# Patient Record
Sex: FEMALE | Race: WHITE | HISPANIC OR LATINO | Employment: UNEMPLOYED | URBAN - METROPOLITAN AREA
[De-identification: names, ages, dates, MRNs, and addresses within clinical notes are randomized per-mention and may not be internally consistent; named-entity substitution may affect disease eponyms.]

---

## 2017-01-09 ENCOUNTER — TELEPHONE (OUTPATIENT)
Dept: PEDIATRIC CARDIOLOGY | Facility: CLINIC | Age: 22
End: 2017-01-09

## 2017-01-26 ENCOUNTER — HOSPITAL ENCOUNTER (OUTPATIENT)
Dept: CARDIOLOGY | Facility: CLINIC | Age: 22
Discharge: HOME | End: 2017-01-26
Payer: COMMERCIAL

## 2017-01-26 ENCOUNTER — OFFICE VISIT (OUTPATIENT)
Dept: CARDIOLOGY | Facility: CLINIC | Age: 22
End: 2017-01-26
Payer: COMMERCIAL

## 2017-01-26 VITALS
WEIGHT: 106.69 LBS | SYSTOLIC BLOOD PRESSURE: 98 MMHG | HEART RATE: 54 BPM | HEIGHT: 59 IN | OXYGEN SATURATION: 98 % | DIASTOLIC BLOOD PRESSURE: 58 MMHG | BODY MASS INDEX: 21.51 KG/M2

## 2017-01-26 DIAGNOSIS — Q89.3 SITUS INVERSUS: ICD-10-CM

## 2017-01-26 DIAGNOSIS — I37.0 PULMONARY STENOSIS: ICD-10-CM

## 2017-01-26 DIAGNOSIS — Z98.890 S/P FONTAN PROCEDURE: ICD-10-CM

## 2017-01-26 DIAGNOSIS — I37.0 NONRHEUMATIC PULMONARY VALVE STENOSIS: ICD-10-CM

## 2017-01-26 DIAGNOSIS — Q20.1 DORV (DOUBLE OUTLET RIGHT VENTRICLE): ICD-10-CM

## 2017-01-26 DIAGNOSIS — Q24.9 ADULT CONGENITAL HEART DISEASE: ICD-10-CM

## 2017-01-26 DIAGNOSIS — Q24.9 ADULT CONGENITAL HEART DISEASE: Primary | ICD-10-CM

## 2017-01-26 PROCEDURE — 99999 PR PBB SHADOW E&M-EST. PATIENT-LVL III: CPT | Mod: PBBFAC,,, | Performed by: PEDIATRICS

## 2017-01-26 PROCEDURE — 93303 ECHO TRANSTHORACIC: CPT | Mod: S$GLB,,, | Performed by: PEDIATRICS

## 2017-01-26 PROCEDURE — 93320 DOPPLER ECHO COMPLETE: CPT | Mod: S$GLB,,, | Performed by: PEDIATRICS

## 2017-01-26 PROCEDURE — 1159F MED LIST DOCD IN RCRD: CPT | Mod: S$GLB,,, | Performed by: PEDIATRICS

## 2017-01-26 PROCEDURE — 93325 DOPPLER ECHO COLOR FLOW MAPG: CPT | Mod: S$GLB,,, | Performed by: PEDIATRICS

## 2017-01-26 PROCEDURE — 99214 OFFICE O/P EST MOD 30 MIN: CPT | Mod: S$GLB,,, | Performed by: PEDIATRICS

## 2017-01-26 PROCEDURE — 93000 ELECTROCARDIOGRAM COMPLETE: CPT | Mod: S$GLB,,, | Performed by: INTERNAL MEDICINE

## 2017-01-28 NOTE — PROGRESS NOTES
"2017     [facility]    Primary Doctor No  No address on file    re:Theresa Grey  :1995    Theresa Grey is a 21 y.o. female diagnosed as a young child with congenital heart disease while living in [place]. She was evaluated at [facility] with a cath at about 7 years of age. She was scheduled for heart surgery by her report about 10 years ago. However, due to social issues (no insurance, no social security number) the surgery was put off and she was lost to follow up. I first saw her in  she was admitted with pregnancy, rare chest pain, and cyanosis.  After long discussion, she had a pregnancy termination due to the risk associated with unrepaired cyanotic congenital heart disease.    On 16, she underwent an extracardiac Fontan with oversewing and ligation of the MPA at [facility].  She did very well and was discharged a week later.      Interval history:  I last saw her a year ago.  Since then, she denies new problems.  No chest pain, worsening dyspnea on exertion, edema, diarrhea, palpitations, productive cough, syncope, near syncope.    The review of systems is as noted above. It is otherwise negative for other symptoms related to the general, neurological, psychiatric, endocrine, gastrointestinal, genitourinary, respiratory, dermatologic, musculoskeletal, hematologic, and immunologic systems.    Past Medical History   Diagnosis Date    heart disease      fatigue as a result of pregnancy (8wks ) clubbing indicates chronic oxygenation issues but pt considered it "normal"     Past Surgical History   Procedure Laterality Date    Cardiac catheterization  at 7 yars of age    Dilation and curettage of uterus      Fontan procedure, extracardiac  2015     With a nonfenestrated 22 mm [place]-Justice tube graft.  The pulmonary valve was closed.  Procedure performed by Dr. Moe Kulkarni at [facility].     Family History   Problem Relation " Age of Onset    Diabetes Maternal Grandmother     Vision loss Maternal Grandmother     Miscarriages / Stillbirths Mother     Diabetes Father     Diabetes Paternal Grandmother     No Known Problems Brother     No Known Problems Maternal Grandfather     No Known Problems Paternal Grandfather     Hypertension Maternal Aunt     Diabetes Maternal Aunt     No Known Problems Sister     No Known Problems Maternal Uncle     No Known Problems Paternal Aunt     No Known Problems Paternal Uncle     Anemia Neg Hx     Arrhythmia Neg Hx     Asthma Neg Hx     Clotting disorder Neg Hx     Fainting Neg Hx     Heart attack Neg Hx     Heart disease Neg Hx     Heart failure Neg Hx     Hyperlipidemia Neg Hx     Stroke Neg Hx     Atrial Septal Defect Neg Hx      Social History     Social History    Marital status: Single     Spouse name: N/A    Number of children: N/A    Years of education: N/A     Social History Main Topics    Smoking status: Former Smoker     Packs/day: 0.25    Smokeless tobacco: Never Used    Alcohol use 0.0 oz/week     0 Standard drinks or equivalent per week      Comment: on ocassion before pregnancy    Drug use: No      Comment: previous marijuana use    Sexual activity: Yes     Partners: Male     Birth control/ protection: Injection     Other Topics Concern    None     Social History Narrative    None     Current Outpatient Prescriptions on File Prior to Visit   Medication Sig Dispense Refill    aspirin 81 MG Chew Take 1 tablet (81 mg total) by mouth once daily.  0    ibuprofen (ADVIL) 100 MG tablet Take 100 mg by mouth every 6 (six) hours as needed for Temperature greater than.       No current facility-administered medications on file prior to visit.      Review of patient's allergies indicates:   Allergen Reactions    Hydrocodone Shortness Of Breath and Other (See Comments)     Dizziness; sweating     Visit Vitals    BP (!) 98/58 (BP Location: Right arm, Patient Position:  "Sitting, BP Method: Automatic)    Pulse (!) 54    Ht 4' 11" (1.499 m)    Wt 48.4 kg (106 lb 11.2 oz)    LMP 01/05/2017    SpO2 98%    BMI 21.55 kg/m2       SpO2 Readings from Last 3 Encounters:   01/26/17 98%   01/27/16 97%   07/30/15 (!) 83%   In general, she is a thin, acyanotic, nondysmorphic appearing female in no apparent distress.  The eyes, nares, and oropharynx are clear.  Eyelids and conjunctiva free of drainage and redness.  PERRLA.  Teeth in good repair.  Mucous membranes are moist.  The head is normocephalic and atraumatic.  The neck is supple without jugular venous distention or thyroid enlargement.  The lungs are clear to auscultation bilaterally.  There is a well healed sternotomy scar.  The PMI is in the right chest.  The first heart sound is normal.  The second is loud and single.  There are no gallops, rubs, or clicks in the supine position.  The abdominal exam is benign without hepatosplenomegaly, tenderness, or distention.  Pulses are normal in all 4 extremities with brisk capillary refill and no edema.  No rashes are noted.  She has some clubbing.  No tenderness, swelling in legs.  Her neurological exam is normal.    EKG with a "left atrial rhythm" with evidence of dextrocardia.  HR about 50.  Echo:  CONCLUSIONS     1 - Heterotaxy, abdominal situs inversus, dextrocardia (I, L, D), atrial situs inversus, atrioventricular concordance, large VSD with straddling AV valves, d-malposed great arteries and pulmonary stenosis s/p Fontan.     2 - Patent left SVC Pavel with flow noted into the proximal branch PAs.     3 - Intact IVC to Fontan without evidence of obstruction. The Fontan lateral to the heart is not seen. Flow from the Fontan is demonstrated into the pulmonary artery confluence.     4 - Pulmonary venous anatomy is not well seen.     5 - The atrial septum is not well seen.     6 - Large inlet to outlet VSD with shunt from LV to RV.     7 - The RV is leftward, anterior, and superior. " Moderate RVH, moderate RV dilation. Qualitatively normal RV function. .     8 - The LV is rightward, posterior, and inferior. Normal LV systolic function.     9 - The mitral valve straddles the ventricular septum. .     10 - Trivial mitral regurgitation.     11 - The tricuspid valve is difficult to visualize.     12 - Mild tricuspid regurgitation.     13 - No aortic valve insufficiency. Normal aortic valve velocity.    Diagnoses:  1. 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 resolved cyanosis    Discussion:  I had a long discussion with the patient last year, and we reviewed today. We discussed:  1. Risk of thromboembolic events with Fontan   - she needs to be on a baby aspirin daily to try to cut down on that risk   - would avoid estrogen containing birth control.  She will discuss with her gynecologist  2. Long term risk of arrhythmias, heart failure, PLE, liver disease discussed  3. Desire for pregnancy - we discussed the increased risk of arrhythmia, swelling, heart failure symptoms in mother's with Fontan physiology and the increased risk of early delivery, miscarriage, and congenital heart disease in the offspring.  That said, she would likely do well.  Would need careful monitoring of pregnancy by high risk OB, fetal evaluation for heart disease at about 18-22 wga, and delivery at [facility].  Vaginal delivery is preferable from a cardiac standpoint.  That said, there is definitely risk involved, and careful consideration of all potential issues related to pregnancy need to be considered.    Plan:  1. aspirin 81 mg daily  2. 1 year with ekg, CPX, holter and echo  3. SBEP is indicated for dental work     Sincerely,        Toro Weems MD  Pediatric Cardiology  Adult Congenital Heart Disease  Pediatric Heart Failure and  Transplantation  [facility]  [address] [place]  [address]  [phone]

## 2019-04-18 ENCOUNTER — HOSPITAL ENCOUNTER (EMERGENCY)
Facility: HOSPITAL | Age: 24
Discharge: HOME | End: 2019-04-18
Attending: EMERGENCY MEDICINE
Payer: COMMERCIAL

## 2019-04-18 VITALS
BODY MASS INDEX: 22.58 KG/M2 | SYSTOLIC BLOOD PRESSURE: 110 MMHG | DIASTOLIC BLOOD PRESSURE: 70 MMHG | HEIGHT: 59 IN | HEART RATE: 63 BPM | WEIGHT: 112 LBS | RESPIRATION RATE: 17 BRPM | OXYGEN SATURATION: 96 % | TEMPERATURE: 98 F

## 2019-04-18 DIAGNOSIS — R07.9 CHEST PAIN: ICD-10-CM

## 2019-04-18 LAB
ANION GAP SERPL CALC-SCNC: 10 MMOL/L (ref 8–16)
B-HCG UR QL: NEGATIVE
BASOPHILS # BLD AUTO: 0 K/UL (ref 0–0.2)
BASOPHILS NFR BLD: 0.3 % (ref 0–1.9)
BUN SERPL-MCNC: 11 MG/DL (ref 6–20)
CALCIUM SERPL-MCNC: 9.8 MG/DL (ref 8.7–10.5)
CHLORIDE SERPL-SCNC: 107 MMOL/L (ref 95–110)
CO2 SERPL-SCNC: 22 MMOL/L (ref 23–29)
CREAT SERPL-MCNC: 0.7 MG/DL (ref 0.5–1.4)
CTP QC/QA: YES
D DIMER PPP IA.FEU-MCNC: 0.19 MG/L FEU
DIFFERENTIAL METHOD: ABNORMAL
EOSINOPHIL # BLD AUTO: 0.5 K/UL (ref 0–0.5)
EOSINOPHIL NFR BLD: 4.4 % (ref 0–8)
ERYTHROCYTE [DISTWIDTH] IN BLOOD BY AUTOMATED COUNT: 13 % (ref 11.5–14.5)
EST. GFR  (AFRICAN AMERICAN): >60 ML/MIN/1.73 M^2
EST. GFR  (NON AFRICAN AMERICAN): >60 ML/MIN/1.73 M^2
GLUCOSE SERPL-MCNC: 114 MG/DL (ref 70–110)
HCT VFR BLD AUTO: 40.7 % (ref 37–48.5)
HGB BLD-MCNC: 13.3 G/DL (ref 12–16)
LYMPHOCYTES # BLD AUTO: 2.3 K/UL (ref 1–4.8)
LYMPHOCYTES NFR BLD: 19.8 % (ref 18–48)
MCH RBC QN AUTO: 29.4 PG (ref 27–31)
MCHC RBC AUTO-ENTMCNC: 32.5 G/DL (ref 32–36)
MCV RBC AUTO: 90 FL (ref 82–98)
MONOCYTES # BLD AUTO: 0.7 K/UL (ref 0.3–1)
MONOCYTES NFR BLD: 6.1 % (ref 4–15)
NEUTROPHILS # BLD AUTO: 8.2 K/UL (ref 1.8–7.7)
NEUTROPHILS NFR BLD: 69.4 % (ref 38–73)
PLATELET # BLD AUTO: 153 K/UL (ref 150–350)
PMV BLD AUTO: 12.1 FL (ref 9.2–12.9)
POTASSIUM SERPL-SCNC: 3.6 MMOL/L (ref 3.5–5.1)
RBC # BLD AUTO: 4.51 M/UL (ref 4–5.4)
SODIUM SERPL-SCNC: 139 MMOL/L (ref 136–145)
TROPONIN I SERPL DL<=0.01 NG/ML-MCNC: <0.006 NG/ML (ref 0–0.03)
WBC # BLD AUTO: 11.8 K/UL (ref 3.9–12.7)

## 2019-04-18 PROCEDURE — 84484 ASSAY OF TROPONIN QUANT: CPT

## 2019-04-18 PROCEDURE — 85379 FIBRIN DEGRADATION QUANT: CPT

## 2019-04-18 PROCEDURE — 25000003 PHARM REV CODE 250: Performed by: EMERGENCY MEDICINE

## 2019-04-18 PROCEDURE — 93005 ELECTROCARDIOGRAM TRACING: CPT

## 2019-04-18 PROCEDURE — 36415 COLL VENOUS BLD VENIPUNCTURE: CPT

## 2019-04-18 PROCEDURE — 85025 COMPLETE CBC W/AUTO DIFF WBC: CPT

## 2019-04-18 PROCEDURE — 81025 URINE PREGNANCY TEST: CPT | Performed by: EMERGENCY MEDICINE

## 2019-04-18 PROCEDURE — 99285 EMERGENCY DEPT VISIT HI MDM: CPT | Mod: 25

## 2019-04-18 PROCEDURE — 80048 BASIC METABOLIC PNL TOTAL CA: CPT

## 2019-04-18 RX ORDER — IBUPROFEN 400 MG/1
400 TABLET ORAL
Status: COMPLETED | OUTPATIENT
Start: 2019-04-18 | End: 2019-04-18

## 2019-04-18 RX ADMIN — IBUPROFEN 400 MG: 400 TABLET, FILM COATED ORAL at 09:04

## 2019-04-19 NOTE — ED PROVIDER NOTES
"Encounter Date: 4/18/2019    SCRIBE #1 NOTE: I, Binta Hong, am scribing for, and in the presence of, Dr. Hummel.       History     Chief Complaint   Patient presents with    Chest Pain     chest pain and SOB since this AM described at heaviness on the chest and top of back         Time seen by provider: 7:57 PM on 04/18/2019    Theresa Grey is a 23 y.o. female who presents to the ED complaining of chest pain located in the middle of her chest and shortness of breath that began this morning. She admits that it is painful to take deep breaths and notes that the pain radiates to her back. Pt is currently followed by the adult congential heart disease clinic. The patient denies vomiting, sweating, abdominal pain, or any other symptoms at this time. PMHx includes abnormality of heart valve. SHx includes extracardiac fontan procedure. Allergies include hydrocodone.    The history is provided by the patient.     Review of patient's allergies indicates:   Allergen Reactions    Hydrocodone Shortness Of Breath and Other (See Comments)     Dizziness; sweating     Past Medical History:   Diagnosis Date    Abnormality of heart valve     heart disease     fatigue as a result of pregnancy (8wks ) clubbing indicates chronic oxygenation issues but pt considered it "normal"     Past Surgical History:   Procedure Laterality Date    CARDIAC CATHETERIZATION  at 7 yars of age    D & C (SUCTION) N/A 12/12/2014    Performed by Pierre Ellsworth III, MD at [facility] OR 2ND FLR    DILATION AND CURETTAGE OF UTERUS      FONTAN PROCEDURE, EXTRACARDIAC  September 29, 2015    With a nonfenestrated 22 mm [place]-Justice tube graft.  The pulmonary valve was closed.  Procedure performed by Dr. Moe [facility]i [facility] [place] [facility] WITH [facility] CONGEITAL CARD ABN N/A 3/20/2015    Performed by Patrick Fragoso Jr., MD at [facility] CATH LAB    TRANSESOPHAGEAL ECHOCARDIOGRAM (FLORI) N/A 3/20/2015    Performed by Patrick Fragoso Jr., MD at [facility] " CATH LAB     Family History   Problem Relation Age of Onset    Diabetes Maternal Grandmother     Vision loss Maternal Grandmother     Miscarriages / Stillbirths Mother     Diabetes Father     Diabetes Paternal Grandmother     No Known Problems Brother     No Known Problems Maternal Grandfather     No Known Problems Paternal Grandfather     Hypertension Maternal Aunt     Diabetes Maternal Aunt     No Known Problems Sister     No Known Problems Maternal Uncle     No Known Problems Paternal Aunt     No Known Problems Paternal Uncle     Anemia Neg Hx     Arrhythmia Neg Hx     Asthma Neg Hx     Clotting disorder Neg Hx     Fainting Neg Hx     Heart attack Neg Hx     Heart disease Neg Hx     Heart failure Neg Hx     Hyperlipidemia Neg Hx     Stroke Neg Hx     Atrial Septal Defect Neg Hx      Social History     Tobacco Use    Smoking status: Former Smoker     Packs/day: 0.25    Smokeless tobacco: Never Used   Substance Use Topics    Alcohol use: Yes     Alcohol/week: 0.0 oz     Comment: on ocassion before pregnancy    Drug use: No     Comment: previous marijuana use     Review of Systems   Constitutional: Negative for diaphoresis and fever.   HENT: Negative for sore throat.    Respiratory: Positive for shortness of breath.    Cardiovascular: Positive for chest pain.   Gastrointestinal: Negative for abdominal pain, nausea and vomiting.   Genitourinary: Negative for dysuria.   Musculoskeletal: Negative for back pain.   Skin: Negative for rash.   Neurological: Negative for weakness.   Hematological: Does not bruise/bleed easily.       Physical Exam     Initial Vitals [04/18/19 1944]   BP Pulse Resp Temp SpO2   131/69 97 17 98.3 °F (36.8 °C) 99 %      MAP       --         Physical Exam    Nursing note and vitals reviewed.  Constitutional: She appears well-developed and well-nourished. She is not diaphoretic. No distress.   HENT:   Head: Normocephalic and atraumatic.   Mouth/Throat: Oropharynx is  clear and moist.   Eyes: Conjunctivae are normal.   Neck: Neck supple.   Cardiovascular: Normal rate, regular rhythm, normal heart sounds and intact distal pulses. Exam reveals no gallop and no friction rub.    No murmur heard.  Pulses:       Radial pulses are 2+ on the right side, and 2+ on the left side.        Dorsalis pedis pulses are 2+ on the right side, and 2+ on the left side.        Posterior tibial pulses are 2+ on the right side, and 2+ on the left side.   Right-sided heart tones.   Pulmonary/Chest: Breath sounds normal. She has no wheezes. She has no rhonchi. She has no rales.   Well-healed midline chest incision.   Abdominal: Soft. She exhibits no distension. There is no tenderness.   Musculoskeletal: Normal range of motion. She exhibits no edema or tenderness.        Right lower leg: She exhibits no tenderness and no edema.        Left lower leg: She exhibits no tenderness and no edema.   Neurological: She is alert and oriented to person, place, and time.   Skin: No rash noted. No erythema.   Psychiatric: Her speech is normal.         ED Course   Procedures  Labs Reviewed   CBC W/ AUTO DIFFERENTIAL - Abnormal; Notable for the following components:       Result Value    Gran # (ANC) 8.2 (*)     All other components within normal limits   BASIC METABOLIC PANEL - Abnormal; Notable for the following components:    CO2 22 (*)     Glucose 114 (*)     All other components within normal limits   TROPONIN I   D DIMER, QUANTITATIVE   POCT URINE PREGNANCY          Imaging Results          X-Ray Chest PA And Lateral (In process)                  Medical Decision Making:   History:   Old Medical Records: I decided to obtain old medical records.  Clinical Tests:   Lab Tests: Ordered and Reviewed  Radiological Study: Ordered and Reviewed  Medical Tests: Ordered and Reviewed            Scribe Attestation:   Scribe #1: I performed the above scribed service and the documentation accurately describes the services I  performed. I attest to the accuracy of the note.    I, Dr. Jayson Hummel, personally performed the services described in this documentation. All medical record entries made by the scribe were at my direction and in my presence.  I have reviewed the chart and agree that the record reflects my personal performance and is accurate and complete. Jayson Hummel MD.  10:20 PM 04/18/2019    Theresa Grey is a 23 y.o. female presenting with chest pain in this otherwise well-appearing patient.  I did speak with Pediatric Cardiology given her significant history who concurs with outpatient management given negative workup.  Negative cardiac biomarker after symptoms throughout the day today.  I do not think she requires admission cardiac monitoring or serial troponin evaluation.  EKG is nonischemic with no sign of arrhythmia.  Lungs are clear with no sign of edema.  There is no JVD.  I doubt heart failure.  Low risk for PE with negative D-dimer and I think pulmonary embolus is very unlikely.  I do not think further CT angiography is indicated.  I have very low suspicion for aortic dissection.  I doubt other complication of previous cardiac surgery warranting emergent treatment.  She is appropriate for outpatient follow-up.  Follow up closely with Pediatric Cardiology Clinic this coming week.  Return precautions reviewed.        ED Course as of Apr 18 2221 Thu Apr 18, 2019 1952 EKG:  NSR, rate of 67, normal intervals, normal axis. Incomplete RBBB morphology similar to prior.  There are no acute ST or T wave changes suggestive of acute ischemia or infarction.      [MR]   2017 CXR:  Dextrocardia, sternal wires, NAD. (my read)    [MR]   2100 Awaiting return call from group with Dr. Toro Weems pediatric cardiology.    [MR]      ED Course User Index  [MR] Jayson Hummel MD     Clinical Impression:       ICD-10-CM ICD-9-CM   1. Chest pain R07.9 786.50         Disposition:   Disposition: Discharged  Condition:  Stable                        Jayson Hummel MD  04/18/19 0939

## 2019-04-22 DIAGNOSIS — Q24.9 ADULT CONGENITAL HEART DISEASE: Primary | ICD-10-CM

## 2019-09-18 DIAGNOSIS — Q20.1 DORV (DOUBLE OUTLET RIGHT VENTRICLE): Primary | ICD-10-CM

## 2019-09-26 ENCOUNTER — HOSPITAL ENCOUNTER (EMERGENCY)
Facility: HOSPITAL | Age: 24
Discharge: HOME | End: 2019-09-26
Attending: EMERGENCY MEDICINE
Payer: MEDICAID

## 2019-09-26 VITALS
RESPIRATION RATE: 16 BRPM | WEIGHT: 116 LBS | DIASTOLIC BLOOD PRESSURE: 57 MMHG | OXYGEN SATURATION: 98 % | HEART RATE: 62 BPM | BODY MASS INDEX: 23.39 KG/M2 | TEMPERATURE: 98 F | HEIGHT: 59 IN | SYSTOLIC BLOOD PRESSURE: 100 MMHG

## 2019-09-26 DIAGNOSIS — O21.9 VOMITING COMPLICATING PREGNANCY: Primary | ICD-10-CM

## 2019-09-26 LAB
ALBUMIN SERPL BCP-MCNC: 3.6 G/DL (ref 3.5–5.2)
ALP SERPL-CCNC: 50 U/L (ref 55–135)
ALT SERPL W/O P-5'-P-CCNC: 18 U/L (ref 10–44)
ANION GAP SERPL CALC-SCNC: 8 MMOL/L (ref 8–16)
APTT PPP: 29 SEC (ref 26.2–34.7)
AST SERPL-CCNC: 20 U/L (ref 10–40)
B-HCG UR QL: POSITIVE
BASOPHILS # BLD AUTO: 0.05 K/UL (ref 0–0.2)
BASOPHILS NFR BLD: 0.5 % (ref 0–1.9)
BILIRUB SERPL-MCNC: 0.7 MG/DL (ref 0.1–1)
BILIRUB UR QL STRIP: NEGATIVE
BUN SERPL-MCNC: 7 MG/DL (ref 6–20)
CALCIUM SERPL-MCNC: 9 MG/DL (ref 8.7–10.5)
CHLORIDE SERPL-SCNC: 106 MMOL/L (ref 95–110)
CLARITY UR: ABNORMAL
CO2 SERPL-SCNC: 21 MMOL/L (ref 23–29)
COLOR UR: YELLOW
CREAT SERPL-MCNC: 0.4 MG/DL (ref 0.5–1.4)
DIFFERENTIAL METHOD: ABNORMAL
EOSINOPHIL # BLD AUTO: 0.4 K/UL (ref 0–0.5)
EOSINOPHIL NFR BLD: 3.6 % (ref 0–8)
ERYTHROCYTE [DISTWIDTH] IN BLOOD BY AUTOMATED COUNT: 13 % (ref 11.5–14.5)
EST. GFR  (AFRICAN AMERICAN): >60 ML/MIN/1.73 M^2
EST. GFR  (NON AFRICAN AMERICAN): >60 ML/MIN/1.73 M^2
GLUCOSE SERPL-MCNC: 97 MG/DL (ref 70–110)
GLUCOSE UR QL STRIP: NEGATIVE
HCT VFR BLD AUTO: 38.5 % (ref 37–48.5)
HGB BLD-MCNC: 12.8 G/DL (ref 12–16)
HGB UR QL STRIP: NEGATIVE
IMM GRANULOCYTES # BLD AUTO: 0.05 K/UL (ref 0–0.04)
IMM GRANULOCYTES NFR BLD AUTO: 0.5 % (ref 0–0.5)
INR PPP: 1.1
KETONES UR QL STRIP: NEGATIVE
LEUKOCYTE ESTERASE UR QL STRIP: NEGATIVE
LIPASE SERPL-CCNC: 30 U/L (ref 4–60)
LYMPHOCYTES # BLD AUTO: 1.6 K/UL (ref 1–4.8)
LYMPHOCYTES NFR BLD: 16.6 % (ref 18–48)
MAGNESIUM SERPL-MCNC: 1.6 MG/DL (ref 1.6–2.6)
MCH RBC QN AUTO: 29.8 PG (ref 27–31)
MCHC RBC AUTO-ENTMCNC: 33.2 G/DL (ref 32–36)
MCV RBC AUTO: 90 FL (ref 82–98)
MONOCYTES # BLD AUTO: 0.5 K/UL (ref 0.3–1)
MONOCYTES NFR BLD: 4.7 % (ref 4–15)
NEUTROPHILS # BLD AUTO: 7.2 K/UL (ref 1.8–7.7)
NEUTROPHILS NFR BLD: 74.1 % (ref 38–73)
NITRITE UR QL STRIP: NEGATIVE
NRBC BLD-RTO: 0 /100 WBC
PH UR STRIP: 7 [PH] (ref 5–8)
PLATELET # BLD AUTO: 109 K/UL (ref 150–350)
PMV BLD AUTO: 13.8 FL (ref 9.2–12.9)
POTASSIUM SERPL-SCNC: 3.5 MMOL/L (ref 3.5–5.1)
PROT SERPL-MCNC: 7.5 G/DL (ref 6–8.4)
PROT UR QL STRIP: ABNORMAL
PROTHROMBIN TIME: 13.3 SEC (ref 11.7–14)
RBC # BLD AUTO: 4.3 M/UL (ref 4–5.4)
SODIUM SERPL-SCNC: 135 MMOL/L (ref 136–145)
SP GR UR STRIP: 1.01 (ref 1–1.03)
URN SPEC COLLECT METH UR: ABNORMAL
UROBILINOGEN UR STRIP-ACNC: NEGATIVE EU/DL
WBC # BLD AUTO: 9.69 K/UL (ref 3.9–12.7)

## 2019-09-26 PROCEDURE — C9113 INJ PANTOPRAZOLE SODIUM, VIA: HCPCS | Performed by: EMERGENCY MEDICINE

## 2019-09-26 PROCEDURE — 85730 THROMBOPLASTIN TIME PARTIAL: CPT

## 2019-09-26 PROCEDURE — 96375 TX/PRO/DX INJ NEW DRUG ADDON: CPT

## 2019-09-26 PROCEDURE — 85025 COMPLETE CBC W/AUTO DIFF WBC: CPT

## 2019-09-26 PROCEDURE — 83690 ASSAY OF LIPASE: CPT

## 2019-09-26 PROCEDURE — 96374 THER/PROPH/DIAG INJ IV PUSH: CPT

## 2019-09-26 PROCEDURE — 99284 EMERGENCY DEPT VISIT MOD MDM: CPT | Mod: 25

## 2019-09-26 PROCEDURE — 96361 HYDRATE IV INFUSION ADD-ON: CPT

## 2019-09-26 PROCEDURE — 80053 COMPREHEN METABOLIC PANEL: CPT

## 2019-09-26 PROCEDURE — 81003 URINALYSIS AUTO W/O SCOPE: CPT

## 2019-09-26 PROCEDURE — 81025 URINE PREGNANCY TEST: CPT

## 2019-09-26 PROCEDURE — 63600175 PHARM REV CODE 636 W HCPCS: Performed by: EMERGENCY MEDICINE

## 2019-09-26 PROCEDURE — 85610 PROTHROMBIN TIME: CPT

## 2019-09-26 PROCEDURE — 83735 ASSAY OF MAGNESIUM: CPT

## 2019-09-26 RX ORDER — METOCLOPRAMIDE 10 MG/1
10 TABLET ORAL EVERY 6 HOURS PRN
Qty: 15 TABLET | Refills: 0 | Status: SHIPPED | OUTPATIENT
Start: 2019-09-26 | End: 2019-11-12 | Stop reason: CLARIF

## 2019-09-26 RX ORDER — METOCLOPRAMIDE HYDROCHLORIDE 5 MG/ML
10 INJECTION INTRAMUSCULAR; INTRAVENOUS
Status: COMPLETED | OUTPATIENT
Start: 2019-09-26 | End: 2019-09-26

## 2019-09-26 RX ORDER — PANTOPRAZOLE SODIUM 20 MG/1
20 TABLET, DELAYED RELEASE ORAL DAILY
Qty: 10 TABLET | Refills: 0 | Status: SHIPPED | OUTPATIENT
Start: 2019-09-26 | End: 2019-11-12 | Stop reason: CLARIF

## 2019-09-26 RX ORDER — PANTOPRAZOLE SODIUM 40 MG/10ML
40 INJECTION, POWDER, LYOPHILIZED, FOR SOLUTION INTRAVENOUS
Status: COMPLETED | OUTPATIENT
Start: 2019-09-26 | End: 2019-09-26

## 2019-09-26 RX ADMIN — PANTOPRAZOLE SODIUM 40 MG: 40 INJECTION, POWDER, LYOPHILIZED, FOR SOLUTION INTRAVENOUS at 10:09

## 2019-09-26 RX ADMIN — METOCLOPRAMIDE 10 MG: 5 INJECTION, SOLUTION INTRAMUSCULAR; INTRAVENOUS at 10:09

## 2019-09-26 RX ADMIN — SODIUM CHLORIDE 1000 ML: 9 INJECTION, SOLUTION INTRAVENOUS at 08:09

## 2019-09-26 NOTE — ED PROVIDER NOTES
"Encounter Date: 9/26/2019       History     Chief Complaint   Patient presents with    Emesis     pt is 12 wks pregnant. normally nauseated with emesis. this morning saw "bright red blood in vomit"      Patient here with reported vomiting states she is approximately 12 weeks pregnant has had a significant amount of hyperemesis with pregnancy she presented this morning because she saw some bright red blood after vomiting streaks of blood no abdominal pain no diarrhea no black stools no spotting        Review of patient's allergies indicates:   Allergen Reactions    Hydrocodone Shortness Of Breath and Other (See Comments)     Dizziness; sweating     Past Medical History:   Diagnosis Date    Abnormality of heart valve     heart disease     fatigue as a result of pregnancy (8wks ) clubbing indicates chronic oxygenation issues but pt considered it "normal"     Past Surgical History:   Procedure Laterality Date    CARDIAC CATHETERIZATION  at 7 yars of age    DILATION AND CURETTAGE OF UTERUS      FONTAN PROCEDURE, EXTRACARDIAC  September 29, 2015    With a nonfenestrated 22 mm [place]-Justice tube graft.  The pulmonary valve was closed.  Procedure performed by Dr. Moe Kulkarni at [facility].     Family History   Problem Relation Age of Onset    Diabetes Maternal Grandmother     Vision loss Maternal Grandmother     Miscarriages / Stillbirths Mother     Diabetes Father     Diabetes Paternal Grandmother     No Known Problems Brother     No Known Problems Maternal Grandfather     No Known Problems Paternal Grandfather     Hypertension Maternal Aunt     Diabetes Maternal Aunt     No Known Problems Sister     No Known Problems Maternal Uncle     No Known Problems Paternal Aunt     No Known Problems Paternal Uncle     Anemia Neg Hx     Arrhythmia Neg Hx     Asthma Neg Hx     Clotting disorder Neg Hx     Fainting Neg Hx     Heart attack Neg Hx     Heart disease Neg Hx     Heart failure Neg Hx     " Hyperlipidemia Neg Hx     Stroke Neg Hx     Atrial Septal Defect Neg Hx      Social History     Tobacco Use    Smoking status: Former Smoker     Packs/day: 0.25    Smokeless tobacco: Never Used   Substance Use Topics    Alcohol use: Yes     Alcohol/week: 0.0 standard drinks     Comment: on ocassion before pregnancy    Drug use: No     Comment: previous marijuana use     Review of Systems   Constitutional: Negative.    HENT: Negative.    Eyes: Negative.    Respiratory: Negative.    Cardiovascular: Negative.    Gastrointestinal: Positive for nausea and vomiting. Negative for abdominal pain, anal bleeding and blood in stool.   Endocrine: Negative.    Genitourinary: Negative.    Musculoskeletal: Negative.    Skin: Negative.    Neurological: Negative.    Hematological: Negative.    Psychiatric/Behavioral: Negative.        Physical Exam     Initial Vitals [09/26/19 0739]   BP Pulse Resp Temp SpO2   (!) 146/89 66 16 98.1 °F (36.7 °C) 97 %      MAP       --         Physical Exam    Constitutional: She appears well-developed and well-nourished. No distress.   HENT:   Head: Normocephalic and atraumatic.   Right Ear: External ear normal.   Mouth/Throat: Oropharynx is clear and moist.   Eyes: Conjunctivae and EOM are normal. Pupils are equal, round, and reactive to light.   Neck: Normal range of motion. Neck supple.   Cardiovascular: Normal rate, regular rhythm, normal heart sounds and intact distal pulses.   Pulmonary/Chest: Breath sounds normal. No respiratory distress.   Abdominal: Soft. Bowel sounds are normal. She exhibits no distension.   Musculoskeletal: Normal range of motion. She exhibits no edema or tenderness.   Neurological: She is alert and oriented to person, place, and time. She has normal strength. GCS score is 15. GCS eye subscore is 4. GCS verbal subscore is 5. GCS motor subscore is 6.   Skin: Skin is warm and dry. Capillary refill takes less than 2 seconds.   Psychiatric: She has a normal mood and  affect. Her behavior is normal.         ED Course   Procedures  Labs Reviewed   CBC W/ AUTO DIFFERENTIAL - Abnormal; Notable for the following components:       Result Value    Platelets 109 (*)     MPV 13.8 (*)     Immature Grans (Abs) 0.05 (*)     Gran% 74.1 (*)     Lymph% 16.6 (*)     All other components within normal limits   COMPREHENSIVE METABOLIC PANEL - Abnormal; Notable for the following components:    Sodium 135 (*)     CO2 21 (*)     Creatinine 0.4 (*)     Alkaline Phosphatase 50 (*)     All other components within normal limits   PROTIME-INR   APTT   LIPASE   MAGNESIUM   URINALYSIS   PREGNANCY TEST, URINE RAPID          Imaging Results    None                            ED Course as of Sep 26 0904   Thu Sep 26, 2019   0832 WBC: 9.69 [AT]   0832 Hemoglobin: 12.8 [AT]   0832 Hematocrit: 38.5 [AT]   0832 Platelets(!): 109 [AT]      ED Course User Index  [AT] Connor Rodriguez MD     Clinical Impression:       ICD-10-CM ICD-9-CM   1. Vomiting complicating pregnancy O21.9 643.90                                Connor Rodriguez MD  09/26/19 1618       Connor Rodriguez MD  01/28/20 [id number]

## 2019-09-26 NOTE — ED NOTES
Pt states she is 12 weeks pregnant. She normally vomits, but today she said she saw bright red blood in her vomit and it scared her.

## 2019-10-04 ENCOUNTER — TELEPHONE (OUTPATIENT)
Dept: MATERNAL FETAL MEDICINE | Facility: CLINIC | Age: 24
End: 2019-10-04

## 2019-10-04 DIAGNOSIS — Z36.9 ENCOUNTER FOR FETAL ULTRASOUND: Primary | ICD-10-CM

## 2019-10-04 NOTE — TELEPHONE ENCOUNTER
Pt returned call. Is unable to make appointment 10/11. Pt requested 10/7 since she is off for the day. Rescheduled to Monday, 10/7 at 1:00 pm with Dr Kothari. Discussed date, time and location of clinic in detail. Pt verbalized understanding of information. Refaxed appointment letter to Dr Turpin with new date and time.

## 2019-10-04 NOTE — TELEPHONE ENCOUNTER
Received referral from Dr. Davey Turpin via fax for a MFM consultation and ultrasound due to maternal heart defect/surgery in 2015 currently 14 weeks IUP. Information reviewed with Dr. Franklin who recommends pt be scheduled for next week.    Phone call placed to patient to schedule appointment. No answer, no voicemail. MFM consult and ultrasound appointment scheduled on Friday, October 11 at 8:40 am. Appointment letter placed in mail.

## 2019-10-07 ENCOUNTER — PROCEDURE VISIT (OUTPATIENT)
Dept: MATERNAL FETAL MEDICINE | Facility: CLINIC | Age: 24
End: 2019-10-07
Attending: OBSTETRICS & GYNECOLOGY
Payer: MEDICAID

## 2019-10-07 VITALS
SYSTOLIC BLOOD PRESSURE: 114 MMHG | DIASTOLIC BLOOD PRESSURE: 78 MMHG | BODY MASS INDEX: 23.38 KG/M2 | WEIGHT: 115.75 LBS

## 2019-10-07 DIAGNOSIS — Q24.9 ADULT CONGENITAL HEART DISEASE: Primary | ICD-10-CM

## 2019-10-07 DIAGNOSIS — Z36.89 ENCOUNTER FOR FETAL ANATOMIC SURVEY: Primary | ICD-10-CM

## 2019-10-07 DIAGNOSIS — Z36.9 ENCOUNTER FOR FETAL ULTRASOUND: ICD-10-CM

## 2019-10-07 DIAGNOSIS — Q20.1 DORV (DOUBLE OUTLET RIGHT VENTRICLE): ICD-10-CM

## 2019-10-07 PROCEDURE — 99999 PR PBB SHADOW E&M-EST. PATIENT-LVL II: ICD-10-PCS | Mod: PBBFAC,,, | Performed by: OBSTETRICS & GYNECOLOGY

## 2019-10-07 PROCEDURE — 76805 OB US >/= 14 WKS SNGL FETUS: CPT | Mod: 26,S$PBB,, | Performed by: OBSTETRICS & GYNECOLOGY

## 2019-10-07 PROCEDURE — 99205 OFFICE O/P NEW HI 60 MIN: CPT | Mod: S$PBB,TH,25, | Performed by: OBSTETRICS & GYNECOLOGY

## 2019-10-07 PROCEDURE — 76805 OB US >/= 14 WKS SNGL FETUS: CPT | Mod: PBBFAC | Performed by: OBSTETRICS & GYNECOLOGY

## 2019-10-07 PROCEDURE — 99212 OFFICE O/P EST SF 10 MIN: CPT | Mod: PBBFAC,TH,25 | Performed by: OBSTETRICS & GYNECOLOGY

## 2019-10-07 PROCEDURE — 99205 PR OFFICE/OUTPT VISIT, NEW, LEVL V, 60-74 MIN: ICD-10-PCS | Mod: S$PBB,TH,25, | Performed by: OBSTETRICS & GYNECOLOGY

## 2019-10-07 PROCEDURE — 99999 PR PBB SHADOW E&M-EST. PATIENT-LVL II: CPT | Mod: PBBFAC,,, | Performed by: OBSTETRICS & GYNECOLOGY

## 2019-10-07 PROCEDURE — 76805 PR US, OB 14+WKS, TRANSABD, SINGLE GESTATION: ICD-10-PCS | Mod: 26,S$PBB,, | Performed by: OBSTETRICS & GYNECOLOGY

## 2019-10-07 NOTE — PROGRESS NOTES
"Chief complaint: Maternal congenital heart disease S/PFontan repair    Provider requesting consultation: Dr. SANDHYA Turpin    24 y.o. [id number] 14w3d EGA     PMH:  Past Medical History:   Diagnosis Date    Abnormality of heart valve     heart disease     fatigue as a result of pregnancy (8wks ) clubbing indicates chronic oxygenation issues but pt considered it "normal"       PObHx:  OB History    Para Term  AB Living   2       1     SAB TAB Ectopic Multiple Live Births     1            # Outcome Date GA Lbr Hong/2nd Weight Sex Delivery Anes PTL Lv   2 Current            1 TAB  8w0d              PSH:  Past Surgical History:   Procedure Laterality Date    CARDIAC CATHETERIZATION  at 7 yars of age    DILATION AND CURETTAGE OF UTERUS      FONTAN PROCEDURE, EXTRACARDIAC  2015    With a nonfenestrated 22 mm Jerusalem-Jutsice tube graft.  The pulmonary valve was closed.  Procedure performed by Dr. Moe Kulkarni at [facility].       Family history:family history includes Diabetes in her father, maternal aunt, maternal grandmother, and paternal grandmother; Hypertension in her maternal aunt; Miscarriages / Stillbirths in her mother; No Known Problems in her brother, maternal grandfather, maternal uncle, paternal aunt, paternal grandfather, paternal uncle, and sister; Vision loss in her maternal grandmother.    Social history: reports that she has quit smoking. She smoked 0.25 packs per day. She has never used smokeless tobacco. She reports that she drinks alcohol. She reports that she does not use drugs.    A detailed fetal ultrasound was completed today.  See details in imaging section of [facility].    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  The echocardiogram S/P procedure 2 years ago documented the followin - Heterotaxy, abdominal situs inversus, dextrocardia (I, L, D), atrial situs inversus, atrioventricular concordance, large VSD with " straddling AV valves, d-malposed great arteries and pulmonary stenosis s/p Fontan.     2 - Patent left SVC Pavel with flow noted into the proximal branch PAs.     3 - Intact IVC to Fontan without evidence of obstruction. The Fontan lateral to the heart is not seen. Flow from the Fontan is demonstrated into the pulmonary artery confluence.     4 - Pulmonary venous anatomy is not well seen.     5 - The atrial septum is not well seen.     6 - Large inlet to outlet VSD with shunt from LV to RV.     7 - The RV is leftward, anterior, and superior. Moderate RVH, moderate RV dilation. Qualitatively normal RV function. .     8 - The LV is rightward, posterior, and inferior. Normal LV systolic function.     9 - The mitral valve straddles the ventricular septum. .     10 - Trivial mitral regurgitation.     11 - The tricuspid valve is difficult to visualize.     12 - Mild tricuspid regurgitation.     13 - No aortic valve insufficiency. Normal aortic valve velocity.    She has not been seen for 2 years.  With her last pregnancy she became symptomatic in the 1st trimester with SOB and limitation of activity.  This pregnancy was terminated and she had her Fontan procedure at age 17.  Since then she has been asymptomatic and has not had symptoms into the second trimester of pregnancy.  She has been lost to f/u for 2 years.   I discussed the stressors that pregnancy would place on her heart and circulation.  I discussed the increased blood volume associated with pregnancy and the auto transfusion after delivery.  I also reviewed the increased O2 demands of the placenta and fetus.    I reviewed all of her past records available at the time of consult and discussed with Dr. Bean.  Before offering a prognosis, we will have Dr. Weems reimage her heart.  I will see her back several days after this visit to complete the consult.    Of note, the literature indicates good maternal outcomes in pregnancy with decompensation resolving after  delivery.  Prematurity and IUGR are very frequent due to the decreased O2 tension associated with Fontan circulations.   If she decides to proceed with the pregnancy, delivery should likely take place at [facility] or [facility].    The patient was given an opportunity to ask questions about management and the diease process.  She expressed an understanding of and agreement to the above impression and plan. All questions were answered to her satisfaction.  She was given contact information to the [facility] clinic to address further concerns.      The approximate physician face-to-face time was 60 minutes. The majority of the time (>50%) was spent on counseling of the patient or coordination of care.

## 2019-10-07 NOTE — LETTER
October 7, 2019      Davey Turpin MD  [address] [facility]  [place] [facility] [place] [address]           [employer] JAIME Galan [facility] [address]  [address]  [facility] [address]  Phone: [phone]          Patient: Theresa Grey   MR Number: [id number]   YOB: 1995   Date of Visit: 10/7/2019       Dear Dr. Davey Turpin:    Thank you for referring Theresa Grey to me for evaluation. Attached you will find relevant portions of my assessment and plan of care.    If you have questions, please do not hesitate to call me. I look forward to following Theresa Grey along with you.    Sincerely,    Felice Kothari MD    Enclosure  CC:  No Recipients    If you would like to receive this communication electronically, please contact [email or web address] or [phone] to request more information on [employer] Link access.    For providers and/or their staff who would like to refer a patient to [facility], please contact us through our one-stop-shop provider referral line, [facility], at [phone].    If you feel you have received this communication in error or would no longer like to receive these types of communications, please e-mail [email or web address]

## 2019-10-10 ENCOUNTER — HOSPITAL ENCOUNTER (OUTPATIENT)
Dept: CARDIOLOGY | Facility: CLINIC | Age: 24
Discharge: HOME | End: 2019-10-10
Payer: MEDICAID

## 2019-10-10 ENCOUNTER — CLINICAL SUPPORT (OUTPATIENT)
Dept: PEDIATRIC CARDIOLOGY | Facility: CLINIC | Age: 24
End: 2019-10-10
Attending: PEDIATRICS
Payer: MEDICAID

## 2019-10-10 ENCOUNTER — HOSPITAL ENCOUNTER (OUTPATIENT)
Dept: CARDIOLOGY | Facility: CLINIC | Age: 24
Discharge: HOME | End: 2019-10-10
Attending: PEDIATRICS
Payer: MEDICAID

## 2019-10-10 ENCOUNTER — OFFICE VISIT (OUTPATIENT)
Dept: CARDIOLOGY | Facility: CLINIC | Age: 24
End: 2019-10-10
Payer: MEDICAID

## 2019-10-10 VITALS
HEART RATE: 90 BPM | WEIGHT: 115.5 LBS | SYSTOLIC BLOOD PRESSURE: 105 MMHG | BODY MASS INDEX: 23.28 KG/M2 | OXYGEN SATURATION: 95 % | HEIGHT: 59 IN | DIASTOLIC BLOOD PRESSURE: 70 MMHG

## 2019-10-10 VITALS — HEART RATE: 65 BPM | HEIGHT: 59 IN | WEIGHT: 115 LBS | BODY MASS INDEX: 23.18 KG/M2

## 2019-10-10 DIAGNOSIS — Z98.890 S/P FONTAN PROCEDURE: ICD-10-CM

## 2019-10-10 DIAGNOSIS — O99.891 CONGENITAL HEART DISEASE IN PREGNANCY: ICD-10-CM

## 2019-10-10 DIAGNOSIS — Q20.1 DORV (DOUBLE OUTLET RIGHT VENTRICLE): ICD-10-CM

## 2019-10-10 DIAGNOSIS — I37.0 NONRHEUMATIC PULMONARY VALVE STENOSIS: ICD-10-CM

## 2019-10-10 DIAGNOSIS — Q24.9 ADULT CONGENITAL HEART DISEASE: ICD-10-CM

## 2019-10-10 DIAGNOSIS — Z98.890 S/P FONTAN PROCEDURE: Primary | ICD-10-CM

## 2019-10-10 DIAGNOSIS — Q24.9 CONGENITAL HEART DISEASE IN PREGNANCY: ICD-10-CM

## 2019-10-10 DIAGNOSIS — Q24.9 CONGENITAL HEART DISEASE: ICD-10-CM

## 2019-10-10 DIAGNOSIS — Q89.3 SITUS INVERSUS: ICD-10-CM

## 2019-10-10 LAB
AV INDEX (PROSTH): 0.7
AV MEAN GRADIENT: 4 MMHG
AV PEAK GRADIENT: 5 MMHG
AV VELOCITY RATIO: 0.78
BSA FOR ECHO PROCEDURE: 1.47 M2
CV ECHO LV RWT: 0.47 CM
DOP CALC AO PEAK VEL: 1.14 M/S
DOP CALC AO VTI: 25.04 CM
DOP CALC LVOT PEAK VEL: 0.89 M/S
DOP CALCLVOT PEAK VEL VTI: 17.41 CM
E WAVE DECELERATION TIME: 246.92 MSEC
E/A RATIO: 1.48
ECHO LV POSTERIOR WALL: 0.69 CM (ref 0.6–1.1)
FRACTIONAL SHORTENING: 31 % (ref 28–44)
INTERVENTRICULAR SEPTUM: 0.69 CM (ref 0.6–1.1)
LEFT ATRIUM SIZE: 2.88 CM
LEFT INTERNAL DIMENSION IN SYSTOLE: 2.04 CM (ref 2.1–4)
LEFT VENTRICLE DIASTOLIC VOLUME INDEX: 22.87 ML/M2
LEFT VENTRICLE DIASTOLIC VOLUME: 33.33 ML
LEFT VENTRICLE MASS INDEX: 32 G/M2
LEFT VENTRICLE SYSTOLIC VOLUME INDEX: 9.1 ML/M2
LEFT VENTRICLE SYSTOLIC VOLUME: 13.33 ML
LEFT VENTRICULAR INTERNAL DIMENSION IN DIASTOLE: 2.94 CM (ref 3.5–6)
LEFT VENTRICULAR MASS: 46.53 G
MV PEAK A VEL: 0.5 M/S
MV PEAK E VEL: 0.74 M/S

## 2019-10-10 PROCEDURE — 93303 ECHO (CUPID ONLY): ICD-10-PCS | Mod: 26,S$PBB,, | Performed by: PEDIATRICS

## 2019-10-10 PROCEDURE — 93227: ICD-10-PCS | Mod: ,,, | Performed by: PEDIATRICS

## 2019-10-10 PROCEDURE — 99214 OFFICE O/P EST MOD 30 MIN: CPT | Mod: PBBFAC,25 | Performed by: PEDIATRICS

## 2019-10-10 PROCEDURE — 99999 PR PBB SHADOW E&M-EST. PATIENT-LVL IV: ICD-10-PCS | Mod: PBBFAC,,, | Performed by: PEDIATRICS

## 2019-10-10 PROCEDURE — 93010 EKG 12-LEAD: ICD-10-PCS | Mod: S$PBB,,, | Performed by: INTERNAL MEDICINE

## 2019-10-10 PROCEDURE — 99215 PR OFFICE/OUTPT VISIT, EST, LEVL V, 40-54 MIN: ICD-10-PCS | Mod: 25,S$PBB,, | Performed by: PEDIATRICS

## 2019-10-10 PROCEDURE — 93010 ELECTROCARDIOGRAM REPORT: CPT | Mod: S$PBB,,, | Performed by: INTERNAL MEDICINE

## 2019-10-10 PROCEDURE — 93227 XTRNL ECG REC<48 HR R&I: CPT | Mod: ,,, | Performed by: PEDIATRICS

## 2019-10-10 PROCEDURE — 93303 ECHO TRANSTHORACIC: CPT | Mod: PBBFAC | Performed by: PEDIATRICS

## 2019-10-10 PROCEDURE — 99215 OFFICE O/P EST HI 40 MIN: CPT | Mod: 25,S$PBB,, | Performed by: PEDIATRICS

## 2019-10-10 PROCEDURE — 99999 PR PBB SHADOW E&M-EST. PATIENT-LVL IV: CPT | Mod: PBBFAC,,, | Performed by: PEDIATRICS

## 2019-10-10 PROCEDURE — 93005 ELECTROCARDIOGRAM TRACING: CPT | Mod: PBBFAC | Performed by: INTERNAL MEDICINE

## 2019-10-10 NOTE — PROGRESS NOTES
10/10/2019     [facility]    re:Theresa Grey  :1995    Theresa Grey is a 24 y.o. female with the following diagnoses:  Diagnoses:  1. 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 resolved cyanosis  3. Currently pregnant at 14 6/7 wga    Discussion:  In regards to her pregnancy, we have several concerns:  1.  The rate of fetal loss is significantly elevated, approaching 50%.  2.  The rate of premature delivery is significantly elevated.  3.  The clotting predisposition often noted in Fontan patients may be worsened by pregnancy.  4.  Without a sub pulmonic ventricle, she may tolerate the volume load associated with a pregnancy less well than a 2 ventricle patient.  There is an increased risk of heart failure symptoms.  5.  There is an increased risk of arrhythmias.    That said, she really looks good.  I would expect her to tolerate a pregnancy well.  Vaginal delivery is typically preferable if she is doing well.  IV fluids may be necessary to avoid dehydration.  I would recommend close monitoring with telemetry for 24-48 hours after delivery.    That said, she really looks good.  I would expect her to tolerate a pregnancy well.    We again discussed the long-term risk associated with Fontan circulation. We discussed:  1. Risk of thromboembolic events with Fontan   - she needs to be on a baby aspirin daily to try to cut down on that risk   - would avoid estrogen containing birth control.  She will discuss with her gynecologist  2. Long term risk of arrhythmias, heart failure, PLE, liver disease discussed    Our echocardiogram today reveals good systolic function of her single ventricle and no significant atrioventricular valve insufficiency.  That said, we are  completely unable to image her Fontan and the branch pulmonary arteries.  To better risk assess her pregnancy, we need to confirm that there is no obstruction or thrombosis.  A cardiac MRI is indicated.  The American Heart Association recently published a scientific statement entitled Evaluation and Management of the Child and Adult with Fontan Circulation  (Circulation, 2019).  They recommend a cardiac MRI every 2-3 years in these patients.  She has not had an MRI since her Fontan surgery in 2016.    Plan:  1. I strongly recommend that she get back on her baby aspirin daily.  I would have a low threshold to start her on Lovenox if she was immobilized for any significant amount of time.  2.  24 hr Holter today  3. SBEP is indicated for dental work   4.  Fetal echocardiogram at around 20-24 weeks gestational age  5.  Follow-up me in 1 month with EKG.    Interval history:  I last saw her 2 and half years ago.  She is now almost 15 weeks pregnant.  She denies any symptoms related to cardiovascular system.  Specifically, she denies chest pain, palpitations, syncope, near syncope, cyanosis, and edema.  She denies chronic diarrhea.  She has had no productive cough.    PMH:  She wasdiagnosed as a young child with congenital heart disease while living in [place]. She was evaluated at [facility] with a cath at about 7 years of age. She was scheduled for heart surgery by her report about 10 years ago. However, due to social issues (no insurance, no social security number) the surgery was put off and she was lost to follow up. I first saw her in 2014 she was admitted with pregnancy, rare chest pain, and cyanosis.  After long discussion, she had a pregnancy termination due to the risk associated with unrepaired cyanotic congenital heart disease.  On 9/29/16, she underwent an extracardiac Fontan with oversewing and ligation of the MPA at [facility].  She did very well and was discharged a week later.      The review  "of systems is as noted above. It is otherwise negative for other symptoms related to the general, neurological, psychiatric, endocrine, gastrointestinal, genitourinary, respiratory, dermatologic, musculoskeletal, hematologic, and immunologic systems.    Past Medical History:   Diagnosis Date    Abnormality of heart valve     heart disease     fatigue as a result of pregnancy (8wks ) clubbing indicates chronic oxygenation issues but pt considered it "normal"     Past Surgical History:   Procedure Laterality Date    CARDIAC CATHETERIZATION  at 7 yars of age    DILATION AND CURETTAGE OF UTERUS      FONTAN PROCEDURE, EXTRACARDIAC  September 29, 2015    With a nonfenestrated 22 mm Poultney-Justice tube graft.  The pulmonary valve was closed.  Procedure performed by Dr. Moe Kulkarni at [facility].     Family History   Problem Relation Age of Onset    Diabetes Maternal Grandmother     Vision loss Maternal Grandmother     Miscarriages / Stillbirths Mother     Diabetes Father     Diabetes Paternal Grandmother     No Known Problems Brother     No Known Problems Maternal Grandfather     No Known Problems Paternal Grandfather     Hypertension Maternal Aunt     Diabetes Maternal Aunt     No Known Problems Sister     No Known Problems Maternal Uncle     No Known Problems Paternal Aunt     No Known Problems Paternal Uncle     Anemia Neg Hx     Arrhythmia Neg Hx     Asthma Neg Hx     Clotting disorder Neg Hx     Fainting Neg Hx     Heart attack Neg Hx     Heart disease Neg Hx     Heart failure Neg Hx     Hyperlipidemia Neg Hx     Stroke Neg Hx     Atrial Septal Defect Neg Hx      Social History     Socioeconomic History    Marital status:      Spouse name: Not on file    Number of children: Not on file    Years of education: Not on file    Highest education level: Not on file   Occupational History    Not on file   Social Needs    Financial resource strain: Not on file    Food insecurity: " "    Worry: Not on file     Inability: Not on file    Transportation needs:     Medical: Not on file     Non-medical: Not on file   Tobacco Use    Smoking status: Former Smoker     Packs/day: 0.25    Smokeless tobacco: Never Used   Substance and Sexual Activity    Alcohol use: Yes     Alcohol/week: 0.0 standard drinks     Comment: on ocassion before pregnancy    Drug use: No     Comment: previous marijuana use    Sexual activity: Yes     Partners: Male     Birth control/protection: Injection   Lifestyle    Physical activity:     Days per week: Not on file     Minutes per session: Not on file    Stress: Not on file   Relationships    Social connections:     Talks on phone: Not on file     Gets together: Not on file     Attends [employer] service: Not on file     Active member of club or organization: Not on file     Attends meetings of clubs or organizations: Not on file     Relationship status: Not on file   Other Topics Concern    Not on file   Social History Narrative    Not on file     Current Outpatient Medications on File Prior to Visit   Medication Sig Dispense Refill    aspirin 81 MG Chew Take 1 tablet (81 mg total) by mouth once daily.  0    ibuprofen (ADVIL) 100 MG tablet Take 100 mg by mouth every 6 (six) hours as needed for Temperature greater than.      metoclopramide HCl (REGLAN) 10 MG tablet Take 1 tablet (10 mg total) by mouth every 6 (six) hours as needed. (Patient not taking: Reported on 10/7/2019) 15 tablet 0    pantoprazole (PROTONIX) 20 MG tablet Take 1 tablet (20 mg total) by mouth once daily. for 10 doses 10 tablet 0     No current facility-administered medications on file prior to visit.      Review of patient's allergies indicates:   Allergen Reactions    Hydrocodone Shortness Of Breath and Other (See Comments)     Dizziness; sweating     LMP 06/26/2019   /70 (BP Location: Left arm, Patient Position: Sitting, BP Method: Large (Automatic))   Pulse 90   Ht 4' 11" (1.499 m) " "  Wt 52.4 kg (115 lb 8.3 oz)   LMP 06/26/2019   SpO2 95%   BMI 23.33 kg/m²     In general, she is an acyanotic, nondysmorphic appearing female in no apparent distress.  The eyes, nares, and oropharynx are clear.  Eyelids and conjunctiva free of drainage and redness.  PERRLA.  Teeth in good repair.  Mucous membranes are moist.  The head is normocephalic and atraumatic.  The neck is supple without jugular venous distention or thyroid enlargement.  The lungs are clear to auscultation bilaterally.  There is a well healed sternotomy scar.  The PMI is in the right chest.  The first heart sound is normal.  The second is loud and single.  There are no gallops, rubs, or clicks in the supine position.  The abdominal exam is benign without hepatosplenomegaly, tenderness, or distention.  Pulses are normal in all 4 extremities with brisk capillary refill and no edema.  No rashes are noted.  She has some clubbing.  No tenderness, swelling in legs.  Her neurological exam is normal.    EKG with a "left atrial rhythm" with evidence of dextrocardia.  HR about 89.  Echo:  Pregnant at 15 weeks EGA-  Technically very limited imaging secondary to difficult acoustic windows.  Dextrocardia.  Laminar flow noted in normal size inferior vena cava connecting to extracardiac conduit, laminar flow in left-sided SVC connecting to left pulmonary artery with laminar flow demonstrated at Pavel and Fontan anastomoses.  Unable to demonstrate pulmonary branches clearly.    Qualitative impression of normal size atria.  Straddling left AV valve demonstrated with no significant AV valve insufficiency.    Right-sided AV valve with no evidence of stenosis or insufficiency.  Large inlet left ventricle unrestricted flow from LV to RV.  The ventricles are jason-cross in relationship as demonstrated by color Doppler with limited details of the ventricular structures demonstrated by 2D imaging.  Qualitatively good biventricular systolic function.  No " evidence of subaortic or aortic valve stenosis or insufficiency.  Large right aortic arch.  There is no pericardial effusion.    Results for YAN PELAYO (MRN [id number]) as of 10/10/2019 22:20   Ref. Range 9/26/2019 08:05   WBC Latest Ref Range: 3.90 - 12.70 K/uL 9.69   RBC Latest Ref Range: 4.00 - 5.40 M/uL 4.30   Hemoglobin Latest Ref Range: 12.0 - 16.0 g/dL 12.8   Hematocrit Latest Ref Range: 37.0 - 48.5 % 38.5   MCV Latest Ref Range: 82 - 98 fL 90   MCH Latest Ref Range: 27.0 - 31.0 pg 29.8   MCHC Latest Ref Range: 32.0 - 36.0 g/dL 33.2   RDW Latest Ref Range: 11.5 - 14.5 % 13.0   Platelets Latest Ref Range: 150 - 350 K/uL 109 (L)   MPV Latest Ref Range: 9.2 - 12.9 fL 13.8 (H)   Gran% Latest Ref Range: 38.0 - 73.0 % 74.1 (H)   Gran # (ANC) Latest Ref Range: 1.8 - 7.7 K/uL 7.2   Lymph% Latest Ref Range: 18.0 - 48.0 % 16.6 (L)   Lymph # Latest Ref Range: 1.0 - 4.8 K/uL 1.6   Mono% Latest Ref Range: 4.0 - 15.0 % 4.7   Mono # Latest Ref Range: 0.3 - 1.0 K/uL 0.5   Eosinophil% Latest Ref Range: 0.0 - 8.0 % 3.6   Eos # Latest Ref Range: 0.0 - 0.5 K/uL 0.4   Basophil% Latest Ref Range: 0.0 - 1.9 % 0.5   Baso # Latest Ref Range: 0.00 - 0.20 K/uL 0.05   nRBC Latest Ref Range: 0 /100 WBC 0   Differential Method Unknown Automated   Immature Grans (Abs) Latest Ref Range: 0.00 - 0.04 K/uL 0.05 (H)   Immature Granulocytes Latest Ref Range: 0.0 - 0.5 % 0.5   Coumadin Monitoring INR Unknown 1.1   PT Latest Ref Range: 11.7 - 14.0 sec 13.3   aPTT Latest Ref Range: 26.2 - 34.7 sec 29.0   Sodium Latest Ref Range: 136 - 145 mmol/L 135 (L)   Potassium Latest Ref Range: 3.5 - 5.1 mmol/L 3.5   Chloride Latest Ref Range: 95 - 110 mmol/L 106   CO2 Latest Ref Range: 23 - 29 mmol/L 21 (L)   Anion Gap Latest Ref Range: 8 - 16 mmol/L 8   BUN, Bld Latest Ref Range: 6 - 20 mg/dL 7   Creatinine Latest Ref Range: 0.5 - 1.4 mg/dL 0.4 (L)   eGFR if non African American Latest Ref Range: >60 mL/min/1.73 m^2 >60.0   eGFR if  Latest  Ref Range: >60 mL/min/1.73 m^2 >60.0   Glucose Latest Ref Range: 70 - 110 mg/dL 97   Calcium Latest Ref Range: 8.7 - 10.5 mg/dL 9.0   Magnesium Latest Ref Range: 1.6 - 2.6 mg/dL 1.6   Alkaline Phosphatase Latest Ref Range: 55 - 135 U/L 50 (L)   PROTEIN TOTAL Latest Ref Range: 6.0 - 8.4 g/dL 7.5   Albumin Latest Ref Range: 3.5 - 5.2 g/dL 3.6   BILIRUBIN TOTAL Latest Ref Range: 0.1 - 1.0 mg/dL 0.7   AST Latest Ref Range: 10 - 40 U/L 20   ALT Latest Ref Range: 10 - 44 U/L 18   Lipase Latest Ref Range: 4 - 60 U/L 30     Sincerely,        Toro Weems MD  Pediatric Cardiology  Adult Congenital Heart Disease  Pediatric Heart Failure and Transplantation  [facility] [address] [place]  [address]  [phone]

## 2019-10-14 ENCOUNTER — INITIAL CONSULT (OUTPATIENT)
Dept: MATERNAL FETAL MEDICINE | Facility: CLINIC | Age: 24
End: 2019-10-14
Attending: OBSTETRICS & GYNECOLOGY
Payer: MEDICAID

## 2019-10-14 VITALS
WEIGHT: 114.63 LBS | BODY MASS INDEX: 23.15 KG/M2 | DIASTOLIC BLOOD PRESSURE: 74 MMHG | SYSTOLIC BLOOD PRESSURE: 100 MMHG

## 2019-10-14 DIAGNOSIS — Q24.9 ADULT CONGENITAL HEART DISEASE: ICD-10-CM

## 2019-10-14 DIAGNOSIS — Q89.3 SITUS INVERSUS: ICD-10-CM

## 2019-10-14 DIAGNOSIS — Q24.9 CONGENITAL HEART DISEASE IN PREGNANCY: Primary | ICD-10-CM

## 2019-10-14 DIAGNOSIS — O99.891 CONGENITAL HEART DISEASE IN PREGNANCY: Primary | ICD-10-CM

## 2019-10-14 PROCEDURE — 99214 PR OFFICE/OUTPT VISIT, EST, LEVL IV, 30-39 MIN: ICD-10-PCS | Mod: S$PBB,TH,, | Performed by: OBSTETRICS & GYNECOLOGY

## 2019-10-14 PROCEDURE — 99999 PR PBB SHADOW E&M-EST. PATIENT-LVL II: ICD-10-PCS | Mod: PBBFAC,,, | Performed by: OBSTETRICS & GYNECOLOGY

## 2019-10-14 PROCEDURE — 99214 OFFICE O/P EST MOD 30 MIN: CPT | Mod: S$PBB,TH,, | Performed by: OBSTETRICS & GYNECOLOGY

## 2019-10-14 PROCEDURE — 99212 OFFICE O/P EST SF 10 MIN: CPT | Mod: PBBFAC,TH | Performed by: OBSTETRICS & GYNECOLOGY

## 2019-10-14 PROCEDURE — 99999 PR PBB SHADOW E&M-EST. PATIENT-LVL II: CPT | Mod: PBBFAC,,, | Performed by: OBSTETRICS & GYNECOLOGY

## 2019-10-14 RX ORDER — ENOXAPARIN SODIUM 100 MG/ML
40 INJECTION SUBCUTANEOUS DAILY
Qty: 30 SYRINGE | Refills: 12 | Status: SHIPPED | OUTPATIENT
Start: 2019-10-14 | End: 2020-10-02

## 2019-10-14 NOTE — PROGRESS NOTES
"Chief complaint: Maternal congenital heart disease S/P Fontan repair    Provider requesting consultation: Dr. Turpin    24 y.o. [id number] 15w3d EGA.    PMH:  Past Medical History:   Diagnosis Date    Abnormality of heart valve     heart disease     fatigue as a result of pregnancy (8wks ) clubbing indicates chronic oxygenation issues but pt considered it "normal"       PObHx:  OB History    Para Term  AB Living   2       1     SAB TAB Ectopic Multiple Live Births     1            # Outcome Date GA Lbr Hong/2nd Weight Sex Delivery Anes PTL Lv   2 Current            1 TAB 2014 8w0d              PSH:  Past Surgical History:   Procedure Laterality Date    CARDIAC CATHETERIZATION  at 7 yars of age    DILATION AND CURETTAGE OF UTERUS      FONTAN PROCEDURE, EXTRACARDIAC  2015    With a nonfenestrated 22 mm Cade-Justice tube graft.  The pulmonary valve was closed.  Procedure performed by Dr. Moe Kulkarni at [facility].       Family history:family history includes Diabetes in her father, maternal aunt, maternal grandmother, and paternal grandmother; Hypertension in her maternal aunt; Miscarriages / Stillbirths in her mother; No Known Problems in her brother, maternal grandfather, maternal uncle, paternal aunt, paternal grandfather, paternal uncle, and sister; Vision loss in her maternal grandmother.    Social history: reports that she has quit smoking. She smoked 0.25 packs per day. She has never used smokeless tobacco. She reports that she drinks alcohol. She reports that she does not use drugs.    A detailed fetal anatomical ultrasound was completed today.  See details in imaging section of [facility].    Theresa returns for discussion after having had a visit and echocardiogram with Dr. Weems. I discussed the risks as outlined in his note.  These are excellently outlined in his recent note:  "Discussion:  In regards to her pregnancy, we have several concerns:  1.  The rate of fetal loss is " "significantly elevated, approaching 50%.  2.  The rate of premature delivery is significantly elevated.  3.  The clotting predisposition often noted in Fontan patients may be worsened by pregnancy.  4.  Without a sub pulmonic ventricle, she may tolerate the volume load associated with a pregnancy less well than a 2 ventricle patient.  There is an increased risk of heart failure symptoms.  5.  There is an increased risk of arrhythmias.    That said, she really looks good.  I would expect her to tolerate a pregnancy well.  Vaginal delivery is typically preferable if she is doing well.  IV fluids may be necessary to avoid dehydration.  I would recommend close monitoring with telemetry for 24-48 hours after delivery.    That said, she really looks good.  I would expect her to tolerate a pregnancy well.     We again discussed the long-term risk associated with Fontan circulation. We discussed:  1. Risk of thromboembolic events with Fontan              - she needs to be on a baby aspirin daily to try to cut down on that risk              - would avoid estrogen containing birth control.  She will discuss with her gynecologist  2. Long term risk of arrhythmias, heart failure, PLE, liver disease discussed     Plan:  1. I strongly recommend that she get back on her baby aspirin daily.  I would have a low threshold to start her on Lovenox if she was immobilized for any significant amount of time.  2.  24 hr Holter today  3. SBEP is indicated for dental work   4.  Fetal echocardiogram at around 20-24 weeks gestational age  5.  Follow-up me in 1 month with EKG."     I discussed these risks with her.  She understands and is willing to accept these risks.  With the above in mind, I decided to start her on prophylactic Lovenox at 40 mg daily.  I discussed how to administer and to call us if the pharmacy does not provide education on administration.      The next decision to be made is OB care.  She should deliver at [facility].  This " can be arranged in 1 of 2 ways.  The patient would prefer to continue care with Dr. Turpin with [facility] following as co-managers. This would be easier for her transportation wise and is acceptable if Dr. Turpin agrees.  The other would be to transfer all care to [facility] from now on.  She will discuss this with Dr. Turpin.      Her next [facility] appoint ment is for her anatomy scan at 20 weeks.  We would be happy to see her  before that time if indicated.      The patient was given an opportunity to ask questions about management and the diease process.  She expressed an understanding of and agreement to the above impression and plan. All questions were answered to her satisfaction.  She was given contact information to the [facility] clinic to address further concerns.      The approximate physician face-to-face time was 30 minutes. The majority of the time (>50%) was spent on counseling of the patient or coordination of care.

## 2019-10-16 ENCOUNTER — HOSPITAL ENCOUNTER (OUTPATIENT)
Dept: RADIOLOGY | Facility: HOSPITAL | Age: 24
Discharge: HOME | End: 2019-10-16
Attending: PEDIATRICS
Payer: MEDICAID

## 2019-10-16 DIAGNOSIS — Z98.890 S/P FONTAN PROCEDURE: ICD-10-CM

## 2019-10-16 DIAGNOSIS — Q24.9 CONGENITAL HEART DISEASE: ICD-10-CM

## 2019-10-16 PROCEDURE — 75557 CARDIAC MRI FOR MORPH: CPT | Mod: TC

## 2019-10-16 PROCEDURE — 75557 CARDIAC MRI FOR MORPH: CPT | Mod: 26,,, | Performed by: RADIOLOGY

## 2019-10-16 PROCEDURE — 75557 MRI CARDIAC WO CONTRAST: ICD-10-PCS | Mod: 26,,, | Performed by: RADIOLOGY

## 2019-10-17 ENCOUNTER — TELEPHONE (OUTPATIENT)
Dept: PEDIATRIC CARDIOLOGY | Facility: CLINIC | Age: 24
End: 2019-10-17

## 2019-10-21 ENCOUNTER — TELEPHONE (OUTPATIENT)
Dept: PEDIATRIC CARDIOLOGY | Facility: CLINIC | Age: 24
End: 2019-10-21

## 2019-10-21 DIAGNOSIS — Z98.890 S/P FONTAN PROCEDURE: ICD-10-CM

## 2019-10-21 DIAGNOSIS — Q20.1 DORV (DOUBLE OUTLET RIGHT VENTRICLE): Primary | ICD-10-CM

## 2019-10-21 LAB
OHS CV EVENT MONITOR DAY: 1
OHS CV HOLTER LENGTH DECIMAL HOURS: 24
OHS CV HOLTER LENGTH HOURS: 0
OHS CV HOLTER LENGTH MINUTES: 0

## 2019-10-21 NOTE — TELEPHONE ENCOUNTER
NO answer, unable to leave message.    ----- Message from Carin Pitt sent at 10/21/2019  9:33 AM CDT -----  Contact: Self Theresa [phone]  Type:  Patient Returning Call    Who Called: Theresa    Who Left Message for Patient: Unknown    Would the patient rather a call back or a response via [facility]? Call back    Best Call Back Number: Joby Cardenas [phone]    Additional Information: Theresa is returning a missed call.

## 2019-10-30 ENCOUNTER — TELEPHONE (OUTPATIENT)
Dept: MATERNAL FETAL MEDICINE | Facility: CLINIC | Age: 24
End: 2019-10-30

## 2019-10-30 NOTE — TELEPHONE ENCOUNTER
"Vivienne calling to report that Dr Turpin would like to transfer full OB care to [facility] so there is "no more back and forth". Informed her that we do not do total OB care but we will however assist with getting her established with an OB provider here at [facility] and we will work closely with them to manage her care. She verbalized understanding of information and notified Dr Turpin.      "

## 2019-10-31 ENCOUNTER — TELEPHONE (OUTPATIENT)
Dept: OBSTETRICS AND GYNECOLOGY | Facility: CLINIC | Age: 24
End: 2019-10-31

## 2019-10-31 ENCOUNTER — PATIENT MESSAGE (OUTPATIENT)
Dept: OBSTETRICS AND GYNECOLOGY | Facility: CLINIC | Age: 24
End: 2019-10-31

## 2019-11-08 ENCOUNTER — APPOINTMENT (OUTPATIENT)
Dept: LAB | Facility: OTHER | Age: 24
End: 2019-11-08
Payer: MEDICAID

## 2019-11-08 ENCOUNTER — INITIAL PRENATAL (OUTPATIENT)
Dept: OBSTETRICS AND GYNECOLOGY | Facility: CLINIC | Age: 24
End: 2019-11-08
Payer: MEDICAID

## 2019-11-08 ENCOUNTER — CLINICAL SUPPORT (OUTPATIENT)
Dept: PEDIATRIC CARDIOLOGY | Facility: CLINIC | Age: 24
End: 2019-11-08
Payer: MEDICAID

## 2019-11-08 ENCOUNTER — PROCEDURE VISIT (OUTPATIENT)
Dept: MATERNAL FETAL MEDICINE | Facility: CLINIC | Age: 24
End: 2019-11-08
Payer: MEDICAID

## 2019-11-08 ENCOUNTER — PATIENT MESSAGE (OUTPATIENT)
Dept: ADMINISTRATIVE | Facility: OTHER | Age: 24
End: 2019-11-08

## 2019-11-08 ENCOUNTER — OFFICE VISIT (OUTPATIENT)
Dept: PEDIATRIC CARDIOLOGY | Facility: CLINIC | Age: 24
End: 2019-11-08
Payer: MEDICAID

## 2019-11-08 ENCOUNTER — INITIAL CONSULT (OUTPATIENT)
Dept: MATERNAL FETAL MEDICINE | Facility: CLINIC | Age: 24
End: 2019-11-08
Payer: MEDICAID

## 2019-11-08 VITALS
HEART RATE: 80 BPM | SYSTOLIC BLOOD PRESSURE: 129 MMHG | RESPIRATION RATE: 18 BRPM | BODY MASS INDEX: 23.75 KG/M2 | OXYGEN SATURATION: 96 % | DIASTOLIC BLOOD PRESSURE: 69 MMHG | HEIGHT: 59 IN | TEMPERATURE: 98 F | WEIGHT: 117.81 LBS

## 2019-11-08 VITALS — WEIGHT: 114 LBS | BODY MASS INDEX: 23.03 KG/M2 | SYSTOLIC BLOOD PRESSURE: 108 MMHG | DIASTOLIC BLOOD PRESSURE: 72 MMHG

## 2019-11-08 VITALS
SYSTOLIC BLOOD PRESSURE: 108 MMHG | WEIGHT: 117.31 LBS | BODY MASS INDEX: 23.69 KG/M2 | DIASTOLIC BLOOD PRESSURE: 72 MMHG

## 2019-11-08 DIAGNOSIS — Z3A.19 19 WEEKS GESTATION OF PREGNANCY: Primary | ICD-10-CM

## 2019-11-08 DIAGNOSIS — Z36.89 ENCOUNTER FOR ULTRASOUND TO CHECK FETAL GROWTH: ICD-10-CM

## 2019-11-08 DIAGNOSIS — Q24.9 CONGENITAL HEART DISEASE IN PREGNANCY: ICD-10-CM

## 2019-11-08 DIAGNOSIS — Q24.9 CONGENITAL HEART DISEASE IN PREGNANCY: Primary | ICD-10-CM

## 2019-11-08 DIAGNOSIS — O99.891 CONGENITAL HEART DISEASE IN PREGNANCY: ICD-10-CM

## 2019-11-08 DIAGNOSIS — Z36.9 ENCOUNTER FOR FETAL ULTRASOUND: ICD-10-CM

## 2019-11-08 DIAGNOSIS — Q20.1 DORV (DOUBLE OUTLET RIGHT VENTRICLE): ICD-10-CM

## 2019-11-08 DIAGNOSIS — Q24.9 ADULT CONGENITAL HEART DISEASE: ICD-10-CM

## 2019-11-08 DIAGNOSIS — Z98.890 S/P FONTAN PROCEDURE: Primary | ICD-10-CM

## 2019-11-08 DIAGNOSIS — O99.891 CONGENITAL HEART DISEASE IN PREGNANCY: Primary | ICD-10-CM

## 2019-11-08 DIAGNOSIS — O09.92 SUPERVISION OF HIGH RISK PREGNANCY IN SECOND TRIMESTER: ICD-10-CM

## 2019-11-08 DIAGNOSIS — Z36.89 ENCOUNTER FOR FETAL ANATOMIC SURVEY: ICD-10-CM

## 2019-11-08 PROCEDURE — 99999 PR PBB SHADOW E&M-EST. PATIENT-LVL III: ICD-10-PCS | Mod: PBBFAC,,, | Performed by: PEDIATRICS

## 2019-11-08 PROCEDURE — 99214 OFFICE O/P EST MOD 30 MIN: CPT | Mod: S$PBB,25,TH, | Performed by: PEDIATRICS

## 2019-11-08 PROCEDURE — 99213 PR OFFICE/OUTPT VISIT, EST, LEVL III, 20-29 MIN: ICD-10-PCS | Mod: 25,S$PBB,, | Performed by: PEDIATRICS

## 2019-11-08 PROCEDURE — 99999 PR PBB SHADOW E&M-EST. PATIENT-LVL III: CPT | Mod: PBBFAC,,, | Performed by: PEDIATRICS

## 2019-11-08 PROCEDURE — 99213 OFFICE O/P EST LOW 20 MIN: CPT | Mod: 25,S$PBB,, | Performed by: PEDIATRICS

## 2019-11-08 PROCEDURE — 76811 PR US, OB FETAL EVAL & EXAM, TRANSABDOM,FIRST GESTATION: ICD-10-PCS | Mod: 26,S$PBB,, | Performed by: PEDIATRICS

## 2019-11-08 PROCEDURE — 99213 PR OFFICE/OUTPT VISIT, EST, LEVL III, 20-29 MIN: ICD-10-PCS | Mod: S$PBB,TH,, | Performed by: OBSTETRICS & GYNECOLOGY

## 2019-11-08 PROCEDURE — 93005 ELECTROCARDIOGRAM TRACING: CPT | Mod: PBBFAC,PO | Performed by: PEDIATRICS

## 2019-11-08 PROCEDURE — 76811 OB US DETAILED SNGL FETUS: CPT | Mod: 26,S$PBB,, | Performed by: PEDIATRICS

## 2019-11-08 PROCEDURE — 99213 OFFICE O/P EST LOW 20 MIN: CPT | Mod: S$PBB,TH,, | Performed by: OBSTETRICS & GYNECOLOGY

## 2019-11-08 PROCEDURE — 99213 OFFICE O/P EST LOW 20 MIN: CPT | Mod: PBBFAC,27,TH,25 | Performed by: OBSTETRICS & GYNECOLOGY

## 2019-11-08 PROCEDURE — 93010 ELECTROCARDIOGRAM REPORT: CPT | Mod: S$PBB,,, | Performed by: PEDIATRICS

## 2019-11-08 PROCEDURE — 99999 PR PBB SHADOW E&M-EST. PATIENT-LVL III: CPT | Mod: PBBFAC,,, | Performed by: OBSTETRICS & GYNECOLOGY

## 2019-11-08 PROCEDURE — 76811 OB US DETAILED SNGL FETUS: CPT | Mod: PBBFAC | Performed by: PEDIATRICS

## 2019-11-08 PROCEDURE — 99999 PR PBB SHADOW E&M-EST. PATIENT-LVL III: ICD-10-PCS | Mod: PBBFAC,,, | Performed by: OBSTETRICS & GYNECOLOGY

## 2019-11-08 PROCEDURE — 99213 OFFICE O/P EST LOW 20 MIN: CPT | Mod: PBBFAC,PO,25 | Performed by: PEDIATRICS

## 2019-11-08 PROCEDURE — 99214 PR OFFICE/OUTPT VISIT, EST, LEVL IV, 30-39 MIN: ICD-10-PCS | Mod: S$PBB,25,TH, | Performed by: PEDIATRICS

## 2019-11-08 PROCEDURE — 93010 EKG 12-LEAD PEDIATRIC: ICD-10-PCS | Mod: S$PBB,,, | Performed by: PEDIATRICS

## 2019-11-08 NOTE — PROGRESS NOTES
The patient had consult with Dr Quiñones and would like [id number] test done. The appropriate requisition form was given to the patient. She was directed on the location of the lab and informed that lab results take approximately 7-10 business days. Pt verbalized understanding of information.

## 2019-11-08 NOTE — PROGRESS NOTES
2019     [facility]    re:Theresa Grey  :1995     Davey Turpin MD   [address] [facility] [address]     Dr. Felice Jones    Dear Doctors:    Theresa Grey is a 24 y.o. female with the following diagnoses:  Diagnoses:  1. 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  2. resolved cyanosis  3. Currently pregnant at 19 wga    Discussion:  In regards to her pregnancy, we have several concerns:  1.  The rate of fetal loss is significantly elevated, approaching 50%.  2.  The rate of premature delivery is significantly elevated.  3.  The clotting predisposition often noted in Fontan patients may be worsened by pregnancy.  4.  Without a sub pulmonic ventricle, she may tolerate the volume load associated with a pregnancy less well than a 2 ventricle patient.  There is an increased risk of heart failure symptoms.  5.  There is an increased risk of arrhythmias.    That said, she really looks good.  I would expect her to tolerate a pregnancy well.  Vaginal delivery is typically preferable if she is doing well.  IV fluids may be necessary to avoid dehydration.  I would recommend close monitoring with telemetry for 24-48 hours after delivery.      We again discussed the long-term risk associated with Fontan circulation. We discussed:  1. Risk of thromboembolic events with Fontan   - she needs to be on a baby aspirin daily to try to cut down on that risk   - would avoid estrogen containing birth control.  She will discuss with her gynecologist  2.  Long term risk of arrhythmias, heart failure, PLE, liver disease discussed    Plan:  1.  Continue daily baby aspirin.  I would have a low threshold to start her on Lovenox if she was immobilized for any significant amount of time.  2.  Follow-up with me in 1 month with repeat echocardiogram, EKG, and 24 hr Holter.  3.  SBEP is indicated for dental work   4.  Fetal echocardiogram scheduled for November 18th.  5.  Close follow-up with obstetrics.    Interval history:  I saw her today in [place].  I last saw her a month ago.  Overall, she has done well since that time.  She denies shortness of breath.  She denies edema.  She has had no diarrhea.  She does occasionally get a midsternal chest tightness that lasts for a minute or 2.  It is not exertional.  She also occasionally feels a skipped heartbeat when she is laying down at night.  She denies any runs of tachycardia.  She has had no syncope or near-syncope.  She says there are concerns about a possible clubfoot in the fetus.    PMH:  She wasdiagnosed as a young child with congenital heart disease while living in [place]. She was evaluated at [facility] with a cath at about 7 years of age. She was scheduled for heart surgery by her report about 10 years ago. However, due to social issues (no insurance, no social security number) the surgery was put off and she was lost to follow up. I first saw her in 2014 she was admitted with pregnancy, rare chest pain, and cyanosis.  After long discussion, she had a pregnancy termination due to the risk associated with unrepaired cyanotic congenital heart disease.  On 9/29/16, she underwent an extracardiac Fontan with oversewing and ligation of the MPA at [facility].  She did very well and was discharged a week later.      The review of systems is as noted above. It is otherwise negative for other symptoms related to the general, neurological, psychiatric, endocrine, gastrointestinal, genitourinary, respiratory,  "dermatologic, musculoskeletal, hematologic, and immunologic systems.    Past Medical History:   Diagnosis Date    Abnormality of heart valve     heart disease     fatigue as a result of pregnancy (8wks ) clubbing indicates chronic oxygenation issues but pt considered it "normal"     Past Surgical History:   Procedure Laterality Date    CARDIAC CATHETERIZATION  at 7 yars of age    DILATION AND CURETTAGE OF UTERUS      FONTAN PROCEDURE, EXTRACARDIAC  September 29, 2015    With a nonfenestrated 22 mm Morgan-Justice tube graft.  The pulmonary valve was closed.  Procedure performed by Dr. Moe Kulkarni at [facility].     Family History   Problem Relation Age of Onset    Diabetes Maternal Grandmother     Vision loss Maternal Grandmother     Miscarriages / Stillbirths Mother     Diabetes Father     Diabetes Paternal Grandmother     No Known Problems Brother     No Known Problems Maternal Grandfather     No Known Problems Paternal Grandfather     Hypertension Maternal Aunt     Diabetes Maternal Aunt     No Known Problems Sister     No Known Problems Maternal Uncle     No Known Problems Paternal Aunt     No Known Problems Paternal Uncle     Anemia Neg Hx     Arrhythmia Neg Hx     Asthma Neg Hx     Clotting disorder Neg Hx     Fainting Neg Hx     Heart attack Neg Hx     Heart disease Neg Hx     Heart failure Neg Hx     Hyperlipidemia Neg Hx     Stroke Neg Hx     Atrial Septal Defect Neg Hx      Social History     Socioeconomic History    Marital status:      Spouse name: Not on file    Number of children: Not on file    Years of education: Not on file    Highest education level: Not on file   Occupational History    Not on file   Social Needs    Financial resource strain: Not on file    Food insecurity:     Worry: Not on file     Inability: Not on file    Transportation needs:     Medical: Not on file     Non-medical: Not on file   Tobacco Use    Smoking status: Former Smoker     " Packs/day: 0.25    Smokeless tobacco: Never Used   Substance and Sexual Activity    Alcohol use: Not Currently     Alcohol/week: 0.0 standard drinks     Comment: on ocassion before pregnancy    Drug use: No     Comment: previous marijuana use    Sexual activity: Yes     Partners: Male     Birth control/protection: Injection   Lifestyle    Physical activity:     Days per week: Not on file     Minutes per session: Not on file    Stress: Not on file   Relationships    Social connections:     Talks on phone: Not on file     Gets together: Not on file     Attends Caodaism service: Not on file     Active member of club or organization: Not on file     Attends meetings of clubs or organizations: Not on file     Relationship status: Not on file   Other Topics Concern    Not on file   Social History Narrative    Not on file     Current Outpatient Medications on File Prior to Visit   Medication Sig Dispense Refill    aspirin 81 MG Chew Take 1 tablet (81 mg total) by mouth once daily.  0    enoxaparin (LOVENOX) 40 mg/0.4 mL Syrg Inject 0.4 mLs (40 mg total) into the skin once daily. 30 Syringe 12    PRENATAL NO.116-IRON-FOLIC-DHA ORAL Take 1 tablet by mouth once daily.      ibuprofen (ADVIL) 100 MG tablet Take 100 mg by mouth every 6 (six) hours as needed for Temperature greater than.      metoclopramide HCl (REGLAN) 10 MG tablet Take 1 tablet (10 mg total) by mouth every 6 (six) hours as needed. (Patient not taking: Reported on 10/7/2019) 15 tablet 0    pantoprazole (PROTONIX) 20 MG tablet Take 1 tablet (20 mg total) by mouth once daily. for 10 doses (Patient not taking: Reported on 10/10/2019) 10 tablet 0     No current facility-administered medications on file prior to visit.      Review of patient's allergies indicates:   Allergen Reactions    Hydrocodone Shortness Of Breath and Other (See Comments)     Dizziness; sweating     /69 (BP Location: Right arm, Patient Position: Sitting, BP Method: Medium  "(Automatic))   Pulse 80   Temp 98.3 °F (36.8 °C) (Oral)   Resp 18   Ht 4' 11" (1.499 m)   Wt 53.4 kg (117 lb 13.4 oz)   LMP 06/26/2019   SpO2 96%   BMI 23.80 kg/m²   /69 (BP Location: Right arm, Patient Position: Sitting, BP Method: Medium (Automatic))   Pulse 80   Temp 98.3 °F (36.8 °C) (Oral)   Resp 18   Ht 4' 11" (1.499 m)   Wt 53.4 kg (117 lb 13.4 oz)   LMP 06/26/2019   SpO2 96%   BMI 23.80 kg/m²     In general, she is an acyanotic, nondysmorphic appearing female in no apparent distress.  The eyes, nares, and oropharynx are clear.  Eyelids and conjunctiva free of drainage and redness.  PERRLA.  Teeth in good repair.  Mucous membranes are moist.  The head is normocephalic and atraumatic.  The neck is supple without jugular venous distention or thyroid enlargement.  The lungs are clear to auscultation bilaterally.  There is a well healed sternotomy scar.  The PMI is in the right chest.  The first heart sound is normal.  The second is loud and single.  There are no gallops, rubs, or clicks in the supine position.  The abdominal exam is benign without hepatosplenomegaly, tenderness, or distention.  Pulses are normal in all 4 extremities with brisk capillary refill and no edema.  No rashes are noted.  She has some clubbing.  No tenderness, swelling in legs.  Her neurological exam is normal.    EKG with a "left atrial rhythm" with evidence of dextrocardia.  HR about 65.    Cardiac MRI October 19, 2019:  1. Patent innominate vein to left-sided SVC. Widely patent left-sided Pavel anastomosis. The IVC is anastomosed to the left pulmonary artery. The Fontan tunnel is widely patent without evidence of obstruction. The branch pulmonary arteries are normal in size with no evidence of obstruction.   2. Atrio-ventricular concordance with a jason-cross type of arrangement.   3. The right ventricle volumes are low normal. Moderate RVH. The calculated RVEF is 45 %.  4. The left ventricular volumes are low " normal. The calculated LVEF is 50 %.   5. Large inlet type ventricular septal defect.   6. Oversewn pulmonary valve.   7. Anterior and rightward aorta. Structurally normal aortic valve with no significant aortic insufficiency.   8. Right aortic arch.   9.   Results for YAN PELAYO (MRN [id number]) as of 10/10/2019 22:20   Ref. Range 9/26/2019 08:05   WBC Latest Ref Range: 3.90 - 12.70 K/uL 9.69   RBC Latest Ref Range: 4.00 - 5.40 M/uL 4.30   Hemoglobin Latest Ref Range: 12.0 - 16.0 g/dL 12.8   Hematocrit Latest Ref Range: 37.0 - 48.5 % 38.5   MCV Latest Ref Range: 82 - 98 fL 90   MCH Latest Ref Range: 27.0 - 31.0 pg 29.8   MCHC Latest Ref Range: 32.0 - 36.0 g/dL 33.2   RDW Latest Ref Range: 11.5 - 14.5 % 13.0   Platelets Latest Ref Range: 150 - 350 K/uL 109 (L)   MPV Latest Ref Range: 9.2 - 12.9 fL 13.8 (H)   Gran% Latest Ref Range: 38.0 - 73.0 % 74.1 (H)   Gran # (ANC) Latest Ref Range: 1.8 - 7.7 K/uL 7.2   Lymph% Latest Ref Range: 18.0 - 48.0 % 16.6 (L)   Lymph # Latest Ref Range: 1.0 - 4.8 K/uL 1.6   Mono% Latest Ref Range: 4.0 - 15.0 % 4.7   Mono # Latest Ref Range: 0.3 - 1.0 K/uL 0.5   Eosinophil% Latest Ref Range: 0.0 - 8.0 % 3.6   Eos # Latest Ref Range: 0.0 - 0.5 K/uL 0.4   Basophil% Latest Ref Range: 0.0 - 1.9 % 0.5   Baso # Latest Ref Range: 0.00 - 0.20 K/uL 0.05   nRBC Latest Ref Range: 0 /100 WBC 0   Differential Method Unknown Automated   Immature Grans (Abs) Latest Ref Range: 0.00 - 0.04 K/uL 0.05 (H)   Immature Granulocytes Latest Ref Range: 0.0 - 0.5 % 0.5   Coumadin Monitoring INR Unknown 1.1   PT Latest Ref Range: 11.7 - 14.0 sec 13.3   aPTT Latest Ref Range: 26.2 - 34.7 sec 29.0   Sodium Latest Ref Range: 136 - 145 mmol/L 135 (L)   Potassium Latest Ref Range: 3.5 - 5.1 mmol/L 3.5   Chloride Latest Ref Range: 95 - 110 mmol/L 106   CO2 Latest Ref Range: 23 - 29 mmol/L 21 (L)   Anion Gap Latest Ref Range: 8 - 16 mmol/L 8   BUN, Bld Latest Ref Range: 6 - 20 mg/dL 7   Creatinine Latest Ref Range: 0.5 -  1.4 mg/dL 0.4 (L)   eGFR if non African American Latest Ref Range: >60 mL/min/1.73 m^2 >60.0   eGFR if African American Latest Ref Range: >60 mL/min/1.73 m^2 >60.0   Glucose Latest Ref Range: 70 - 110 mg/dL 97   Calcium Latest Ref Range: 8.7 - 10.5 mg/dL 9.0   Magnesium Latest Ref Range: 1.6 - 2.6 mg/dL 1.6   Alkaline Phosphatase Latest Ref Range: 55 - 135 U/L 50 (L)   PROTEIN TOTAL Latest Ref Range: 6.0 - 8.4 g/dL 7.5   Albumin Latest Ref Range: 3.5 - 5.2 g/dL 3.6   BILIRUBIN TOTAL Latest Ref Range: 0.1 - 1.0 mg/dL 0.7   AST Latest Ref Range: 10 - 40 U/L 20   ALT Latest Ref Range: 10 - 44 U/L 18   Lipase Latest Ref Range: 4 - 60 U/L 30     Sincerely,        Toro Weems MD  Pediatric Cardiology  Adult Congenital Heart Disease  Pediatric Heart Failure and Transplantation  [facility] [address] [place]  [address]  [phone]

## 2019-11-10 PROBLEM — O09.92 SUPERVISION OF HIGH RISK PREGNANCY IN SECOND TRIMESTER: Status: ACTIVE | Noted: 2019-11-10

## 2019-11-12 ENCOUNTER — HOSPITAL ENCOUNTER (EMERGENCY)
Facility: HOSPITAL | Age: 24
Discharge: ANOTHER HOSPITAL | End: 2019-11-12
Attending: EMERGENCY MEDICINE
Payer: MEDICAID

## 2019-11-12 ENCOUNTER — OFFICE VISIT (OUTPATIENT)
Dept: URGENT CARE | Facility: CLINIC | Age: 24
End: 2019-11-12
Payer: MEDICAID

## 2019-11-12 ENCOUNTER — HOSPITAL ENCOUNTER (EMERGENCY)
Facility: OTHER | Age: 24
Discharge: HOME | End: 2019-11-12
Attending: OBSTETRICS & GYNECOLOGY
Payer: MEDICAID

## 2019-11-12 VITALS
BODY MASS INDEX: 23.99 KG/M2 | HEIGHT: 59 IN | TEMPERATURE: 98 F | RESPIRATION RATE: 18 BRPM | DIASTOLIC BLOOD PRESSURE: 76 MMHG | OXYGEN SATURATION: 97 % | SYSTOLIC BLOOD PRESSURE: 141 MMHG | HEART RATE: 78 BPM | WEIGHT: 119 LBS

## 2019-11-12 VITALS
HEART RATE: 74 BPM | HEIGHT: 59 IN | SYSTOLIC BLOOD PRESSURE: 97 MMHG | TEMPERATURE: 97 F | DIASTOLIC BLOOD PRESSURE: 60 MMHG | WEIGHT: 120.19 LBS | BODY MASS INDEX: 24.23 KG/M2 | OXYGEN SATURATION: 97 %

## 2019-11-12 VITALS
HEART RATE: 56 BPM | RESPIRATION RATE: 18 BRPM | SYSTOLIC BLOOD PRESSURE: 106 MMHG | TEMPERATURE: 98 F | DIASTOLIC BLOOD PRESSURE: 56 MMHG | OXYGEN SATURATION: 96 %

## 2019-11-12 DIAGNOSIS — R10.13 EPIGASTRIC PAIN DURING PREGNANCY, ANTEPARTUM: Primary | ICD-10-CM

## 2019-11-12 DIAGNOSIS — E87.6 HYPOKALEMIA DUE TO LOSS OF POTASSIUM: ICD-10-CM

## 2019-11-12 DIAGNOSIS — Q24.9 ADULT CONGENITAL HEART DISEASE: ICD-10-CM

## 2019-11-12 DIAGNOSIS — O13.2 TRANSIENT HYPERTENSION OF PREGNANCY IN SECOND TRIMESTER: ICD-10-CM

## 2019-11-12 DIAGNOSIS — R10.9 ABDOMINAL PAIN DURING PREGNANCY IN SECOND TRIMESTER: Primary | ICD-10-CM

## 2019-11-12 DIAGNOSIS — O26.892 ABDOMINAL PAIN DURING PREGNANCY IN SECOND TRIMESTER: Primary | ICD-10-CM

## 2019-11-12 DIAGNOSIS — R10.13 EPIGASTRIC PAIN: ICD-10-CM

## 2019-11-12 DIAGNOSIS — O26.899 EPIGASTRIC PAIN DURING PREGNANCY, ANTEPARTUM: Primary | ICD-10-CM

## 2019-11-12 DIAGNOSIS — Z98.890 S/P FONTAN PROCEDURE: ICD-10-CM

## 2019-11-12 LAB
ALBUMIN SERPL BCP-MCNC: 3.4 G/DL (ref 3.5–5.2)
ALP SERPL-CCNC: 68 U/L (ref 55–135)
ALT SERPL W/O P-5'-P-CCNC: 16 U/L (ref 10–44)
ANION GAP SERPL CALC-SCNC: 9 MMOL/L (ref 8–16)
AST SERPL-CCNC: 15 U/L (ref 10–40)
BASOPHILS # BLD AUTO: 0.03 K/UL (ref 0–0.2)
BASOPHILS NFR BLD: 0.3 % (ref 0–1.9)
BILIRUB SERPL-MCNC: 0.2 MG/DL (ref 0.1–1)
BNP SERPL-MCNC: 26 PG/ML (ref 0–99)
BUN SERPL-MCNC: 7 MG/DL (ref 6–20)
CALCIUM SERPL-MCNC: 9.4 MG/DL (ref 8.7–10.5)
CHLORIDE SERPL-SCNC: 105 MMOL/L (ref 95–110)
CO2 SERPL-SCNC: 22 MMOL/L (ref 23–29)
CREAT SERPL-MCNC: 0.6 MG/DL (ref 0.5–1.4)
CREAT UR-MCNC: 76.8 MG/DL (ref 15–325)
DIFFERENTIAL METHOD: ABNORMAL
EOSINOPHIL # BLD AUTO: 0.3 K/UL (ref 0–0.5)
EOSINOPHIL NFR BLD: 2.8 % (ref 0–8)
ERYTHROCYTE [DISTWIDTH] IN BLOOD BY AUTOMATED COUNT: 12.7 % (ref 11.5–14.5)
EST. GFR  (AFRICAN AMERICAN): >60 ML/MIN/1.73 M^2
EST. GFR  (NON AFRICAN AMERICAN): >60 ML/MIN/1.73 M^2
GLUCOSE SERPL-MCNC: 95 MG/DL (ref 70–110)
HCT VFR BLD AUTO: 34.6 % (ref 37–48.5)
HGB BLD-MCNC: 11.4 G/DL (ref 12–16)
IMM GRANULOCYTES # BLD AUTO: 0.09 K/UL (ref 0–0.04)
LIPASE SERPL-CCNC: 20 U/L (ref 4–60)
LYMPHOCYTES # BLD AUTO: 1.7 K/UL (ref 1–4.8)
LYMPHOCYTES NFR BLD: 15.5 % (ref 18–48)
MCH RBC QN AUTO: 29.4 PG (ref 27–31)
MCHC RBC AUTO-ENTMCNC: 32.9 G/DL (ref 32–36)
MCV RBC AUTO: 89 FL (ref 82–98)
MONOCYTES # BLD AUTO: 0.7 K/UL (ref 0.3–1)
MONOCYTES NFR BLD: 6.1 % (ref 4–15)
NEUTROPHILS # BLD AUTO: 8.3 K/UL (ref 1.8–7.7)
NEUTROPHILS NFR BLD: 74.5 % (ref 38–73)
NRBC BLD-RTO: 0 /100 WBC
PLATELET # BLD AUTO: 113 K/UL (ref 150–350)
PMV BLD AUTO: 13.4 FL (ref 9.2–12.9)
POTASSIUM SERPL-SCNC: 3.3 MMOL/L (ref 3.5–5.1)
PROT SERPL-MCNC: 7.4 G/DL (ref 6–8.4)
PROT UR-MCNC: 18 MG/DL (ref 0–15)
PROT/CREAT UR: 0.23 MG/G{CREAT} (ref 0–0.2)
RBC # BLD AUTO: 3.88 M/UL (ref 4–5.4)
SODIUM SERPL-SCNC: 136 MMOL/L (ref 136–145)
TROPONIN I SERPL DL<=0.01 NG/ML-MCNC: 0.01 NG/ML (ref 0–0.03)
WBC # BLD AUTO: 11.07 K/UL (ref 3.9–12.7)

## 2019-11-12 PROCEDURE — 84156 ASSAY OF PROTEIN URINE: CPT

## 2019-11-12 PROCEDURE — 99204 OFFICE O/P NEW MOD 45 MIN: CPT | Mod: S$GLB,,, | Performed by: NURSE PRACTITIONER

## 2019-11-12 PROCEDURE — 85025 COMPLETE CBC W/AUTO DIFF WBC: CPT

## 2019-11-12 PROCEDURE — 83690 ASSAY OF LIPASE: CPT

## 2019-11-12 PROCEDURE — 84484 ASSAY OF TROPONIN QUANT: CPT

## 2019-11-12 PROCEDURE — 83880 ASSAY OF NATRIURETIC PEPTIDE: CPT

## 2019-11-12 PROCEDURE — 99204 PR OFFICE/OUTPT VISIT, NEW, LEVL IV, 45-59 MIN: ICD-10-PCS | Mod: S$GLB,,, | Performed by: NURSE PRACTITIONER

## 2019-11-12 PROCEDURE — 25000003 PHARM REV CODE 250: Performed by: STUDENT IN AN ORGANIZED HEALTH CARE EDUCATION/TRAINING PROGRAM

## 2019-11-12 PROCEDURE — 80053 COMPREHEN METABOLIC PANEL: CPT

## 2019-11-12 PROCEDURE — 36415 COLL VENOUS BLD VENIPUNCTURE: CPT

## 2019-11-12 PROCEDURE — 99283 EMERGENCY DEPT VISIT LOW MDM: CPT | Mod: 25

## 2019-11-12 PROCEDURE — 99285 EMERGENCY DEPT VISIT HI MDM: CPT | Mod: 25,27

## 2019-11-12 PROCEDURE — 93005 ELECTROCARDIOGRAM TRACING: CPT

## 2019-11-12 PROCEDURE — 99284 EMERGENCY DEPT VISIT MOD MDM: CPT | Mod: 25,27

## 2019-11-12 PROCEDURE — 25000003 PHARM REV CODE 250: Performed by: OBSTETRICS & GYNECOLOGY

## 2019-11-12 PROCEDURE — 25000003 PHARM REV CODE 250: Performed by: EMERGENCY MEDICINE

## 2019-11-12 RX ORDER — POTASSIUM CHLORIDE 20 MEQ/1
20 TABLET, EXTENDED RELEASE ORAL ONCE
Status: COMPLETED | OUTPATIENT
Start: 2019-11-12 | End: 2019-11-12

## 2019-11-12 RX ORDER — CALCIUM CARBONATE 200(500)MG
1000 TABLET,CHEWABLE ORAL
Status: COMPLETED | OUTPATIENT
Start: 2019-11-12 | End: 2019-11-12

## 2019-11-12 RX ADMIN — ALUMINUM HYDROXIDE, MAGNESIUM HYDROXIDE, AND SIMETHICONE 50 ML: 200; 200; 20 SUSPENSION ORAL at 06:11

## 2019-11-12 RX ADMIN — Medication 1000 MG: at 03:11

## 2019-11-12 RX ADMIN — POTASSIUM CHLORIDE 20 MEQ: 1500 TABLET, EXTENDED RELEASE ORAL at 06:11

## 2019-11-12 NOTE — ED PROVIDER NOTES
"Encounter Date: 11/12/2019    SCRIBE #1 NOTE: IFrancesca, am scribing for, and in the presence of, Dr. Hummel.       History     Chief Complaint   Patient presents with    Abdominal Pain     19 weeks pregnant      11/12/2019  2:55 PM     The patient is a 24 y.o. High risk pregnant female who presents with abdominal pain. Pt is 19 weeks pregnant. Patient c/o acute onset of abdomen which started 3 hours ago. She describes her pain as tightness to the upper abdomen. Pt also reports her pain radiates down her abdomen when she stretches. She denies any nausea, vomiting, fever, diarrhea, chest pain, sob, cough, burning with urination, blood in urine, vaginal pain, vaginal discharge or vaginal bleeding. No previous episode of abdominal pain. No changes in swelling. Pt is on lovenox and asa daily. PMHx of Complex cyanotic coangenital heart disease with dextrocardia, DORV, large VSD, significant pulmonary valve stenosis and resolved cyanosis. SHx of cardiac catheterization, D&C and extracardiac fontan procedure.    The history is provided by the patient.     Review of patient's allergies indicates:   Allergen Reactions    Hydrocodone Shortness Of Breath and Other (See Comments)     Dizziness; sweating     Past Medical History:   Diagnosis Date    Abnormality of heart valve     heart disease     fatigue as a result of pregnancy (8wks ) clubbing indicates chronic oxygenation issues but pt considered it "normal"     Past Surgical History:   Procedure Laterality Date    CARDIAC CATHETERIZATION  at 7 yars of age    DILATION AND CURETTAGE OF UTERUS      FONTAN PROCEDURE, EXTRACARDIAC  September 29, 2015    With a nonfenestrated 22 mm [place]-Justice tube graft.  The pulmonary valve was closed.  Procedure performed by [facility]. Moe [facility]arni at [facility].     Family History   Problem Relation Age of Onset    Diabetes Maternal Grandmother     Vision loss Maternal Grandmother     Miscarriages / Stillbirths Mother     " Diabetes Father     Diabetes Paternal Grandmother     No Known Problems Brother     No Known Problems Maternal Grandfather     No Known Problems Paternal Grandfather     Hypertension Maternal Aunt     Diabetes Maternal Aunt     No Known Problems Sister     No Known Problems Maternal Uncle     No Known Problems Paternal Aunt     No Known Problems Paternal Uncle     Anemia Neg Hx     Arrhythmia Neg Hx     Asthma Neg Hx     Clotting disorder Neg Hx     Fainting Neg Hx     Heart attack Neg Hx     Heart disease Neg Hx     Heart failure Neg Hx     Hyperlipidemia Neg Hx     Stroke Neg Hx     Atrial Septal Defect Neg Hx      Social History     Tobacco Use    Smoking status: Former Smoker     Packs/day: 0.25    Smokeless tobacco: Never Used   Substance Use Topics    Alcohol use: Not Currently     Alcohol/week: 0.0 standard drinks     Comment: on ocassion before pregnancy    Drug use: No     Comment: previous marijuana use     Review of Systems   Constitutional: Negative for appetite change, chills and fever.   HENT: Negative for congestion, rhinorrhea and sore throat.    Respiratory: Negative for cough and shortness of breath.    Cardiovascular: Negative for chest pain.   Gastrointestinal: Positive for abdominal pain. Negative for diarrhea, nausea and vomiting.   Genitourinary: Negative for dysuria, hematuria, vaginal bleeding, vaginal discharge and vaginal pain.   Musculoskeletal: Negative for back pain and myalgias.   Skin: Negative for rash.   Neurological: Negative for weakness and numbness.   Hematological: Does not bruise/bleed easily.       Physical Exam     Initial Vitals [11/12/19 1447]   BP Pulse Resp Temp SpO2   (!) 142/76 80 18 97.9 °F (36.6 °C) 97 %      MAP       --         Physical Exam    Nursing note and vitals reviewed.  Constitutional: She appears well-developed and well-nourished. No distress.   HENT:   Head: Normocephalic and atraumatic.   Eyes: EOM are normal. Pupils are equal,  round, and reactive to light.   Neck: Normal range of motion.   Cardiovascular: Normal rate, regular rhythm, normal heart sounds and intact distal pulses. Exam reveals no gallop and no friction rub.    No murmur heard.  Pulmonary/Chest: Breath sounds normal. She has no wheezes. She has no rhonchi. She has no rales.   Abdominal: Soft. She exhibits no distension. There is tenderness in the epigastric area. There is no rebound, no guarding and negative Durand's sign. No hernia.   Gravid abdomen. Mild epigastric TTP. No lower abdominal TTP. No hernias. No guarding. Negative durand's sign.   Musculoskeletal: Normal range of motion. She exhibits no edema.   Neurological: She is alert and oriented to person, place, and time. She has normal strength.   Skin: Skin is warm and dry. No rash noted.   Psychiatric: She has a normal mood and affect.         ED Course   Procedures  Labs Reviewed   CBC W/ AUTO DIFFERENTIAL - Abnormal; Notable for the following components:       Result Value    RBC 3.88 (*)     Hemoglobin 11.4 (*)     Hematocrit 34.6 (*)     Platelets 113 (*)     MPV 13.4 (*)     Gran # (ANC) 8.3 (*)     Immature Grans (Abs) 0.09 (*)     Gran% 74.5 (*)     Lymph% 15.5 (*)     All other components within normal limits   COMPREHENSIVE METABOLIC PANEL   LIPASE     EKG Readings: (Independently Interpreted)   EKG:  Sinus bradycardia, rate of 59, incomplete RBBB morphology, old T-wave inversions in I and aVL.  There are no acute ST or T wave changes suggestive of acute ischemia or infarction.  No significant change compared to 4 days prior.       Imaging Results    None          Medical Decision Making:   History:   Old Medical Records: I decided to obtain old medical records.  Independently Interpreted Test(s):   I have ordered and independently interpreted EKG Reading(s) - see prior notes  Clinical Tests:   Lab Tests: Ordered and Reviewed  Medical Tests: Reviewed and Ordered  Other:   I have discussed this case with  another health care provider.       <> Summary of the Discussion: Spoke to transfer center and pt will be transferred to [facility]. Dr Radha Mario is the accepting OB.            Scribe Attestation:   Scribe #1: I performed the above scribed service and the documentation accurately describes the services I performed. I attest to the accuracy of the note.    I, Dr. Jayson Hummel, personally performed the services described in this documentation. All medical record entries made by the scribe were at my direction and in my presence.  I have reviewed the chart and agree that the record reflects my personal performance and is accurate and complete. Jayson Hummel MD.  4:09 PM 2019    Theresa Grey is a 24 y.o. female presenting with epigastric pain at approximately 19 weeks of pregnancy.  No sign of heart failure or cardiopulmonary compromise.  Lungs are clear to auscultation with no edema on exam.  She has primarily epigastric tenderness. On the part of the mother, I have low suspicion for acute, life-threatening illness such as abscess, atypical appendicitis, or other emergent process with laboratories currently pending.  I did discuss extensively that she is a high risk pregnancy with new abdominal pain and although epigastric area is somewhat reassuring, she does merit some consideration for OB related issues.  She has no symptoms to suggest  labor at this point.  I will defer pelvic exam if indicated to OB.  Certainly upper GI etiologies are possible with calcium carbonate given prior to arrival after discussion with OB. Patient elects to go via private vehicle to OB ED with family; directions given; admonition to not detour with associated risks reviewed prior to her departure.        ED Course as of 1617   Tue 2019   1529 D/w Dr. Radha Mario OB accepting via [facility].    [MR]   1529 EKG:  Sinus bradycardia, rate of 59, incomplete RBBB morphology, old T-wave  inversions in I and aVL.  There are no acute ST or T wave changes suggestive of acute ischemia or infarction.  No significant change compared to 4 days prior.      [MP]      ED Course User Index  [MP] Francesca Paredes  [MR] Jayson Hummel MD                Clinical Impression:       ICD-10-CM ICD-9-CM   1. Epigastric pain R10.13 789.06         Disposition:   Disposition: Transferred  Condition: Stable                     Jayson Hummel MD  11/12/19 5829

## 2019-11-12 NOTE — PATIENT INSTRUCTIONS
Please report to ED immediatly for further evaluation.     Abdominal Pain and Early Pregnancy    (To rule out ectopic pregnancy or miscarriage)  Our tests show that you are pregnant, but the exact cause of your pain isnt clear.  Some pain and bleeding are common early in pregnancy. Often they stop, and you can go on to have a normal pregnancy and baby. Other times the pain or bleeding can be signs of a miscarriage or ectopic pregnancy. An ectopic pregnancy is a very serious problem. At this time it is unclear if your pregnancy will continue normally, if you will have a miscarriage, or if you could have an ectopic pregnancy. Below is some information about this.  Miscarriage  At this time we dont know whether you will have a miscarriage, or if things will clear up and your pregnancy will continue normally. We understand that this is emotionally difficult. There is little we can say to change the way you feel. But understand that miscarriages are common.  About 1 or 2 out of every 10 pregnancies end this way. Some end even before you know you are pregnant. This happens for a number of reasons, and usually we never figure out why. Its important you know that it is not your fault. It didnt happen because you did anything wrong.  Having sex or exercising does not cause a miscarriage. These activities are usually safe unless you have pain or bleeding or your doctor tells you to stop. Even minor falls wont cause a miscarriage. Miscarriages happen because things were not developing as they were supposed to. No medicine can prevent a miscarriage.  Ectopic pregnancy  In a normal pregnancy, the fertilized egg attaches to the wall of the womb (uterus). In an ectopic or tubal pregnancy, the fertilized egg attaches outside the uterus, usually in the fallopian tube. Very rarely, the egg attaches to an ovary or somewhere else in the abdomen. An ectopic pregnancy is much less common than a miscarriage, but it is very serious.  The baby cannot survive, and as it grows it can rupture the tube. This can cause internal bleeding and even death. Risk factors for an ectopic are:  · An ectopic pregnancy in the past  · Pelvic inflammatory disease, or PID  · Endometriosis  · Smoking  · An IUD  Additional tests  Because we dont know whats causing your symptoms, you will need more tests to figure out what the problem is. You may need:  Ultrasound  An ultrasound can usually find a normal pregnancy as early as 4 to 5 weeks along. If the ultrasound does not show the baby inside the uterus, it means that:  · You have a normal pregnancy less than 4 weeks along, or  · You are having or recently had a miscarriage, or  · You have an ectopic pregnancy  Quantitative HCG  This test measures the amount of a pregnancy hormone in your blood. Comparing today's test result to a repeat test in 2 days will show whether you have a normal pregnancy.  Laparoscopy  This is a type of surgery. The healthcare provider will put a tube with a light inside your belly (abdomen) to look directly at your pelvic organs. This test is used when it is not safe to wait 2 days for blood test results.  Important information  If you do have an ectopic pregnancy, there is a small chance that the growing fetus can tear the fallopian tube. This can cause severe internal bleeding. If this happens, you may have:  · Sudden severe pain in your lower abdomen  · Vaginal bleeding  · Weakness, dizziness, and sometimes fainting  If any of these symptoms occur:  · Call 911 or return immediately to the hospital.  · Do not drive yourself.  · Do not go to your healthcare provider's office or to a clinic. Go to the hospital.   Home care  Follow these guidelines to help care for yourself at home:  · Rest until your next exam. Dont do anything strenuous.  · Eat a light diet with foods that are easy to digest.  · Dont have sex until your healthcare provider says its OK.  Follow-up care  Follow up with  your healthcare provider, or as advised. If you were told to have a repeat blood test in 2 days, its important to get it done.  If you had an X-ray or ultrasound, a radiologist will review it. You will be told of any new findings that may affect your care.  Call 911  Call 911 if you have any of these:  · Severe pain and very heavy bleeding  · Severe lightheadedness, passing out, or fainting  · Rapid heart rate  · Trouble breathing  · Confused or difficulty waking up  When to seek medical advice  Call your healthcare provider right away if any of these occur:  · The pain in your abdomen gets worse, either suddenly or gradually.  · You are dizzy or weak when you stand.  · You have heavy vaginal bleeding. This means soaking 1 pad an hour for 3 hours.  · You have vaginal bleeding for more than 5 days.  · You have repeated vomiting or diarrhea.  · The pain in your abdomen moves to the lower right.  · You have blood in your vomit or bowel movements. This will be dark red or black.  · You have a fever of 100.4ºF (38ºC) or higher, or as directed by your healthcare provider  Date Last Reviewed: 10/1/2016  © 2911-2557 [employer]. [address] [place] [address], [place] [address]. All rights reserved. This information is not intended as a substitute for professional medical care. Always follow your healthcare professional's instructions.

## 2019-11-12 NOTE — PROGRESS NOTES
"Subjective:       Patient ID: Theresa Grey is a 24 y.o. female.    Vitals:  height is 4' 11" (1.499 m) and weight is 54.5 kg (120 lb 3.2 oz). Her temperature is 97.1 °F (36.2 °C). Her blood pressure is 97/60 and her pulse is 74. Her oxygen saturation is 97%.     Chief Complaint: Abdominal Pain (pt is 19 wks pregnant)    Presents to the clinic with complaint of upper abdominal pain, which has become worse over 24 hours. Patient is 19 weeks pregnant, and has OBGYN care with [facility]. Patient describes her pain as "cramping" feeling. She also explains she was categorized as a "high risk" pregnancy secondary to undergoing heart surgery in 2015. Pain radiates down stomach. Denies  sympoms. Denies Vaginal Bleeding. Denies N/V/D.       Gastrointestinal: Positive for abdominal pain.       Objective:      Physical Exam   Constitutional: She is oriented to person, place, and time. She appears well-developed and well-nourished.   HENT:   Head: Normocephalic.   Eyes: Pupils are equal, round, and reactive to light.   Neck: Normal range of motion.   Cardiovascular: Normal rate and regular rhythm.   Pulmonary/Chest: Effort normal and breath sounds normal.   Abdominal: Bowel sounds are normal. There is tenderness. There is guarding.   Pregnant abdomen noted.    Musculoskeletal: Normal range of motion.   Neurological: She is oriented to person, place, and time.   Skin: Skin is warm. Capillary refill takes less than 2 seconds.   Psychiatric: She has a normal mood and affect.         Assessment:       No diagnosis found.    Plan:         After assessing patient, and discussing high risk factors at bedside, patient agrees to to go to ED for further evaluation. Being patient is a "high risk pregnancy", she will need higher lever of care/assessment, and possible further work-up.     There are no diagnoses linked to this encounter.       "

## 2019-11-12 NOTE — ED PROVIDER NOTES
"Encounter Date: 2019       History     Chief Complaint   Patient presents with    Abdominal Pain     Theresa Grey is a 24 y.o. [id number] at 19w4d presents complaining of abdominal pain.    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 S/p extracardiac Fontan with oversewing of the pulmonary valve and division of the MPA on 16 at [facility]. She is currently on ASA and Lovenox.    Patient reports that the pain began at 1 pm and reached its peak intensity at 2pm. The pain is sharp, constant, non-radiating and localized to her epigastric region. It is worsened with recumbence and does not increase with activity. It is not worsened or relieved with food. She is tolerating PO with no nausea or vomiting. Denies dysphagia. Endorses normal bowel movements with no diarrhea, hematochezia, or melena. No associated SOB, palpitations, or lightheadedness. No fever, chills, headache, or changes in vision.     Patient denies contractions, denies vaginal bleeding, denies LOF. Denies fetal movement.     She was evaluated in at the ED on main campus and elected come by private vehicle be evaluated at the OB ED due to her high risk pregnancy. Per the ED evaluation, her EKG was unchanged from a previous EKG 4 days ago and did not reveal ischemic changes. CBC revealed mild anemia, Lipase was normal, and CMP unremarkable with normal bilirubin and AST/ALT.        Review of patient's allergies indicates:   Allergen Reactions    Hydrocodone Shortness Of Breath and Other (See Comments)     Dizziness; sweating     Past Medical History:   Diagnosis Date    Abnormality of heart valve     heart disease     fatigue as a result of pregnancy (8wks ) clubbing indicates chronic oxygenation issues but pt considered it "normal"     Past Surgical History:   Procedure Laterality Date    CARDIAC CATHETERIZATION  at 7 yars of age "    DILATION AND CURETTAGE OF UTERUS      FONTAN PROCEDURE, EXTRACARDIAC  September 29, 2015    With a nonfenestrated 22 mm [place] tube graft.  The pulmonary valve was closed.  Pro[facility] performed by [facility] at [facility]     Family History   Problem Relation Age of Onset    Diabetes Maternal Grandmother     Vision loss Maternal Grandmother     Miscarriages / Stillbirths Mother     Diabetes Father     Diabetes Paternal Grandmother     No Known Problems Brother     No Known Problems Maternal Grandfather     No Known Problems Paternal Grandfather     Hypertension Maternal Aunt     Diabetes Maternal Aunt     No Known Problems Sister     No Known Problems Maternal Uncle     No Known Problems Paternal Aunt     No Known Problems Paternal Uncle     Anemia Neg Hx     Arrhythmia Neg Hx     Asthma Neg Hx     Clotting disorder Neg Hx     Fainting Neg Hx     Heart attack Neg Hx     Heart disease Neg Hx     Heart failure Neg Hx     Hyperlipidemia Neg Hx     Stroke Neg Hx     Atrial Septal Defect Neg Hx      Social History     Tobacco Use    Smoking status: Former Smoker     Packs/day: 0.25    Smokeless tobacco: Never Used   Substance Use Topics    Alcohol use: Not Currently     Alcohol/week: 0.0 standard drinks     Comment: on ocassion before pregnancy    Drug use: No     Comment: previous marijuana use     Review of Systems   Constitutional: Negative for appetite change, chills, fatigue and fever.   HENT: Negative for sore throat.    Respiratory: Negative for cough, chest tightness and shortness of breath.    Cardiovascular: Negative for chest pain, palpitations and leg swelling.   Gastrointestinal: Positive for abdominal pain. Negative for anal bleeding, blood in stool, constipation, diarrhea, nausea and vomiting.   Genitourinary: Negative for dysuria, vaginal bleeding, vaginal discharge and vaginal pain.       Physical Exam     Initial Vitals   BP Pulse Resp Temp SpO2    11/12/19 1701 11/12/19 1700 11/12/19 1701 11/12/19 1701 11/12/19 1700   121/61 69 16 97.3 °F (36.3 °C) 98 %      MAP       --                Physical Exam    Constitutional: She appears well-nourished.   Cardiovascular: Normal rate and regular rhythm.   Heart sounds auscultated on right side of chest due to dextrocardia   Pulmonary/Chest: Breath sounds normal.   Abdominal: Soft. Bowel sounds are normal. She exhibits no distension and no mass. There is tenderness. There is no rebound and no guarding.   Neurological: She is oriented to person, place, and time.   Skin: Skin is warm and dry.         ED Course   Procedures   Heart tones verified, 140s    Labs Reviewed   PROTEIN / CREATININE RATIO, URINE - Abnormal; Notable for the following components:       Result Value    Protein, Urine Random 18 (*)     Prot/Creat Ratio, Ur 0.23 (*)     All other components within normal limits   B-TYPE NATRIURETIC PEPTIDE   TROPONIN I          Imaging Results    None          Medical Decision Making:   ED Management:  - BNP and troponin ordered -- wnl  - GI cocktail given. Pt vomited up the GI cocktail, but felt improvement in her pain afterwards  - Dc home in good condition  Other:   I have discussed this case with another health care provider.       <> Summary of the Discussion: Dr. Slade                                 Clinical Impression:       ICD-10-CM ICD-9-CM   1. Epigastric pain during pregnancy, antepartum O26.899 646.83    R10.13 789.06   2. Hypokalemia due to loss of potassium E87.6 276.8   3. S/P Fontan procedure Z98.890 V45.89   4. Adult congenital heart disease Q24.9 746.9   5. Transient hypertension of pregnancy in second trimester O13.2 642.33         Disposition:   Disposition: Discharged  Condition: Stable        Tomasz Birmingham MD  PGY3, OBGYN [facility]               Tomasz Birmingham MD  Resident  11/13/19 0694

## 2019-11-12 NOTE — PROGRESS NOTES
"Subjective:       Patient ID: Theresa Grey is a 24 y.o. female.    Vitals:  height is 4' 11" (1.499 m) and weight is 54.5 kg (120 lb 3.2 oz). Her temperature is 97.1 °F (36.2 °C). Her blood pressure is 97/60 and her pulse is 74. Her oxygen saturation is 97%.     Chief Complaint: Abdominal Pain (pt is 19 wks pregnant)    Abdominal Pain   The current episode started today. The pain is at a severity of 7/10. The quality of the pain is sharp. Associated symptoms include constipation. The pain is aggravated by certain positions. The pain is relieved by nothing. She has tried nothing for the symptoms.       Gastrointestinal: Positive for abdominal pain and constipation.       Objective:      Physical Exam      Assessment:       No diagnosis found.    Plan:         There are no diagnoses linked to this encounter.       "

## 2019-11-13 NOTE — ED NOTES
RN at bedside. Pt denies any abdominal pain or epigastric pain currently. Denies SOB, chest pain. No ctxs, LOF, or vaginal bleeding

## 2019-11-15 ENCOUNTER — TELEPHONE (OUTPATIENT)
Dept: MATERNAL FETAL MEDICINE | Facility: CLINIC | Age: 24
End: 2019-11-15

## 2019-11-15 NOTE — TELEPHONE ENCOUNTER
Patient has been notified of NEGATIVE [id number] results. This specimen showed an expected representation of chromosomes 13, 18 and 21 material consistent with a FEMALE fetus.    Pt verbalized understanding of information.

## 2019-11-18 ENCOUNTER — OFFICE VISIT (OUTPATIENT)
Dept: PEDIATRIC CARDIOLOGY | Facility: CLINIC | Age: 24
End: 2019-11-18
Payer: MEDICAID

## 2019-11-18 ENCOUNTER — CLINICAL SUPPORT (OUTPATIENT)
Dept: PEDIATRIC CARDIOLOGY | Facility: CLINIC | Age: 24
End: 2019-11-18
Payer: MEDICAID

## 2019-11-18 VITALS
WEIGHT: 118.19 LBS | BODY MASS INDEX: 23.2 KG/M2 | SYSTOLIC BLOOD PRESSURE: 108 MMHG | DIASTOLIC BLOOD PRESSURE: 65 MMHG | HEIGHT: 60 IN | HEART RATE: 88 BPM

## 2019-11-18 DIAGNOSIS — Z82.79 FAMILY HISTORY OF COMPLEX CONGENITAL HEART DISEASE: ICD-10-CM

## 2019-11-18 DIAGNOSIS — Z98.890 S/P FONTAN PROCEDURE: ICD-10-CM

## 2019-11-18 DIAGNOSIS — Z03.73 FETAL ANOMALY SUSPECTED BUT NOT FOUND: ICD-10-CM

## 2019-11-18 DIAGNOSIS — Q20.1 DORV (DOUBLE OUTLET RIGHT VENTRICLE): ICD-10-CM

## 2019-11-18 DIAGNOSIS — Q20.1 DORV (DOUBLE OUTLET RIGHT VENTRICLE): Primary | ICD-10-CM

## 2019-11-18 PROCEDURE — 93325 DOPPLER ECHO COLOR FLOW MAPG: CPT | Mod: 26,S$PBB,, | Performed by: PEDIATRICS

## 2019-11-18 PROCEDURE — 76825 ECHO EXAM OF FETAL HEART: CPT | Mod: 26,S$PBB,, | Performed by: PEDIATRICS

## 2019-11-18 PROCEDURE — 93325 PR DOPPLER COLOR FLOW VELOCITY MAP: ICD-10-PCS | Mod: 26,S$PBB,, | Performed by: PEDIATRICS

## 2019-11-18 PROCEDURE — 93325 DOPPLER ECHO COLOR FLOW MAPG: CPT | Mod: PBBFAC,PO | Performed by: PEDIATRICS

## 2019-11-18 PROCEDURE — 76827 PR  SO2 FETAL HEART DOPPLER: ICD-10-PCS | Mod: 26,S$PBB,, | Performed by: PEDIATRICS

## 2019-11-18 PROCEDURE — 99203 PR OFFICE/OUTPT VISIT, NEW, LEVL III, 30-44 MIN: ICD-10-PCS | Mod: 25,S$PBB,, | Performed by: PEDIATRICS

## 2019-11-18 PROCEDURE — 99999 PR PBB SHADOW E&M-EST. PATIENT-LVL II: CPT | Mod: PBBFAC,,, | Performed by: PEDIATRICS

## 2019-11-18 PROCEDURE — 76825 PR  SO2 FETAL HEART: ICD-10-PCS | Mod: 26,S$PBB,, | Performed by: PEDIATRICS

## 2019-11-18 PROCEDURE — 76827 ECHO EXAM OF FETAL HEART: CPT | Mod: PBBFAC,PO | Performed by: PEDIATRICS

## 2019-11-18 PROCEDURE — 76827 ECHO EXAM OF FETAL HEART: CPT | Mod: 26,S$PBB,, | Performed by: PEDIATRICS

## 2019-11-18 PROCEDURE — 99999 PR PBB SHADOW E&M-EST. PATIENT-LVL II: ICD-10-PCS | Mod: PBBFAC,,, | Performed by: PEDIATRICS

## 2019-11-18 PROCEDURE — 99203 OFFICE O/P NEW LOW 30 MIN: CPT | Mod: 25,S$PBB,, | Performed by: PEDIATRICS

## 2019-11-18 PROCEDURE — 76825 ECHO EXAM OF FETAL HEART: CPT | Mod: PBBFAC,PO | Performed by: PEDIATRICS

## 2019-11-18 PROCEDURE — 99212 OFFICE O/P EST SF 10 MIN: CPT | Mod: PBBFAC,PO,25 | Performed by: PEDIATRICS

## 2019-11-19 NOTE — PROGRESS NOTES
"[facility]- Pediatric Cardiology Fetal Cardiology Clinic    Today, I had the pleasure of evaluating Theresa Grey who is now 24 y.o. and carrying her second pregnancy at 20 3/7 weeks gestation with an DWAYNE of 4/3/20. She was referred for evaluation of the fetal heart  for a history of severe maternal congenital heart disease.     She is carrying a female fetus, named Inga.      Obstetric History:    .  Her OB care is by Dr. Dorado.  Her MFM care is by Dr. Kothari.    Past Medical History:   Diagnosis Date    Abnormality of heart valve     heart disease     fatigue as a result of pregnancy (8wks ) clubbing indicates chronic oxygenation issues but pt considered it "normal"         Current Outpatient Medications:     aspirin 81 MG Chew, Take 1 tablet (81 mg total) by mouth once daily., Disp: , Rfl: 0    enoxaparin (LOVENOX) 40 mg/0.4 mL Syrg, Inject 0.4 mLs (40 mg total) into the skin once daily., Disp: 30 Syringe, Rfl: 12    PRENATAL NO.116-IRON-FOLIC-DHA ORAL, Take 1 tablet by mouth once daily., Disp: , Rfl:     Family History: Except for her own severe congenital heart disease, negative for congenital heart disease, early coronary artery disease, sudden unexplained death, connective tissues disorders, genetic syndromes, multiple miscarriages or other congenital anomalies.    Social History: Mrs. Grey is  to the father of the baby.  She is a dental assistant.  He is a .    FETAL ECHOCARDIOGRAM (summary):  Fetal echocardiogram at 20 3/7 weeks gestation for a history of severe  maternal congenital heart disease. DWAYNE 4/3/20.  Normally connected heart.  No ectopy or sustained arrhythmia demonstrated throughout the study.  Normal fetal atrial and ductal level shunting.  No ventricular level shunting.  Normal atrioventricular and semilunar valve structure and function.  Normal ductal and aortic arches.  Normal biventricular size and systolic function.  No pericardial effusion.(Full report in " electronic medical record)    Impression:  Single active female fetus at 20 wga.  Normal fetal echocardiogram.      Todays fetal echocardiogram is normal, within the limitations of fetal echocardiography.  I discussed with her that fetal echocardiography is insufficiently sensitive to rule out all septal defects, anomalies of pulmonary and systemic veins, arch anomalies, and some valvar abnormalities, nor can it ensure that the ductus arteriosus and foramen ovale will spontaneously close.     Recommendations:  Location, timing, and mode of delivery will be determined by the obstetrical team.  She does not require further follow-up in the fetal echocardiography clinic, but I would be happy to see her again if additional questions or concerns arise.    Should there be any concerns about the baby's heart after birth, a post-arabella echocardiogram and cardiology consultation are recommended.     Given Mrs. Grey's history, I would like for the baby to have an echocardiogram in clinic with me in the first 1-2 months of life.      The above information was discussed in detail including the use of diagrams, with 30 minutes of total face to face time, with greater than 50% with counseling and coordination of care.  The discussion of the diagnosis and treatment options is as described above.      Lefty Coon MD, MPH  Pediatric and Fetal Cardiology  [facility]   [address] [place] [address]    Office: [phone]  Cell: [phone]

## 2019-11-25 ENCOUNTER — PATIENT MESSAGE (OUTPATIENT)
Dept: OBSTETRICS AND GYNECOLOGY | Facility: CLINIC | Age: 24
End: 2019-11-25

## 2019-11-29 DIAGNOSIS — Q24.9 CONGENITAL HEART DISEASE IN PREGNANCY: ICD-10-CM

## 2019-11-29 DIAGNOSIS — I37.0 NONRHEUMATIC PULMONARY VALVE STENOSIS: Primary | ICD-10-CM

## 2019-11-29 DIAGNOSIS — Q24.9 ADULT CONGENITAL HEART DISEASE: ICD-10-CM

## 2019-11-29 DIAGNOSIS — O99.891 CONGENITAL HEART DISEASE IN PREGNANCY: ICD-10-CM

## 2019-11-29 DIAGNOSIS — Q20.1 DORV (DOUBLE OUTLET RIGHT VENTRICLE): ICD-10-CM

## 2019-12-09 ENCOUNTER — PROCEDURE VISIT (OUTPATIENT)
Dept: MATERNAL FETAL MEDICINE | Facility: CLINIC | Age: 24
End: 2019-12-09
Payer: MEDICAID

## 2019-12-09 DIAGNOSIS — O99.891 CONGENITAL HEART DISEASE IN PREGNANCY: ICD-10-CM

## 2019-12-09 DIAGNOSIS — Z36.89 ENCOUNTER FOR ULTRASOUND TO CHECK FETAL GROWTH: ICD-10-CM

## 2019-12-09 DIAGNOSIS — Q24.9 CONGENITAL HEART DISEASE IN PREGNANCY: ICD-10-CM

## 2019-12-09 PROCEDURE — 76816 OB US FOLLOW-UP PER FETUS: CPT | Mod: 26,S$PBB,, | Performed by: PEDIATRICS

## 2019-12-09 PROCEDURE — 76816 PR  US,PREGNANT UTERUS,F/U,TRANSABD APP: ICD-10-PCS | Mod: 26,S$PBB,, | Performed by: PEDIATRICS

## 2019-12-09 PROCEDURE — 76816 OB US FOLLOW-UP PER FETUS: CPT | Mod: PBBFAC | Performed by: PEDIATRICS

## 2019-12-10 DIAGNOSIS — Z3A.23 23 WEEKS GESTATION OF PREGNANCY: Primary | ICD-10-CM

## 2019-12-18 ENCOUNTER — TELEPHONE (OUTPATIENT)
Dept: MATERNAL FETAL MEDICINE | Facility: CLINIC | Age: 24
End: 2019-12-18

## 2019-12-18 NOTE — TELEPHONE ENCOUNTER
Patient called [facility] clinic to reschedule follow-up with Dr. Quiñones. Patient states that she cannot come in on 12/31/19 because of her work schedule. Offered to reschedule appointment to 1/3/20 with Dr. Quiñones but patient declined due to her work schedule. Patient then requested an appointment on 1/2/20 and appt scheduled for 1240pm with Dr. Franklin. Patient aware that Dr. Quiñones is not available on 1/2/20.

## 2019-12-20 ENCOUNTER — OFFICE VISIT (OUTPATIENT)
Dept: PEDIATRIC CARDIOLOGY | Facility: CLINIC | Age: 24
End: 2019-12-20
Payer: MEDICAID

## 2019-12-20 ENCOUNTER — CLINICAL SUPPORT (OUTPATIENT)
Dept: PEDIATRIC CARDIOLOGY | Facility: CLINIC | Age: 24
End: 2019-12-20
Attending: PEDIATRICS
Payer: MEDICAID

## 2019-12-20 ENCOUNTER — CLINICAL SUPPORT (OUTPATIENT)
Dept: PEDIATRIC CARDIOLOGY | Facility: CLINIC | Age: 24
End: 2019-12-20
Payer: MEDICAID

## 2019-12-20 ENCOUNTER — ROUTINE PRENATAL (OUTPATIENT)
Dept: OBSTETRICS AND GYNECOLOGY | Facility: CLINIC | Age: 24
End: 2019-12-20
Payer: MEDICAID

## 2019-12-20 VITALS
DIASTOLIC BLOOD PRESSURE: 62 MMHG | HEART RATE: 93 BPM | BODY MASS INDEX: 22.76 KG/M2 | HEIGHT: 60 IN | OXYGEN SATURATION: 96 % | SYSTOLIC BLOOD PRESSURE: 114 MMHG | WEIGHT: 115.94 LBS

## 2019-12-20 VITALS — SYSTOLIC BLOOD PRESSURE: 123 MMHG | WEIGHT: 116 LBS | BODY MASS INDEX: 22.77 KG/M2 | DIASTOLIC BLOOD PRESSURE: 84 MMHG

## 2019-12-20 DIAGNOSIS — O99.891 CONGENITAL HEART DISEASE IN PREGNANCY: ICD-10-CM

## 2019-12-20 DIAGNOSIS — Z3A.25 25 WEEKS GESTATION OF PREGNANCY: Primary | ICD-10-CM

## 2019-12-20 DIAGNOSIS — Q20.1 DORV (DOUBLE OUTLET RIGHT VENTRICLE): ICD-10-CM

## 2019-12-20 DIAGNOSIS — Q24.9 ADULT CONGENITAL HEART DISEASE: ICD-10-CM

## 2019-12-20 DIAGNOSIS — Q24.9 CONGENITAL HEART DISEASE IN PREGNANCY: ICD-10-CM

## 2019-12-20 DIAGNOSIS — Q24.0 DEXTROCARDIA: ICD-10-CM

## 2019-12-20 DIAGNOSIS — I37.0 NONRHEUMATIC PULMONARY VALVE STENOSIS: ICD-10-CM

## 2019-12-20 DIAGNOSIS — Q89.3 SITUS INVERSUS: ICD-10-CM

## 2019-12-20 DIAGNOSIS — I37.0 NONRHEUMATIC PULMONARY VALVE STENOSIS: Primary | ICD-10-CM

## 2019-12-20 DIAGNOSIS — O09.92 SUPERVISION OF HIGH RISK PREGNANCY IN SECOND TRIMESTER: ICD-10-CM

## 2019-12-20 DIAGNOSIS — Z98.890 S/P FONTAN PROCEDURE: ICD-10-CM

## 2019-12-20 PROCEDURE — 93325 DOPPLER ECHO COLOR FLOW MAPG: CPT | Mod: PBBFAC,PO | Performed by: PEDIATRICS

## 2019-12-20 PROCEDURE — 93010 EKG 12-LEAD PEDIATRIC: ICD-10-PCS | Mod: S$PBB,,, | Performed by: PEDIATRICS

## 2019-12-20 PROCEDURE — 99999 PR PBB SHADOW E&M-EST. PATIENT-LVL III: CPT | Mod: PBBFAC,,, | Performed by: PEDIATRICS

## 2019-12-20 PROCEDURE — 93320 DOPPLER ECHO COMPLETE: CPT | Mod: PBBFAC,PO | Performed by: PEDIATRICS

## 2019-12-20 PROCEDURE — 93303 PR ECHO XTHORACIC,CONG A2M,COMPLETE: ICD-10-PCS | Mod: 26,S$PBB,, | Performed by: PEDIATRICS

## 2019-12-20 PROCEDURE — 99212 OFFICE O/P EST SF 10 MIN: CPT | Mod: PBBFAC,TH,25 | Performed by: OBSTETRICS & GYNECOLOGY

## 2019-12-20 PROCEDURE — 93010 ELECTROCARDIOGRAM REPORT: CPT | Mod: S$PBB,,, | Performed by: PEDIATRICS

## 2019-12-20 PROCEDURE — 99999 PR PBB SHADOW E&M-EST. PATIENT-LVL III: ICD-10-PCS | Mod: PBBFAC,,, | Performed by: PEDIATRICS

## 2019-12-20 PROCEDURE — 93325 DOPPLER ECHO COLOR FLOW MAPG: CPT | Mod: 26,S$PBB,, | Performed by: PEDIATRICS

## 2019-12-20 PROCEDURE — 93325 PR DOPPLER COLOR FLOW VELOCITY MAP: ICD-10-PCS | Mod: 26,S$PBB,, | Performed by: PEDIATRICS

## 2019-12-20 PROCEDURE — 93303 ECHO TRANSTHORACIC: CPT | Mod: PBBFAC,PO | Performed by: PEDIATRICS

## 2019-12-20 PROCEDURE — 93227: ICD-10-PCS | Mod: ,,, | Performed by: PEDIATRICS

## 2019-12-20 PROCEDURE — 93005 ELECTROCARDIOGRAM TRACING: CPT | Mod: PBBFAC,PO | Performed by: PEDIATRICS

## 2019-12-20 PROCEDURE — 99213 PR OFFICE/OUTPT VISIT, EST, LEVL III, 20-29 MIN: ICD-10-PCS | Mod: S$PBB,TH,, | Performed by: OBSTETRICS & GYNECOLOGY

## 2019-12-20 PROCEDURE — 93320 PR DOPPLER ECHO HEART,COMPLETE: ICD-10-PCS | Mod: 26,S$PBB,, | Performed by: PEDIATRICS

## 2019-12-20 PROCEDURE — 99214 PR OFFICE/OUTPT VISIT, EST, LEVL IV, 30-39 MIN: ICD-10-PCS | Mod: 25,S$PBB,, | Performed by: PEDIATRICS

## 2019-12-20 PROCEDURE — 99999 PR PBB SHADOW E&M-EST. PATIENT-LVL II: CPT | Mod: PBBFAC,,, | Performed by: OBSTETRICS & GYNECOLOGY

## 2019-12-20 PROCEDURE — 93303 ECHO TRANSTHORACIC: CPT | Mod: 26,S$PBB,, | Performed by: PEDIATRICS

## 2019-12-20 PROCEDURE — 99214 OFFICE O/P EST MOD 30 MIN: CPT | Mod: 25,S$PBB,, | Performed by: PEDIATRICS

## 2019-12-20 PROCEDURE — 93320 DOPPLER ECHO COMPLETE: CPT | Mod: 26,S$PBB,, | Performed by: PEDIATRICS

## 2019-12-20 PROCEDURE — 99999 PR PBB SHADOW E&M-EST. PATIENT-LVL II: ICD-10-PCS | Mod: PBBFAC,,, | Performed by: OBSTETRICS & GYNECOLOGY

## 2019-12-20 PROCEDURE — 99213 OFFICE O/P EST LOW 20 MIN: CPT | Mod: PBBFAC,27,PO,25 | Performed by: PEDIATRICS

## 2019-12-20 PROCEDURE — 93227 XTRNL ECG REC<48 HR R&I: CPT | Mod: ,,, | Performed by: PEDIATRICS

## 2019-12-20 PROCEDURE — 99213 OFFICE O/P EST LOW 20 MIN: CPT | Mod: S$PBB,TH,, | Performed by: OBSTETRICS & GYNECOLOGY

## 2019-12-20 NOTE — PROGRESS NOTES
Doing well today. No OB concerns.    Problem Noted   Supervision of High Risk Pregnancy in Second Trimester 11/10/2019    Rh positive  1 hour GTT between 24-28 weeks, today  GBS between 35-37 weeks  Genetic screening: NIPT  TDaP after 27 weeks  Flu recommended  Feeding: Breast, Rx for pump given   PP Birth Control: TBD  Connected MOM: Yes  Pediatrician: CHANDNI  Prenatal Classes: TBD  FLU SHOT 12/20  Anesthesia consult today               Congenital Heart Disease in Pregnancy 10/10/2019    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 S/p extracardiac Fontan with oversewing of the pulmonary valve and division of the MPA on 9/29/16 at [facility]. Good ventricular function, no significant atrioventricular valve insufficiency, and no evidence of Fontan obstruction or thrombosis on cardiac MRI October 16, 2019.  Followed by Dr. Weems and JAIME    #Telemetry 24-48 hours after delivery  #increased risk of thromboembolic events - currently on ASA 81 mg daily and lovenox 40 mg chandrika  #avoid estrogen containing contraception  #Fu with Dr. Weems for echo, ekg and 24 hour holter  #SBEP is indicated for dental work   #Fetal echocardiogram scheduled for November 18th.         RTO in 4 weeks.     Gabriella Dorado MD  Obstetrics and Gynecology  [facility]

## 2019-12-20 NOTE — PROGRESS NOTES
2019     [facility]    re:Theresa Grey  :1995     Davey Turpin MD   [address] [facility] [place] [facility] [address]     Dr. Felice Quiñones    Dear Doctors:    Theresa Grey is a 24 y.o. female with the following diagnoses:  Diagnoses:  1. 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 resolved cyanosis  3. Currently pregnant at 25 wga    Discussion:  In regards to her pregnancy, we have several concerns:  1.  The rate of fetal loss is significantly elevated, approaching 50%.  2.  The rate of premature delivery is significantly elevated.  3.  The clotting predisposition often noted in Fontan patients may be worsened by pregnancy.  4.  Without a sub pulmonic ventricle, she may tolerate the volume load associated with a pregnancy less well than a 2 ventricle patient.  There is an increased risk of heart failure symptoms.  5.  There is an increased risk of arrhythmias.    That said, she really looks good.  I would expect her to tolerate a pregnancy well.  Vaginal delivery is typically preferable if she is doing well.  IV fluids may be necessary to avoid dehydration.  I would recommend close monitoring with telemetry for 24-48 hours after delivery.      We again discussed the long-term risk associated with Fontan circulation. We discussed:  1. Risk of thromboembolic events with Fontan   - she needs to be on a baby aspirin daily to try to cut down on that risk   - would avoid estrogen containing birth control.  She will discuss with her gynecologist  2. Long term risk of arrhythmias,  "heart failure, PLE, liver disease discussed    Plan:  1.  Continue daily baby aspirin.  I would have a low threshold to start her on Lovenox if she was immobilized for any significant amount of time.  2.  Follow-up with me in 1 month with EKG only.  Plan repeat echo at about 34-35 weeks.  3.  SBEP is indicated for dental work   4.  Close follow-up with obstetrics.    Interval history:  I saw her today in [place].  I last saw her a month ago.  Overall, she has done well since that time.  She denies shortness of breath.  She denies edema.  She has had no diarrhea.  No chest pain or palpitations.  She has had no syncope or near-syncope.      PMH:  She was diagnosed as a young child with congenital heart disease while living in [place]. She was evaluated at [facility] with a cath at about 7 years of age. She was scheduled for heart surgery by her report about 10 years ago. However, due to social issues (no insurance, no social security number) the surgery was put off and she was lost to follow up. I first saw her in 2014 she was admitted with pregnancy, rare chest pain, and cyanosis.  After long discussion, she had a pregnancy termination due to the risk associated with unrepaired cyanotic congenital heart disease.  On 9/29/16, she underwent an extracardiac Fontan with oversewing and ligation of the MPA at [facility].  She did very well and was discharged a week later.      The review of systems is as noted above. It is otherwise negative for other symptoms related to the general, neurological, psychiatric, endocrine, gastrointestinal, genitourinary, respiratory, dermatologic, musculoskeletal, hematologic, and immunologic systems.    Past Medical History:   Diagnosis Date    Abnormality of heart valve     heart disease     fatigue as a result of pregnancy (8wks ) clubbing indicates chronic oxygenation issues but pt considered it "normal"     Past Surgical History:   Procedure Laterality Date    CARDIAC " CATHETERIZATION  at 7 yars of age    DILATION AND CURETTAGE OF UTERUS      FONTAN PROCEDURE, EXTRACARDIAC  September 29, 2015    With a nonfenestrated 22 mm Elgin-Justice tube graft.  The pulmonary valve was closed.  Procedure performed by Dr. Moe Kulkarni at [facility].     Family History   Problem Relation Age of Onset    Diabetes Maternal Grandmother     Vision loss Maternal Grandmother     Miscarriages / Stillbirths Mother     Diabetes Father     Diabetes Paternal Grandmother     No Known Problems Brother     No Known Problems Maternal Grandfather     No Known Problems Paternal Grandfather     Hypertension Maternal Aunt     Diabetes Maternal Aunt     No Known Problems Sister     No Known Problems Maternal Uncle     No Known Problems Paternal Aunt     No Known Problems Paternal Uncle     Anemia Neg Hx     Arrhythmia Neg Hx     Asthma Neg Hx     Clotting disorder Neg Hx     Fainting Neg Hx     Heart attack Neg Hx     Heart disease Neg Hx     Heart failure Neg Hx     Hyperlipidemia Neg Hx     Stroke Neg Hx     Atrial Septal Defect Neg Hx      Social History     Socioeconomic History    Marital status:      Spouse name: Not on file    Number of children: Not on file    Years of education: Not on file    Highest education level: Not on file   Occupational History    Not on file   Social Needs    Financial resource strain: Not on file    Food insecurity:     Worry: Not on file     Inability: Not on file    Transportation needs:     Medical: Not on file     Non-medical: Not on file   Tobacco Use    Smoking status: Former Smoker     Packs/day: 0.25    Smokeless tobacco: Never Used   Substance and Sexual Activity    Alcohol use: Not Currently     Alcohol/week: 0.0 standard drinks     Comment: on ocassion before pregnancy    Drug use: No     Comment: previous marijuana use    Sexual activity: Yes     Partners: Male     Birth control/protection: Injection   Lifestyle     "Physical activity:     Days per week: Not on file     Minutes per session: Not on file    Stress: Not on file   Relationships    Social connections:     Talks on phone: Not on file     Gets together: Not on file     Attends Baptism service: Not on file     Active member of club or organization: Not on file     Attends meetings of clubs or organizations: Not on file     Relationship status: Not on file   Other Topics Concern    Not on file   Social History Narrative    Not on file     Current Outpatient Medications on File Prior to Visit   Medication Sig Dispense Refill    aspirin 81 MG Chew Take 1 tablet (81 mg total) by mouth once daily.  0    enoxaparin (LOVENOX) 40 mg/0.4 mL Syrg Inject 0.4 mLs (40 mg total) into the skin once daily. 30 Syringe 12    PRENATAL NO.116-IRON-FOLIC-DHA ORAL Take 1 tablet by mouth once daily.       No current facility-administered medications on file prior to visit.      Review of patient's allergies indicates:   Allergen Reactions    Hydrocodone Shortness Of Breath and Other (See Comments)     Dizziness; sweating     /62 (BP Location: Right arm)   Pulse 93   Ht 4' 11.84" (1.52 m)   Wt 52.6 kg (115 lb 15.4 oz)   LMP 06/26/2019   SpO2 96%   BMI 22.77 kg/m²     In general, she is an acyanotic, nondysmorphic appearing female in no apparent distress.  The eyes, nares, and oropharynx are clear.  Eyelids and conjunctiva free of drainage and redness.  PERRLA.  Teeth in good repair.  Mucous membranes are moist.  The head is normocephalic and atraumatic.  The neck is supple without jugular venous distention or thyroid enlargement.  The lungs are clear to auscultation bilaterally.  There is a well healed sternotomy scar.  The PMI is in the right chest.  The first heart sound is normal.  The second is loud and single.  There are no gallops, rubs, or clicks in the supine position.  The abdominal exam is benign without hepatosplenomegaly, tenderness but there is a gravid " "uterus.  Pulses are normal in all 4 extremities with brisk capillary refill and no edema.  No rashes are noted.  She has some clubbing.  No tenderness, swelling in legs.  Her neurological exam is normal.    EKG with a "left atrial rhythm" with evidence of dextrocardia.  HR about 70.    An echocardiogram today reveals excellent function, no significant AV valve insufficiency, no effusion, and no evidence of Fontan obstruction.    Cardiac MRI October 19, 2019:  1. Patent innominate vein to left-sided SVC. Widely patent left-sided Pavel anastomosis. The IVC is anastomosed to the left pulmonary artery. The Fontan tunnel is widely patent without evidence of obstruction. The branch pulmonary arteries are normal in size with no evidence of obstruction.   2. Atrio-ventricular concordance with a jason-cross type of arrangement.   3. The right ventricle volumes are low normal. Moderate RVH. The calculated RVEF is 45 %.  4. The left ventricular volumes are low normal. The calculated LVEF is 50 %.   5. Large inlet type ventricular septal defect.   6. Oversewn pulmonary valve.   7. Anterior and rightward aorta. Structurally normal aortic valve with no significant aortic insufficiency.   8. Right aortic arch.     Sincerely,        Toro Weems MD  Pediatric Cardiology  Adult Congenital Heart Disease  Pediatric Heart Failure and Transplantation  [facility]  [address] [place]  [address]  [phone]            "

## 2020-01-02 ENCOUNTER — PROCEDURE VISIT (OUTPATIENT)
Dept: MATERNAL FETAL MEDICINE | Facility: CLINIC | Age: 25
End: 2020-01-02
Payer: MEDICAID

## 2020-01-02 ENCOUNTER — LAB VISIT (OUTPATIENT)
Dept: LAB | Facility: OTHER | Age: 25
End: 2020-01-02
Attending: OBSTETRICS & GYNECOLOGY
Payer: MEDICAID

## 2020-01-02 ENCOUNTER — INITIAL CONSULT (OUTPATIENT)
Dept: MATERNAL FETAL MEDICINE | Facility: CLINIC | Age: 25
End: 2020-01-02
Payer: MEDICAID

## 2020-01-02 VITALS
WEIGHT: 119.06 LBS | DIASTOLIC BLOOD PRESSURE: 64 MMHG | BODY MASS INDEX: 23.37 KG/M2 | SYSTOLIC BLOOD PRESSURE: 108 MMHG

## 2020-01-02 DIAGNOSIS — Q24.9 ADULT CONGENITAL HEART DISEASE: ICD-10-CM

## 2020-01-02 DIAGNOSIS — Z36.89 ENCOUNTER FOR ULTRASOUND TO CHECK FETAL GROWTH: ICD-10-CM

## 2020-01-02 DIAGNOSIS — Z3A.25 25 WEEKS GESTATION OF PREGNANCY: ICD-10-CM

## 2020-01-02 DIAGNOSIS — Z36.89 ENCOUNTER FOR ULTRASOUND TO CHECK FETAL GROWTH: Primary | ICD-10-CM

## 2020-01-02 LAB
BASOPHILS # BLD AUTO: 0.03 K/UL (ref 0–0.2)
BASOPHILS NFR BLD: 0.3 % (ref 0–1.9)
DIFFERENTIAL METHOD: ABNORMAL
EOSINOPHIL # BLD AUTO: 0.2 K/UL (ref 0–0.5)
EOSINOPHIL NFR BLD: 2 % (ref 0–8)
ERYTHROCYTE [DISTWIDTH] IN BLOOD BY AUTOMATED COUNT: 12.9 % (ref 11.5–14.5)
GLUCOSE SERPL-MCNC: 123 MG/DL (ref 70–140)
HCT VFR BLD AUTO: 35.9 % (ref 37–48.5)
HGB BLD-MCNC: 11.3 G/DL (ref 12–16)
IMM GRANULOCYTES # BLD AUTO: 0.13 K/UL (ref 0–0.04)
IMM GRANULOCYTES NFR BLD AUTO: 1.2 % (ref 0–0.5)
LYMPHOCYTES # BLD AUTO: 1.3 K/UL (ref 1–4.8)
LYMPHOCYTES NFR BLD: 12.5 % (ref 18–48)
MCH RBC QN AUTO: 28.2 PG (ref 27–31)
MCHC RBC AUTO-ENTMCNC: 31.5 G/DL (ref 32–36)
MCV RBC AUTO: 90 FL (ref 82–98)
MONOCYTES # BLD AUTO: 0.6 K/UL (ref 0.3–1)
MONOCYTES NFR BLD: 5.5 % (ref 4–15)
NEUTROPHILS # BLD AUTO: 8.4 K/UL (ref 1.8–7.7)
NEUTROPHILS NFR BLD: 78.5 % (ref 38–73)
NRBC BLD-RTO: 0 /100 WBC
PLATELET # BLD AUTO: 121 K/UL (ref 150–350)
PMV BLD AUTO: 14.6 FL (ref 9.2–12.9)
RBC # BLD AUTO: 4.01 M/UL (ref 4–5.4)
WBC # BLD AUTO: 10.71 K/UL (ref 3.9–12.7)

## 2020-01-02 PROCEDURE — 99999 PR PBB SHADOW E&M-EST. PATIENT-LVL II: CPT | Mod: PBBFAC,,, | Performed by: OBSTETRICS & GYNECOLOGY

## 2020-01-02 PROCEDURE — 99214 PR OFFICE/OUTPT VISIT, EST, LEVL IV, 30-39 MIN: ICD-10-PCS | Mod: 25,S$PBB,TH, | Performed by: OBSTETRICS & GYNECOLOGY

## 2020-01-02 PROCEDURE — 36415 COLL VENOUS BLD VENIPUNCTURE: CPT

## 2020-01-02 PROCEDURE — 82950 GLUCOSE TEST: CPT

## 2020-01-02 PROCEDURE — 76816 OB US FOLLOW-UP PER FETUS: CPT | Mod: 26,S$PBB,, | Performed by: OBSTETRICS & GYNECOLOGY

## 2020-01-02 PROCEDURE — 99999 PR PBB SHADOW E&M-EST. PATIENT-LVL II: ICD-10-PCS | Mod: PBBFAC,,, | Performed by: OBSTETRICS & GYNECOLOGY

## 2020-01-02 PROCEDURE — 99214 OFFICE O/P EST MOD 30 MIN: CPT | Mod: 25,S$PBB,TH, | Performed by: OBSTETRICS & GYNECOLOGY

## 2020-01-02 PROCEDURE — 76816 PR  US,PREGNANT UTERUS,F/U,TRANSABD APP: ICD-10-PCS | Mod: 26,S$PBB,, | Performed by: OBSTETRICS & GYNECOLOGY

## 2020-01-02 PROCEDURE — 85025 COMPLETE CBC W/AUTO DIFF WBC: CPT

## 2020-01-02 PROCEDURE — 76816 OB US FOLLOW-UP PER FETUS: CPT | Mod: PBBFAC | Performed by: OBSTETRICS & GYNECOLOGY

## 2020-01-02 PROCEDURE — 99212 OFFICE O/P EST SF 10 MIN: CPT | Mod: PBBFAC,TH,25 | Performed by: OBSTETRICS & GYNECOLOGY

## 2020-01-02 NOTE — PROGRESS NOTES
Patient also has low platelets (noted post visit). Recommend primary ob monitor these at least monthly. They have been low in past. If they fall below 100, we will need to address Lovenox. Would recommend primary ob refer to hematology.

## 2020-01-02 NOTE — PROGRESS NOTES
Indication  ========    Follow-up evaluation for fetal growth and anatomy    History  ======    Previous Outcomes   2  Para 0  Abortions (A) 1  Terminations 1  Preg. no. 1  Outcome: elective termination  Date:   Risk Factors  Details: patient born with congenital Heart Defect surgical repair     Pregnancy History  ==============    Maternal Lab Tests  Test: Cell free fetal DNA analysis  Result:    Materni T21 negative    Maternal Assessment  =================    Weight 54 kg  Weight (lb) 119 lb  BP syst 108 mmHg  BP diast 64 mmHg    Fetal Growth Overview  =================    Exam date        GA              BPD (mm)        HC (mm)         AC (mm)        FL (mm)        HL (mm)         EFW (g)  10/7/2019          14w 3d        26.6                 97.7               79.0               15.1              14.6               97  2019          19w 0d        42.7                 164.5              130.9             27.5              26.9               247  2019          23w 3d        54.2                 205.5             181.7              38.8                                   530    23%  2020          26w 6d        65.0                 238.3             226.6              46.5                                   923    36%      Method  ======    Transabdominal ultrasound examination. View: Good view    Pregnancy  =========    Rice pregnancy. Number of fetuses: 1    Dating  ======    Cycle: regular cycle  GA by prior assessment 26 w + 6 d  DWAYNE by prior assessment: 4/3/2020  Ultrasound examination on: 2020  GA by U/S based upon: AC, BPD, Femur, HC  GA by U/S 26 w + 1 d  DWAYNE by U/S: 2020  Assigned: based on stated DWAYNE, selected on 10/7/2019  Assigned GA 26 w + 6 d  Assigned DWAYNE: 4/3/2020  Pregnancy length 280 d    General Evaluation  ==============    Cardiac activity present.  bpm.  Fetal movements visualized.  Presentation cephalic.  Placenta Placental site: anterior with  lakes.  Umbilical cord Cord vessels: 3 vessel cord.  Amniotic fluid Amount of AF: normal amount. MVP 4.6 cm.    Fetal Biometry  ============    Standard  BPD 65.0 mm  26w 2d                Hadlock    OFD 84.4 mm  27w 2d                Wendy    .3 mm  25w 6d                Hadlock    .6 mm  27w 0d                Hadlock    Femur 46.5 mm  25w 3d                Hadlock    HC / AC 1.05     g          36%        Scottie    EFW (lb) 2 lb  EFW (oz) 1 oz  EFW by: Hadlock (BPD-HC-AC-FL)  Head / Face / Neck  Cephalic index 0.77    Extremities / Bony Struc  FL / BPD 0.72    FL / AC 0.21    Other Structures   bpm    Fetal Anatomy  ============    Cranium: appears normal  Profile: normal  4-chamber view: normal  Heart / Thorax  3-vessel view: normal  Stomach: normal  Kidneys: normal  Bladder: normal  Genitals: normal  Rt foot: plantar surface normal, tib/fib views suboptimal  Wants to know gender: no    Consultation  ==========    Type: Follow up  I reviewed the prior consultation notes from my colleagues and Dr. Weems.    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    Per Dr. Weems last note, he does recommend telemetry postpartum. I would also likely recommend this intrapartum but I will message him as  well. He did recommend SBE prophylaxis for dental procedures. We will confirm that he would also want this with delivery as this is typical if  they require it for dental procedures. She has no cardiac symptoms currently. I would recommend that she have a follow-up growth ultrasound  in 4 weeks. We will monitor the fetal growth closely. She will need a weekly BPP or NST/MVP at 32 weeks. Delivery time will be dependent  upon how the patient is doing as well. We would want to stop  "her Lovenox 24 hr ideally before delivery. She should have SCDs on during her  intrapartum and postpartum course and then resume the Lovenox postpartum. She is continuing baby aspirin. She did have an anesthesia  consult their note is not available. Recommend primary Ob contact anesthesia for the consultation note.  At Dr. Weems's last visit:" EKG with a "left atrial rhythm" with evidence of dextrocardia. HR about 70.  An echocardiogram today reveals excellent function, no significant AV valve insufficiency, no effusion, and no evidence of Fontan obstruction."  That was done on .    Patient with known situs inversus. This should be kept in mind with respect to complaints and for surgical issues.    Patient has a rare left side rib pain/upper abdomen pain. It did not appear obviously liver related on palpation. This comes and goes. It does not  appear to be contraction related. No obvious concerns for cardiopulmonary process. She denies any vaginal bleeding, or leakage of fluid. She  has felt fetal movement. There is no back pain or CVA tenderness. There are no urinary symptoms. She was given precautions. Dr. Dorado will  also check on the patient.    The patient asked about a fetal clubfoot. The fetal right foot did not appear obviously clubbed today but views were limited. I discussed with her  that they will assess the baby postnatally as well to confirm there is no evidence of clubbing. Low risk NIPT.    Fetal echo was normal.  to see Dr. Coon in the first 1-2 months of life for evaluation.    25 min was spent face-to-face time with greater than half that time spent in counseling and coordination of care.    Impression  =========    Rice live intrauterine pregnancy.  Overall normal fetal growth.  Normal amniotic fluid volume.  Limited fetal anatomy appears normal.  Right foot plantar surface appears normal-tib/fib views suboptimal.  Placenta with numerous lakes and possible resolving " subchorionic hemorrhage (stable to improved from prior scan).    Recommendation  ==============    Precautions given.  Continued follow up with Dr. Weems.  Will message Dr. Weems about tele in labor and SBE prophylaxis.  Recommend primary ob message anesthesia regarding consult report.  Serial growth.  Will need weekly BPP or NST/MVP at 32 weeks.  Would plan to schedule delivery so that Lovenox can be stopped the day prior.  SCDs in labor.  Resume Lovenox postpartum at appropriate timeframe.  Dr. Dorado will check on patient within the next few days.  See note above.

## 2020-01-03 ENCOUNTER — DOCUMENTATION ONLY (OUTPATIENT)
Dept: MATERNAL FETAL MEDICINE | Facility: CLINIC | Age: 25
End: 2020-01-03

## 2020-01-03 NOTE — PROGRESS NOTES
Dr. Weems does recommend SBE prophylaxis for delivery and intrapartum tele.  Ideally, will try to get pregnancy as far as possible but he indicated risk of PTD is high.

## 2020-01-03 NOTE — PROGRESS NOTES
Dr. Weems indicated thrombocytopenia is common with Fontan patients but did proceed Fontan-he is fine with primary ob referring to heme.

## 2020-01-06 ENCOUNTER — TELEPHONE (OUTPATIENT)
Dept: OBSTETRICS AND GYNECOLOGY | Facility: CLINIC | Age: 25
End: 2020-01-06

## 2020-01-06 DIAGNOSIS — D69.6 THROMBOCYTOPENIA: Primary | ICD-10-CM

## 2020-01-06 DIAGNOSIS — Z3A.27 27 WEEKS GESTATION OF PREGNANCY: Primary | ICD-10-CM

## 2020-01-06 NOTE — TELEPHONE ENCOUNTER
----- Message from Ilya Ortiz sent at 1/6/2020  3:53 PM CST -----  Contact: Pt   Type:  Patient Returning Call    Who Called: YAN PELAYO [[id number]]    Who Left Message for Patient: Michelle     Does the patient know what this is regarding?:Yes     Best Call Back Number:[phone] (home)     Additional Information:

## 2020-01-06 NOTE — TELEPHONE ENCOUNTER
Spoke with patient, she would like to get results from  then she will contact me via portal to schedule nst/sai [place]. MSG routed to provider.

## 2020-01-06 NOTE — TELEPHONE ENCOUNTER
Tried calling patient to review results of CBC and GTT. No answer.     Gabriella Dorado MD  Obstetrics and Gynecology  [facility]

## 2020-01-06 NOTE — TELEPHONE ENCOUNTER
----- Message from Gabriella Dorado MD sent at 1/6/2020  2:55 PM CST -----  Hi,  Can we schedule her for weekly prenatal testing (NST and LEONIDAS) starting at 32 weeks per [facility] recommendations?  Thanks!  Gabriella

## 2020-01-07 ENCOUNTER — PATIENT MESSAGE (OUTPATIENT)
Dept: OBSTETRICS AND GYNECOLOGY | Facility: CLINIC | Age: 25
End: 2020-01-07

## 2020-01-07 NOTE — TELEPHONE ENCOUNTER
Called patient. We reviewed thrombocytopenia and likely related to her congenital heart disease. However, will also refer to hematology to complete evaluation.    She is amenable to weekly prenatal testing starting at 32 weeks and since I am out of the office on 1/17/20, can we reschedule her for 1/31/20?    Thank you!    Gabriella Dorado MD  Obstetrics and Gynecology  [facility]

## 2020-01-08 ENCOUNTER — TELEPHONE (OUTPATIENT)
Dept: HEMATOLOGY/ONCOLOGY | Facility: CLINIC | Age: 25
End: 2020-01-08

## 2020-01-09 ENCOUNTER — TELEPHONE (OUTPATIENT)
Dept: HEMATOLOGY/ONCOLOGY | Facility: CLINIC | Age: 25
End: 2020-01-09

## 2020-01-16 DIAGNOSIS — I37.0 NONRHEUMATIC PULMONARY VALVE STENOSIS: Primary | ICD-10-CM

## 2020-01-16 DIAGNOSIS — Q24.9 ADULT CONGENITAL HEART DISEASE: ICD-10-CM

## 2020-01-16 DIAGNOSIS — Z98.890 S/P FONTAN PROCEDURE: ICD-10-CM

## 2020-01-16 DIAGNOSIS — Q24.0 DEXTROCARDIA: ICD-10-CM

## 2020-01-16 DIAGNOSIS — Q20.1 DORV (DOUBLE OUTLET RIGHT VENTRICLE): ICD-10-CM

## 2020-01-17 ENCOUNTER — CLINICAL SUPPORT (OUTPATIENT)
Dept: PEDIATRIC CARDIOLOGY | Facility: CLINIC | Age: 25
End: 2020-01-17
Payer: MEDICAID

## 2020-01-17 ENCOUNTER — OFFICE VISIT (OUTPATIENT)
Dept: PEDIATRIC CARDIOLOGY | Facility: CLINIC | Age: 25
End: 2020-01-17
Payer: MEDICAID

## 2020-01-17 VITALS
HEART RATE: 74 BPM | WEIGHT: 118.63 LBS | BODY MASS INDEX: 23.29 KG/M2 | OXYGEN SATURATION: 97 % | SYSTOLIC BLOOD PRESSURE: 118 MMHG | DIASTOLIC BLOOD PRESSURE: 63 MMHG | HEIGHT: 60 IN

## 2020-01-17 DIAGNOSIS — Q24.9 ADULT CONGENITAL HEART DISEASE: Primary | ICD-10-CM

## 2020-01-17 DIAGNOSIS — Z98.890 S/P FONTAN PROCEDURE: ICD-10-CM

## 2020-01-17 DIAGNOSIS — Z98.890 S/P FONTAN PROCEDURE: Primary | ICD-10-CM

## 2020-01-17 DIAGNOSIS — Q24.0 DEXTROCARDIA: ICD-10-CM

## 2020-01-17 DIAGNOSIS — I37.0 NONRHEUMATIC PULMONARY VALVE STENOSIS: ICD-10-CM

## 2020-01-17 DIAGNOSIS — O99.891 CONGENITAL HEART DISEASE IN PREGNANCY: ICD-10-CM

## 2020-01-17 DIAGNOSIS — Q24.9 ADULT CONGENITAL HEART DISEASE: ICD-10-CM

## 2020-01-17 DIAGNOSIS — Q24.9 CONGENITAL HEART DISEASE IN PREGNANCY: ICD-10-CM

## 2020-01-17 DIAGNOSIS — Z98.890 S/P D&C (STATUS POST DILATION AND CURETTAGE): ICD-10-CM

## 2020-01-17 DIAGNOSIS — Q20.1 DORV (DOUBLE OUTLET RIGHT VENTRICLE): ICD-10-CM

## 2020-01-17 PROCEDURE — 93010 ELECTROCARDIOGRAM REPORT: CPT | Mod: S$PBB,,, | Performed by: PEDIATRICS

## 2020-01-17 PROCEDURE — 93010 EKG 12-LEAD PEDIATRIC: ICD-10-PCS | Mod: S$PBB,,, | Performed by: PEDIATRICS

## 2020-01-17 PROCEDURE — 99213 PR OFFICE/OUTPT VISIT, EST, LEVL III, 20-29 MIN: ICD-10-PCS | Mod: 25,S$PBB,, | Performed by: PEDIATRICS

## 2020-01-17 PROCEDURE — 99999 PR PBB SHADOW E&M-EST. PATIENT-LVL I: ICD-10-PCS | Mod: PBBFAC,,,

## 2020-01-17 PROCEDURE — 99212 OFFICE O/P EST SF 10 MIN: CPT | Mod: PBBFAC,PO,25 | Performed by: PEDIATRICS

## 2020-01-17 PROCEDURE — 99213 OFFICE O/P EST LOW 20 MIN: CPT | Mod: 25,S$PBB,, | Performed by: PEDIATRICS

## 2020-01-17 PROCEDURE — 99999 PR PBB SHADOW E&M-EST. PATIENT-LVL II: ICD-10-PCS | Mod: PBBFAC,,, | Performed by: PEDIATRICS

## 2020-01-17 PROCEDURE — 93005 ELECTROCARDIOGRAM TRACING: CPT | Mod: PBBFAC,PO | Performed by: PEDIATRICS

## 2020-01-17 PROCEDURE — 99999 PR PBB SHADOW E&M-EST. PATIENT-LVL I: CPT | Mod: PBBFAC,,,

## 2020-01-17 PROCEDURE — 99211 OFF/OP EST MAY X REQ PHY/QHP: CPT | Mod: PBBFAC,25,27,PO

## 2020-01-17 PROCEDURE — 99999 PR PBB SHADOW E&M-EST. PATIENT-LVL II: CPT | Mod: PBBFAC,,, | Performed by: PEDIATRICS

## 2020-01-17 NOTE — PROGRESS NOTES
2020     [facility]    re:Theresa Grey  :1995     Gabriella Dorado MD   [address] [facility] [address]     Dr. Felice Kim    Dear Doctors:    Theresa Grey is a 24 y.o. female with the following diagnoses:  Diagnoses:  1. 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 resolved cyanosis  3. Currently pregnant at 29 wga.  Normal fetal echocardiogram.  Estimated date of delivery April 3, 2020.    Discussion:  In regards to her pregnancy, we have several concerns:  1.  The rate of fetal loss is significantly elevated, approaching 50%.  2.  The rate of premature delivery is significantly elevated.  3.  The clotting predisposition often noted in Fontan patients may be worsened by pregnancy.  4.  Without a sub pulmonic ventricle, she may tolerate the volume load associated with a pregnancy less well than a 2 ventricle patient.  There is an increased risk of heart failure symptoms.  5.  There is an increased risk of arrhythmias.    That said, she really looks good.  I would expect her to tolerate a pregnancy well.  Vaginal delivery is typically preferable if she is doing well.  IV fluids may be necessary to avoid dehydration.  I would recommend SBE prophylaxis for delivery although this is certainly debatable.  I would recommend close monitoring with telemetry for during delivery and for 24-48 hours after delivery.      We again discussed the long-term risk associated with Fontan circulation. We discussed:  1. Risk of thromboembolic events with Fontan   - she needs to be on a baby  aspirin daily to try to cut down on that risk.  I agree with the use of prophylactic Lovenox during the pregnancy.   - would avoid estrogen containing birth control long-term.  She will discuss with her gynecologist  2. Long term risk of arrhythmias, heart failure, PLE, liver disease discussed    Plan:  1.  Continue daily baby aspirin.  Agree with current use of Lovenox which will be stopped prior to her delivery.    2.  Follow-up with me in 1 month with EKG and echocardiogram.  3.  SBEP is indicated for dental work.  Although there are no clear guidelines for this, I would recommend SBE prophylaxis for delivery as well.  4.  Close follow-up with obstetrics.    Interval history:  I saw her today in [place].  I last saw her a month ago.  Overall, she has done well since that time.  She denies productive cough, diarrhea, chest pain, palpitations, syncope, near syncope.  She has noticed a little bit of shortness of breath when she exerts herself.  She denies any shortness of breath at rest or while lying supine.  She had mild swelling in her feet during a recent long car ride to [place].  Otherwise, she has had no swelling.      PMH:  She was diagnosed as a young child with congenital heart disease while living in [place]. She was evaluated at [facility] with a cath at about 7 years of age. She was scheduled for heart surgery by her report about 10 years ago. However, due to social issues (no insurance, no social security number) the surgery was put off and she was lost to follow up. I first saw her in 2014 she was admitted with pregnancy, rare chest pain, and cyanosis.  After long discussion, she had a pregnancy termination due to the risk associated with unrepaired cyanotic congenital heart disease.  On 9/29/16, she underwent an extracardiac Fontan with oversewing and ligation of the MPA at [facility].  She did very well and was discharged a week later.      The review of systems is as noted above. It is  "otherwise negative for other symptoms related to the general, neurological, psychiatric, endocrine, gastrointestinal, genitourinary, respiratory, dermatologic, musculoskeletal, hematologic, and immunologic systems.    Past Medical History:   Diagnosis Date    Abnormality of heart valve     heart disease     fatigue as a result of pregnancy (8wks ) clubbing indicates chronic oxygenation issues but pt considered it "normal"     Past Surgical History:   Procedure Laterality Date    CARDIAC CATHETERIZATION  at 7 yars of age    DILATION AND CURETTAGE OF UTERUS      FONTAN PROCEDURE, EXTRACARDIAC  September 29, 2015    With a nonfenestrated 22 mm [place]-Justice tube graft.  The pulmonary valve was closed.  Procedure performed by Dr. Moe Kulkarni at [facility].     Family History   Problem Relation Age of Onset    Diabetes Maternal Grandmother     Vision loss Maternal Grandmother     Miscarriages / Stillbirths Mother     Diabetes Father     Diabetes Paternal Grandmother     No Known Problems Brother     No Known Problems Maternal Grandfather     No Known Problems Paternal Grandfather     Hypertension Maternal Aunt     Diabetes Maternal Aunt     No Known Problems Sister     No Known Problems Maternal Uncle     No Known Problems Paternal Aunt     No Known Problems Paternal Uncle     Anemia Neg Hx     Arrhythmia Neg Hx     Asthma Neg Hx     Clotting disorder Neg Hx     Fainting Neg Hx     Heart attack Neg Hx     Heart disease Neg Hx     Heart failure Neg Hx     Hyperlipidemia Neg Hx     Stroke Neg Hx     Atrial Septal Defect Neg Hx      Social History     Socioeconomic History    Marital status:      Spouse name: Not on file    Number of children: Not on file    Years of education: Not on file    Highest education level: Not on file   Occupational History    Not on file   Social Needs    Financial resource strain: Not on file    Food insecurity:     Worry: Not on file     " "Inability: Not on file    Transportation needs:     Medical: Not on file     Non-medical: Not on file   Tobacco Use    Smoking status: Former Smoker     Packs/day: 0.25    Smokeless tobacco: Never Used   Substance and Sexual Activity    Alcohol use: Not Currently     Alcohol/week: 0.0 standard drinks     Comment: on ocassion before pregnancy    Drug use: No     Comment: previous marijuana use    Sexual activity: Yes     Partners: Male     Birth control/protection: Injection   Lifestyle    Physical activity:     Days per week: Not on file     Minutes per session: Not on file    Stress: Not on file   Relationships    Social connections:     Talks on phone: Not on file     Gets together: Not on file     Attends [employer] service: Not on file     Active member of club or organization: Not on file     Attends meetings of clubs or organizations: Not on file     Relationship status: Not on file   Other Topics Concern    Not on file   Social History Narrative    Not on file     Current Outpatient Medications on File Prior to Visit   Medication Sig Dispense Refill    aspirin 81 MG Chew Take 1 tablet (81 mg total) by mouth once daily.  0    enoxaparin (LOVENOX) 40 mg/0.4 mL Syrg Inject 0.4 mLs (40 mg total) into the skin once daily. 30 Syringe 12    PRENATAL NO.116-IRON-FOLIC-DHA ORAL Take 1 tablet by mouth once daily.       No current facility-administered medications on file prior to visit.      Review of patient's allergies indicates:   Allergen Reactions    Hydrocodone Shortness Of Breath and Other (See Comments)     Dizziness; sweating     /63 (BP Location: Right arm)   Pulse 74   Ht 5' 0.04" (1.525 m)   Wt 53.8 kg (118 lb 9.7 oz)   LMP 06/26/2019   SpO2 97%   BMI 23.13 kg/m²     In general, she is an acyanotic, nondysmorphic appearing female in no apparent distress.  The eyes, nares, and oropharynx are clear.  Eyelids and conjunctiva free of drainage and redness.  PERRLA.  Teeth in good " "repair.  Mucous membranes are moist.  The head is normocephalic and atraumatic.  The neck is supple without jugular venous distention or thyroid enlargement.  The lungs are clear to auscultation bilaterally.  There is a well healed sternotomy scar.  The PMI is in the right chest.  The first heart sound is normal.  The second is loud and single.  There are no gallops, rubs, or clicks in the supine position.  The abdominal exam reveals a gravid uterus.  Pulses are normal in all 4 extremities with brisk capillary refill and no edema.  No rashes are noted.  She has some clubbing.  No tenderness, swelling in legs.  Her neurological exam is normal.    EKG with a "left atrial rhythm" with evidence of dextrocardia.  HR about 72.    An echocardiogram 1 month ago reveals excellent function, no significant AV valve insufficiency, no effusion, and no evidence of Fontan obstruction.    Cardiac MRI October 19, 2019:  1. Patent innominate vein to left-sided SVC. Widely patent left-sided Pavel anastomosis. The IVC is anastomosed to the left pulmonary artery. The Fontan tunnel is widely patent without evidence of obstruction. The branch pulmonary arteries are normal in size with no evidence of obstruction.   2. Atrio-ventricular concordance with a jason-cross type of arrangement.   3. The right ventricle volumes are low normal. Moderate RVH. The calculated RVEF is 45 %.  4. The left ventricular volumes are low normal. The calculated LVEF is 50 %.   5. Large inlet type ventricular septal defect.   6. Oversewn pulmonary valve.   7. Anterior and rightward aorta. Structurally normal aortic valve with no significant aortic insufficiency.   8. Right aortic arch.     Sincerely,        Toro Weems MD  Pediatric Cardiology  Adult Congenital Heart Disease  Pediatric Heart Failure and Transplantation  [facility] [address] [place]  [address]  [phone]            "

## 2020-01-19 PROBLEM — D69.6 THROMBOCYTOPENIA: Status: ACTIVE | Noted: 2020-01-19

## 2020-01-20 ENCOUNTER — INITIAL CONSULT (OUTPATIENT)
Dept: HEMATOLOGY/ONCOLOGY | Facility: CLINIC | Age: 25
End: 2020-01-20
Payer: MEDICAID

## 2020-01-20 ENCOUNTER — LAB VISIT (OUTPATIENT)
Dept: LAB | Facility: HOSPITAL | Age: 25
End: 2020-01-20
Payer: MEDICAID

## 2020-01-20 VITALS
WEIGHT: 119.69 LBS | RESPIRATION RATE: 16 BRPM | TEMPERATURE: 98 F | SYSTOLIC BLOOD PRESSURE: 107 MMHG | BODY MASS INDEX: 23.5 KG/M2 | HEIGHT: 60 IN | HEART RATE: 72 BPM | OXYGEN SATURATION: 98 % | DIASTOLIC BLOOD PRESSURE: 59 MMHG

## 2020-01-20 DIAGNOSIS — Q24.9 CONGENITAL HEART DISEASE IN PREGNANCY: Primary | ICD-10-CM

## 2020-01-20 DIAGNOSIS — D69.6 THROMBOCYTOPENIA: ICD-10-CM

## 2020-01-20 DIAGNOSIS — Z3A.29 29 WEEKS GESTATION OF PREGNANCY: ICD-10-CM

## 2020-01-20 DIAGNOSIS — O99.891 CONGENITAL HEART DISEASE IN PREGNANCY: Primary | ICD-10-CM

## 2020-01-20 LAB
ALBUMIN SERPL BCP-MCNC: 3.1 G/DL (ref 3.5–5.2)
ALP SERPL-CCNC: 112 U/L (ref 55–135)
ALT SERPL W/O P-5'-P-CCNC: 10 U/L (ref 10–44)
ANION GAP SERPL CALC-SCNC: 9 MMOL/L (ref 8–16)
APTT BLDCRRT: 23.9 SEC (ref 21–32)
AST SERPL-CCNC: 12 U/L (ref 10–40)
BASOPHILS # BLD AUTO: 0.03 K/UL (ref 0–0.2)
BASOPHILS NFR BLD: 0.3 % (ref 0–1.9)
BILIRUB SERPL-MCNC: 0.3 MG/DL (ref 0.1–1)
BUN SERPL-MCNC: 6 MG/DL (ref 6–20)
CALCIUM SERPL-MCNC: 9.1 MG/DL (ref 8.7–10.5)
CHLORIDE SERPL-SCNC: 105 MMOL/L (ref 95–110)
CO2 SERPL-SCNC: 21 MMOL/L (ref 23–29)
CREAT SERPL-MCNC: 0.6 MG/DL (ref 0.5–1.4)
DIFFERENTIAL METHOD: ABNORMAL
EOSINOPHIL # BLD AUTO: 0.3 K/UL (ref 0–0.5)
EOSINOPHIL NFR BLD: 2.5 % (ref 0–8)
ERYTHROCYTE [DISTWIDTH] IN BLOOD BY AUTOMATED COUNT: 13.1 % (ref 11.5–14.5)
EST. GFR  (AFRICAN AMERICAN): >60 ML/MIN/1.73 M^2
EST. GFR  (NON AFRICAN AMERICAN): >60 ML/MIN/1.73 M^2
FIBRINOGEN PPP-MCNC: 559 MG/DL (ref 182–366)
FOLATE SERPL-MCNC: 13.4 NG/ML (ref 4–24)
GLUCOSE SERPL-MCNC: 71 MG/DL (ref 70–110)
HCT VFR BLD AUTO: 35.9 % (ref 37–48.5)
HGB BLD-MCNC: 11.3 G/DL (ref 12–16)
HIV1+2 IGG SERPL QL IA.RAPID: NORMAL
IGA SERPL-MCNC: 291 MG/DL (ref 40–350)
IGG SERPL-MCNC: 1131 MG/DL (ref 650–1600)
IGM SERPL-MCNC: 88 MG/DL (ref 50–300)
IMM GRANULOCYTES # BLD AUTO: 0.1 K/UL (ref 0–0.04)
IMM GRANULOCYTES NFR BLD AUTO: 0.9 % (ref 0–0.5)
INR PPP: 1 (ref 0.8–1.2)
LDH SERPL L TO P-CCNC: 175 U/L (ref 110–260)
LYMPHOCYTES # BLD AUTO: 1.8 K/UL (ref 1–4.8)
LYMPHOCYTES NFR BLD: 15.6 % (ref 18–48)
MCH RBC QN AUTO: 28 PG (ref 27–31)
MCHC RBC AUTO-ENTMCNC: 31.5 G/DL (ref 32–36)
MCV RBC AUTO: 89 FL (ref 82–98)
MONOCYTES # BLD AUTO: 0.6 K/UL (ref 0.3–1)
MONOCYTES NFR BLD: 5.4 % (ref 4–15)
NEUTROPHILS # BLD AUTO: 8.6 K/UL (ref 1.8–7.7)
NEUTROPHILS NFR BLD: 75.3 % (ref 38–73)
NRBC BLD-RTO: 0 /100 WBC
PATH REV BLD -IMP: NORMAL
PLATELET # BLD AUTO: 128 K/UL (ref 150–350)
PMV BLD AUTO: 13.6 FL (ref 9.2–12.9)
POTASSIUM SERPL-SCNC: 3.6 MMOL/L (ref 3.5–5.1)
PROT SERPL-MCNC: 7.7 G/DL (ref 6–8.4)
PROTHROMBIN TIME: 10.1 SEC (ref 9–12.5)
RBC # BLD AUTO: 4.03 M/UL (ref 4–5.4)
SODIUM SERPL-SCNC: 135 MMOL/L (ref 136–145)
TSH SERPL DL<=0.005 MIU/L-ACNC: 1.44 UIU/ML (ref 0.4–4)
VIT B12 SERPL-MCNC: 214 PG/ML (ref 210–950)
WBC # BLD AUTO: 11.34 K/UL (ref 3.9–12.7)

## 2020-01-20 PROCEDURE — 82607 VITAMIN B-12: CPT

## 2020-01-20 PROCEDURE — 86703 HIV-1/HIV-2 1 RESULT ANTBDY: CPT

## 2020-01-20 PROCEDURE — 84443 ASSAY THYROID STIM HORMONE: CPT

## 2020-01-20 PROCEDURE — 85610 PROTHROMBIN TIME: CPT

## 2020-01-20 PROCEDURE — 86147 CARDIOLIPIN ANTIBODY EA IG: CPT | Mod: 59

## 2020-01-20 PROCEDURE — 85384 FIBRINOGEN ACTIVITY: CPT

## 2020-01-20 PROCEDURE — 82746 ASSAY OF FOLIC ACID SERUM: CPT

## 2020-01-20 PROCEDURE — 99999 PR PBB SHADOW E&M-EST. PATIENT-LVL III: CPT | Mod: PBBFAC,,, | Performed by: NURSE PRACTITIONER

## 2020-01-20 PROCEDURE — 87340 HEPATITIS B SURFACE AG IA: CPT

## 2020-01-20 PROCEDURE — 99999 PR PBB SHADOW E&M-EST. PATIENT-LVL III: ICD-10-PCS | Mod: PBBFAC,,, | Performed by: NURSE PRACTITIONER

## 2020-01-20 PROCEDURE — 80053 COMPREHEN METABOLIC PANEL: CPT

## 2020-01-20 PROCEDURE — 99203 PR OFFICE/OUTPT VISIT, NEW, LEVL III, 30-44 MIN: ICD-10-PCS | Mod: S$PBB,,, | Performed by: NURSE PRACTITIONER

## 2020-01-20 PROCEDURE — 83615 LACTATE (LD) (LDH) ENZYME: CPT

## 2020-01-20 PROCEDURE — 99213 OFFICE O/P EST LOW 20 MIN: CPT | Mod: PBBFAC | Performed by: NURSE PRACTITIONER

## 2020-01-20 PROCEDURE — 84165 PATHOLOGIST INTERPRETATION SPE: ICD-10-PCS | Mod: 26,,, | Performed by: PATHOLOGY

## 2020-01-20 PROCEDURE — 85730 THROMBOPLASTIN TIME PARTIAL: CPT

## 2020-01-20 PROCEDURE — 85613 RUSSELL VIPER VENOM DILUTED: CPT

## 2020-01-20 PROCEDURE — 86038 ANTINUCLEAR ANTIBODIES: CPT

## 2020-01-20 PROCEDURE — 82784 ASSAY IGA/IGD/IGG/IGM EACH: CPT | Mod: 59

## 2020-01-20 PROCEDURE — 84165 PROTEIN E-PHORESIS SERUM: CPT | Mod: 26,,, | Performed by: PATHOLOGY

## 2020-01-20 PROCEDURE — 86334 IMMUNOFIX E-PHORESIS SERUM: CPT

## 2020-01-20 PROCEDURE — 85025 COMPLETE CBC W/AUTO DIFF WBC: CPT

## 2020-01-20 PROCEDURE — 85060 PATHOLOGIST REVIEW: ICD-10-PCS | Mod: ,,, | Performed by: PATHOLOGY

## 2020-01-20 PROCEDURE — 36415 COLL VENOUS BLD VENIPUNCTURE: CPT

## 2020-01-20 PROCEDURE — 84165 PROTEIN E-PHORESIS SERUM: CPT

## 2020-01-20 PROCEDURE — 86334 IMMUNOFIX E-PHORESIS SERUM: CPT | Mod: 26,,, | Performed by: PATHOLOGY

## 2020-01-20 PROCEDURE — 86334 PATHOLOGIST INTERPRETATION IFE: ICD-10-PCS | Mod: 26,,, | Performed by: PATHOLOGY

## 2020-01-20 PROCEDURE — 99203 OFFICE O/P NEW LOW 30 MIN: CPT | Mod: S$PBB,,, | Performed by: NURSE PRACTITIONER

## 2020-01-20 PROCEDURE — 85060 BLOOD SMEAR INTERPRETATION: CPT | Mod: ,,, | Performed by: PATHOLOGY

## 2020-01-20 NOTE — PROGRESS NOTES
"HEMATOLOGY/ONCOLOGY NEW PATIENT CONSULT NOTE:    PATIENT: Theresa Grey  MRN: [id number]  DATE: 1/20/2020  Diagnosis:   1. Congenital heart disease in pregnancy    2. Thrombocytopenia    3. 29 weeks gestation of pregnancy      Chief Complaint: Follow-up    Subjective:    Initial History: Ms. Grey is a 24 y.o. female who presents as a new patient consult for thrombocytopenia. She was referred by her OB GYN. She is 29 weeks pregnant. She is followed by cardiology for congenital heart disease. Her OBGYN thought her thrombocytopenia may be 2/2 congenital heart disease but thought it would be best to work-up with hematology. She denies any unusual bruising or bleeding, including nose or gum bleeding and bloody urine and stools. Her plts have intermittently mildly low since her cardiac surgery in 2015--but barely low (~145). She went to the ED 3 mos ago with pregnancy complicated vomiting and plts were 109K. 2 mos ago they were 113K, and 2 weeks ago they were 121K. Of note, she is on lovenox to prevent blood clots during pregnancy given her cardiac history. She has been on long term baby ASA, which she is also taking.     Duration of thrombocytopenia: Per Epic record review, she has had intermittently mild thrombocytopenia since 2015.  Diet: She reports that she tends to eat unhealthy foods and not many fruits and vegetables  Bleeding: denies bleeding, including gum bleeding, nose bleeding, vaginal bleeding, and blood to urine or stools  GI: n/a  Colonoscopy: n/a  Family history of blood disorder: denies  Surgical History: Open heart surgery 2015  Alcohol use: Not currrently while pregnant  GYN: patient is 29 weeks pregnant.     Past Medical History:   Past Medical History:   Diagnosis Date    Abnormality of heart valve     heart disease     fatigue as a result of pregnancy (8wks ) clubbing indicates chronic oxygenation issues but pt considered it "normal"     Past Surgical HIstory:   Past Surgical History:   Procedure " Laterality Date    CARDIAC CATHETERIZATION  at 7 yars of age    DILATION AND CURETTAGE OF UTERUS      FONTAN PROCEDURE, EXTRACARDIAC  September 29, 2015    With a nonfenestrated 22 mm [place]-Justice tube graft.  The pulmonary valve was closed.  Procedure performed by Dr. Moe Kulkarni at [facility].     Family History:   Family History   Problem Relation Age of Onset    Diabetes Maternal Grandmother     Vision loss Maternal Grandmother     Miscarriages / Stillbirths Mother     Diabetes Father     Diabetes Paternal Grandmother     No Known Problems Brother     No Known Problems Maternal Grandfather     No Known Problems Paternal Grandfather     Hypertension Maternal Aunt     Diabetes Maternal Aunt     No Known Problems Sister     No Known Problems Maternal Uncle     No Known Problems Paternal Aunt     No Known Problems Paternal Uncle     Anemia Neg Hx     Arrhythmia Neg Hx     Asthma Neg Hx     Clotting disorder Neg Hx     Fainting Neg Hx     Heart attack Neg Hx     Heart disease Neg Hx     Heart failure Neg Hx     Hyperlipidemia Neg Hx     Stroke Neg Hx     Atrial Septal Defect Neg Hx      Social History:  reports that she has quit smoking. She smoked 0.25 packs per day. She has never used smokeless tobacco. She reports that she drank alcohol. She reports that she does not use drugs.  Allergies:  Review of patient's allergies indicates:   Allergen Reactions    Hydrocodone Shortness Of Breath and Other (See Comments)     Dizziness; sweating     Medications:  Current Outpatient Medications   Medication Sig Dispense Refill    enoxaparin (LOVENOX) 40 mg/0.4 mL Syrg Inject 0.4 mLs (40 mg total) into the skin once daily. 30 Syringe 12    PRENATAL NO.116-IRON-FOLIC-DHA ORAL Take 1 tablet by mouth once daily.      aspirin 81 MG Chew Take 1 tablet (81 mg total) by mouth once daily.  0     No current facility-administered medications for this visit.      Review of Systems  Constitutional:  Negative for chills, fatigue, and fever.   HENT: Negative for congestion and nosebleeds.    Eyes: Negative for redness.   Respiratory: Negative for cough, sputum production, shortness of breath and wheezing.    Cardiovascular:  Negative for chest pain, leg swelling, and palpitations.   Gastrointestinal: Negative for abdominal pain, blood in stool, constipation, nausea and vomiting.   Genitourinary: Negative for dysuria, frequency and hematuria.   Musculoskeletal: Negative for back pain, falls, joint pain and myalgias.   Skin: Neg for rash or wounds  Neurological: Negative for dizziness, tingling and headaches.   Psychiatric/Behavioral: The patient is not nervous/anxious.   Past medical, surgical, social history reviewed with patient.    ECOG Performance Status: 0 - Asymptomatic  KPS Performance Status: 100%- Normal, No Complaints, No Evidence of Disease  Objective:      Vitals:   Vitals:    01/20/20 1537   BP: (!) 107/59   BP Location: Right arm   Patient Position: Sitting   BP Method: Medium (Automatic)   Pulse: 72   Resp: 16   Temp: 98.1 °F (36.7 °C)   TempSrc: Oral   SpO2: 98%   Weight: 54.3 kg (119 lb 11.4 oz)   Height: 5' (1.524 m)     BMI: Body mass index is 23.38 kg/m².  Physical Exam  General - well developed, well nourished, no apparent distress  HEENT - oropharynx clear  Chest and Lung - clear to auscultation   Cardiovascular - RRR with no MGR, normal S1 and S2. No swelling to legs noted  Abdomen-  soft, no palpable hepatomegaly or splenomegaly; no abdominal tenderness  Lymph - no lymphadenopathy on exam today  Heme - no bruising, petechiae, pallor  Skin - no wounds or rashes noted  Psych - appropriate mood and affect  Laboratory Data:  Lab Visit on 01/20/2020   Component Date Value Ref Range Status    WBC 01/20/2020 11.34  3.90 - 12.70 K/uL Final    RBC 01/20/2020 4.03  4.00 - 5.40 M/uL Final    Hemoglobin 01/20/2020 11.3* 12.0 - 16.0 g/dL Final    Hematocrit 01/20/2020 35.9* 37.0 - 48.5 % Final     Mean Corpuscular Volume 01/20/2020 89  82 - 98 fL Final    Mean Corpuscular Hemoglobin 01/20/2020 28.0  27.0 - 31.0 pg Final    Mean Corpuscular Hemoglobin Conc 01/20/2020 31.5* 32.0 - 36.0 g/dL Final    RDW 01/20/2020 13.1  11.5 - 14.5 % Final    Platelets 01/20/2020 128* 150 - 350 K/uL Final    MPV 01/20/2020 13.6* 9.2 - 12.9 fL Final    Immature Granulocytes 01/20/2020 0.9* 0.0 - 0.5 % Final    Gran # (ANC) 01/20/2020 8.6* 1.8 - 7.7 K/uL Final    Immature Grans (Abs) 01/20/2020 0.10* 0.00 - 0.04 K/uL Final    Comment: Mild elevation in immature granulocytes is non specific and   can be seen in a variety of conditions including stress response,   acute inflammation, trauma and pregnancy. Correlation with other   laboratory and clinical findings is essential.      Lymph # 01/20/2020 1.8  1.0 - 4.8 K/uL Final    Mono # 01/20/2020 0.6  0.3 - 1.0 K/uL Final    Eos # 01/20/2020 0.3  0.0 - 0.5 K/uL Final    Baso # 01/20/2020 0.03  0.00 - 0.20 K/uL Final    nRBC 01/20/2020 0  0 /100 WBC Final    Gran% 01/20/2020 75.3* 38.0 - 73.0 % Final    Lymph% 01/20/2020 15.6* 18.0 - 48.0 % Final    Mono% 01/20/2020 5.4  4.0 - 15.0 % Final    Eosinophil% 01/20/2020 2.5  0.0 - 8.0 % Final    Basophil% 01/20/2020 0.3  0.0 - 1.9 % Final    Differential Method 01/20/2020 Automated   Final    Pathologist Review 01/20/2020 Review required   Final    aPTT 01/20/2020 23.9  21.0 - 32.0 sec Final    aPTT therapeutic range = 39-69 seconds    Prothrombin Time 01/20/2020 10.1  9.0 - 12.5 sec Final    INR 01/20/2020 1.0  0.8 - 1.2 Final    Comment: Coumadin Therapy:  2.0 - 3.0 for INR for all indicators except mechanical heart valves  and antiphospholipid syndromes which should use 2.5 - 3.5.      Fibrinogen 01/20/2020 559* 182 - 366 mg/dL Final     Imaging:    Assessment:       1. Congenital heart disease in pregnancy    2. Thrombocytopenia    3. 29 weeks gestation of pregnancy         Plan:     Thrombocytopenia  -  intermittently mild thrombocytopenia dating back to 2015. See HPI.  - plts 121K on 1/2/20  - will check CBC, CMP, peripheral smear, SPEP, MARIELA, immunoglubulins, B12, folate, tsh, T4. HBc Ab, HBsAg, HIV, HCV Ab, KECIA, anti cardiolipin Ab, lupus anticoag, PTT, INR, fibrinogen, and LDH  - plts 128K today. Trending up. Suspect thrombocytopenia is transient 2/2 pregnancy and/or lovenox.    Congenital heart disease  - open heart surgery 2015  - followed by cardiology    Pregnancy  - 29 weeks pregnant  - managed by OB/GYN who is referring provider    Follow up:  Labs today. CBC and f/u appt with NP in 1-2 weeks    Wilma Noyola, FNP  Hematology/Oncology/Bone Marrow Transplant    Collaborating provider Mino Sotelo    Reviewed medical, surgical, family, social, and substance history.

## 2020-01-20 NOTE — LETTER
January 20, 2020      Gabriella Dorado MD  [address] [facility] [address]           [facility]  [address] [facility] [address]  Phone: [phone]          Patient: Theresa Grey   MR Number: [id number]   YOB: 1995   Date of Visit: 1/20/2020       Dear Dr. Gabriella Dorado:    Thank you for referring Theresa Grey to me for evaluation. Attached you will find relevant portions of my assessment and plan of care.    If you have questions, please do not hesitate to call me. I look forward to following Theresa Grey along with you.    Sincerely,    Wilma Noyola, NP    Enclosure  CC:  No Recipients    If you would like to receive this communication electronically, please contact [email or web address] or [phone] to request more information on [employer] access.    For providers and/or their staff who would like to refer a patient to [facility], please contact us through our one-stop-shop provider referral line, [facility], at [phone].    If you feel you have received this communication in error or would no longer like to receive these types of communications, please e-mail [email or web address]

## 2020-01-21 LAB
ALBUMIN SERPL ELPH-MCNC: 3.53 G/DL (ref 3.35–5.55)
ALPHA1 GLOB SERPL ELPH-MCNC: 0.37 G/DL (ref 0.17–0.41)
ALPHA2 GLOB SERPL ELPH-MCNC: 1.11 G/DL (ref 0.43–0.99)
ANA SER QL IF: NORMAL
B-GLOBULIN SERPL ELPH-MCNC: 1.09 G/DL (ref 0.5–1.1)
CARDIOLIPIN IGG SER IA-ACNC: <9.4 GPL (ref 0–14.99)
CARDIOLIPIN IGM SER IA-ACNC: 12.72 MPL (ref 0–12.49)
GAMMA GLOB SERPL ELPH-MCNC: 0.99 G/DL (ref 0.67–1.58)
HBV SURFACE AG SERPL QL IA: NEGATIVE
INTERPRETATION SERPL IFE-IMP: NORMAL
LA PPP-IMP: NEGATIVE
PATH REV BLD -IMP: NORMAL
PATHOLOGIST INTERPRETATION IFE: NORMAL
PATHOLOGIST INTERPRETATION SPE: NORMAL
PROT SERPL-MCNC: 7.1 G/DL (ref 6–8.4)

## 2020-01-27 ENCOUNTER — PATIENT MESSAGE (OUTPATIENT)
Dept: OBSTETRICS AND GYNECOLOGY | Facility: CLINIC | Age: 25
End: 2020-01-27

## 2020-01-31 ENCOUNTER — ROUTINE PRENATAL (OUTPATIENT)
Dept: OBSTETRICS AND GYNECOLOGY | Facility: CLINIC | Age: 25
End: 2020-01-31
Payer: MEDICAID

## 2020-01-31 ENCOUNTER — INITIAL CONSULT (OUTPATIENT)
Dept: MATERNAL FETAL MEDICINE | Facility: CLINIC | Age: 25
End: 2020-01-31
Payer: MEDICAID

## 2020-01-31 ENCOUNTER — TELEPHONE (OUTPATIENT)
Dept: OBSTETRICS AND GYNECOLOGY | Facility: CLINIC | Age: 25
End: 2020-01-31

## 2020-01-31 ENCOUNTER — PROCEDURE VISIT (OUTPATIENT)
Dept: MATERNAL FETAL MEDICINE | Facility: CLINIC | Age: 25
End: 2020-01-31
Payer: MEDICAID

## 2020-01-31 ENCOUNTER — CLINICAL SUPPORT (OUTPATIENT)
Dept: OBSTETRICS AND GYNECOLOGY | Facility: CLINIC | Age: 25
End: 2020-01-31
Payer: MEDICAID

## 2020-01-31 VITALS
WEIGHT: 119 LBS | DIASTOLIC BLOOD PRESSURE: 78 MMHG | HEIGHT: 60 IN | SYSTOLIC BLOOD PRESSURE: 120 MMHG | BODY MASS INDEX: 23.36 KG/M2

## 2020-01-31 VITALS
SYSTOLIC BLOOD PRESSURE: 120 MMHG | BODY MASS INDEX: 23.25 KG/M2 | DIASTOLIC BLOOD PRESSURE: 78 MMHG | WEIGHT: 119.06 LBS

## 2020-01-31 DIAGNOSIS — Z23 NEED FOR DIPHTHERIA-TETANUS-PERTUSSIS (TDAP) VACCINE: Primary | ICD-10-CM

## 2020-01-31 DIAGNOSIS — Z36.89 ENCOUNTER FOR ULTRASOUND TO CHECK FETAL GROWTH: ICD-10-CM

## 2020-01-31 DIAGNOSIS — O99.891 CONGENITAL HEART DISEASE IN PREGNANCY: ICD-10-CM

## 2020-01-31 DIAGNOSIS — Q24.9 ADULT CONGENITAL HEART DISEASE: ICD-10-CM

## 2020-01-31 DIAGNOSIS — Q24.9 CONGENITAL HEART DISEASE IN PREGNANCY: ICD-10-CM

## 2020-01-31 DIAGNOSIS — Q24.9 CONGENITAL HEART DISEASE DURING PREGNANCY: Primary | ICD-10-CM

## 2020-01-31 DIAGNOSIS — D69.6 THROMBOCYTOPENIA: ICD-10-CM

## 2020-01-31 DIAGNOSIS — Z98.890 S/P FONTAN PROCEDURE: ICD-10-CM

## 2020-01-31 DIAGNOSIS — O99.891 CONGENITAL HEART DISEASE DURING PREGNANCY: Primary | ICD-10-CM

## 2020-01-31 DIAGNOSIS — Z36.89 ENCOUNTER FOR ULTRASOUND TO CHECK FETAL GROWTH: Primary | ICD-10-CM

## 2020-01-31 PROCEDURE — 99999 PR PBB SHADOW E&M-EST. PATIENT-LVL II: CPT | Mod: PBBFAC,,, | Performed by: OBSTETRICS & GYNECOLOGY

## 2020-01-31 PROCEDURE — 76816 OB US FOLLOW-UP PER FETUS: CPT | Mod: 26,S$PBB,, | Performed by: OBSTETRICS & GYNECOLOGY

## 2020-01-31 PROCEDURE — 99999 PR PBB SHADOW E&M-EST. PATIENT-LVL III: ICD-10-PCS | Mod: PBBFAC,,, | Performed by: OBSTETRICS & GYNECOLOGY

## 2020-01-31 PROCEDURE — 99211 OFF/OP EST MAY X REQ PHY/QHP: CPT | Mod: PBBFAC

## 2020-01-31 PROCEDURE — 99213 PR OFFICE/OUTPT VISIT, EST, LEVL III, 20-29 MIN: ICD-10-PCS | Mod: S$PBB,,, | Performed by: OBSTETRICS & GYNECOLOGY

## 2020-01-31 PROCEDURE — 90471 IMMUNIZATION ADMIN: CPT | Mod: PBBFAC

## 2020-01-31 PROCEDURE — 76819 FETAL BIOPHYS PROFIL W/O NST: CPT | Mod: 26,S$PBB,, | Performed by: OBSTETRICS & GYNECOLOGY

## 2020-01-31 PROCEDURE — 99213 PR OFFICE/OUTPT VISIT, EST, LEVL III, 20-29 MIN: ICD-10-PCS | Mod: S$PBB,TH,25, | Performed by: OBSTETRICS & GYNECOLOGY

## 2020-01-31 PROCEDURE — 76816 OB US FOLLOW-UP PER FETUS: CPT | Mod: PBBFAC | Performed by: OBSTETRICS & GYNECOLOGY

## 2020-01-31 PROCEDURE — 99212 OFFICE O/P EST SF 10 MIN: CPT | Mod: PBBFAC,27,25 | Performed by: OBSTETRICS & GYNECOLOGY

## 2020-01-31 PROCEDURE — 99213 OFFICE O/P EST LOW 20 MIN: CPT | Mod: S$PBB,,, | Performed by: OBSTETRICS & GYNECOLOGY

## 2020-01-31 PROCEDURE — 99999 PR PBB SHADOW E&M-EST. PATIENT-LVL I: ICD-10-PCS | Mod: PBBFAC,,,

## 2020-01-31 PROCEDURE — 76819 FETAL BIOPHYS PROFIL W/O NST: CPT | Mod: PBBFAC | Performed by: OBSTETRICS & GYNECOLOGY

## 2020-01-31 PROCEDURE — 99213 OFFICE O/P EST LOW 20 MIN: CPT | Mod: PBBFAC,27,TH,25 | Performed by: OBSTETRICS & GYNECOLOGY

## 2020-01-31 PROCEDURE — 76819 PR US, OB, FETAL BIOPHYSICAL, W/O NST: ICD-10-PCS | Mod: 26,S$PBB,, | Performed by: OBSTETRICS & GYNECOLOGY

## 2020-01-31 PROCEDURE — 76816 PR  US,PREGNANT UTERUS,F/U,TRANSABD APP: ICD-10-PCS | Mod: 26,S$PBB,, | Performed by: OBSTETRICS & GYNECOLOGY

## 2020-01-31 PROCEDURE — 99213 OFFICE O/P EST LOW 20 MIN: CPT | Mod: S$PBB,TH,25, | Performed by: OBSTETRICS & GYNECOLOGY

## 2020-01-31 PROCEDURE — 99999 PR PBB SHADOW E&M-EST. PATIENT-LVL III: CPT | Mod: PBBFAC,,, | Performed by: OBSTETRICS & GYNECOLOGY

## 2020-01-31 PROCEDURE — 99999 PR PBB SHADOW E&M-EST. PATIENT-LVL I: CPT | Mod: PBBFAC,,,

## 2020-01-31 PROCEDURE — 99999 PR PBB SHADOW E&M-EST. PATIENT-LVL II: ICD-10-PCS | Mod: PBBFAC,,, | Performed by: OBSTETRICS & GYNECOLOGY

## 2020-01-31 RX ORDER — ASPIRIN 81 MG/1
81 TABLET ORAL DAILY
COMMUNITY
End: 2020-10-02 | Stop reason: SDUPTHER

## 2020-01-31 NOTE — PROGRESS NOTES
Doing well today. No OB concerns. Having repeat US and MFM consultation today.     Problem Noted   Thrombocytopenia 1/19/2020    Seeing heme/onc  Labs obtained at consult visit, has fu in 2 weeks. Intermittent thrombocytopenia since 2015. Per heme, may be transient due to pregnancy vs lovenox. Per cardiology, may also be secondary to congential heart disease.    Lab Results   Component Value Date    WBC 11.34 01/20/2020    HGB 11.3 (L) 01/20/2020    HCT 35.9 (L) 01/20/2020    MCV 89 01/20/2020     (L) 01/20/2020            Supervision of High Risk Pregnancy in Second Trimester 11/10/2019    Rh positive  1 hour GTT between 24-28 weeks, today  GBS between 35-37 weeks  Genetic screening: NIPT  TDaP after 27 weeks  Flu recommended  Feeding: Breast, Rx for pump given   PP Birth Control: TBD  Connected MOM: Yes  Pediatrician: [facility]  Prenatal Classes: TBD  FLU SHOT 12/20  Anesthesia consult today               Congenital Heart Disease in Pregnancy 10/10/2019    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 S/p extracardiac Fontan with oversewing of the pulmonary valve and division of the MPA on 9/29/16 at [facility]. Good ventricular function, no significant atrioventricular valve insufficiency, and no evidence of Fontan obstruction or thrombosis on cardiac MRI October 16, 2019.  Followed by Dr. Weems and M    #Telemetry intrapartum and for 24-48 hours after delivery  #increased risk of thromboembolic events - currently on ASA 81 mg daily and lovenox 40 mg chandrika  #avoid estrogen containing contraception  #Fu with Dr. Weems for echo, ekg and 24 hour holter  #SBEP is indicated for dental work and delivery   #Fetal echocardiogram scheduled for November 18th.         RTO in 2 weeks. Strict precautions reviewed. She will start prenatal testing.     Gabriella Dorado MD  Obstetrics and Gynecology  [facility]

## 2020-01-31 NOTE — TELEPHONE ENCOUNTER
----- Message from Gabriella Dorado MD sent at 1/31/2020  1:06 PM CST -----  Do you mind letting her know that heme/onc wants to see her back to review lab results and they have scheduled her for 02/5. They are also going to repeat labs the same day and she can stop by the lab before her appointment.  Thanks!  Gabriella

## 2020-02-01 ENCOUNTER — DOCUMENTATION ONLY (OUTPATIENT)
Dept: MATERNAL FETAL MEDICINE | Facility: CLINIC | Age: 25
End: 2020-02-01

## 2020-02-01 NOTE — PROGRESS NOTES
Obstetrical ultrasound completed today.  See report in imaging section of EPIC.    Follow-up visit, maternal congenital heart disease    Weight 119 lb blood pressure 120/78    Medications:  Low-dose aspirin, Lovenox 40 mg, prenatal vitamin    Patient reports normal fetal movement, denies  labor complaints.  We reviewed her ultrasound findings which are reassuring.  Growth is in the 19th percentile the biophysical profile is reassuring.    I have reviewed Dr. Weems and Dr. Franklin's notes.  She has seen Hematology for thrombocytopenia.  Currently her platelet counts are stable.  The patient reports that she has seen OB anesthesia, however that consultation report is not available for review. I discussed with the patient that I will confirm with Dr. Weems and Dr. Lopez that there are no concerns with delivery here at [facility].  In the interim, the patient was counseled to monitor for signs and symptoms of  labor.  We reviewed that weekly antepartum testing is indicated.  We will do this in the [facility] office and will follow patient closely.      1. Primary OB should contact OB anesthesia for consultation report  2. Continue lovenox, baby aspirin  3. Weekly BPP with [facility] visit; f/u growth scan in 3 weeks  4. Anticipate induction of labor (37-38 weeks); however, high risk for  labor  5. SBE prophylaxis intrapartum  6. Telemetry intrapartum and for 48 hrs PP  7. Keep f/u with Dr. Weems as scheduled  8. Hold lovenox 12 hours prior to neuraxial; resume at appropriate time according to [facility] thromb[facility]roph[facility]axis guidelines. SCDs should be used intrapartum and postpartum.    The approximate physician face to face time was 15 minutes. The majority of time (greater than 50%) was spent on counseling of the patient or coordination of care.

## 2020-02-04 NOTE — PROGRESS NOTES
HEMATOLOGY/ONCOLOGY NEW PATIENT CONSULT NOTE:    PATIENT: Theresa Grey  MRN: [id number]  DATE: 2/5/2020  Diagnosis:   1. Benign gestational thrombocytopenia in third trimester    2. Congenital heart disease in pregnancy    3. 31 weeks gestation of pregnancy      Chief Complaint: Follow-up    Subjective:    Initial History: Ms. Grey is a 24 y.o. female initially presented as a new patient consult for thrombocytopenia. She was referred by her OBGYN. She was 29 weeks pregnant. She is followed by cardiology for congenital heart disease. Her OBGYN thought her thrombocytopenia may be 2/2 congenital heart disease but thought it would be best to work-up with hematology. She denies any unusual bruising or bleeding, including nose or gum bleeding and bloody urine and stools. Her plts have intermittently mildly low since her cardiac surgery in 2015--but barely low (~145). She went to the ED 3 mos ago with pregnancy complicated vomiting and plts were 109K. 2 mos ago they were 113K, and 2 weeks ago they were 121K. Of note, she is on lovenox to prevent blood clots during pregnancy given her cardiac history. She has been on long term baby ASA, which she is also taking.     Today: Imelda is feeling well overall, currently 31weeks pregnant. No issues with bleeding. Platelet count remains stable today. Workup for thrombocytopenia negative. Likely related to pregnancy. Discussed with patient importance of monitoring for bleeding. Also discussed incidental finding on peripheral smear. 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 Recommend repeating CBC and peripheral smear ~6 weeks postpartum.     Duration of thrombocytopenia: Per Epic record review, she has had intermittently mild thrombocytopenia since 2015.  Diet: She reports that she tends to eat unhealthy foods and not many fruits and vegetables  Bleeding: denies bleeding, including gum bleeding, nose bleeding, vaginal bleeding, and blood to urine or stools  GI:  "n/a  Colonoscopy: n/a  Family history of blood disorder: denies  Surgical History: Open heart surgery 2015  Alcohol use: Not currrently while pregnant  GYN: patient was 29 weeks pregnant at time of workup.     Past Medical History:   Past Medical History:   Diagnosis Date    Abnormality of heart valve     heart disease     fatigue as a result of pregnancy (8wks ) clubbing indicates chronic oxygenation issues but pt considered it "normal"     Past Surgical HIstory:   Past Surgical History:   Procedure Laterality Date    CARDIAC CATHETERIZATION  at 7 yars of age    DILATION AND CURETTAGE OF UTERUS      FONTAN PROCEDURE, EXTRACARDIAC  September 29, 2015    With a nonfenestrated 22 mm [place]-Justice tube graft.  The pulmonary valve was closed.  Procedure performed by Dr. Moe Kulkarni at [facility].     Family History:   Family History   Problem Relation Age of Onset    Diabetes Maternal Grandmother     Vision loss Maternal Grandmother     Miscarriages / Stillbirths Mother     Diabetes Father     Diabetes Paternal Grandmother     No Known Problems Brother     No Known Problems Maternal Grandfather     No Known Problems Paternal Grandfather     Hypertension Maternal Aunt     Diabetes Maternal Aunt     No Known Problems Sister     No Known Problems Maternal Uncle     No Known Problems Paternal Aunt     No Known Problems Paternal Uncle     Anemia Neg Hx     Arrhythmia Neg Hx     Asthma Neg Hx     Clotting disorder Neg Hx     Fainting Neg Hx     Heart attack Neg Hx     Heart disease Neg Hx     Heart failure Neg Hx     Hyperlipidemia Neg Hx     Stroke Neg Hx     Atrial Septal Defect Neg Hx      Social History:  reports that she has quit smoking. She smoked 0.25 packs per day. She has never used smokeless tobacco. She reports that she drank alcohol. She reports that she does not use drugs.  Allergies:  Review of patient's allergies indicates:   Allergen Reactions    Hydrocodone Shortness Of " Breath and Other (See Comments)     Dizziness; sweating     Medications:  Current Outpatient Medications   Medication Sig Dispense Refill    aspirin (ECOTRIN) 81 MG EC tablet Take 81 mg by mouth once daily.      enoxaparin (LOVENOX) 40 mg/0.4 mL Syrg Inject 0.4 mLs (40 mg total) into the skin once daily. 30 Syringe 12    PRENATAL NO.116-IRON-FOLIC-DHA ORAL Take 1 tablet by mouth once daily.       No current facility-administered medications for this visit.      Review of Systems  Constitutional: Negative for chills, fatigue, and fever.   HENT: Negative for congestion and nosebleeds.    Eyes: Negative for redness.   Respiratory: Negative for cough, sputum production, shortness of breath and wheezing.    Cardiovascular:  Negative for chest pain, leg swelling, and palpitations.   Gastrointestinal: Negative for abdominal pain, blood in stool, constipation, nausea and vomiting.   Genitourinary: Negative for dysuria, frequency and hematuria.   Musculoskeletal: Negative for back pain, falls, joint pain and myalgias.   Skin: Neg for rash or wounds  Neurological: Negative for dizziness, tingling and headaches.   Psychiatric/Behavioral: The patient is not nervous/anxious.   Past medical, surgical, social history reviewed with patient.    ECOG Performance Status: 0 - Asymptomatic  KPS Performance Status: 100%- Normal, No Complaints, No Evidence of Disease  Objective:      Vitals:   Vitals:    02/05/20 1459   BP: (!) 105/59   BP Location: Right arm   Patient Position: Sitting   BP Method: Medium (Automatic)   Pulse: 72   Resp: 16   Temp: 97.7 °F (36.5 °C)   TempSrc: Oral   SpO2: 97%   Weight: 54.9 kg (121 lb 0.5 oz)   Height: 5' (1.524 m)     BMI: Body mass index is 23.64 kg/m².  Physical Exam  General - well developed, well nourished, no apparent distress  HEENT - oropharynx clear  Chest and Lung - clear to auscultation   Cardiovascular - RRR with no MGR, normal S1 and S2. No swelling to legs noted  Abdomen-  soft, no  palpable hepatomegaly or splenomegaly; no abdominal tenderness  Lymph - no lymphadenopathy on exam today  Heme - no bruising, petechiae, pallor  Skin - no wounds or rashes noted  Psych - appropriate mood and affect  Laboratory Data:  Lab Visit on 02/05/2020   Component Date Value Ref Range Status    WBC 02/05/2020 10.72  3.90 - 12.70 K/uL Final    RBC 02/05/2020 4.22  4.00 - 5.40 M/uL Final    Hemoglobin 02/05/2020 11.4* 12.0 - 16.0 g/dL Final    Hematocrit 02/05/2020 37.3  37.0 - 48.5 % Final    Mean Corpuscular Volume 02/05/2020 88  82 - 98 fL Final    Mean Corpuscular Hemoglobin 02/05/2020 27.0  27.0 - 31.0 pg Final    Mean Corpuscular Hemoglobin Conc 02/05/2020 30.6* 32.0 - 36.0 g/dL Final    RDW 02/05/2020 13.2  11.5 - 14.5 % Final    Platelets 02/05/2020 128* 150 - 350 K/uL Final    MPV 02/05/2020 SEE COMMENT  9.2 - 12.9 fL Final    Result not available.    Immature Granulocytes 02/05/2020 1.1* 0.0 - 0.5 % Final    Gran # (ANC) 02/05/2020 7.9* 1.8 - 7.7 K/uL Final    Immature Grans (Abs) 02/05/2020 0.12* 0.00 - 0.04 K/uL Final    Comment: Mild elevation in immature granulocytes is non specific and   can be seen in a variety of conditions including stress response,   acute inflammation, trauma and pregnancy. Correlation with other   laboratory and clinical findings is essential.      Lymph # 02/05/2020 1.8  1.0 - 4.8 K/uL Final    Mono # 02/05/2020 0.7  0.3 - 1.0 K/uL Final    Eos # 02/05/2020 0.3  0.0 - 0.5 K/uL Final    Baso # 02/05/2020 0.05  0.00 - 0.20 K/uL Final    nRBC 02/05/2020 0  0 /100 WBC Final    Gran% 02/05/2020 73.4* 38.0 - 73.0 % Final    Lymph% 02/05/2020 16.3* 18.0 - 48.0 % Final    Mono% 02/05/2020 6.3  4.0 - 15.0 % Final    Eosinophil% 02/05/2020 2.4  0.0 - 8.0 % Final    Basophil% 02/05/2020 0.5  0.0 - 1.9 % Final    Differential Method 02/05/2020 Automated   Final     Imaging:    Assessment:       1. Benign gestational thrombocytopenia in third trimester    2.  Congenital heart disease in pregnancy    3. 31 weeks gestation of pregnancy         Plan:     Gestational Thrombocytopenia  - intermittently mild thrombocytopenia dating back to 2015. See HPI.  - plts 121K on 1/2/20  - CBC, CMP, peripheral smear, SPEP, MARIELA, immunoglubulins, B12, folate, tsh, T4. HBc Ab, HBsAg, HIV, HCV Ab, KECIA, lupus anticoag, PTT, INR, fibrinogen, and LDH drawn on 1/20/20 all negative  - Cardiolipin Ab only very mildly elevated; finding benign  - Of note, on pathologist review of peripheral smear, white cells with noted Pelger-Huet anomaly. Recommend repeating CBC and peripheral smear ~6 weeks postpartum.   - Plts 128K today.  - Suspect thrombocytopenia is transient 2/2 pregnancy.    Congenital heart disease  - open heart surgery 2015  - followed by cardiology    Pregnancy  - patient is now 31 weeks pregnant  - managed by OB/GYN who is referring provider    Follow up: Hematology will sign off at this time. Thrombocytopenia workup benign, likely gestational. Incidental finding on peripheral smear of pelger-heut anomaly in white blood cells. Recommend that patient have repeat CBC and peripheral smear with pathologist review ~6weeks postpartum; will notify OBGYASHOK Mcdonough NP  Hematology/Oncology/BMT    Collaborating physician Dr. Mino Sotelo

## 2020-02-05 ENCOUNTER — OFFICE VISIT (OUTPATIENT)
Dept: HEMATOLOGY/ONCOLOGY | Facility: CLINIC | Age: 25
End: 2020-02-05
Payer: MEDICAID

## 2020-02-05 ENCOUNTER — LAB VISIT (OUTPATIENT)
Dept: LAB | Facility: HOSPITAL | Age: 25
End: 2020-02-05
Payer: MEDICAID

## 2020-02-05 VITALS
WEIGHT: 121.06 LBS | DIASTOLIC BLOOD PRESSURE: 59 MMHG | TEMPERATURE: 98 F | OXYGEN SATURATION: 97 % | SYSTOLIC BLOOD PRESSURE: 105 MMHG | RESPIRATION RATE: 16 BRPM | BODY MASS INDEX: 23.77 KG/M2 | HEART RATE: 72 BPM | HEIGHT: 60 IN

## 2020-02-05 DIAGNOSIS — Q24.9 CONGENITAL HEART DISEASE IN PREGNANCY: ICD-10-CM

## 2020-02-05 DIAGNOSIS — O99.891 CONGENITAL HEART DISEASE IN PREGNANCY: ICD-10-CM

## 2020-02-05 DIAGNOSIS — D69.6 BENIGN GESTATIONAL THROMBOCYTOPENIA IN THIRD TRIMESTER: Primary | ICD-10-CM

## 2020-02-05 DIAGNOSIS — Z3A.31 31 WEEKS GESTATION OF PREGNANCY: ICD-10-CM

## 2020-02-05 DIAGNOSIS — O99.113 BENIGN GESTATIONAL THROMBOCYTOPENIA IN THIRD TRIMESTER: Primary | ICD-10-CM

## 2020-02-05 DIAGNOSIS — D69.6 THROMBOCYTOPENIA: ICD-10-CM

## 2020-02-05 LAB
BASOPHILS # BLD AUTO: 0.05 K/UL (ref 0–0.2)
BASOPHILS NFR BLD: 0.5 % (ref 0–1.9)
DIFFERENTIAL METHOD: ABNORMAL
EOSINOPHIL # BLD AUTO: 0.3 K/UL (ref 0–0.5)
EOSINOPHIL NFR BLD: 2.4 % (ref 0–8)
ERYTHROCYTE [DISTWIDTH] IN BLOOD BY AUTOMATED COUNT: 13.2 % (ref 11.5–14.5)
HCT VFR BLD AUTO: 37.3 % (ref 37–48.5)
HGB BLD-MCNC: 11.4 G/DL (ref 12–16)
IMM GRANULOCYTES # BLD AUTO: 0.12 K/UL (ref 0–0.04)
IMM GRANULOCYTES NFR BLD AUTO: 1.1 % (ref 0–0.5)
LYMPHOCYTES # BLD AUTO: 1.8 K/UL (ref 1–4.8)
LYMPHOCYTES NFR BLD: 16.3 % (ref 18–48)
MCH RBC QN AUTO: 27 PG (ref 27–31)
MCHC RBC AUTO-ENTMCNC: 30.6 G/DL (ref 32–36)
MCV RBC AUTO: 88 FL (ref 82–98)
MONOCYTES # BLD AUTO: 0.7 K/UL (ref 0.3–1)
MONOCYTES NFR BLD: 6.3 % (ref 4–15)
NEUTROPHILS # BLD AUTO: 7.9 K/UL (ref 1.8–7.7)
NEUTROPHILS NFR BLD: 73.4 % (ref 38–73)
NRBC BLD-RTO: 0 /100 WBC
PLATELET # BLD AUTO: 128 K/UL (ref 150–350)
PMV BLD AUTO: ABNORMAL FL (ref 9.2–12.9)
RBC # BLD AUTO: 4.22 M/UL (ref 4–5.4)
WBC # BLD AUTO: 10.72 K/UL (ref 3.9–12.7)

## 2020-02-05 PROCEDURE — 99999 PR PBB SHADOW E&M-EST. PATIENT-LVL III: CPT | Mod: PBBFAC,,, | Performed by: NURSE PRACTITIONER

## 2020-02-05 PROCEDURE — 36415 COLL VENOUS BLD VENIPUNCTURE: CPT

## 2020-02-05 PROCEDURE — 99213 OFFICE O/P EST LOW 20 MIN: CPT | Mod: PBBFAC | Performed by: NURSE PRACTITIONER

## 2020-02-05 PROCEDURE — 99999 PR PBB SHADOW E&M-EST. PATIENT-LVL III: ICD-10-PCS | Mod: PBBFAC,,, | Performed by: NURSE PRACTITIONER

## 2020-02-05 PROCEDURE — 99213 PR OFFICE/OUTPT VISIT, EST, LEVL III, 20-29 MIN: ICD-10-PCS | Mod: S$PBB,,, | Performed by: NURSE PRACTITIONER

## 2020-02-05 PROCEDURE — 85025 COMPLETE CBC W/AUTO DIFF WBC: CPT

## 2020-02-05 PROCEDURE — 99213 OFFICE O/P EST LOW 20 MIN: CPT | Mod: S$PBB,,, | Performed by: NURSE PRACTITIONER

## 2020-02-07 ENCOUNTER — INITIAL CONSULT (OUTPATIENT)
Dept: MATERNAL FETAL MEDICINE | Facility: CLINIC | Age: 25
End: 2020-02-07
Payer: MEDICAID

## 2020-02-07 ENCOUNTER — PROCEDURE VISIT (OUTPATIENT)
Dept: MATERNAL FETAL MEDICINE | Facility: CLINIC | Age: 25
End: 2020-02-07
Payer: MEDICAID

## 2020-02-07 VITALS
WEIGHT: 121.25 LBS | SYSTOLIC BLOOD PRESSURE: 124 MMHG | BODY MASS INDEX: 23.68 KG/M2 | DIASTOLIC BLOOD PRESSURE: 72 MMHG

## 2020-02-07 DIAGNOSIS — O99.891 CONGENITAL HEART DISEASE IN PREGNANCY: ICD-10-CM

## 2020-02-07 DIAGNOSIS — Z98.890 S/P FONTAN PROCEDURE: ICD-10-CM

## 2020-02-07 DIAGNOSIS — Q24.9 CONGENITAL HEART DISEASE IN PREGNANCY: ICD-10-CM

## 2020-02-07 DIAGNOSIS — Q24.9 ADULT CONGENITAL HEART DISEASE: ICD-10-CM

## 2020-02-07 DIAGNOSIS — Z36.89 ENCOUNTER FOR ULTRASOUND TO CHECK FETAL GROWTH: ICD-10-CM

## 2020-02-07 PROCEDURE — 99212 OFFICE O/P EST SF 10 MIN: CPT | Mod: PBBFAC,TH,25 | Performed by: OBSTETRICS & GYNECOLOGY

## 2020-02-07 PROCEDURE — 99212 OFFICE O/P EST SF 10 MIN: CPT | Mod: S$PBB,TH,25, | Performed by: OBSTETRICS & GYNECOLOGY

## 2020-02-07 PROCEDURE — 76819 FETAL BIOPHYS PROFIL W/O NST: CPT | Mod: PBBFAC | Performed by: OBSTETRICS & GYNECOLOGY

## 2020-02-07 PROCEDURE — 99212 PR OFFICE/OUTPT VISIT, EST, LEVL II, 10-19 MIN: ICD-10-PCS | Mod: S$PBB,TH,25, | Performed by: OBSTETRICS & GYNECOLOGY

## 2020-02-07 PROCEDURE — 99999 PR PBB SHADOW E&M-EST. PATIENT-LVL II: ICD-10-PCS | Mod: PBBFAC,,, | Performed by: OBSTETRICS & GYNECOLOGY

## 2020-02-07 PROCEDURE — 76819 PR US, OB, FETAL BIOPHYSICAL, W/O NST: ICD-10-PCS | Mod: 26,S$PBB,, | Performed by: OBSTETRICS & GYNECOLOGY

## 2020-02-07 PROCEDURE — 76819 FETAL BIOPHYS PROFIL W/O NST: CPT | Mod: 26,S$PBB,, | Performed by: OBSTETRICS & GYNECOLOGY

## 2020-02-07 PROCEDURE — 99999 PR PBB SHADOW E&M-EST. PATIENT-LVL II: CPT | Mod: PBBFAC,,, | Performed by: OBSTETRICS & GYNECOLOGY

## 2020-02-07 NOTE — PROGRESS NOTES
Obstetrical ultrasound completed today.  See report in imaging section of [facility].    F/u visit    /74  Lovenox 40 daily, ASA 81 mg, PNV    +FM. No PTL.    Heme notes reviewed. Likely benign gestational thrombocytopenia. Needs CBC and peripheral smear 6 weeks postpartum. Recommend primary OB check CBC q 2 weeks until 36 weeks then weekly. Notify MFM if < 100K.    Maternal CHD. See previous notes. F/u with Dr. Weems on 2/14.    Continue weekly BPP. High risk for PTD and preeclampsia. Anesthesia consultation report not available in Epic. Will need to stop lovenox 12 hours prior to planned neuraxial anesthesia. Counseled on fetal kick counts today and OB ED.    The approximate physician face to face time was 10 minutes. The majority of time (greater than 50%) was spent on counseling of the patient or coordination of care.    2/14: f/u with Dr. Weems, Dr. Quiñones (weekly BPP), and Dr. Dorado

## 2020-02-13 ENCOUNTER — PATIENT MESSAGE (OUTPATIENT)
Dept: OBSTETRICS AND GYNECOLOGY | Facility: CLINIC | Age: 25
End: 2020-02-13

## 2020-02-14 ENCOUNTER — PROCEDURE VISIT (OUTPATIENT)
Dept: MATERNAL FETAL MEDICINE | Facility: CLINIC | Age: 25
End: 2020-02-14
Payer: MEDICAID

## 2020-02-14 ENCOUNTER — ROUTINE PRENATAL (OUTPATIENT)
Dept: OBSTETRICS AND GYNECOLOGY | Facility: CLINIC | Age: 25
End: 2020-02-14
Payer: MEDICAID

## 2020-02-14 ENCOUNTER — CLINICAL SUPPORT (OUTPATIENT)
Dept: PEDIATRIC CARDIOLOGY | Facility: CLINIC | Age: 25
End: 2020-02-14
Payer: MEDICAID

## 2020-02-14 ENCOUNTER — OFFICE VISIT (OUTPATIENT)
Dept: PEDIATRIC CARDIOLOGY | Facility: CLINIC | Age: 25
End: 2020-02-14
Payer: MEDICAID

## 2020-02-14 VITALS
WEIGHT: 120.56 LBS | HEIGHT: 61 IN | BODY MASS INDEX: 22.76 KG/M2 | RESPIRATION RATE: 18 BRPM | HEART RATE: 71 BPM | SYSTOLIC BLOOD PRESSURE: 111 MMHG | OXYGEN SATURATION: 98 % | TEMPERATURE: 98 F | DIASTOLIC BLOOD PRESSURE: 67 MMHG

## 2020-02-14 VITALS — BODY MASS INDEX: 23.2 KG/M2 | DIASTOLIC BLOOD PRESSURE: 68 MMHG | WEIGHT: 121.69 LBS | SYSTOLIC BLOOD PRESSURE: 110 MMHG

## 2020-02-14 DIAGNOSIS — Q24.9 CONGENITAL HEART DISEASE IN PREGNANCY: ICD-10-CM

## 2020-02-14 DIAGNOSIS — O99.891 CONGENITAL HEART DISEASE IN PREGNANCY: ICD-10-CM

## 2020-02-14 DIAGNOSIS — Q24.0 DEXTROCARDIA: ICD-10-CM

## 2020-02-14 DIAGNOSIS — Q24.9 ADULT CONGENITAL HEART DISEASE: ICD-10-CM

## 2020-02-14 DIAGNOSIS — Z98.890 S/P FONTAN PROCEDURE: ICD-10-CM

## 2020-02-14 DIAGNOSIS — O09.92 SUPERVISION OF HIGH RISK PREGNANCY IN SECOND TRIMESTER: ICD-10-CM

## 2020-02-14 DIAGNOSIS — Z3A.33 33 WEEKS GESTATION OF PREGNANCY: ICD-10-CM

## 2020-02-14 DIAGNOSIS — Q20.1 DORV (DOUBLE OUTLET RIGHT VENTRICLE): ICD-10-CM

## 2020-02-14 DIAGNOSIS — Q24.9 ADULT CONGENITAL HEART DISEASE: Primary | ICD-10-CM

## 2020-02-14 DIAGNOSIS — Z36.89 ENCOUNTER FOR ULTRASOUND TO CHECK FETAL GROWTH: ICD-10-CM

## 2020-02-14 DIAGNOSIS — Z98.890 S/P D&C (STATUS POST DILATION AND CURETTAGE): ICD-10-CM

## 2020-02-14 DIAGNOSIS — D69.6 THROMBOCYTOPENIA: ICD-10-CM

## 2020-02-14 PROCEDURE — 93010 EKG 12-LEAD PEDIATRIC: ICD-10-PCS | Mod: S$PBB,,, | Performed by: PEDIATRICS

## 2020-02-14 PROCEDURE — 99213 PR OFFICE/OUTPT VISIT, EST, LEVL III, 20-29 MIN: ICD-10-PCS | Mod: 25,S$PBB,, | Performed by: PEDIATRICS

## 2020-02-14 PROCEDURE — 99999 PR PBB SHADOW E&M-EST. PATIENT-LVL III: ICD-10-PCS | Mod: PBBFAC,,, | Performed by: PEDIATRICS

## 2020-02-14 PROCEDURE — 93321 DOPPLER ECHO F-UP/LMTD STD: CPT | Mod: PBBFAC,PO | Performed by: PEDIATRICS

## 2020-02-14 PROCEDURE — 76819 FETAL BIOPHYS PROFIL W/O NST: CPT | Mod: PBBFAC | Performed by: OBSTETRICS & GYNECOLOGY

## 2020-02-14 PROCEDURE — 93325 DOPPLER ECHO COLOR FLOW MAPG: CPT | Mod: PBBFAC,PO | Performed by: PEDIATRICS

## 2020-02-14 PROCEDURE — 93304 ECHO TRANSTHORACIC: CPT | Mod: PBBFAC,PO | Performed by: PEDIATRICS

## 2020-02-14 PROCEDURE — 99213 OFFICE O/P EST LOW 20 MIN: CPT | Mod: PBBFAC,PO,25 | Performed by: PEDIATRICS

## 2020-02-14 PROCEDURE — 99213 OFFICE O/P EST LOW 20 MIN: CPT | Mod: 25,S$PBB,, | Performed by: PEDIATRICS

## 2020-02-14 PROCEDURE — 76819 PR US, OB, FETAL BIOPHYSICAL, W/O NST: ICD-10-PCS | Mod: 26,S$PBB,, | Performed by: OBSTETRICS & GYNECOLOGY

## 2020-02-14 PROCEDURE — 93325 PR DOPPLER COLOR FLOW VELOCITY MAP: ICD-10-PCS | Mod: 26,S$PBB,, | Performed by: PEDIATRICS

## 2020-02-14 PROCEDURE — 99999 PR PBB SHADOW E&M-EST. PATIENT-LVL III: CPT | Mod: PBBFAC,,, | Performed by: PEDIATRICS

## 2020-02-14 PROCEDURE — 99213 OFFICE O/P EST LOW 20 MIN: CPT | Mod: S$PBB,TH,, | Performed by: OBSTETRICS & GYNECOLOGY

## 2020-02-14 PROCEDURE — 93304 PR ECHO XTHORACIC,CONG A2M,LIMITED: ICD-10-PCS | Mod: 26,S$PBB,, | Performed by: PEDIATRICS

## 2020-02-14 PROCEDURE — 93325 DOPPLER ECHO COLOR FLOW MAPG: CPT | Mod: 26,S$PBB,, | Performed by: PEDIATRICS

## 2020-02-14 PROCEDURE — 93321 DOPPLER ECHO F-UP/LMTD STD: CPT | Mod: 26,S$PBB,, | Performed by: PEDIATRICS

## 2020-02-14 PROCEDURE — 99213 PR OFFICE/OUTPT VISIT, EST, LEVL III, 20-29 MIN: ICD-10-PCS | Mod: S$PBB,TH,, | Performed by: OBSTETRICS & GYNECOLOGY

## 2020-02-14 PROCEDURE — 93304 ECHO TRANSTHORACIC: CPT | Mod: 26,S$PBB,, | Performed by: PEDIATRICS

## 2020-02-14 PROCEDURE — 93010 ELECTROCARDIOGRAM REPORT: CPT | Mod: S$PBB,,, | Performed by: PEDIATRICS

## 2020-02-14 PROCEDURE — 99999 PR PBB SHADOW E&M-EST. PATIENT-LVL II: CPT | Mod: PBBFAC,,, | Performed by: OBSTETRICS & GYNECOLOGY

## 2020-02-14 PROCEDURE — 99999 PR PBB SHADOW E&M-EST. PATIENT-LVL II: ICD-10-PCS | Mod: PBBFAC,,, | Performed by: OBSTETRICS & GYNECOLOGY

## 2020-02-14 PROCEDURE — 93321 PR DOPPLER ECHO HEART,LIMITED,F/U: ICD-10-PCS | Mod: 26,S$PBB,, | Performed by: PEDIATRICS

## 2020-02-14 PROCEDURE — 76819 FETAL BIOPHYS PROFIL W/O NST: CPT | Mod: 26,S$PBB,, | Performed by: OBSTETRICS & GYNECOLOGY

## 2020-02-14 PROCEDURE — 93005 ELECTROCARDIOGRAM TRACING: CPT | Mod: PBBFAC,PO | Performed by: PEDIATRICS

## 2020-02-14 PROCEDURE — 99212 OFFICE O/P EST SF 10 MIN: CPT | Mod: PBBFAC,25,27,TH | Performed by: OBSTETRICS & GYNECOLOGY

## 2020-02-14 NOTE — PROGRESS NOTES
2020     [facility]    re:Theresa Grey  :1995     Gabriella Dorado MD   [address] [address]     Dr. Felice Kim    Dear Doctors:    Theresa Grey is a 24 y.o. female with the following diagnoses:  Diagnoses:  1. 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 resolved cyanosis  3. Currently pregnant at 33 wga.  Normal fetal echocardiogram.  Estimated date of delivery April 3, 2020.  4. Mild thrombocytopenia - common in Fontan patients.    Discussion:  In regards to her pregnancy, we have several concerns:  1.  The rate of fetal loss is significantly elevated, approaching 50%.  2.  The rate of premature delivery is significantly elevated.  3.  The clotting predisposition often noted in Fontan patients may be worsened by pregnancy.  4.  Without a sub pulmonic ventricle, she may tolerate the volume load associated with a pregnancy less well than a 2 ventricle patient.  There is an increased risk of heart failure symptoms.  5.  There is an increased risk of arrhythmias.    That said, she really looks great.  I would expect her to tolerate a pregnancy well.  Vaginal delivery is typically preferable if she is doing well.  IV fluids may be necessary to avoid dehydration.  I would recommend SBE prophylaxis for delivery although this is certainly debatable.  I would recommend close monitoring with telemetry for during delivery and for 24-48 hours after delivery.      We again discussed the long-term risk associated with Fontan  circulation. We discussed:  1. Risk of thromboembolic events with Fontan   - she needs to be on a baby aspirin daily to try to cut down on that risk.  I agree with the use of prophylactic Lovenox during the pregnancy.   - would avoid estrogen containing birth control long-term.  She will discuss with her gynecologist  2. Long term risk of arrhythmias, heart failure, PLE, liver disease discussed    Plan:  1.  Continue daily baby aspirin.  Agree with current use of Lovenox which will be stopped prior to her delivery.    2.  Follow-up with me in 1 month with EKG only.  3.  SBEP is indicated for dental work.  Although there are no clear guidelines for this, I would recommend SBE prophylaxis for delivery as well.  4.  Close follow-up with obstetrics.    Interval history:  I saw her today in [place].  I last saw her a month ago.  Overall, she has done well since that time.  She denies productive cough, diarrhea, chest pain, palpitations, syncope, near syncope.  She has noticed a little bit of shortness of breath when she exerts herself, but this is unchanged.  She denies any shortness of breath at rest or while lying supine.  No swelling in her feet recently.     PMH:  She was diagnosed as a young child with congenital heart disease while living in [place]. She was evaluated at [facility] with a cath at about 7 years of age. She was scheduled for heart surgery by her report about 10 years ago. However, due to social issues (no insurance, no social security number) the surgery was put off and she was lost to follow up. I first saw her in 2014 she was admitted with pregnancy, rare chest pain, and cyanosis.  After long discussion, she had a pregnancy termination due to the risk associated with unrepaired cyanotic congenital heart disease.  On 9/29/16, she underwent an extracardiac Fontan with oversewing and ligation of the MPA at [facility].  She did very well and was discharged a week later.      The review of  "systems is as noted above. It is otherwise negative for other symptoms related to the general, neurological, psychiatric, endocrine, gastrointestinal, genitourinary, respiratory, dermatologic, musculoskeletal, hematologic, and immunologic systems.    Past Medical History:   Diagnosis Date    Abnormality of heart valve     heart disease     fatigue as a result of pregnancy (8wks ) clubbing indicates chronic oxygenation issues but pt considered it "normal"     Past Surgical History:   Procedure Laterality Date    CARDIAC CATHETERIZATION  at 7 yars of age    DILATION AND CURETTAGE OF UTERUS      FONTAN PROCEDURE, EXTRACARDIAC  September 29, 2015    With a nonfenestrated 22 mm [place]-Justice tube graft.  The pulmonary valve was closed.  Procedure performed by Dr. Moe Kulkarni at [facility].     Family History   Problem Relation Age of Onset    Diabetes Maternal Grandmother     Vision loss Maternal Grandmother     Miscarriages / Stillbirths Mother     Diabetes Father     Diabetes Paternal Grandmother     No Known Problems Brother     No Known Problems Maternal Grandfather     No Known Problems Paternal Grandfather     Hypertension Maternal Aunt     Diabetes Maternal Aunt     No Known Problems Sister     No Known Problems Maternal Uncle     No Known Problems Paternal Aunt     No Known Problems Paternal Uncle     Anemia Neg Hx     Arrhythmia Neg Hx     Asthma Neg Hx     Clotting disorder Neg Hx     Fainting Neg Hx     Heart attack Neg Hx     Heart disease Neg Hx     Heart failure Neg Hx     Hyperlipidemia Neg Hx     Stroke Neg Hx     Atrial Septal Defect Neg Hx      Social History     Socioeconomic History    Marital status:      Spouse name: Not on file    Number of children: Not on file    Years of education: Not on file    Highest education level: Not on file   Occupational History    Not on file   Social Needs    Financial resource strain: Not on file    Food insecurity:    " " Worry: Not on file     Inability: Not on file    Transportation needs:     Medical: Not on file     Non-medical: Not on file   Tobacco Use    Smoking status: Former Smoker     Packs/day: 0.25    Smokeless tobacco: Never Used   Substance and Sexual Activity    Alcohol use: Not Currently     Alcohol/week: 0.0 standard drinks     Comment: on ocassion before pregnancy    Drug use: No     Comment: previous marijuana use    Sexual activity: Yes     Partners: Male     Birth control/protection: Injection   Lifestyle    Physical activity:     Days per week: Not on file     Minutes per session: Not on file    Stress: Not on file   Relationships    Social connections:     Talks on phone: Not on file     Gets together: Not on file     Attends Spiritism service: Not on file     Active member of club or organization: Not on file     Attends meetings of clubs or organizations: Not on file     Relationship status: Not on file   Other Topics Concern    Not on file   Social History Narrative    Not on file     Current Outpatient Medications on File Prior to Visit   Medication Sig Dispense Refill    aspirin (ECOTRIN) 81 MG EC tablet Take 81 mg by mouth once daily.      enoxaparin (LOVENOX) 40 mg/0.4 mL Syrg Inject 0.4 mLs (40 mg total) into the skin once daily. 30 Syringe 12    PRENATAL NO.116-IRON-FOLIC-DHA ORAL Take 1 tablet by mouth once daily.       No current facility-administered medications on file prior to visit.      Review of patient's allergies indicates:   Allergen Reactions    Hydrocodone Shortness Of Breath and Other (See Comments)     Dizziness; sweating     /67 (BP Location: Right arm, Patient Position: Sitting, BP Method: Medium (Automatic))   Pulse 71   Temp 97.9 °F (36.6 °C) (Oral)   Resp 18   Ht 5' 0.73" (1.543 m)   Wt 54.7 kg (120 lb 9.5 oz)   LMP 06/26/2019   SpO2 98%   BMI 22.99 kg/m²     In general, she is an acyanotic, nondysmorphic appearing female in no apparent distress.  The " "eyes, nares, and oropharynx are clear.  Eyelids and conjunctiva free of drainage and redness.  PERRLA.  Teeth in good repair.  Mucous membranes are moist.  The head is normocephalic and atraumatic.  The neck is supple without jugular venous distention or thyroid enlargement.  The lungs are clear to auscultation bilaterally.  There is a well healed sternotomy scar.  The PMI is in the right chest.  The first heart sound is normal.  The second is loud and single.  There are no gallops, rubs, or clicks in the supine position.  The abdominal exam reveals a gravid uterus.  Pulses are normal in all 4 extremities with brisk capillary refill and no edema.  No rashes are noted.  She has some clubbing.  No tenderness, swelling in legs.  Her neurological exam is normal.    EKG with a "left atrial rhythm" with evidence of dextrocardia.  HR about 54.    An echocardiogram today reveals excellent function, no significant AV valve insufficiency, no effusion, and no evidence of Fontan obstruction.    Cardiac MRI October 19, 2019:  1. Patent innominate vein to left-sided SVC. Widely patent left-sided Pavel anastomosis. The IVC is anastomosed to the left pulmonary artery. The Fontan tunnel is widely patent without evidence of obstruction. The branch pulmonary arteries are normal in size with no evidence of obstruction.   2. Atrio-ventricular concordance with a jason-cross type of arrangement.   3. The right ventricle volumes are low normal. Moderate RVH. The calculated RVEF is 45 %.  4. The left ventricular volumes are low normal. The calculated LVEF is 50 %.   5. Large inlet type ventricular septal defect.   6. Oversewn pulmonary valve.   7. Anterior and rightward aorta. Structurally normal aortic valve with no significant aortic insufficiency.   8. Right aortic arch.     Sincerely,        Toro Weems MD  Pediatric Cardiology  Adult Congenital Heart Disease  Pediatric Heart Failure and Transplantation  [facility] " [id number] [place]  [address]  [phone]

## 2020-02-17 ENCOUNTER — HOSPITAL ENCOUNTER (EMERGENCY)
Facility: OTHER | Age: 25
Discharge: HOME | End: 2020-02-17
Attending: OBSTETRICS & GYNECOLOGY
Payer: MEDICAID

## 2020-02-17 ENCOUNTER — TELEPHONE (OUTPATIENT)
Dept: OBSTETRICS AND GYNECOLOGY | Facility: CLINIC | Age: 25
End: 2020-02-17

## 2020-02-17 ENCOUNTER — PATIENT MESSAGE (OUTPATIENT)
Dept: OBSTETRICS AND GYNECOLOGY | Facility: CLINIC | Age: 25
End: 2020-02-17

## 2020-02-17 VITALS
RESPIRATION RATE: 16 BRPM | TEMPERATURE: 98 F | HEART RATE: 92 BPM | SYSTOLIC BLOOD PRESSURE: 115 MMHG | DIASTOLIC BLOOD PRESSURE: 65 MMHG | OXYGEN SATURATION: 95 %

## 2020-02-17 DIAGNOSIS — O36.8120 DECREASED FETAL MOVEMENTS IN SECOND TRIMESTER, SINGLE OR UNSPECIFIED FETUS: ICD-10-CM

## 2020-02-17 DIAGNOSIS — Q24.9 CONGENITAL HEART DISEASE: Primary | ICD-10-CM

## 2020-02-17 DIAGNOSIS — Z3A.33 33 WEEKS GESTATION OF PREGNANCY: Primary | ICD-10-CM

## 2020-02-17 PROCEDURE — 59025 PR FETAL 2N-STRESS TEST: ICD-10-PCS | Mod: 26,,, | Performed by: OBSTETRICS & GYNECOLOGY

## 2020-02-17 PROCEDURE — 99283 EMERGENCY DEPT VISIT LOW MDM: CPT | Mod: 25,,, | Performed by: OBSTETRICS & GYNECOLOGY

## 2020-02-17 PROCEDURE — 99284 EMERGENCY DEPT VISIT MOD MDM: CPT | Mod: 25

## 2020-02-17 PROCEDURE — 99283 PR EMERGENCY DEPT VISIT,LEVEL III: ICD-10-PCS | Mod: 25,,, | Performed by: OBSTETRICS & GYNECOLOGY

## 2020-02-17 PROCEDURE — 59025 FETAL NON-STRESS TEST: CPT

## 2020-02-17 PROCEDURE — 59025 FETAL NON-STRESS TEST: CPT | Mod: 26,,, | Performed by: OBSTETRICS & GYNECOLOGY

## 2020-02-17 NOTE — PROGRESS NOTES
Doing well today. No OB concerns.    Problem Noted   Thrombocytopenia 1/19/2020    Seeing heme/onc  Labs obtained at consult visit, has fu in 2 weeks. Intermittent thrombocytopenia since 2015. Per heme, may be transient due to pregnancy vs lovenox. Per cardiology, may also be secondary to congential heart disease.    Lab Results   Component Value Date    WBC 11.34 01/20/2020    HGB 11.3 (L) 01/20/2020    HCT 35.9 (L) 01/20/2020    MCV 89 01/20/2020     (L) 01/20/2020       Per heme/onc, she had peripheral smear that showed pelger-heut anomaly in white blood cells. It is our recommendation that the patient have repeat CBC and peripheral smear with pathologist review ~6weeks postpartum by your office to make sure her counts have improved and this finding is now normal.      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 S/p extracardiac Fontan with oversewing of the pulmonary valve and division of the MPA on 9/29/16 at [facility]. Good ventricular function, no significant atrioventricular valve insufficiency, and no evidence of Fontan obstruction or thrombosis on cardiac MRI October 16, 2019.  Followed by Dr. Weems and ANNA    #Telemetry intrapartum and for 24-48 hours after delivery  #increased risk of thromboembolic events - currently on ASA 81 mg daily and lovenox 40 mg chandrika  #avoid estrogen containing contraception  #Fu with Dr. Weems for echo, ekg and 24 hour holter  #SBEP is  indicated for dental work and delivery   #Fetal echocardiogram scheduled for November 18th.         RTO in 2 weeks.     Gabriella Dorado MD  Obstetrics and Gynecology  [facility]

## 2020-02-17 NOTE — TELEPHONE ENCOUNTER
Called pt. Pt advised of scheduled induction date/time/location to report. Pt voiced understanding

## 2020-02-17 NOTE — TELEPHONE ENCOUNTER
----- Message from Gabriella Dorado MD sent at 2/17/2020  3:22 PM CST -----  Do you mind letting her know that I tentatively put in a request for induction of labor for her on 03/30 at 8 PM. That puts her at 39w3d and hopefully delivering on Tuesday during the day time. Ideally, we have a planned induction given her medical history and lovenox. However, we are prepared for her if she has an unplanned delivery. I sent [facility] a message confirming that they agree with timing of delivery at 39 weeks. If they recommend sooner, then we can always move the induction up.  Thanks!

## 2020-02-17 NOTE — ED PROVIDER NOTES
"Encounter Date: 2020       History     Chief Complaint   Patient presents with    Decreased Fetal Movement     HPI   Theresa Grey is a 24 y.o. [id number] at 33w3d presents complaining of decreased fetal movement. Patient reports that since 2 days ago, she has felt less fetal movement than usual. She normally feels 12-15 movements per hour however over the past several days, has only felt 7-8 movements an hour. This concerned the patient, and she wished to be evaluated. 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 Patient denies contractions, denies vaginal bleeding, denies LOF.   Fetal Movement: decreased.     Review of patient's allergies indicates:   Allergen Reactions    Hydrocodone Shortness Of Breath and Other (See Comments)     Dizziness; sweating     Past Medical History:   Diagnosis Date    Abnormality of heart valve     heart disease     fatigue as a result of pregnancy (8wks ) clubbing indicates chronic oxygenation issues but pt considered it "normal"     Past Surgical History:   Procedure Laterality Date    CARDIAC CATHETERIZATION  at 7 yars of age    DILATION AND CURETTAGE OF UTERUS      FONTAN PROCEDURE, EXTRACARDIAC  2015    With a nonfenestrated 22 mm Colman-Justice tube graft.  The pulmonary valve was closed.  Procedure performed by Dr. Moe Kulkarni at [facility].     Family History   Problem Relation Age of Onset    Diabetes Maternal Grandmother     Vision loss Maternal Grandmother     Miscarriages / Stillbirths Mother     Diabetes Father     Diabetes Paternal Grandmother     No Known Problems Brother     No Known Problems Maternal Grandfather     No Known Problems Paternal Grandfather     Hypertension Maternal Aunt     Diabetes Maternal Aunt     No Known Problems Sister     No Known Problems Maternal Uncle     No " Known Problems Paternal Aunt     No Known Problems Paternal Uncle     Anemia Neg Hx     Arrhythmia Neg Hx     Asthma Neg Hx     Clotting disorder Neg Hx     Fainting Neg Hx     Heart attack Neg Hx     Heart disease Neg Hx     Heart failure Neg Hx     Hyperlipidemia Neg Hx     Stroke Neg Hx     Atrial Septal Defect Neg Hx      Social History     Tobacco Use    Smoking status: Former Smoker     Packs/day: 0.25    Smokeless tobacco: Never Used   Substance Use Topics    Alcohol use: Not Currently     Alcohol/week: 0.0 standard drinks     Comment: on ocassion before pregnancy    Drug use: No     Comment: previous marijuana use     Review of Systems   Constitutional: Negative for chills, diaphoresis and fever.   HENT: Negative for congestion, postnasal drip, rhinorrhea, sneezing and sore throat.    Eyes: Negative for photophobia.   Respiratory: Negative for cough, chest tightness and shortness of breath.    Cardiovascular: Negative for chest pain, palpitations and leg swelling.   Gastrointestinal: Negative for abdominal pain, constipation, diarrhea, nausea and vomiting.   Genitourinary: Negative for decreased urine volume, dysuria, frequency, hematuria, pelvic pain, vaginal bleeding and vaginal discharge.   Musculoskeletal: Negative for arthralgias, back pain and joint swelling.   Skin: Negative for rash.   Neurological: Negative for syncope, light-headedness and headaches.   Psychiatric/Behavioral: Negative for self-injury, sleep disturbance and suicidal ideas. The patient is not nervous/anxious.        Physical Exam     Initial Vitals   BP Pulse Resp Temp SpO2   02/17/20 1405 02/17/20 1405 02/17/20 1405 -- 02/17/20 1406   115/65 75 16  96 %      MAP       --                Physical Exam    Vitals reviewed.  Constitutional: Vital signs are normal. She appears well-developed and well-nourished. She is not diaphoretic. No distress.   Cardiovascular: Normal rate, regular rhythm and normal heart sounds.    Pulmonary/Chest: No respiratory distress.   Abdominal: Soft. There is no tenderness. There is no rebound and no guarding.   Gravid abdomen, size = dates   Genitourinary: Uterus normal.   Musculoskeletal: She exhibits no edema or tenderness.   Neurological: She is alert and oriented to person, place, and time. She has normal reflexes. No cranial nerve deficit.   Skin: Skin is warm and dry. Capillary refill takes less than 2 seconds.   Psychiatric: She has a normal mood and affect. Her behavior is normal. Judgment and thought content normal.         ED Course   Obtain Fetal nonstress test (NST)  Date/Time: 2/17/2020 2:30 PM  Performed by: Mireya Iyer MD  Authorized by: Mireya Iyer MD     Nonstress Test:     Variability:  6-25 BPM    Decelerations:  None    Accelerations:  15 bpm    Acoustic Stimulator: No      Uterine Irritability: No      Contractions:  Not present (Irritable)    Contraction Frequency:  135  Biophysical Profile:     Nonstress Test Interpretation: reactive      Overall Impression:  Reassuring      Labs Reviewed - No data to display       Imaging Results    None          Medical Decision Making:   History:   Old Records Summarized: records from clinic visits and records from previous admission(s).  ED Management:  Patient presenting with decreased fetal movement, however, still feeling fetal movements hourly  NST: 135, reactive/reassuring. CTX: intermittent irritability  Discussed with patient regarding kick counts and potential periods of decreased fetal movement due to fetal sleep cycles. Will discharge patient home in stable condition. Verbalized understanding, all questions answered.               Attending Attestation:   Physician Attestation Statement for Resident:  As the supervising MD   Physician Attestation Statement: I have personally seen and examined this patient.   I agree with the above history. -:   As the supervising MD I agree with the above PE.    As the supervising MD I  agree with the above treatment, course, plan, and disposition.   -: Patient evaluated and found to be stable, agree with resident's assessment and plan.  I was personally present during the critical portions of the procedure(s) performed by the resident and was immediately available in the ED to provide services and assistance as needed during the entire procedure.  I have reviewed the following: old records at this facility.                    ED Course as of Feb 17 1432   Mon Feb 17, 2020   1428 NST reactive/reassuring. Notes fetal movement in the F[facility].  Discussed kick counts.     [AR]      ED Course User Index  [AR] Erendira Guerra MD                Clinical Impression:       ICD-10-CM ICD-9-CM   1. 33 weeks gestation of pregnancy Z3A.33 V22.2   2. Decreased fetal movements in second trimester, single or unspecified fetus O36.8120 655.73         Disposition:   Disposition: Discharged  Condition: Stable                     Mireya Iyer MD  Resident  02/17/20 1432       Erendira Guerra MD  02/19/20 0864

## 2020-02-17 NOTE — TELEPHONE ENCOUNTER
Contacted pt in reference to not feeling baby move. Pt was advised to go to the [facility] per Dr. Tolbert pt is 33 weeks and baby is normally really active.

## 2020-02-21 ENCOUNTER — ROUTINE PRENATAL (OUTPATIENT)
Dept: OBSTETRICS AND GYNECOLOGY | Facility: CLINIC | Age: 25
End: 2020-02-21
Payer: MEDICAID

## 2020-02-21 ENCOUNTER — PROCEDURE VISIT (OUTPATIENT)
Dept: MATERNAL FETAL MEDICINE | Facility: CLINIC | Age: 25
End: 2020-02-21
Payer: MEDICAID

## 2020-02-21 ENCOUNTER — INITIAL CONSULT (OUTPATIENT)
Dept: MATERNAL FETAL MEDICINE | Facility: CLINIC | Age: 25
End: 2020-02-21
Payer: MEDICAID

## 2020-02-21 ENCOUNTER — LAB VISIT (OUTPATIENT)
Dept: LAB | Facility: OTHER | Age: 25
End: 2020-02-21
Attending: OBSTETRICS & GYNECOLOGY
Payer: MEDICAID

## 2020-02-21 VITALS
DIASTOLIC BLOOD PRESSURE: 72 MMHG | WEIGHT: 119.69 LBS | SYSTOLIC BLOOD PRESSURE: 114 MMHG | BODY MASS INDEX: 23.38 KG/M2

## 2020-02-21 VITALS
HEIGHT: 60 IN | BODY MASS INDEX: 23.98 KG/M2 | WEIGHT: 122.13 LBS | SYSTOLIC BLOOD PRESSURE: 118 MMHG | DIASTOLIC BLOOD PRESSURE: 76 MMHG

## 2020-02-21 DIAGNOSIS — O99.891 CONGENITAL HEART DISEASE IN PREGNANCY: ICD-10-CM

## 2020-02-21 DIAGNOSIS — Q24.9 CONGENITAL HEART DISEASE IN PREGNANCY: ICD-10-CM

## 2020-02-21 DIAGNOSIS — O09.92 SUPERVISION OF HIGH RISK PREGNANCY IN SECOND TRIMESTER: ICD-10-CM

## 2020-02-21 DIAGNOSIS — Z3A.34 34 WEEKS GESTATION OF PREGNANCY: Primary | ICD-10-CM

## 2020-02-21 DIAGNOSIS — O36.5930 INTRAUTERINE GROWTH RESTRICTION (IUGR) AFFECTING CARE OF MOTHER, THIRD TRIMESTER, SINGLE OR UNSPECIFIED FETUS: ICD-10-CM

## 2020-02-21 DIAGNOSIS — O36.5931 INTRAUTERINE GROWTH RESTRICTION (IUGR) AFFECTING CARE OF MOTHER, THIRD TRIMESTER, FETUS 1: ICD-10-CM

## 2020-02-21 DIAGNOSIS — Z98.890 S/P FONTAN PROCEDURE: ICD-10-CM

## 2020-02-21 DIAGNOSIS — Z3A.34 34 WEEKS GESTATION OF PREGNANCY: ICD-10-CM

## 2020-02-21 DIAGNOSIS — Q89.3 SITUS INVERSUS: ICD-10-CM

## 2020-02-21 DIAGNOSIS — Q24.9 ADULT CONGENITAL HEART DISEASE: ICD-10-CM

## 2020-02-21 DIAGNOSIS — Z36.89 ENCOUNTER FOR ULTRASOUND TO CHECK FETAL GROWTH: ICD-10-CM

## 2020-02-21 DIAGNOSIS — D69.6 THROMBOCYTOPENIA: ICD-10-CM

## 2020-02-21 LAB
BASOPHILS # BLD AUTO: 0.04 K/UL (ref 0–0.2)
BASOPHILS NFR BLD: 0.3 % (ref 0–1.9)
DIFFERENTIAL METHOD: ABNORMAL
EOSINOPHIL # BLD AUTO: 0.2 K/UL (ref 0–0.5)
EOSINOPHIL NFR BLD: 1.9 % (ref 0–8)
ERYTHROCYTE [DISTWIDTH] IN BLOOD BY AUTOMATED COUNT: 13.6 % (ref 11.5–14.5)
HCT VFR BLD AUTO: 38.4 % (ref 37–48.5)
HGB BLD-MCNC: 11.7 G/DL (ref 12–16)
IMM GRANULOCYTES # BLD AUTO: 0.12 K/UL (ref 0–0.04)
IMM GRANULOCYTES NFR BLD AUTO: 1 % (ref 0–0.5)
LYMPHOCYTES # BLD AUTO: 1.7 K/UL (ref 1–4.8)
LYMPHOCYTES NFR BLD: 14.4 % (ref 18–48)
MCH RBC QN AUTO: 25.7 PG (ref 27–31)
MCHC RBC AUTO-ENTMCNC: 30.5 G/DL (ref 32–36)
MCV RBC AUTO: 84 FL (ref 82–98)
MONOCYTES # BLD AUTO: 0.7 K/UL (ref 0.3–1)
MONOCYTES NFR BLD: 6.2 % (ref 4–15)
NEUTROPHILS # BLD AUTO: 9 K/UL (ref 1.8–7.7)
NEUTROPHILS NFR BLD: 76.2 % (ref 38–73)
NRBC BLD-RTO: 0 /100 WBC
PLATELET # BLD AUTO: 130 K/UL (ref 150–350)
PMV BLD AUTO: ABNORMAL FL (ref 9.2–12.9)
RBC # BLD AUTO: 4.55 M/UL (ref 4–5.4)
WBC # BLD AUTO: 11.85 K/UL (ref 3.9–12.7)

## 2020-02-21 PROCEDURE — 76820 UMBILICAL ARTERY ECHO: CPT | Mod: PBBFAC | Performed by: OBSTETRICS & GYNECOLOGY

## 2020-02-21 PROCEDURE — 76820 PR US, OB DOPPLER, FETAL UMBILICAL ARTERY ECHO: ICD-10-PCS | Mod: 26,S$PBB,, | Performed by: OBSTETRICS & GYNECOLOGY

## 2020-02-21 PROCEDURE — 86703 HIV-1/HIV-2 1 RESULT ANTBDY: CPT

## 2020-02-21 PROCEDURE — 87081 CULTURE SCREEN ONLY: CPT

## 2020-02-21 PROCEDURE — 99213 OFFICE O/P EST LOW 20 MIN: CPT | Mod: S$PBB,TH,, | Performed by: OBSTETRICS & GYNECOLOGY

## 2020-02-21 PROCEDURE — 99214 PR OFFICE/OUTPT VISIT, EST, LEVL IV, 30-39 MIN: ICD-10-PCS | Mod: 25,S$PBB,TH, | Performed by: OBSTETRICS & GYNECOLOGY

## 2020-02-21 PROCEDURE — 86592 SYPHILIS TEST NON-TREP QUAL: CPT

## 2020-02-21 PROCEDURE — 99212 OFFICE O/P EST SF 10 MIN: CPT | Mod: PBBFAC,25,27,TH | Performed by: OBSTETRICS & GYNECOLOGY

## 2020-02-21 PROCEDURE — 76816 PR  US,PREGNANT UTERUS,F/U,TRANSABD APP: ICD-10-PCS | Mod: 26,S$PBB,, | Performed by: OBSTETRICS & GYNECOLOGY

## 2020-02-21 PROCEDURE — 76820 UMBILICAL ARTERY ECHO: CPT | Mod: 26,S$PBB,, | Performed by: OBSTETRICS & GYNECOLOGY

## 2020-02-21 PROCEDURE — 76819 FETAL BIOPHYS PROFIL W/O NST: CPT | Mod: 26,S$PBB,, | Performed by: OBSTETRICS & GYNECOLOGY

## 2020-02-21 PROCEDURE — 99213 OFFICE O/P EST LOW 20 MIN: CPT | Mod: PBBFAC,TH | Performed by: OBSTETRICS & GYNECOLOGY

## 2020-02-21 PROCEDURE — 99213 PR OFFICE/OUTPT VISIT, EST, LEVL III, 20-29 MIN: ICD-10-PCS | Mod: S$PBB,TH,, | Performed by: OBSTETRICS & GYNECOLOGY

## 2020-02-21 PROCEDURE — 85025 COMPLETE CBC W/AUTO DIFF WBC: CPT

## 2020-02-21 PROCEDURE — 76816 OB US FOLLOW-UP PER FETUS: CPT | Mod: 26,S$PBB,, | Performed by: OBSTETRICS & GYNECOLOGY

## 2020-02-21 PROCEDURE — 99999 PR PBB SHADOW E&M-EST. PATIENT-LVL III: CPT | Mod: PBBFAC,,, | Performed by: OBSTETRICS & GYNECOLOGY

## 2020-02-21 PROCEDURE — 99999 PR PBB SHADOW E&M-EST. PATIENT-LVL II: ICD-10-PCS | Mod: PBBFAC,,, | Performed by: OBSTETRICS & GYNECOLOGY

## 2020-02-21 PROCEDURE — 76819 FETAL BIOPHYS PROFIL W/O NST: CPT | Mod: PBBFAC | Performed by: OBSTETRICS & GYNECOLOGY

## 2020-02-21 PROCEDURE — 99999 PR PBB SHADOW E&M-EST. PATIENT-LVL III: ICD-10-PCS | Mod: PBBFAC,,, | Performed by: OBSTETRICS & GYNECOLOGY

## 2020-02-21 PROCEDURE — 36415 COLL VENOUS BLD VENIPUNCTURE: CPT

## 2020-02-21 PROCEDURE — 99999 PR PBB SHADOW E&M-EST. PATIENT-LVL II: CPT | Mod: PBBFAC,,, | Performed by: OBSTETRICS & GYNECOLOGY

## 2020-02-21 PROCEDURE — 76816 OB US FOLLOW-UP PER FETUS: CPT | Mod: PBBFAC | Performed by: OBSTETRICS & GYNECOLOGY

## 2020-02-21 PROCEDURE — 99214 OFFICE O/P EST MOD 30 MIN: CPT | Mod: 25,S$PBB,TH, | Performed by: OBSTETRICS & GYNECOLOGY

## 2020-02-21 PROCEDURE — 76819 PR US, OB, FETAL BIOPHYSICAL, W/O NST: ICD-10-PCS | Mod: 26,S$PBB,, | Performed by: OBSTETRICS & GYNECOLOGY

## 2020-02-21 NOTE — PROGRESS NOTES
Indication  ========    Rt MD: Follow-up evaluation for fetal growth    History  ======    Previous Outcomes   2  Para 0  Abortions (A) 1  Terminations 1  Preg. no. 1  Outcome: elective termination  Date:   Risk Factors  Details: patient born with congenital Heart Defect surgical repair     Pregnancy History  ==============    Maternal Lab Tests  Test: Cell free fetal DNA analysis  Result:    Materni T21 negative    Maternal Assessment  =================    Weight 55 kg  Weight (lb) 121 lb  BP syst 118 mmHg  BP diast 76 mmHg    Fetal Growth Overview  =================    Exam date        GA              BPD (mm)         HC (mm)        AC (mm)        FL (mm)         HL (mm)        EFW (g)  10/7/2019          14w 3d        26.6                 97.7               79.0               15.1               14.6              97  2019          19w 0d        42.7                  164.5             130.9             27.5              26.9               247  2019          23w 3d        54.2                 205.5              181.7             38.8                                   530    23%  2020          26w 6d        65.0                 238.3              226.6             46.5                                   923    36%  2020        31w 0d        73.9                  274.6             260.4             56.1                                   1,476    19%  2020        34w 0d        78.2                  290.2             283.7             60.1                                   1,867    8%      Method  ======    Transabdominal ultrasound examination, 2D Color Doppler. View: Good view    Pregnancy  =========    Rice pregnancy. Number of fetuses: 1    Dating  ======    GA by prior assessment 34 w + 0 d  DWAYNE by prior assessment: 4/3/2020  Ultrasound examination on: 2020  GA by U/S based upon: AC, BPD, Femur, HC  GA by U/S 31 w + 6 d  DWAYNE by U/S: 2020  Assigned: based on  stated DWAYNE, selected on 10/7/2019  Assigned GA 34 w + 0 d  Assigned DWAYNE: 4/3/2020  Pregnancy length 280 d    General Evaluation  ==============    Cardiac activity present.  bpm.  Fetal movements visualized.  Presentation cephalic.  Placenta Placental site: anterior.  Umbilical cord Cord vessels: 3 vessel cord.  Amniotic fluid Amount of AF: normal amount. MVP 6.5 cm.    Biophysical Profile  ==============    2: Fetal breathing movements  2: Gross body movements  2: Fetal tone  2: Amniotic fluid volume  8/8 Biophysical profile score    Fetal Biometry  ============    Standard  BPD 78.2 mm  31w 3d                Hadlock    .9 mm  33w 3d                Wendy    .2 mm  32w 0d                Hadlock    .7 mm  32w 3d                Hadlock    Femur 60.1 mm  31w 2d                Hadlock    HC / AC 1.02    EFW 1,867 g          8%        Scottie    EFW (lb) 4 lb  EFW (oz) 2 oz  EFW by: Hadlock (BPD-HC-AC-FL)  Head / Face / Neck  Cephalic index 0.76    Extremities / Bony Struc  FL / BPD 0.77    FL / HC 0.21    FL / AC 0.21    Other Structures   bpm    Fetal Anatomy  ===========    4-chamber view: normal  Stomach: normal  Kidneys: normal  Bladder: normal  Wants to know gender: no    Fetal Doppler  ===========    Arterial  Umbilical A PI 0.90          58%        Saud    Umbilical A RI 0.60          55%        Saud    Umbilical A PS 48.99 cm/s    Umbilical A ED 20.68 cm/s  Umbilical A TAmax 34.00 cm/s    Umbilical A MD 20.53 cm/s  Umbilical A S / D 2.52          49%        Saud    Umbilical A  bpm    Consultation  ==========    Type: Follow up  Patient denies any cardiac symptoms. Patient with known cardiac anomaly and situs inversus. She saw Dr. Weems recently (2/14) and he felt  she was overall stable. She does have follow-up scheduled with him. She is taking Lovenox and baby aspirin daily and should continue this. I  would recommend this for at least 6-8 weeks postpartum as  well. She is aware of when to stop the Lovenox prior to scheduled delivery and if  there are any concerns about  labor, ruptured membranes or any concerns she should hold the Lovenox and come in and be evaluated.  She is aware that she may need her appointment moved with Cardiology given the findings today. Dr. Weems has been messaged. Patient  patient was recently seen for decreased fetal movement. She has felt good fetal movement since. BPP reassuring today. She has an  occasional cramp that she reported after her visit when I spoke with her via phone to confirm testing but nothing regular. She was given  precautions and saw Dr. Dorado today. Tele monitoring and SBE prophylaxis as per Dr. Weems. Please see his note for detailed  recommendations. Primary ob notify anesthesia of delivery timing-they have seen patient but no consult note noted.    The patients fetus has been diagnosed with IUGR based on an EFW less than the 10th percentile. The patient had low risk NIPT. Potential  etiologies of IUGR include but are not limited to normal variation of stature, placental insufficiency, chromosomal abnormalities, genetic  disorders, infections, medical conditions, teratogen exposure and other etiologies. Patient is 5 feet tall and FOB is 5 feet 8 inches tall. She is  also aware that her cardiac condition predisposes her to have an IUGR baby. We discussed the increased risk of stillbirth and the potential  need for earlier delivery. Due to the IUGR and increased risk of stillbirth, recommend the patient have twice weekly  fetal surveillance.  Primary ob will schedule NSTs on  except will do Monday next week due to holiday. MFM will see the patient on  for MFM  visit, BPP and UA dopplers. A follow up growth ultrasound is recommended in 3 weeks. Recommend delivery when patient just over 37 weeks  due to desire to deliver during weekday due to maternal cardiac condition if all is stable. The  patient was advised to perform fetal kick counts.  The patient should be monitored closely for any signs of evolving preeclampsia.  With respect to delivery planning, we recommend the following:  - At just over 37 weeks due to IUGR and maternal cardiac condition provided patient is stable (patient will be 37 weeks on a friday-if stable,  recommend bringing in for delivery on  or monday when she is 37 and 2 or 37 and 3 if no changes to condition). Patient is aware if there  was a rapid decline in growth seen on the growth ultrasound that Friday that she would have to come in that evening. Risks of delivery prior to  39 weeks and risks of delivery of IUGR fetuses were reviewed    -Earlier delivery if:  - testing is non-reassuring  -Oligohydramnios is diagnosed  -Abnormal dopplers  -Hypertension or other concerns  For all pregnancies with FGR, the placenta should be sent to pathology for examination.    Patient is aware that the femur length lags more than the other measurements. The patient is of small stature. The femur is without bowing or  fracture. Other long bones suboptimal. The fetal HC is normal by SD. Reviewed small potential for skeletal dysplasia but less likely given  overall small fetus.      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 She has seen Hematology. They recommend  follow-up with her CBC and mini smear postpartum with pathologist review to be ordered by primary ob. Patient is aware that this finding can be  inherited in families. I would recommend primary OB notify the pediatricians at delivery so that an appropriate CBC can be checked on the  baby. Patient should have weekly CBC given her history of thrombocytopenia which primary OB will draw. Platelets 128 on .    Spoke to Dr. Dorado regarding the patient. She will arrange the NSTs. Given that patient wasn't scheduled for an NST early next week, I did  ask our staff to arrange  next weeks NST. Dr. Dorado was will arrange the rest.     echo on baby post delivery as per cardiology. (normal fetal echo).    25 min spent face-to-face time with greater than half that time spent counseling and coordination of care.          Impression  =========    Rice live intrauterine pregnancy.  IUGR is noted with the fetus measuring at the 8th percentile. 391g of interval growth over 3 weeks with growth of all parameters.  Normal amniotic fluid volume.  Limited fetal anatomy appears normal.  BPP 8/8.  Normal UA doppler S/D ratio.    Recommendation  ==============    Twice weekly  fetal surveillance. Dr. Dorado will schedule NSTs for  for the patient and for next week Monday.  MFM will do weekly BPP and UA doppler with visit on .  Growth in 3 weeks. Plan delivery at just over 37 weeks if patient is stable given maternal medical condition and ideal to deliver during the day.  Follow Dr. Weems's delivery instructions (will need tele, sbe prophylaxis).  Stop Lovenox in appropriate time frame with anesthesia guidelines and resume postpartum in appropriate timeframe.  Primary ob notify anesthesia of delivery timing-they have seen patient but no consult note noted.  Messaged Dr. Weems regarding delivery as he may desire to move her follow up appointment.  Weekly CBC with primary ob. Notify MFM if concerns.   echo for infant as per peds cards notes.  Primary ob should notify peds of maternal cardiac history and of maternal platelet issues.  Patient given precautions.  Please see Dr. Weems's notes.  Discussed with Dr. Dorado.

## 2020-02-22 ENCOUNTER — PATIENT MESSAGE (OUTPATIENT)
Dept: OBSTETRICS AND GYNECOLOGY | Facility: CLINIC | Age: 25
End: 2020-02-22

## 2020-02-24 ENCOUNTER — HOSPITAL ENCOUNTER (OUTPATIENT)
Dept: PERINATAL CARE | Facility: OTHER | Age: 25
Discharge: HOME | End: 2020-02-24
Attending: OBSTETRICS & GYNECOLOGY
Payer: MEDICAID

## 2020-02-24 DIAGNOSIS — Z3A.27 27 WEEKS GESTATION OF PREGNANCY: ICD-10-CM

## 2020-02-24 LAB
HIV 1+2 AB+HIV1 P24 AG SERPL QL IA: NEGATIVE
RPR SER QL: NORMAL

## 2020-02-24 PROCEDURE — 59025 PR FETAL 2N-STRESS TEST: ICD-10-PCS | Mod: 26,,, | Performed by: OBSTETRICS & GYNECOLOGY

## 2020-02-24 PROCEDURE — 59025 FETAL NON-STRESS TEST: CPT | Mod: 26,,, | Performed by: OBSTETRICS & GYNECOLOGY

## 2020-02-24 PROCEDURE — 59025 FETAL NON-STRESS TEST: CPT

## 2020-02-25 PROBLEM — O36.5930 POOR FETAL GROWTH AFFECTING MANAGEMENT OF MOTHER IN THIRD TRIMESTER: Status: ACTIVE | Noted: 2020-02-25

## 2020-02-25 LAB — BACTERIA SPEC AEROBE CULT: NORMAL

## 2020-02-25 NOTE — PROGRESS NOTES
Doing well today. No OB concerns. Recent diagnosis of IUGR.    Problem Noted   Poor Fetal Growth Affecting Management of Mother in Third Trimester 2020    Twice weekly  testing  Delivery at 37 weeks, requested      Thrombocytopenia 2020    Seeing heme/onc  Labs obtained at consult visit, has fu in 2 weeks. Intermittent thrombocytopenia since . Per heme, may be transient due to pregnancy vs lovenox. Per cardiology, may also be secondary to congential heart disease.    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 It is our recommendation that the patient have repeat CBC and peripheral smear with pathologist review ~6weeks postpartum by your office to make sure her counts have improved and this finding is now normal.   Need to ensure pediatric team is aware of this finding as it can be inherited.     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 S/p extracardiac Fontan with oversewing of the pulmonary valve and division of the MPA on 16 at [facility]. Good ventricular function, no significant atrioventricular valve insufficiency, and no evidence of Fontan obstruction or thrombosis on cardiac  MRI 2019.  Followed by Dr. Weems and MFJAIME    #Telemetry intrapartum and for 24-48 hours after delivery  #increased risk of thromboembolic events - currently on ASA 81 mg daily and lovenox 40 mg chandrika  #avoid estrogen containing contraception  #Fu with Dr. Weems for echo, ekg and 24 hour holter  #SBEP is indicated for dental work and delivery   #Fetal echocardiogram completed          RTO weekly.   CBC today.   Twice weekly  testing.    Gabriella Dorado MD  Obstetrics and Gynecology  [facility]

## 2020-02-28 ENCOUNTER — PROCEDURE VISIT (OUTPATIENT)
Dept: MATERNAL FETAL MEDICINE | Facility: CLINIC | Age: 25
End: 2020-02-28
Payer: MEDICAID

## 2020-02-28 ENCOUNTER — INITIAL CONSULT (OUTPATIENT)
Dept: MATERNAL FETAL MEDICINE | Facility: CLINIC | Age: 25
End: 2020-02-28
Attending: OBSTETRICS & GYNECOLOGY
Payer: MEDICAID

## 2020-02-28 VITALS
SYSTOLIC BLOOD PRESSURE: 108 MMHG | BODY MASS INDEX: 23.68 KG/M2 | WEIGHT: 121.25 LBS | DIASTOLIC BLOOD PRESSURE: 80 MMHG

## 2020-02-28 DIAGNOSIS — Q24.9 ADULT CONGENITAL HEART DISEASE: ICD-10-CM

## 2020-02-28 DIAGNOSIS — O99.891 CONGENITAL HEART DISEASE IN PREGNANCY: ICD-10-CM

## 2020-02-28 DIAGNOSIS — Z98.890 S/P FONTAN PROCEDURE: ICD-10-CM

## 2020-02-28 DIAGNOSIS — Z3A.35 35 WEEKS GESTATION OF PREGNANCY: ICD-10-CM

## 2020-02-28 DIAGNOSIS — Q24.9 CONGENITAL HEART DISEASE IN PREGNANCY: ICD-10-CM

## 2020-02-28 DIAGNOSIS — Z36.89 ENCOUNTER FOR ULTRASOUND TO CHECK FETAL GROWTH: ICD-10-CM

## 2020-02-28 DIAGNOSIS — O36.5930 INTRAUTERINE GROWTH RESTRICTION (IUGR) AFFECTING CARE OF MOTHER, THIRD TRIMESTER, SINGLE OR UNSPECIFIED FETUS: ICD-10-CM

## 2020-02-28 PROCEDURE — 99213 OFFICE O/P EST LOW 20 MIN: CPT | Mod: 25,S$PBB,TH, | Performed by: OBSTETRICS & GYNECOLOGY

## 2020-02-28 PROCEDURE — 76819 PR US, OB, FETAL BIOPHYSICAL, W/O NST: ICD-10-PCS | Mod: 26,S$PBB,, | Performed by: OBSTETRICS & GYNECOLOGY

## 2020-02-28 PROCEDURE — 76819 FETAL BIOPHYS PROFIL W/O NST: CPT | Mod: PBBFAC | Performed by: OBSTETRICS & GYNECOLOGY

## 2020-02-28 PROCEDURE — 99999 PR PBB SHADOW E&M-EST. PATIENT-LVL II: CPT | Mod: PBBFAC,,, | Performed by: OBSTETRICS & GYNECOLOGY

## 2020-02-28 PROCEDURE — 99212 OFFICE O/P EST SF 10 MIN: CPT | Mod: PBBFAC,TH,25 | Performed by: OBSTETRICS & GYNECOLOGY

## 2020-02-28 PROCEDURE — 76820 UMBILICAL ARTERY ECHO: CPT | Mod: PBBFAC | Performed by: OBSTETRICS & GYNECOLOGY

## 2020-02-28 PROCEDURE — 99213 PR OFFICE/OUTPT VISIT, EST, LEVL III, 20-29 MIN: ICD-10-PCS | Mod: 25,S$PBB,TH, | Performed by: OBSTETRICS & GYNECOLOGY

## 2020-02-28 PROCEDURE — 76819 FETAL BIOPHYS PROFIL W/O NST: CPT | Mod: 26,S$PBB,, | Performed by: OBSTETRICS & GYNECOLOGY

## 2020-02-28 PROCEDURE — 76820 UMBILICAL ARTERY ECHO: CPT | Mod: 26,S$PBB,, | Performed by: OBSTETRICS & GYNECOLOGY

## 2020-02-28 PROCEDURE — 99999 PR PBB SHADOW E&M-EST. PATIENT-LVL II: ICD-10-PCS | Mod: PBBFAC,,, | Performed by: OBSTETRICS & GYNECOLOGY

## 2020-02-28 PROCEDURE — 76820 PR US, OB DOPPLER, FETAL UMBILICAL ARTERY ECHO: ICD-10-PCS | Mod: 26,S$PBB,, | Performed by: OBSTETRICS & GYNECOLOGY

## 2020-03-03 ENCOUNTER — HOSPITAL ENCOUNTER (OUTPATIENT)
Dept: PERINATAL CARE | Facility: OTHER | Age: 25
Discharge: HOME | End: 2020-03-03
Attending: OBSTETRICS & GYNECOLOGY
Payer: MEDICAID

## 2020-03-03 DIAGNOSIS — O36.5931 INTRAUTERINE GROWTH RESTRICTION (IUGR) AFFECTING CARE OF MOTHER, THIRD TRIMESTER, FETUS 1: ICD-10-CM

## 2020-03-03 DIAGNOSIS — Z3A.34 34 WEEKS GESTATION OF PREGNANCY: ICD-10-CM

## 2020-03-03 PROCEDURE — 59025 PR FETAL 2N-STRESS TEST: ICD-10-PCS | Mod: 26,,, | Performed by: PEDIATRICS

## 2020-03-03 PROCEDURE — 59025 FETAL NON-STRESS TEST: CPT | Mod: 26,,, | Performed by: PEDIATRICS

## 2020-03-03 PROCEDURE — 59025 FETAL NON-STRESS TEST: CPT

## 2020-03-06 ENCOUNTER — PROCEDURE VISIT (OUTPATIENT)
Dept: MATERNAL FETAL MEDICINE | Facility: CLINIC | Age: 25
End: 2020-03-06
Payer: MEDICAID

## 2020-03-06 ENCOUNTER — INITIAL CONSULT (OUTPATIENT)
Dept: MATERNAL FETAL MEDICINE | Facility: CLINIC | Age: 25
End: 2020-03-06
Attending: OBSTETRICS & GYNECOLOGY
Payer: MEDICAID

## 2020-03-06 VITALS — SYSTOLIC BLOOD PRESSURE: 104 MMHG | BODY MASS INDEX: 23.9 KG/M2 | WEIGHT: 122.38 LBS | DIASTOLIC BLOOD PRESSURE: 70 MMHG

## 2020-03-06 DIAGNOSIS — Q24.9 CONGENITAL HEART DISEASE IN PREGNANCY: ICD-10-CM

## 2020-03-06 DIAGNOSIS — Q24.9 ADULT CONGENITAL HEART DISEASE: ICD-10-CM

## 2020-03-06 DIAGNOSIS — O99.891 CONGENITAL HEART DISEASE IN PREGNANCY: ICD-10-CM

## 2020-03-06 DIAGNOSIS — Q20.1 DORV (DOUBLE OUTLET RIGHT VENTRICLE): ICD-10-CM

## 2020-03-06 DIAGNOSIS — Z36.89 ENCOUNTER FOR ULTRASOUND TO CHECK FETAL GROWTH: ICD-10-CM

## 2020-03-06 DIAGNOSIS — O99.891 CONGENITAL HEART DISEASE IN PREGNANCY: Primary | ICD-10-CM

## 2020-03-06 DIAGNOSIS — O36.5930 INTRAUTERINE GROWTH RESTRICTION (IUGR) AFFECTING CARE OF MOTHER, THIRD TRIMESTER, SINGLE OR UNSPECIFIED FETUS: ICD-10-CM

## 2020-03-06 DIAGNOSIS — Q24.9 CONGENITAL HEART DISEASE IN PREGNANCY: Primary | ICD-10-CM

## 2020-03-06 DIAGNOSIS — Z98.890 S/P FONTAN PROCEDURE: ICD-10-CM

## 2020-03-06 PROCEDURE — 99212 OFFICE O/P EST SF 10 MIN: CPT | Mod: PBBFAC,TH | Performed by: OBSTETRICS & GYNECOLOGY

## 2020-03-06 PROCEDURE — 76819 PR US, OB, FETAL BIOPHYSICAL, W/O NST: ICD-10-PCS | Mod: 26,S$PBB,, | Performed by: OBSTETRICS & GYNECOLOGY

## 2020-03-06 PROCEDURE — 99213 PR OFFICE/OUTPT VISIT, EST, LEVL III, 20-29 MIN: ICD-10-PCS | Mod: S$PBB,TH,25, | Performed by: OBSTETRICS & GYNECOLOGY

## 2020-03-06 PROCEDURE — 76819 FETAL BIOPHYS PROFIL W/O NST: CPT | Mod: 26,S$PBB,, | Performed by: OBSTETRICS & GYNECOLOGY

## 2020-03-06 PROCEDURE — 99213 OFFICE O/P EST LOW 20 MIN: CPT | Mod: S$PBB,TH,25, | Performed by: OBSTETRICS & GYNECOLOGY

## 2020-03-06 PROCEDURE — 99999 PR PBB SHADOW E&M-EST. PATIENT-LVL II: ICD-10-PCS | Mod: PBBFAC,,, | Performed by: OBSTETRICS & GYNECOLOGY

## 2020-03-06 PROCEDURE — 99999 PR PBB SHADOW E&M-EST. PATIENT-LVL II: CPT | Mod: PBBFAC,,, | Performed by: OBSTETRICS & GYNECOLOGY

## 2020-03-06 PROCEDURE — 76819 FETAL BIOPHYS PROFIL W/O NST: CPT | Mod: PBBFAC | Performed by: OBSTETRICS & GYNECOLOGY

## 2020-03-06 NOTE — PROGRESS NOTES
See viewpoint US report  Discussed delivery planning, timing and route.  She is aware that vaginal delivery is planned and C/S would be based on obstetric indications.   The patient was given an opportunity to ask questions about management and the diease process.  She expressed an understanding of and agreement to the above impression and plan. All questions were answered to her satisfaction.  She was given contact information to the [facility] clinic to address further concerns.      The approximate physician face-to-face time was 15 minutes. The majority of the time (>50%) was spent on counseling of the patient or coordination of care.

## 2020-03-10 ENCOUNTER — HOSPITAL ENCOUNTER (OUTPATIENT)
Dept: PERINATAL CARE | Facility: OTHER | Age: 25
Discharge: HOME | End: 2020-03-10
Attending: OBSTETRICS & GYNECOLOGY
Payer: MEDICAID

## 2020-03-10 DIAGNOSIS — O36.5931 INTRAUTERINE GROWTH RESTRICTION (IUGR) AFFECTING CARE OF MOTHER, THIRD TRIMESTER, FETUS 1: ICD-10-CM

## 2020-03-10 DIAGNOSIS — Z3A.34 34 WEEKS GESTATION OF PREGNANCY: ICD-10-CM

## 2020-03-10 PROCEDURE — 59025 FETAL NON-STRESS TEST: CPT

## 2020-03-10 PROCEDURE — 59025 FETAL NON-STRESS TEST: CPT | Mod: 26,,, | Performed by: OBSTETRICS & GYNECOLOGY

## 2020-03-10 PROCEDURE — 59025 PR FETAL 2N-STRESS TEST: ICD-10-PCS | Mod: 26,,, | Performed by: OBSTETRICS & GYNECOLOGY

## 2020-03-13 ENCOUNTER — CLINICAL SUPPORT (OUTPATIENT)
Dept: PEDIATRIC CARDIOLOGY | Facility: CLINIC | Age: 25
End: 2020-03-13
Payer: MEDICAID

## 2020-03-13 ENCOUNTER — ROUTINE PRENATAL (OUTPATIENT)
Dept: OBSTETRICS AND GYNECOLOGY | Facility: CLINIC | Age: 25
End: 2020-03-13
Payer: MEDICAID

## 2020-03-13 ENCOUNTER — OFFICE VISIT (OUTPATIENT)
Dept: PEDIATRIC CARDIOLOGY | Facility: CLINIC | Age: 25
End: 2020-03-13
Payer: MEDICAID

## 2020-03-13 ENCOUNTER — INITIAL CONSULT (OUTPATIENT)
Dept: MATERNAL FETAL MEDICINE | Facility: CLINIC | Age: 25
End: 2020-03-13
Payer: MEDICAID

## 2020-03-13 ENCOUNTER — PROCEDURE VISIT (OUTPATIENT)
Dept: MATERNAL FETAL MEDICINE | Facility: CLINIC | Age: 25
End: 2020-03-13
Payer: MEDICAID

## 2020-03-13 VITALS
HEIGHT: 60 IN | HEART RATE: 77 BPM | SYSTOLIC BLOOD PRESSURE: 117 MMHG | OXYGEN SATURATION: 98 % | WEIGHT: 122.81 LBS | BODY MASS INDEX: 24.11 KG/M2 | DIASTOLIC BLOOD PRESSURE: 66 MMHG

## 2020-03-13 VITALS — WEIGHT: 123 LBS | DIASTOLIC BLOOD PRESSURE: 72 MMHG | SYSTOLIC BLOOD PRESSURE: 100 MMHG | BODY MASS INDEX: 23.68 KG/M2

## 2020-03-13 VITALS
DIASTOLIC BLOOD PRESSURE: 80 MMHG | SYSTOLIC BLOOD PRESSURE: 124 MMHG | WEIGHT: 123.44 LBS | BODY MASS INDEX: 23.77 KG/M2

## 2020-03-13 DIAGNOSIS — Q24.9 ADULT CONGENITAL HEART DISEASE: ICD-10-CM

## 2020-03-13 DIAGNOSIS — Q20.1 DORV (DOUBLE OUTLET RIGHT VENTRICLE): ICD-10-CM

## 2020-03-13 DIAGNOSIS — O36.5930 INTRAUTERINE GROWTH RESTRICTION (IUGR) AFFECTING CARE OF MOTHER, THIRD TRIMESTER, SINGLE OR UNSPECIFIED FETUS: ICD-10-CM

## 2020-03-13 DIAGNOSIS — Q24.0 DEXTROCARDIA: ICD-10-CM

## 2020-03-13 DIAGNOSIS — Q24.9 CONGENITAL HEART DISEASE IN PREGNANCY: ICD-10-CM

## 2020-03-13 DIAGNOSIS — O99.891 CONGENITAL HEART DISEASE IN PREGNANCY: ICD-10-CM

## 2020-03-13 DIAGNOSIS — Z98.890 S/P FONTAN PROCEDURE: Primary | ICD-10-CM

## 2020-03-13 DIAGNOSIS — Z98.890 S/P FONTAN PROCEDURE: ICD-10-CM

## 2020-03-13 DIAGNOSIS — O09.92 SUPERVISION OF HIGH RISK PREGNANCY IN SECOND TRIMESTER: ICD-10-CM

## 2020-03-13 DIAGNOSIS — Z36.89 ENCOUNTER FOR ULTRASOUND TO CHECK FETAL GROWTH: ICD-10-CM

## 2020-03-13 DIAGNOSIS — Q89.3 SITUS INVERSUS: ICD-10-CM

## 2020-03-13 DIAGNOSIS — Z98.890 S/P D&C (STATUS POST DILATION AND CURETTAGE): ICD-10-CM

## 2020-03-13 PROBLEM — Z3A.29 29 WEEKS GESTATION OF PREGNANCY: Status: RESOLVED | Noted: 2020-01-20 | Resolved: 2020-03-13

## 2020-03-13 PROCEDURE — 99999 PR PBB SHADOW E&M-EST. PATIENT-LVL III: CPT | Mod: PBBFAC,,, | Performed by: PEDIATRICS

## 2020-03-13 PROCEDURE — 99213 OFFICE O/P EST LOW 20 MIN: CPT | Mod: PBBFAC,PO | Performed by: PEDIATRICS

## 2020-03-13 PROCEDURE — 76819 FETAL BIOPHYS PROFIL W/O NST: CPT | Mod: 26,S$PBB,, | Performed by: PEDIATRICS

## 2020-03-13 PROCEDURE — 76819 FETAL BIOPHYS PROFIL W/O NST: CPT | Mod: PBBFAC | Performed by: PEDIATRICS

## 2020-03-13 PROCEDURE — 99213 PR OFFICE/OUTPT VISIT, EST, LEVL III, 20-29 MIN: ICD-10-PCS | Mod: 25,S$PBB,TH, | Performed by: PEDIATRICS

## 2020-03-13 PROCEDURE — 99999 PR PBB SHADOW E&M-EST. PATIENT-LVL II: ICD-10-PCS | Mod: PBBFAC,,, | Performed by: OBSTETRICS & GYNECOLOGY

## 2020-03-13 PROCEDURE — 99213 OFFICE O/P EST LOW 20 MIN: CPT | Mod: S$PBB,TH,, | Performed by: OBSTETRICS & GYNECOLOGY

## 2020-03-13 PROCEDURE — 76816 OB US FOLLOW-UP PER FETUS: CPT | Mod: PBBFAC | Performed by: PEDIATRICS

## 2020-03-13 PROCEDURE — 76820 UMBILICAL ARTERY ECHO: CPT | Mod: PBBFAC | Performed by: PEDIATRICS

## 2020-03-13 PROCEDURE — 99212 OFFICE O/P EST SF 10 MIN: CPT | Mod: PBBFAC,27,TH | Performed by: OBSTETRICS & GYNECOLOGY

## 2020-03-13 PROCEDURE — 93010 ELECTROCARDIOGRAM REPORT: CPT | Mod: S$PBB,,, | Performed by: PEDIATRICS

## 2020-03-13 PROCEDURE — 99213 PR OFFICE/OUTPT VISIT, EST, LEVL III, 20-29 MIN: ICD-10-PCS | Mod: S$PBB,TH,, | Performed by: OBSTETRICS & GYNECOLOGY

## 2020-03-13 PROCEDURE — 99213 OFFICE O/P EST LOW 20 MIN: CPT | Mod: 25,S$PBB,TH, | Performed by: PEDIATRICS

## 2020-03-13 PROCEDURE — 99999 PR PBB SHADOW E&M-EST. PATIENT-LVL II: CPT | Mod: PBBFAC,,, | Performed by: OBSTETRICS & GYNECOLOGY

## 2020-03-13 PROCEDURE — 99999 PR PBB SHADOW E&M-EST. PATIENT-LVL II: ICD-10-PCS | Mod: PBBFAC,,, | Performed by: PEDIATRICS

## 2020-03-13 PROCEDURE — 76820 PR US, OB DOPPLER, FETAL UMBILICAL ARTERY ECHO: ICD-10-PCS | Mod: 26,S$PBB,, | Performed by: PEDIATRICS

## 2020-03-13 PROCEDURE — 99999 PR PBB SHADOW E&M-EST. PATIENT-LVL II: CPT | Mod: PBBFAC,,, | Performed by: PEDIATRICS

## 2020-03-13 PROCEDURE — 76819 PR US, OB, FETAL BIOPHYSICAL, W/O NST: ICD-10-PCS | Mod: 26,S$PBB,, | Performed by: PEDIATRICS

## 2020-03-13 PROCEDURE — 76816 PR  US,PREGNANT UTERUS,F/U,TRANSABD APP: ICD-10-PCS | Mod: 26,S$PBB,, | Performed by: PEDIATRICS

## 2020-03-13 PROCEDURE — 99213 PR OFFICE/OUTPT VISIT, EST, LEVL III, 20-29 MIN: ICD-10-PCS | Mod: 25,S$PBB,, | Performed by: PEDIATRICS

## 2020-03-13 PROCEDURE — 76816 OB US FOLLOW-UP PER FETUS: CPT | Mod: 26,S$PBB,, | Performed by: PEDIATRICS

## 2020-03-13 PROCEDURE — 93005 ELECTROCARDIOGRAM TRACING: CPT | Mod: PBBFAC,PO | Performed by: PEDIATRICS

## 2020-03-13 PROCEDURE — 99213 OFFICE O/P EST LOW 20 MIN: CPT | Mod: 25,S$PBB,, | Performed by: PEDIATRICS

## 2020-03-13 PROCEDURE — 99999 PR PBB SHADOW E&M-EST. PATIENT-LVL III: ICD-10-PCS | Mod: PBBFAC,,, | Performed by: PEDIATRICS

## 2020-03-13 PROCEDURE — 93010 EKG 12-LEAD PEDIATRIC: ICD-10-PCS | Mod: S$PBB,,, | Performed by: PEDIATRICS

## 2020-03-13 PROCEDURE — 76820 UMBILICAL ARTERY ECHO: CPT | Mod: 26,S$PBB,, | Performed by: PEDIATRICS

## 2020-03-13 NOTE — PROGRESS NOTES
Doing well today. Had US today. Scheduled for IOL on  at 8 PM.    Problem Noted   Poor Fetal Growth Affecting Management of Mother in Third Trimester 2020    Twice weekly  testing  Delivery at 37 weeks, scheduled for induction on 03/15 at 8 PM     Thrombocytopenia 2020    Seeing heme/onc  Labs obtained at consult visit, has fu in 2 weeks. Intermittent thrombocytopenia since . Per heme, may be transient due to pregnancy vs lovenox. Per cardiology, may also be secondary to congential heart disease.    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 It is our recommendation that the patient have repeat CBC and peripheral smear with pathologist review ~6weeks postpartum by your office to make sure her counts have improved and this finding is now normal.   Need to ensure pediatric team is aware of this finding as it can be inherited.     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 S/p extracardiac Fontan with oversewing of the pulmonary valve and division of the MPA on 16 at [facility]. Good ventricular function, no significant atrioventricular valve insufficiency, and no evidence of Fontan  obstruction or thrombosis on cardiac MRI October 16, 2019.  Followed by Dr. Weems and MFJAIME    #Telemetry intrapartum and for 24-48 hours after delivery  #increased risk of thromboembolic events - currently on ASA 81 mg daily and lovenox 40 mg daily. Last dose of lovenox will be Saturday, 03/14.   #avoid estrogen containing contraception  #SBEP is indicated for dental work and delivery   #Fetal echocardiogram completed        Strict precautions reviewed.    Gabriella Dorado MD  Obstetrics and Gynecology  [facility]

## 2020-03-13 NOTE — PROGRESS NOTES
2020     [facility]    re:Theresa Grey  :1995     Gabriella Dorado MD   [address] [facility] [address]     Dr. Felice Kim    Dear Doctors:    Theresa Grey is a 24 y.o. female with the following diagnoses:  Diagnoses:  1. 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 resolved cyanosis  3. Currently pregnant.  Normal fetal echocardiogram.  Estimated due date April 3, 2020.  Scheduled for induction in 2 days.  4. Mild thrombocytopenia - common in Fontan patients.    Discussion:  In regards to her pregnancy, we have several concerns:  1.  The rate of fetal loss is significantly elevated, approaching 50%.  2.  The rate of premature delivery is significantly elevated.  3.  The clotting predisposition often noted in Fontan patients may be worsened by pregnancy.  4.  Without a sub pulmonic ventricle, she may tolerate the volume load associated with a pregnancy less well than a 2 ventricle patient.  There is an increased risk of heart failure symptoms.  5.  There is an increased risk of arrhythmias.    That said, she really looks great.  I would expect her to tolerate a pregnancy well.  Vaginal delivery is typically preferable if she is doing well.  IV fluids may be necessary to avoid dehydration.  I would recommend SBE prophylaxis for delivery although this is certainly debatable.  I would recommend close monitoring with telemetry for during delivery and for 24-48 hours after delivery.      We again discussed the long-term risk associated with  Fontan circulation. We discussed:  1. Risk of thromboembolic events with Fontan   - she needs to be on a baby aspirin daily to try to cut down on that risk.  I agree with the use of prophylactic Lovenox during the pregnancy.   - would avoid estrogen containing birth control long-term.  She will discuss with her gynecologist  2. Long term risk of arrhythmias, heart failure, PLE, liver disease discussed    Plan:  1.  Continue daily baby aspirin.  Agree with current use of Lovenox which will be stopped prior to her delivery. On discharge from the hospital after delivery, only baby aspirin is necessary from a cardiac standpoint.   2.  I will see her in the hospital and likely plan to follow up with her within a week after she delivers.  3.  SBEP is indicated for dental work.  Although there are no clear guidelines for this, I would recommend SBE prophylaxis for delivery as well.  4.  Close follow-up with obstetrics.    Interval history:  I saw her today in [place].  I last saw her a month ago.  Overall, she has done well since that time.  She denies productive cough, diarrhea, chest pain, palpitations, syncope, near syncope.  She has noticed a little bit of shortness of breath when she exerts herself, but this is unchanged.  She denies any shortness of breath at rest or while lying supine.  No swelling in her feet recently.     PMH:  She was diagnosed as a young child with congenital heart disease while living in [place]. She was evaluated at [facility] with a cath at about 7 years of age. She was scheduled for heart surgery by her report about 10 years ago. However, due to social issues (no insurance, no social security number) the surgery was put off and she was lost to follow up. I first saw her in 2014 she was admitted with pregnancy, rare chest pain, and cyanosis.  After long discussion, she had a pregnancy termination due to the risk associated with unrepaired cyanotic congenital heart disease.  On 9/29/16,  "she underwent an extracardiac Fontan with oversewing and ligation of the MPA at [facility].  She did very well and was discharged a week later.      The review of systems is as noted above. It is otherwise negative for other symptoms related to the general, neurological, psychiatric, endocrine, gastrointestinal, genitourinary, respiratory, dermatologic, musculoskeletal, hematologic, and immunologic systems.    Past Medical History:   Diagnosis Date    Abnormality of heart valve     heart disease     fatigue as a result of pregnancy (8wks ) clubbing indicates chronic oxygenation issues but pt considered it "normal"     Past Surgical History:   Procedure Laterality Date    CARDIAC CATHETERIZATION  at 7 yars of age    DILATION AND CURETTAGE OF UTERUS      FONTAN PROCEDURE, EXTRACARDIAC  September 29, 2015    With a nonfenestrated 22 mm [place] tube graft.  The pulmonary valve was closed.  Procedure performed by Dr. Moe Kulkarni at [facility].     Family History   Problem Relation Age of Onset    Diabetes Maternal Grandmother     Vision loss Maternal Grandmother     Miscarriages / Stillbirths Mother     Diabetes Father     Diabetes Paternal Grandmother     No Known Problems Brother     No Known Problems Maternal Grandfather     No Known Problems Paternal Grandfather     Hypertension Maternal Aunt     Diabetes Maternal Aunt     No Known Problems Sister     No Known Problems Maternal Uncle     No Known Problems Paternal Aunt     No Known Problems Paternal Uncle     Anemia Neg Hx     Arrhythmia Neg Hx     Asthma Neg Hx     Clotting disorder Neg Hx     Fainting Neg Hx     Heart attack Neg Hx     Heart disease Neg Hx     Heart failure Neg Hx     Hyperlipidemia Neg Hx     Stroke Neg Hx     Atrial Septal Defect Neg Hx      Social History     Socioeconomic History    Marital status:      Spouse name: Not on file    Number of children: Not on file    Years of education: Not " "on file    Highest education level: Not on file   Occupational History    Not on file   Social Needs    Financial resource strain: Not on file    Food insecurity:     Worry: Not on file     Inability: Not on file    Transportation needs:     Medical: Not on file     Non-medical: Not on file   Tobacco Use    Smoking status: Former Smoker     Packs/day: 0.25    Smokeless tobacco: Never Used   Substance and Sexual Activity    Alcohol use: Not Currently     Alcohol/week: 0.0 standard drinks     Comment: on ocassion before pregnancy    Drug use: No     Comment: previous marijuana use    Sexual activity: Yes     Partners: Male     Birth control/protection: Injection   Lifestyle    Physical activity:     Days per week: Not on file     Minutes per session: Not on file    Stress: Not on file   Relationships    Social connections:     Talks on phone: Not on file     Gets together: Not on file     Attends Gnosticism service: Not on file     Active member of club or organization: Not on file     Attends meetings of clubs or organizations: Not on file     Relationship status: Not on file   Other Topics Concern    Not on file   Social History Narrative    Not on file     Current Outpatient Medications on File Prior to Visit   Medication Sig Dispense Refill    aspirin (ECOTRIN) 81 MG EC tablet Take 81 mg by mouth once daily.      enoxaparin (LOVENOX) 40 mg/0.4 mL Syrg Inject 0.4 mLs (40 mg total) into the skin once daily. 30 Syringe 12    PRENATAL NO.116-IRON-FOLIC-DHA ORAL Take 1 tablet by mouth once daily.       No current facility-administered medications on file prior to visit.      Review of patient's allergies indicates:   Allergen Reactions    Hydrocodone Shortness Of Breath and Other (See Comments)     Dizziness; sweating     /66   Pulse 77   Ht 5' 0.43" (1.535 m)   Wt 55.7 kg (122 lb 12.7 oz)   LMP 06/26/2019   SpO2 98%   BMI 23.64 kg/m²     In general, she is an acyanotic, nondysmorphic " "appearing female in no apparent distress.  The eyes, nares, and oropharynx are clear.  Eyelids and conjunctiva free of drainage and redness.  PERRLA.  Teeth in good repair.  Mucous membranes are moist.  The head is normocephalic and atraumatic.  The neck is supple without jugular venous distention or thyroid enlargement.  The lungs are clear to auscultation bilaterally.  There is a well healed sternotomy scar.  The PMI is in the right chest.  The first heart sound is normal.  The second is loud and single.  There are no gallops, rubs, or clicks in the supine position.  The abdominal exam reveals a gravid uterus.  Pulses are normal in all 4 extremities with brisk capillary refill and no edema.  No rashes are noted.  She has some clubbing.  No tenderness, swelling in legs.  Her neurological exam is normal.    EKG with a "left atrial rhythm" with evidence of dextrocardia.  HR about 61.    An echocardiogram 2/14/20 reveals excellent function, no significant AV valve insufficiency, no effusion, and no evidence of Fontan obstruction.    Lab Results   Component Value Date    WBC 11.85 02/21/2020    HGB 11.7 (L) 02/21/2020    HCT 38.4 02/21/2020    MCV 84 02/21/2020     (L) 02/21/2020       Cardiac MRI October 19, 2019:  1. Patent innominate vein to left-sided SVC. Widely patent left-sided Pavel anastomosis. The IVC is anastomosed to the left pulmonary artery. The Fontan tunnel is widely patent without evidence of obstruction. The branch pulmonary arteries are normal in size with no evidence of obstruction.   2. Atrio-ventricular concordance with a jason-cross type of arrangement.   3. The right ventricle volumes are low normal. Moderate RVH. The calculated RVEF is 45 %.  4. The left ventricular volumes are low normal. The calculated LVEF is 50 %.   5. Large inlet type ventricular septal defect.   6. Oversewn pulmonary valve.   7. Anterior and rightward aorta. Structurally normal aortic valve with no significant " aortic insufficiency.   8. Right aortic arch.     Sincerely,        Toro Weems MD  Pediatric Cardiology  Adult Congenital Heart Disease  Pediatric Heart Failure and Transplantation  [facility] [address] [place]  [address]  [phone]

## 2020-03-15 ENCOUNTER — ANESTHESIA EVENT (OUTPATIENT)
Dept: OBSTETRICS AND GYNECOLOGY | Facility: OTHER | Age: 25
End: 2020-03-15
Payer: MEDICAID

## 2020-03-15 ENCOUNTER — ANESTHESIA (OUTPATIENT)
Dept: OBSTETRICS AND GYNECOLOGY | Facility: OTHER | Age: 25
End: 2020-03-15
Payer: MEDICAID

## 2020-03-15 ENCOUNTER — HOSPITAL ENCOUNTER (INPATIENT)
Facility: OTHER | Age: 25
LOS: 4 days | Discharge: HOME | End: 2020-03-19
Attending: OBSTETRICS & GYNECOLOGY | Admitting: OBSTETRICS & GYNECOLOGY
Payer: MEDICAID

## 2020-03-15 DIAGNOSIS — Z3A.34 34 WEEKS GESTATION OF PREGNANCY: ICD-10-CM

## 2020-03-15 DIAGNOSIS — Z34.90 ENCOUNTER FOR INDUCTION OF LABOR: ICD-10-CM

## 2020-03-15 LAB
BASOPHILS # BLD AUTO: 0.03 K/UL (ref 0–0.2)
BASOPHILS NFR BLD: 0.2 % (ref 0–1.9)
DIFFERENTIAL METHOD: ABNORMAL
EOSINOPHIL # BLD AUTO: 0.3 K/UL (ref 0–0.5)
EOSINOPHIL NFR BLD: 1.9 % (ref 0–8)
ERYTHROCYTE [DISTWIDTH] IN BLOOD BY AUTOMATED COUNT: 14.4 % (ref 11.5–14.5)
HCT VFR BLD AUTO: 34.2 % (ref 37–48.5)
HGB BLD-MCNC: 10.7 G/DL (ref 12–16)
IMM GRANULOCYTES # BLD AUTO: 0.16 K/UL (ref 0–0.04)
IMM GRANULOCYTES NFR BLD AUTO: 1.2 % (ref 0–0.5)
LYMPHOCYTES # BLD AUTO: 2.1 K/UL (ref 1–4.8)
LYMPHOCYTES NFR BLD: 15.8 % (ref 18–48)
MCH RBC QN AUTO: 24.7 PG (ref 27–31)
MCHC RBC AUTO-ENTMCNC: 31.3 G/DL (ref 32–36)
MCV RBC AUTO: 79 FL (ref 82–98)
MONOCYTES # BLD AUTO: 0.6 K/UL (ref 0.3–1)
MONOCYTES NFR BLD: 4.6 % (ref 4–15)
NEUTROPHILS # BLD AUTO: 10 K/UL (ref 1.8–7.7)
NEUTROPHILS NFR BLD: 76.3 % (ref 38–73)
NRBC BLD-RTO: 0 /100 WBC
PLATELET # BLD AUTO: 134 K/UL (ref 150–350)
PMV BLD AUTO: ABNORMAL FL (ref 9.2–12.9)
RBC # BLD AUTO: 4.33 M/UL (ref 4–5.4)
WBC # BLD AUTO: 13.14 K/UL (ref 3.9–12.7)

## 2020-03-15 PROCEDURE — 63600175 PHARM REV CODE 636 W HCPCS: Performed by: STUDENT IN AN ORGANIZED HEALTH CARE EDUCATION/TRAINING PROGRAM

## 2020-03-15 PROCEDURE — 11000001 HC ACUTE MED/SURG PRIVATE ROOM

## 2020-03-15 PROCEDURE — 86762 RUBELLA ANTIBODY: CPT

## 2020-03-15 PROCEDURE — 59409 OBSTETRICAL CARE: CPT | Mod: AA,,, | Performed by: ANESTHESIOLOGY

## 2020-03-15 PROCEDURE — 85025 COMPLETE CBC W/AUTO DIFF WBC: CPT

## 2020-03-15 PROCEDURE — C1751 CATH, INF, PER/CENT/MIDLINE: HCPCS | Performed by: ANESTHESIOLOGY

## 2020-03-15 PROCEDURE — 27200710 HC EPIDURAL INFUSION PUMP SET: Performed by: ANESTHESIOLOGY

## 2020-03-15 PROCEDURE — 25000003 PHARM REV CODE 250: Performed by: STUDENT IN AN ORGANIZED HEALTH CARE EDUCATION/TRAINING PROGRAM

## 2020-03-15 PROCEDURE — 59409 PRA ETRICAL CARE,VAG DELIV ONLY: ICD-10-PCS | Mod: AA,,, | Performed by: ANESTHESIOLOGY

## 2020-03-15 PROCEDURE — 86900 BLOOD TYPING SEROLOGIC ABO: CPT

## 2020-03-15 RX ORDER — SODIUM CHLORIDE, SODIUM LACTATE, POTASSIUM CHLORIDE, CALCIUM CHLORIDE 600; 310; 30; 20 MG/100ML; MG/100ML; MG/100ML; MG/100ML
INJECTION, SOLUTION INTRAVENOUS CONTINUOUS
Status: DISCONTINUED | OUTPATIENT
Start: 2020-03-15 | End: 2020-03-17

## 2020-03-15 RX ORDER — CALCIUM CARBONATE 200(500)MG
500 TABLET,CHEWABLE ORAL 3 TIMES DAILY PRN
Status: DISCONTINUED | OUTPATIENT
Start: 2020-03-15 | End: 2020-03-17

## 2020-03-15 RX ORDER — FENTANYL/BUPIVACAINE/NS/PF 2MCG/ML-.1
PLASTIC BAG, INJECTION (ML) INJECTION
Status: DISPENSED
Start: 2020-03-15 | End: 2020-03-16

## 2020-03-15 RX ORDER — AMOXICILLIN 250 MG/1
2000 CAPSULE ORAL ONCE
Status: COMPLETED | OUTPATIENT
Start: 2020-03-15 | End: 2020-03-15

## 2020-03-15 RX ORDER — ONDANSETRON 8 MG/1
8 TABLET, ORALLY DISINTEGRATING ORAL EVERY 8 HOURS PRN
Status: DISCONTINUED | OUTPATIENT
Start: 2020-03-15 | End: 2020-03-17

## 2020-03-15 RX ORDER — BUPIVACAINE HYDROCHLORIDE 2.5 MG/ML
INJECTION, SOLUTION EPIDURAL; INFILTRATION; INTRACAUDAL
Status: DISPENSED
Start: 2020-03-15 | End: 2020-03-16

## 2020-03-15 RX ORDER — OXYTOCIN/RINGER'S LACTATE 30/500 ML
95 PLASTIC BAG, INJECTION (ML) INTRAVENOUS ONCE
Status: DISCONTINUED | OUTPATIENT
Start: 2020-03-15 | End: 2020-03-17

## 2020-03-15 RX ORDER — SIMETHICONE 80 MG
1 TABLET,CHEWABLE ORAL 4 TIMES DAILY PRN
Status: DISCONTINUED | OUTPATIENT
Start: 2020-03-15 | End: 2020-03-17

## 2020-03-15 RX ORDER — SODIUM CHLORIDE 9 MG/ML
INJECTION, SOLUTION INTRAVENOUS
Status: DISCONTINUED | OUTPATIENT
Start: 2020-03-15 | End: 2020-03-17

## 2020-03-15 RX ORDER — OXYTOCIN/RINGER'S LACTATE 30/500 ML
334 PLASTIC BAG, INJECTION (ML) INTRAVENOUS ONCE
Status: COMPLETED | OUTPATIENT
Start: 2020-03-15 | End: 2020-03-17

## 2020-03-15 RX ORDER — SODIUM CHLORIDE 9 MG/ML
INJECTION, SOLUTION INTRAVENOUS CONTINUOUS
Status: DISCONTINUED | OUTPATIENT
Start: 2020-03-15 | End: 2020-03-15

## 2020-03-15 RX ORDER — FENTANYL CITRATE 50 UG/ML
INJECTION, SOLUTION INTRAMUSCULAR; INTRAVENOUS
Status: COMPLETED
Start: 2020-03-15 | End: 2020-03-15

## 2020-03-15 RX ADMIN — SODIUM CHLORIDE, SODIUM LACTATE, POTASSIUM CHLORIDE, AND CALCIUM CHLORIDE 1000 ML: .6; .31; .03; .02 INJECTION, SOLUTION INTRAVENOUS at 11:03

## 2020-03-15 RX ADMIN — Medication 3 ML: at 11:03

## 2020-03-15 RX ADMIN — AMOXICILLIN 2000 MG: 250 CAPSULE ORAL at 11:03

## 2020-03-15 RX ADMIN — SODIUM CHLORIDE, SODIUM LACTATE, POTASSIUM CHLORIDE, AND CALCIUM CHLORIDE: .6; .31; .03; .02 INJECTION, SOLUTION INTRAVENOUS at 09:03

## 2020-03-15 RX ADMIN — LIDOCAINE HYDROCHLORIDE,EPINEPHRINE BITARTRATE 3 ML: 15; .005 INJECTION, SOLUTION EPIDURAL; INFILTRATION; INTRACAUDAL; PERINEURAL at 11:03

## 2020-03-15 RX ADMIN — Medication 1 ML: at 11:03

## 2020-03-15 RX ADMIN — FENTANYL CITRATE 100 MCG: 50 INJECTION, SOLUTION INTRAMUSCULAR; INTRAVENOUS at 11:03

## 2020-03-15 RX ADMIN — MISOPROSTOL 50 MCG: 100 TABLET ORAL at 11:03

## 2020-03-16 PROBLEM — Z34.90 ENCOUNTER FOR INDUCTION OF LABOR: Status: ACTIVE | Noted: 2020-03-16

## 2020-03-16 LAB
ABO + RH BLD: NORMAL
BLD GP AB SCN CELLS X3 SERPL QL: NORMAL

## 2020-03-16 PROCEDURE — 63600175 PHARM REV CODE 636 W HCPCS: Performed by: STUDENT IN AN ORGANIZED HEALTH CARE EDUCATION/TRAINING PROGRAM

## 2020-03-16 PROCEDURE — 25000003 PHARM REV CODE 250: Performed by: STUDENT IN AN ORGANIZED HEALTH CARE EDUCATION/TRAINING PROGRAM

## 2020-03-16 PROCEDURE — 62326 NJX INTERLAMINAR LMBR/SAC: CPT | Performed by: STUDENT IN AN ORGANIZED HEALTH CARE EDUCATION/TRAINING PROGRAM

## 2020-03-16 PROCEDURE — 99233 PR SUBSEQUENT HOSPITAL CARE,LEVL III: ICD-10-PCS | Mod: ,,, | Performed by: PEDIATRICS

## 2020-03-16 PROCEDURE — 11000001 HC ACUTE MED/SURG PRIVATE ROOM

## 2020-03-16 PROCEDURE — 72100002 HC LABOR CARE, 1ST 8 HOURS

## 2020-03-16 PROCEDURE — 99233 SBSQ HOSP IP/OBS HIGH 50: CPT | Mod: ,,, | Performed by: PEDIATRICS

## 2020-03-16 PROCEDURE — 51702 INSERT TEMP BLADDER CATH: CPT

## 2020-03-16 PROCEDURE — 59200 INSERT CERVICAL DILATOR: CPT

## 2020-03-16 RX ORDER — FENTANYL/BUPIVACAINE/NS/PF 2MCG/ML-.1
PLASTIC BAG, INJECTION (ML) INJECTION CONTINUOUS PRN
Status: DISCONTINUED | OUTPATIENT
Start: 2020-03-16 | End: 2020-03-17

## 2020-03-16 RX ORDER — CARBOPROST TROMETHAMINE 250 UG/ML
INJECTION, SOLUTION INTRAMUSCULAR
Status: DISCONTINUED
Start: 2020-03-16 | End: 2020-03-17 | Stop reason: WASHOUT

## 2020-03-16 RX ORDER — OXYTOCIN/RINGER'S LACTATE 30/500 ML
2 PLASTIC BAG, INJECTION (ML) INTRAVENOUS CONTINUOUS
Status: DISCONTINUED | OUTPATIENT
Start: 2020-03-16 | End: 2020-03-17

## 2020-03-16 RX ORDER — MISOPROSTOL 200 UG/1
TABLET ORAL
Status: DISCONTINUED
Start: 2020-03-16 | End: 2020-03-17 | Stop reason: WASHOUT

## 2020-03-16 RX ORDER — LIDOCAINE HYDROCHLORIDE AND EPINEPHRINE 15; 5 MG/ML; UG/ML
INJECTION, SOLUTION EPIDURAL
Status: DISCONTINUED | OUTPATIENT
Start: 2020-03-15 | End: 2020-03-17

## 2020-03-16 RX ORDER — METHYLERGONOVINE MALEATE 0.2 MG/ML
INJECTION INTRAVENOUS
Status: DISCONTINUED
Start: 2020-03-16 | End: 2020-03-17 | Stop reason: WASHOUT

## 2020-03-16 RX ORDER — ACETAMINOPHEN 500 MG
1000 TABLET ORAL ONCE
Status: COMPLETED | OUTPATIENT
Start: 2020-03-16 | End: 2020-03-16

## 2020-03-16 RX ORDER — FENTANYL CITRATE 50 UG/ML
INJECTION, SOLUTION INTRAMUSCULAR; INTRAVENOUS
Status: DISCONTINUED | OUTPATIENT
Start: 2020-03-15 | End: 2020-03-17

## 2020-03-16 RX ORDER — OXYTOCIN 10 [USP'U]/ML
INJECTION, SOLUTION INTRAMUSCULAR; INTRAVENOUS
Status: DISCONTINUED
Start: 2020-03-16 | End: 2020-03-17 | Stop reason: WASHOUT

## 2020-03-16 RX ORDER — FENTANYL/BUPIVACAINE/NS/PF 2MCG/ML-.1
PLASTIC BAG, INJECTION (ML) INJECTION CONTINUOUS
Status: DISCONTINUED | OUTPATIENT
Start: 2020-03-16 | End: 2020-03-17

## 2020-03-16 RX ORDER — DIPHENHYDRAMINE HYDROCHLORIDE 50 MG/ML
12.5 INJECTION INTRAMUSCULAR; INTRAVENOUS EVERY 4 HOURS PRN
Status: DISCONTINUED | OUTPATIENT
Start: 2020-03-16 | End: 2020-03-17

## 2020-03-16 RX ORDER — BUPIVACAINE HYDROCHLORIDE 2.5 MG/ML
INJECTION, SOLUTION EPIDURAL; INFILTRATION; INTRACAUDAL
Status: DISPENSED
Start: 2020-03-16 | End: 2020-03-17

## 2020-03-16 RX ADMIN — ONDANSETRON 8 MG: 8 TABLET, ORALLY DISINTEGRATING ORAL at 08:03

## 2020-03-16 RX ADMIN — Medication 2 MILLI-UNITS/MIN: at 03:03

## 2020-03-16 RX ADMIN — SODIUM CHLORIDE, SODIUM LACTATE, POTASSIUM CHLORIDE, AND CALCIUM CHLORIDE: .6; .31; .03; .02 INJECTION, SOLUTION INTRAVENOUS at 01:03

## 2020-03-16 RX ADMIN — Medication 250 ML: at 05:03

## 2020-03-16 RX ADMIN — DIPHENHYDRAMINE HYDROCHLORIDE 12.5 MG: 50 INJECTION INTRAMUSCULAR; INTRAVENOUS at 01:03

## 2020-03-16 RX ADMIN — Medication 3 ML: at 12:03

## 2020-03-16 RX ADMIN — SODIUM CHLORIDE, SODIUM LACTATE, POTASSIUM CHLORIDE, AND CALCIUM CHLORIDE: .6; .31; .03; .02 INJECTION, SOLUTION INTRAVENOUS at 07:03

## 2020-03-16 RX ADMIN — Medication 10 ML/HR: at 12:03

## 2020-03-16 RX ADMIN — ACETAMINOPHEN 1000 MG: 500 TABLET ORAL at 07:03

## 2020-03-16 NOTE — PROGRESS NOTES
LABOR NOTE    S:  Complaints: No.  Epidural working:  yes  MD to bedside for routine cervical check and balloon placement.    O: /71   Pulse (!) 56   Temp 98.1 °F (36.7 °C) (Oral)   Resp 16   Ht 5' (1.524 m)   Wt 55.8 kg (123 lb 0.3 oz)   LMP 2019   SpO2 (!) 94%   Breastfeeding? No   BMI 24.03 kg/m²       FHT: 120, mod BTBV, +accels, no decels Cat 2 (reassuring)  CTX: q 2 minutes  SVE: 50/-3, cuevas balloon placed without difficulty; speculum was not needed       ASSESSMENT:   24 y.o.  at 37w3d, induction of labor 2/2 IUGR    FHT reassuring    Active Hospital Problems    Diagnosis  POA    34 weeks gestation of pregnancy [Z3A.34]  Not Applicable      Resolved Hospital Problems   No resolved problems to display.     2300: misoprostol given   0100: cuevas balloon placed without difficulty         PLAN:    Continue Close Maternal/Fetal Monitoring  S/p 1 dose misoprostol   Cuevas balloon in place   Recheck 4 hours or PRN    Loni Ring MD  OBGYN, PGY-2

## 2020-03-16 NOTE — PROGRESS NOTES
LABOR PROGRESS NOTE    S:  MD to bedside for cervical check. Complaints: None.      O: Temp:  [97.5 °F (36.4 °C)-98.1 °F (36.7 °C)] 97.9 °F (36.6 °C)  Pulse:  [45-80] 54  Resp:  [16] 16  SpO2:  [92 %-96 %] 95 %  BP: (102-140)/(55-82) 120/61    FHT: 120 BPM/moderate beat to beat variability/+accels/+ occasional early decels, cat 1  CTX: q 2-4 minutes,   SVE: /0 @1530    ASSESSMENT:   24 y.o.  at 37w3d here for IOL  FHT reassuring      TIMELINE  2300: misoprostol given   0100: cuevas balloon placed without difficulty   0330: cuevas balloon out; cervix 4/60/-3. Pit started  0730: 4/60/-3  1000: 590/-3  1315: /-2, AROM clr  1530: /0      PLAN:  1. Labor management  -Continue Close Maternal/Fetal Monitoring.   -Pitocin augmentation per protocol,   -Recheck 2 hours or PRN    Erin Bowers M.D.   OB/GYN  PGY-1

## 2020-03-16 NOTE — PROGRESS NOTES
LABOR NOTE    S:  Complaints: No.  Epidural working:  yes  MD to bedside for routine cervical check     O: BP (!) 127/58   Pulse (!) 54   Temp 97.5 °F (36.4 °C) (Temporal)   Resp 16   Ht 5' (1.524 m)   Wt 55.8 kg (123 lb 0.3 oz)   LMP 2019   SpO2 (!) 94%   Breastfeeding? No   BMI 24.03 kg/m²       FHT: 130, mod BTBV, +accels, no decels Cat 2 (reassuring)  CTX: q 2-4 minutes  SVE: 60/-3 @0730      ASSESSMENT:   24 y.o.  at 37w3d, induction of labor 2/2 IUGR    FHT reassuring    Active Hospital Problems    Diagnosis  POA    34 weeks gestation of pregnancy [Z3A.34]  Not Applicable      Resolved Hospital Problems   No resolved problems to display.     2300: misoprostol given   0100: cuevas balloon placed without difficulty   0330: cuevas balloon out; cervix 60/-3. Pit started  0730: 460/-3      PLAN:    Continue Close Maternal/Fetal Monitoring  S/p 1 dose misoprostol   Pitocin augmentation per protocol  Plan to AROM next check  Recheck 2 hours or PRN    Erin Bowesr M.D.   OB/GYN  PGY-1

## 2020-03-16 NOTE — ASSESSMENT & PLAN NOTE
1. 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 resolved cyanosis (normal sats for her around 92-98%)  3. Currently pregnant, going through induction now.  4. Mild thrombocytopenia - common in Fontan patients.     Discussion:  In regards to her pregnancy, we have several concerns:  1.  The clotting predisposition often noted in Fontan patients may be worsened by pregnancy.  2.  Without a sub pulmonic ventricle, she may tolerate the volume load associated with a pregnancy less well than a 2 ventricle patient.  There is an increased risk of heart failure symptoms.  3.  There is an increased risk of arrhythmias.    That said, she really looks great and has done well this pregnancy.  Vaginal delivery is typically preferable if she is doing well.  IV fluids may be necessary to avoid dehydration.  I would recommend SBE prophylaxis for delivery although this is certainly debatable.  I would recommend close monitoring with telemetry during delivery and for 24-48 hours after delivery.       We again discussed the long-term risk associated with Fontan circulation. We discussed:     Plan:  1.  Would plan to discharge home on aspirin.  Would plan on lovenox prophylaxis while in the hospital after delivery if she is non-ambulatory for a while.   2.  I will see her in the morning.  3.  Potentially could need some diuretics post delivery.  Will monitor.  4.  I will want to see her within a week or 2 of discharge - will schedule at that time.  5.  Telemetry monitoring.

## 2020-03-16 NOTE — SUBJECTIVE & OBJECTIVE
[facility]     Interval History: Patient currently without complaint.  Denies chest pain, shortness of breath, palpitations.  Feeling some contractions.    Objective:     Vital Signs (Most Recent):  Temp: 97.9 °F (36.6 °C) (03/16/20 1108)  Pulse: 62 (03/16/20 1400)  Resp: 16 (03/15/20 2130)  BP: 120/61 (03/16/20 1145)  SpO2: 95 % (03/16/20 1129) Vital Signs (24h Range):  Temp:  [97.5 °F (36.4 °C)-98.1 °F (36.7 °C)] 97.9 °F (36.6 °C)  Pulse:  [45-80] 62  Resp:  [16] 16  SpO2:  [92 %-96 %] 95 %  BP: (102-140)/(55-82) 120/61     Weight: 55.8 kg (123 lb 0.3 oz)  Body mass index is 24.03 kg/m².     SpO2: 95 %       Intake/Output - Last 3 Shifts       03/14 0700 - 03/15 0659 03/15 0700 - 03/16 0659 03/16 0700 - 03/17 0659    I.V. (mL/kg)  1125 (20.2)     Total Intake(mL/kg)  1125 (20.2)     Urine (mL/kg/hr)  500     Total Output  500     Net  +625                  Lines/Drains/Airways     Drain                 Urethral Catheter 03/16/20 0601 less than 1 day          Epidural Line                 Neuraxial Analgesia/Anesthesia Assessment (using dermatomes) Epidural -- days          Peripheral Intravenous Line                 Peripheral IV - Single Lumen 03/15/20 2144 18 G Anterior;Left Forearm less than 1 day                Scheduled Medications:    lactated ringers  1,000 mL Intravenous Once    miSOPROStoL  50 mcg Oral Q6H    oxytocin in lactated ringers  334 beatrice-units/min Intravenous Once    oxytocin in lactated ringers  95 beatrice-units/min Intravenous Once       Continuous Medications:    fentanyl 2mcg/mL with bupivacaine 0.1% in sdoium chloride 0.9% Epidural      lactated ringers 75 mL/hr at 03/16/20 1313    oxytocin in lactated ringers 2 beatrice-units/min (03/16/20 1144)       PRN Medications: sodium chloride 0.9%, calcium carbonate, diphenhydrAMINE, ondansetron, promethazine (PHENERGAN) IVPB, simethicone    Physical Exam  In general, she is an acyanotic, nondysmorphic  appearing female in no apparent distress.  The eyes, nares, and oropharynx are clear.  Eyelids and conjunctiva free of drainage and redness.  PERRLA.  Teeth in good repair.  Mucous membranes are moist.  The head is normocephalic and atraumatic.  The neck is supple without jugular venous distention or thyroid enlargement.  The lungs are clear to auscultation bilaterally.  There is a well healed sternotomy scar.  The PMI is in the right chest.  The first heart sound is normal.  The second is loud and single.  There are no gallops, rubs, or clicks in the supine position.  The abdominal exam reveals a gravid uterus.  Pulses are normal in all 4 extremities with brisk capillary refill and no edema.  No rashes are noted.  No tenderness or swelling in legs.  Her neurological exam is normal.    Lab Results   Component Value Date    WBC 13.14 (H) 03/15/2020    HGB 10.7 (L) 03/15/2020    HCT 34.2 (L) 03/15/2020    MCV 79 (L) 03/15/2020     (L) 03/15/2020     CMP  Sodium   Date Value Ref Range Status   01/20/2020 135 (L) 136 - 145 mmol/L Final     Potassium   Date Value Ref Range Status   01/20/2020 3.6 3.5 - 5.1 mmol/L Final     Chloride   Date Value Ref Range Status   01/20/2020 105 95 - 110 mmol/L Final     CO2   Date Value Ref Range Status   01/20/2020 21 (L) 23 - 29 mmol/L Final     Glucose   Date Value Ref Range Status   01/20/2020 71 70 - 110 mg/dL Final     BUN, Bld   Date Value Ref Range Status   01/20/2020 6 6 - 20 mg/dL Final     Creatinine   Date Value Ref Range Status   01/20/2020 0.6 0.5 - 1.4 mg/dL Final     Calcium   Date Value Ref Range Status   01/20/2020 9.1 8.7 - 10.5 mg/dL Final     Total Protein   Date Value Ref Range Status   01/20/2020 7.7 6.0 - 8.4 g/dL Final     Albumin   Date Value Ref Range Status   01/20/2020 3.1 (L) 3.5 - 5.2 g/dL Final     Total Bilirubin   Date Value Ref Range Status   01/20/2020 0.3 0.1 - 1.0 mg/dL Final     Comment:     For infants and newborns, interpretation of results  should be based  on gestational age, weight and in agreement with clinical  observations.  Premature Infant recommended reference ranges:  Up to 24 hours.............<8.0 mg/dL  Up to 48 hours............<12.0 mg/dL  3-5 days..................<15.0 mg/dL  6-29 days.................<15.0 mg/dL       Alkaline Phosphatase   Date Value Ref Range Status   01/20/2020 112 55 - 135 U/L Final     AST   Date Value Ref Range Status   01/20/2020 12 10 - 40 U/L Final     ALT   Date Value Ref Range Status   01/20/2020 10 10 - 44 U/L Final     Anion Gap   Date Value Ref Range Status   01/20/2020 9 8 - 16 mmol/L Final     eGFR if    Date Value Ref Range Status   01/20/2020 >60.0 >60 mL/min/1.73 m^2 Final     eGFR if non    Date Value Ref Range Status   01/20/2020 >60.0 >60 mL/min/1.73 m^2 Final     Comment:     Calculation used to obtain the estimated glomerular filtration  rate (eGFR) is the CKD-EPI equation.          Echo 2/14/20:  Heterotaxy, abdominal situs inversus, dextrocardia (I, D, L) atrial situs inversus, atrioventricular concordance with jason-cross  AV valve arrangement, RV is leftward, anterior and superior, LV is rightward, posterior and inferior, large VSD and straddling  AV valves, d-malposed great arteries and pulmonary stenosis. - S/P Extracardiac Fontan (22mm [place] graft) with division  of MPA and oversewing of the pulmonary valve - [facility] 9/2015.  Excellent ventricular systolic function  No pericardial effusion  Trivial AV valve insufficiency  Mildly dilated inferior vena cava consistent with Fontan physiology  No evidence of thrombus in the IVC or the proximal Fontan. Distal connection of the extracardiac Fontan conduit to the  pulmonary artery without obstruction.  Left sided Pavel connection to the LPA well imaged without obstruction. Distal LPA not well seen, but no obstruction within the  proximal LPA and RPA.  No aortic stenosis or insufficiency.  Unrestrictive VSD with LV  to RV shunt.  No change from previous echocardiogram.

## 2020-03-16 NOTE — PROGRESS NOTES
[facility]  Pediatric Cardiology  Progress Note    Patient Name: Theresa Grey  MRN: [id number]  Admission Date: 3/15/2020  Hospital Length of Stay: 1 days  Code Status: Full Code   Attending Physician: Gabriella Dorado MD   Primary Care Physician: Gabriella Dorado MD  Expected Discharge Date:   Principal Problem:Encounter for induction of labor    Subjective:     [facility]     Interval History: Patient currently without complaint.  Denies chest pain, shortness of breath, palpitations.  Feeling some contractions.    Objective:     Vital Signs (Most Recent):  Temp: 97.9 °F (36.6 °C) (03/16/20 1108)  Pulse: 62 (03/16/20 1400)  Resp: 16 (03/15/20 2130)  BP: 120/61 (03/16/20 1145)  SpO2: 95 % (03/16/20 1129) Vital Signs (24h Range):  Temp:  [97.5 °F (36.4 °C)-98.1 °F (36.7 °C)] 97.9 °F (36.6 °C)  Pulse:  [45-80] 62  Resp:  [16] 16  SpO2:  [92 %-96 %] 95 %  BP: (102-140)/(55-82) 120/61     Weight: 55.8 kg (123 lb 0.3 oz)  Body mass index is 24.03 kg/m².     SpO2: 95 %       Intake/Output - Last 3 Shifts       03/14 0700 - 03/15 0659 03/15 0700 - 03/16 0659 03/16 0700 - 03/17 0659    I.V. (mL/kg)  1125 (20.2)     Total Intake(mL/kg)  1125 (20.2)     Urine (mL/kg/hr)  500     Total Output  500     Net  +625                  Lines/Drains/Airways     Drain                 Urethral Catheter 03/16/20 0601 less than 1 day          Epidural Line                 Neuraxial Analgesia/Anesthesia Assessment (using dermatomes) Epidural -- days          Peripheral Intravenous Line                 Peripheral IV - Single Lumen 03/15/20 1020 18 G Anterior;Left Forearm less than 1 day                Scheduled Medications:    lactated ringers  1,000 mL Intravenous Once    miSOPROStoL  50 mcg Oral Q6H    oxytocin in lactated ringers  334 beatrice-units/min Intravenous Once    oxytocin in lactated ringers  95 beatrice-units/min Intravenous Once       Continuous Medications:    fentanyl  2mcg/mL with bupivacaine 0.1% in sdoium chloride 0.9% Epidural      lactated ringers 75 mL/hr at 03/16/20 1313    oxytocin in lactated ringers 2 beatrice-units/min (03/16/20 1144)       PRN Medications: sodium chloride 0.9%, calcium carbonate, diphenhydrAMINE, ondansetron, promethazine (PHENERGAN) IVPB, simethicone    Physical Exam  In general, she is an acyanotic, nondysmorphic appearing female in no apparent distress.  The eyes, nares, and oropharynx are clear.  Eyelids and conjunctiva free of drainage and redness.  PERRLA.  Teeth in good repair.  Mucous membranes are moist.  The head is normocephalic and atraumatic.  The neck is supple without jugular venous distention or thyroid enlargement.  The lungs are clear to auscultation bilaterally.  There is a well healed sternotomy scar.  The PMI is in the right chest.  The first heart sound is normal.  The second is loud and single.  There are no gallops, rubs, or clicks in the supine position.  The abdominal exam reveals a gravid uterus.  Pulses are normal in all 4 extremities with brisk capillary refill and no edema.  No rashes are noted.  No tenderness or swelling in legs.  Her neurological exam is normal.    Lab Results   Component Value Date    WBC 13.14 (H) 03/15/2020    HGB 10.7 (L) 03/15/2020    HCT 34.2 (L) 03/15/2020    MCV 79 (L) 03/15/2020     (L) 03/15/2020     CMP  Sodium   Date Value Ref Range Status   01/20/2020 135 (L) 136 - 145 mmol/L Final     Potassium   Date Value Ref Range Status   01/20/2020 3.6 3.5 - 5.1 mmol/L Final     Chloride   Date Value Ref Range Status   01/20/2020 105 95 - 110 mmol/L Final     CO2   Date Value Ref Range Status   01/20/2020 21 (L) 23 - 29 mmol/L Final     Glucose   Date Value Ref Range Status   01/20/2020 71 70 - 110 mg/dL Final     BUN, Bld   Date Value Ref Range Status   01/20/2020 6 6 - 20 mg/dL Final     Creatinine   Date Value Ref Range Status   01/20/2020 0.6 0.5 - 1.4 mg/dL Final     Calcium   Date Value Ref  Range Status   01/20/2020 9.1 8.7 - 10.5 mg/dL Final     Total Protein   Date Value Ref Range Status   01/20/2020 7.7 6.0 - 8.4 g/dL Final     Albumin   Date Value Ref Range Status   01/20/2020 3.1 (L) 3.5 - 5.2 g/dL Final     Total Bilirubin   Date Value Ref Range Status   01/20/2020 0.3 0.1 - 1.0 mg/dL Final     Comment:     For infants and newborns, interpretation of results should be based  on gestational age, weight and in agreement with clinical  observations.  Premature Infant recommended reference ranges:  Up to 24 hours.............<8.0 mg/dL  Up to 48 hours............<12.0 mg/dL  3-5 days..................<15.0 mg/dL  6-29 days.................<15.0 mg/dL       Alkaline Phosphatase   Date Value Ref Range Status   01/20/2020 112 55 - 135 U/L Final     AST   Date Value Ref Range Status   01/20/2020 12 10 - 40 U/L Final     ALT   Date Value Ref Range Status   01/20/2020 10 10 - 44 U/L Final     Anion Gap   Date Value Ref Range Status   01/20/2020 9 8 - 16 mmol/L Final     eGFR if    Date Value Ref Range Status   01/20/2020 >60.0 >60 mL/min/1.73 m^2 Final     eGFR if non    Date Value Ref Range Status   01/20/2020 >60.0 >60 mL/min/1.73 m^2 Final     Comment:     Calculation used to obtain the estimated glomerular filtration  rate (eGFR) is the CKD-EPI equation.          Echo 2/14/20:  Heterotaxy, abdominal situs inversus, dextrocardia (I, D, L) atrial situs inversus, atrioventricular concordance with jason-cross  AV valve arrangement, RV is leftward, anterior and superior, LV is rightward, posterior and inferior, large VSD and straddling  AV valves, d-malposed great arteries and pulmonary stenosis. - S/P Extracardiac Fontan (22mm Goretex graft) with division  of MPA and oversewing of the pulmonary valve - [facility] 9/2015.  Excellent ventricular systolic function  No pericardial effusion  Trivial AV valve insufficiency  Mildly dilated inferior vena cava consistent with Fontan  physiology  No evidence of thrombus in the IVC or the proximal Fontan. Distal connection of the extracardiac Fontan conduit to the  pulmonary artery without obstruction.  Left sided Pavel connection to the LPA well imaged without obstruction. Distal LPA not well seen, but no obstruction within the  proximal LPA and RPA.  No aortic stenosis or insufficiency.  Unrestrictive VSD with LV to RV shunt.  No change from previous echocardiogram.      Assessment and Plan:     Other  S/P Fontan procedure  1. 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 resolved cyanosis (normal sats for her around 92-98%)  3. Currently pregnant, going through induction now.  4. Mild thrombocytopenia - common in Fontan patients.     Discussion:  In regards to her pregnancy, we have several concerns:  1.  The clotting predisposition often noted in Fontan patients may be worsened by pregnancy.  2.  Without a sub pulmonic ventricle, she may tolerate the volume load associated with a pregnancy less well than a 2 ventricle patient.  There is an increased risk of heart failure symptoms.  3.  There is an increased risk of arrhythmias.    That said, she really looks great and has done well this pregnancy.  Vaginal delivery is typically preferable if she is doing well.  IV fluids may be necessary to avoid dehydration.  I would recommend SBE prophylaxis for delivery although this is certainly debatable.  I would recommend close monitoring with telemetry during delivery and for 24-48 hours after delivery.       We again discussed the long-term risk  associated with Fontan circulation. We discussed:     Plan:  1.  Would plan to discharge home on aspirin.  Would plan on lovenox prophylaxis while in the hospital after delivery if she is non-ambulatory for a while.   2.  I will see her in the morning.  3.  Potentially could need some diuretics post delivery.  Will monitor.  4.  I will want to see her within a week or 2 of discharge - will schedule at that time.  5.  Telemetry monitoring.        Toro Weems MD  Pediatric Cardiology  [facility]

## 2020-03-16 NOTE — PROGRESS NOTES
LABOR PROGRESS NOTE    S:  MD to bedside for cervical check. Complaints: None.      O: Temp:  [97.5 °F (36.4 °C)-98.1 °F (36.7 °C)] 97.5 °F (36.4 °C)  Pulse:  [49-80] 56  Resp:  [16] 16  SpO2:  [92 %-96 %] 95 %  BP: (102-140)/(55-82) 121/66    FHT: 130 BPM/moderate beat to beat variability/+accels/+ occasional early decels   CTX: q 2-3 minutes,     Dilation (cm): 5  Effacement (%): 90  Station: -3  Dose(units) Oxytocin: *4 beatrice-units/min     ASSESSMENT:   24 y.o.  at 37w3d, latent  FHT reassuring  Cat 1      TIMELINE  2300: misoprostol given   0100: cuevas balloon placed without difficulty   0330: cuevas balloon out; cervix 60/-3. Pit started  0730: 60/-3  1000: /-3      PLAN:  1. Labor management  -Continue Close Maternal/Fetal Monitoring.   -Pitocin augmentation per protocol,   -Recheck 2 hours or PRN    SOLOMON Villareal MD  OBGYN PGY1

## 2020-03-16 NOTE — ANESTHESIA PREPROCEDURE EVALUATION
[facility]  Anesthesia Pre-Operative Evaluation       Patient Name: Theresa Grey  YOB: 1995  MRN: [id number]    SUBJECTIVE:     Theresa Grey is a 24 y.o. female  37w2d presenting for scheduled IOL.    Current pregnancy complicated by situs inversus, dextrocardia, DORV, VSD, pulmonary valve stenosis s/p extracardiac Fontan with oversewing of pulm valve and division of the MPA on 2016 at [facility].  She had a previous pregnancy before her surgery which was terminated due to the unrepaired nature.    Echo 2020  Heterotaxy, abdominal situs inversus, dextrocardia (I, D, L) atrial situs inversus, atrioventricular concordance with jason-cross  AV valve arrangement, RV is leftward, anterior and superior, LV is rightward, posterior and inferior, large VSD and straddling  AV valves, d-malposed great arteries and pulmonary stenosis. - S/P Extracardiac Fontan (22mm Goretex graft) with division  of MPA and oversewing of the pulmonary valve - [facility] 2015.  Excellent ventricular systolic function  No pericardial effusion  Trivial AV valve insufficiency  Mildly dilated inferior vena cava consistent with Fontan physiology  No evidence of thrombus in the IVC or the proximal Fontan. Distal connection of the extracardiac Fontan conduit to the  pulmonary artery without obstruction.  Left sided Pavel connection to the LPA well imaged without obstruction. Distal LPA not well seen, but no obstruction within the  proximal LPA and RPA.  No aortic stenosis or insufficiency.  Unrestrictive VSD with LV to RV shunt.  No change from previous echocardiogram.    She is on lovenox 40mg daily for thromboprophylaxis.  She last took it 4pm 3/14/2020.  12 hr window from last lovenox dose needed for placement.  12 hr needed for catheter removal from insertion, and lovenox can be restarted 4 hours after catheter removal.  Pt has gestational thrombocytopenia.    1 previous pregnancies, TAB with D&C due to unrepaired  cyanotic congenital heart disease.    For this pregnancy, patient would like epidural.    Denies previous neuraxial anesthesia.  Denies previous issues with general anesthesia.  Denies family history of issues with anesthesia.    Denies hx of seizures, strokes, HTN, smoking, asthma, COPD, acid reflux, liver disease, kidney disease, bleeding disorders, back surgery.  Lab Results   Component Value Date     (L) 2020       OB History    Para Term  AB Living   2       1     SAB TAB Ectopic Multiple Live Births     1            # Outcome Date GA Lbr Hong/2nd Weight Sex Delivery Anes PTL Lv   2 Current            1 TAB  8w0d              Past Surgical History:   Procedure Laterality Date    CARDIAC CATHETERIZATION  at 7 yars of age    DILATION AND CURETTAGE OF UTERUS      FONTAN PROCEDURE, EXTRACARDIAC  2015    With a nonfenestrated 22 mm [place] tube graft.  The pulmonary valve was closed.  Procedure performed by Dr. Moe Kulkarni at [facility].        Patient Active Problem List   Diagnosis    Pulmonary stenosis    Situs inversus    S/P D&C (status post dilation and curettage)    Adult congenital heart disease    DORV (double outlet right ventricle)    S/P Fontan procedure    Congenital heart disease in pregnancy    Supervision of high risk pregnancy in second trimester    Dextrocardia    Thrombocytopenia    Poor fetal growth affecting management of mother in third trimester    34 weeks gestation of pregnancy       Review of patient's allergies indicates:   Allergen Reactions    Hydrocodone Shortness Of Breath and Other (See Comments)     Dizziness; sweating       Social History     Socioeconomic History    Marital status:      Spouse name: Not on file    Number of children: Not on file    Years of education: Not on file    Highest education level: Not on file   Occupational History    Not on file   Social Needs    Financial resource strain:  Not on file    Food insecurity:     Worry: Not on file     Inability: Not on file    Transportation needs:     Medical: Not on file     Non-medical: Not on file   Tobacco Use    Smoking status: Former Smoker     Packs/day: 0.25    Smokeless tobacco: Never Used   Substance and Sexual Activity    Alcohol use: Not Currently     Alcohol/week: 0.0 standard drinks     Comment: on ocassion before pregnancy    Drug use: No     Comment: previous marijuana use    Sexual activity: Yes     Partners: Male     Birth control/protection: Injection   Lifestyle    Physical activity:     Days per week: Not on file     Minutes per session: Not on file    Stress: Not on file   Relationships    Social connections:     Talks on phone: Not on file     Gets together: Not on file     Attends [employer] service: Not on file     Active member of club or organization: Not on file     Attends meetings of clubs or organizations: Not on file     Relationship status: Not on file   Other Topics Concern    Not on file   Social History Narrative    Not on file       OBJECTIVE:     Weight:  Wt Readings from Last 4 Encounters:   03/13/20 55.8 kg (123 lb 0.3 oz)   03/13/20 56 kg (123 lb 7.3 oz)   03/13/20 55.7 kg (122 lb 12.7 oz)   03/06/20 55.5 kg (122 lb 5.7 oz)     There is no height or weight on file to calculate BMI.    Outpatient Medications:  No current facility-administered medications on file prior to encounter.      Current Outpatient Medications on File Prior to Encounter   Medication Sig Dispense Refill    aspirin (ECOTRIN) 81 MG EC tablet Take 81 mg by mouth once daily.      enoxaparin (LOVENOX) 40 mg/0.4 mL Syrg Inject 0.4 mLs (40 mg total) into the skin once daily. 30 Syringe 12    PRENATAL NO.116-IRON-FOLIC-DHA ORAL Take 1 tablet by mouth once daily.          Current Inpatient Medications:      BP Readings from Last 3 Encounters:   03/13/20 100/72   03/13/20 124/80   03/13/20 117/66         Chemistry        Component Value  Date/Time     (L) 01/20/2020 1637    K 3.6 01/20/2020 1637     01/20/2020 1637    CO2 21 (L) 01/20/2020 1637    BUN 6 01/20/2020 1637    CREATININE 0.6 01/20/2020 1637    GLU 71 01/20/2020 1637        Component Value Date/Time    CALCIUM 9.1 01/20/2020 1637    ALKPHOS 112 01/20/2020 1637    AST 12 01/20/2020 1637    ALT 10 01/20/2020 1637    BILITOT 0.3 01/20/2020 1637    ESTGFRAFRICA >60.0 01/20/2020 1637    EGFRNONAA >60.0 01/20/2020 1637            Lab Results   Component Value Date    WBC 11.85 02/21/2020    HGB 11.7 (L) 02/21/2020    HCT 38.4 02/21/2020    MCV 84 02/21/2020     (L) 02/21/2020       No results for input(s): PT, INR, PROTIME, APTT in the last 72 hours.      Anesthesia Evaluation    I have reviewed the Patient Summary Reports.    I have reviewed the Nursing Notes.   I have reviewed the Medications.     Review of Systems  Anesthesia Hx:  No problems with previous Anesthesia Denies Hx of Anesthetic complications  History of prior surgery of interest to airway management or planning: Denies Family Hx of Anesthesia complications.   Denies Personal Hx of Anesthesia complications.   Social:  No Alcohol Use, Former Smoker    Hematology/Oncology:     Oncology Normal    -- Anemia:   EENT/Dental:EENT/Dental Normal   Cardiovascular:   ECG has been reviewed. Echo 2/14/2020  Heterotaxy, abdominal situs inversus, dextrocardia (I, D, L) atrial situs inversus, atrioventricular concordance with jason-cross  AV valve arrangement, RV is leftward, anterior and superior, LV is rightward, posterior and inferior, large VSD and straddling  AV valves, d-malposed great arteries and pulmonary stenosis. - S/P Extracardiac Fontan (22mm Goretex graft) with division  of MPA and oversewing of the pulmonary valve - [facility] 9/2015.  Excellent ventricular systolic function  No pericardial effusion  Trivial AV valve insufficiency  Mildly dilated inferior vena cava consistent with Fontan physiology  No evidence of  thrombus in the IVC or the proximal Fontan. Distal connection of the extracardiac Fontan conduit to the  pulmonary artery without obstruction.  Left sided Pavel connection to the LPA well imaged without obstruction. Distal LPA not well seen, but no obstruction within the  proximal LPA and RPA.  No aortic stenosis or insufficiency.  Unrestrictive VSD with LV to RV shunt.  No change from previous echocardiogram.   Pulmonary:  Pulmonary Normal    Renal/:  Renal/ Normal     Hepatic/GI:  Hepatic/GI Normal    Neurological:  Neurology Normal    Endocrine:  Endocrine Normal    Psych:  Psychiatric Normal           Physical Exam  General:  Well nourished    Airway/Jaw/Neck:  Airway Findings: Mouth Opening: Normal Tongue: Normal  General Airway Assessment: Adult  Mallampati: II  TM Distance: Normal, at least 6 cm  Jaw/Neck Findings:  Neck ROM: Normal ROM  Neck Findings: Normal    Eyes/Ears/Nose:  EYES/EARS/NOSE FINDINGS: Normal   Dental:  Dental Findings: In tact   Chest/Lungs:  Chest/Lungs Findings: Clear to auscultation, Normal Respiratory Rate     Heart/Vascular:  Heart Findings: Rate: Normal  Rhythm: Regular Rhythm  Sounds: Normal  Heart murmur: negative Other Heart Findings: dextrocardia        Mental Status:  Mental Status Findings:  Cooperative, Alert and Oriented         Anesthesia Plan  Type of Anesthesia, risks & benefits discussed:  Anesthesia Type:  epidural, CSE, general, spinal  Patient's Preference:   Intra-op Monitoring Plan: standard ASA monitors  Intra-op Monitoring Plan Comments:   Post Op Pain Control Plan: multimodal analgesia, IV/PO Opioids PRN, epidural analgesia, per primary service following discharge from PACU and intrathecal opioid  Post Op Pain Control Plan Comments:   Induction:   IV  Beta Blocker:  Patient is not currently on a Beta-Blocker (No further documentation required).       Informed Consent: Patient understands risks and agrees with Anesthesia plan.  Questions answered. Anesthesia  consent signed with patient.  ASA Score: 3     Day of Surgery Review of History & Physical:  There are no significant changes.  H&P update referred to the provider.         Ready For Surgery From Anesthesia Perspective.

## 2020-03-16 NOTE — ANESTHESIA PROCEDURE NOTES
Epidural    Patient location during procedure: OB   Reason for block: primary anesthetic   Start time: 3/16/2020 11:35 PM  Timeout: 3/16/2020 11:30 PM  End time: 3/16/2020 11:55 PM  Surgery related to: Vaginal Delivery    Staffing  Performing Provider: Lloyd Gray MD  Authorizing Provider: Jean Barragan MD        Preanesthetic Checklist  Completed: patient identified, site marked, surgical consent, pre-op evaluation, timeout performed, IV checked, risks and benefits discussed, monitors and equipment checked, anesthesia consent given, hand hygiene performed and patient being monitored  Preparation  Patient position: sitting  Prep: ChloraPrep  Patient monitoring: Pulse Ox  Epidural  Skin Anesthetic: lidocaine 1%  Skin Wheal: 3 mL  Administration type: continuous  Approach: midline  Interspace: L3-4    Injection technique: MINH saline  Needle and Epidural Catheter  Needle type: Tuohy   Needle gauge: 17  Needle length: 3.5 inches  Needle insertion depth: 5.5 cm  Catheter type: springwound  Catheter size: 19 G  Catheter at skin depth: 10 cm  Test dose: 3 mL of lidocaine 1.5% with Epi 1-to-200,000  Additional Documentation: incremental injection, negative aspiration for heme and CSF, no paresthesia on injection, no signs/symptoms of IV or SA injection, no significant pain on injection and no significant complaints from patient  Needle localization: anatomical landmarks  Medications:  Volume per aspiration: 3 mL  Time between aspirations: 5 minutes  Assessment  Ease of block: easy  Patient's tolerance of the procedure: comfortable throughout block and no complaintsNo inadvertent dural puncture with Tuohy.  Dural puncture performed with spinal needle.

## 2020-03-16 NOTE — PROGRESS NOTES
LABOR PROGRESS NOTE    S:  MD to bedside for cervical check. Complaints: None.      O: Temp:  [97.5 °F (36.4 °C)-98.1 °F (36.7 °C)] 97.9 °F (36.6 °C)  Pulse:  [45-80] 54  Resp:  [16] 16  SpO2:  [92 %-96 %] 95 %  BP: (102-140)/(55-82) 120/61    FHT: 120 BPM/moderate beat to beat variability/+accels/+ occasional early decels, cat 1  CTX: q 2-3 minutes,   SVE: 5/90/-2, AROM clr @1315    ASSESSMENT:   24 y.o.  at 37w3d here for IOL  FHT reassuring      TIMELINE  2300: misoprostol given   0100: cuevas balloon placed without difficulty   0330: cuevas balloon out; cervix 4/60/-3. Pit started  0730: 4/60/-3  1000: 5/90/-3  1315: 5/90/-2, AROM clr      PLAN:  1. Labor management  -Continue Close Maternal/Fetal Monitoring.   -Pitocin augmentation per protocol,   -Recheck 2 hours or PRN    Erin Bowers M.D.   OB/GYN  PGY-1

## 2020-03-16 NOTE — H&P
"   HISTORY AND PHYSICAL                                                OBSTETRICS          Subjective:       Theresa Grey is a 24 y.o.  female with IUP at 37w2d weeks gestation who presents for scheduled induction of labor. She is a well known patient to the [facility] service due to her extensive cardiac history.    Patient denies contractions, denies vaginal bleeding, denies LOF.   Fetal Movement: normal.    This IUP is complicated by congenital heart defect (dextrocardia, DORV, large VSD, pulmonary stenosis s/p Fontan procedure ), IUGR ( 8%), Pelger huet platelet anomaly w thrombocytopenia.    Review of Systems   Constitutional: Negative for activity change, appetite change, chills and fever.   Eyes: Negative for visual disturbance.   Respiratory: Negative for cough and shortness of breath.    Cardiovascular: Negative for chest pain.   Gastrointestinal: Negative for abdominal pain, constipation, nausea and vomiting.   Genitourinary: Negative for dysuria, frequency, hematuria, pelvic pain, urgency, vaginal bleeding, vaginal discharge and vaginal odor.   Musculoskeletal: Negative for myalgias.   Integumentary:  Negative for rash.   Neurological: Negative for headaches.   Psychiatric/Behavioral: Negative for depression and sleep disturbance. The patient is not nervous/anxious.        PMHx:   Past Medical History:   Diagnosis Date    Abnormality of heart valve     heart disease     fatigue as a result of pregnancy (8wks ) clubbing indicates chronic oxygenation issues but pt considered it "normal"       PSHx:   Past Surgical History:   Procedure Laterality Date    CARDIAC CATHETERIZATION  at 7 yars of age    DILATION AND CURETTAGE OF UTERUS      FONTAN PROCEDURE, EXTRACARDIAC  2015    With a nonfenestrated 22 mm [place]-Justice tube graft.  The pulmonary valve was closed.  Procedure performed by Dr. Moe Kulkarni at [facility].       All:   Review of patient's allergies indicates:   Allergen " Reactions    Hydrocodone Shortness Of Breath and Other (See Comments)     Dizziness; sweating       Meds:   Medications Prior to Admission   Medication Sig Dispense Refill Last Dose    aspirin (ECOTRIN) 81 MG EC tablet Take 81 mg by mouth once daily.   Taking    enoxaparin (LOVENOX) 40 mg/0.4 mL Syrg Inject 0.4 mLs (40 mg total) into the skin once daily. 30 Syringe 12 Taking    PRENATAL NO.116-IRON-FOLIC-DHA ORAL Take 1 tablet by mouth once daily.   Taking       SH:   Social History     Socioeconomic History    Marital status:      Spouse name: Not on file    Number of children: Not on file    Years of education: Not on file    Highest education level: Not on file   Occupational History    Not on file   Social Needs    Financial resource strain: Not on file    Food insecurity:     Worry: Not on file     Inability: Not on file    Transportation needs:     Medical: Not on file     Non-medical: Not on file   Tobacco Use    Smoking status: Former Smoker     Packs/day: 0.25    Smokeless tobacco: Never Used   Substance and Sexual Activity    Alcohol use: Not Currently     Alcohol/week: 0.0 standard drinks     Comment: on ocassion before pregnancy    Drug use: No     Comment: previous marijuana use    Sexual activity: Yes     Partners: Male     Birth control/protection: Injection   Lifestyle    Physical activity:     Days per week: Not on file     Minutes per session: Not on file    Stress: Not on file   Relationships    Social connections:     Talks on phone: Not on file     Gets together: Not on file     Attends [employer] service: Not on file     Active member of club or organization: Not on file     Attends meetings of clubs or organizations: Not on file     Relationship status: Not on file   Other Topics Concern    Not on file   Social History Narrative    Not on file       FH:   Family History   Problem Relation Age of Onset    Diabetes Maternal Grandmother     Vision loss Maternal  Grandmother     Miscarriages / Stillbirths Mother     Diabetes Father     Diabetes Paternal Grandmother     No Known Problems Brother     No Known Problems Maternal Grandfather     No Known Problems Paternal Grandfather     Hypertension Maternal Aunt     Diabetes Maternal Aunt     No Known Problems Sister     No Known Problems Maternal Uncle     No Known Problems Paternal Aunt     No Known Problems Paternal Uncle     Anemia Neg Hx     Arrhythmia Neg Hx     Asthma Neg Hx     Clotting disorder Neg Hx     Fainting Neg Hx     Heart attack Neg Hx     Heart disease Neg Hx     Heart failure Neg Hx     Hyperlipidemia Neg Hx     Stroke Neg Hx     Atrial Septal Defect Neg Hx        OBHx:   OB History    Para Term  AB Living   2 0 0 0 1 0   SAB TAB Ectopic Multiple Live Births   0 1 0 0 0      # Outcome Date GA Lbr Hong/2nd Weight Sex Delivery Anes PTL Lv   2 Current            1 TAB 2014 8w0d              Objective:       /77   Pulse 77   Temp 98.1 °F (36.7 °C) (Oral)   Resp 16   Ht 5' (1.524 m)   Wt 55.8 kg (123 lb 0.3 oz)   LMP 2019   Breastfeeding? No   BMI 24.03 kg/m²     Vitals:    03/15/20 2130   BP: 129/77   Pulse: 77   Resp: 16   Temp: 98.1 °F (36.7 °C)   TempSrc: Oral   Weight: 55.8 kg (123 lb 0.3 oz)   Height: 5' (1.524 m)       General:   alert, appears stated age and cooperative, no apparent distress   HENT:  normocephalic, atraumatic   Eyes:  extraocular movements and conjunctivae normal   Neck:  supple, range of motion normal, no thyromegaly   Lungs:   no respiratory distress   Heart:   regular rate   Abdomen:  soft, non-tender, non-distended but gravid, no rebound or guarding    Extremities negative edema, negative erythema   FHT: 130, moderate BTBV, +accels, -decels;  Cat 1 (reassuring)                 TOCO: Quiet   Presentations: cephalic by ultrasound   Cervix:     Dilation: 1cm    Effacement: 40    Station:  -3       Lab Review  Blood Type O POS  GBBS:  negative  Rubella: Immune  RPR: NR  HIV: negative   HepB: negative        Assessment:       37w2d weeks gestation presents for scheduled induction of labor     Active Hospital Problems    Diagnosis  POA    34 weeks gestation of pregnancy [Z3A.34]  Not Applicable      Resolved Hospital Problems   No resolved problems to display.          Plan:   1. Encounter for induction of labor    Risks, benefits, alternatives and possible complications have been discussed in detail with the patient.   - Consents signed and to chart  - Admit to Labor and Delivery unit  - cervix 1/40/-3  - plan for cytotec; balloon when comfortable   - Epidural per Anesthesia  - Draw CBC, T&S  - Notify Staff  - Recheck in 4 hrs or PRN    2. Congenital heart disease   - situs inversus, pulmonary stenosis, DORV, s/p Fontan procedure  - has been followed by MFM throughout pregnancy   - last dose of lovenox Saturday 3/14 @ 1500  - telemetry intrapartum and postpartum    - amoxicillin for SBE prophylaxis     3. IUGR   - 1867 2/21/20 (8%)    4. Thrombocytopenia  - most recent platelets 130   - admit CBC pending       Post-Partum Hemorrhage risk - medium    Loni Ring MD  OBGYN, PGY-2

## 2020-03-16 NOTE — PROGRESS NOTES
LABOR PROGRESS NOTE    S:  MD to bedside for cervical check. Complaints: None.      O: Temp:  [97.5 °F (36.4 °C)-98.1 °F (36.7 °C)] 97.9 °F (36.6 °C)  Pulse:  [45-80] 64  Resp:  [16] 16  SpO2:  [91 %-96 %] 92 %  BP: (102-141)/(55-82) 132/70    FHT: 120 BPM/moderate beat to beat variability/+accels/+ occasional early decels, cat 1  CTX: q 2-4 minutes,   SVE: 0 @ 1830    ASSESSMENT:   24 y.o.  at 37w3d here for IOL  FHT reassuring      TIMELINE  2300: misoprostol given   0100: cuevas balloon placed without difficulty   0330: cuevas balloon out; cervix 460/-3. Pit started  0730: 460/-3  1000: /-3  1315: /-2, AROM clr  1530: /0  1830: /0      PLAN:  1. Labor management  -Continue Close Maternal/Fetal Monitoring.   -Pitocin augmentation per protocol,   -Recheck 2 hours or PRN    Dinora Gallardo M.D.  Obstetrics and Gynecology   PGY-3

## 2020-03-16 NOTE — PROGRESS NOTES
LABOR NOTE    S:  Complaints: No.  Epidural working:  yes  MD to bedside for routine cervical check and balloon placement.    O: BP (!) 140/82   Pulse (!) 54   Temp 98.1 °F (36.7 °C) (Oral)   Resp 16   Ht 5' (1.524 m)   Wt 55.8 kg (123 lb 0.3 oz)   LMP 2019   SpO2 (!) 94%   Breastfeeding? No   BMI 24.03 kg/m²       FHT: 120, mod BTBV, +accels, no decels Cat 2 (reassuring)  CTX: q 2 minutes  SVE: 50/-3, cuevas balloon placed without difficulty; speculum was not needed       ASSESSMENT:   24 y.o.  at 37w3d, induction of labor 2/2 IUGR    FHT reassuring    Active Hospital Problems    Diagnosis  POA    34 weeks gestation of pregnancy [Z3A.34]  Not Applicable      Resolved Hospital Problems   No resolved problems to display.     2300: misoprostol given   0100: cuevas balloon placed without difficulty   0330: cuevas balloon out; cervix 4/60/-3      PLAN:    Continue Close Maternal/Fetal Monitoring  S/p 1 dose misoprostol   Cuevas balloon now out   Start pitocin   Recheck 2 hours or PRN    Loni Ring MD  OBGYN, PGY-2

## 2020-03-17 LAB
BASOPHILS # BLD AUTO: 0.03 K/UL (ref 0–0.2)
BASOPHILS NFR BLD: 0.1 % (ref 0–1.9)
DIFFERENTIAL METHOD: ABNORMAL
EOSINOPHIL # BLD AUTO: 0.1 K/UL (ref 0–0.5)
EOSINOPHIL NFR BLD: 0.3 % (ref 0–8)
ERYTHROCYTE [DISTWIDTH] IN BLOOD BY AUTOMATED COUNT: 14.7 % (ref 11.5–14.5)
GIANT PLATELETS BLD QL SMEAR: PRESENT
HCT VFR BLD AUTO: 29.1 % (ref 37–48.5)
HGB BLD-MCNC: 9.5 G/DL (ref 12–16)
IMM GRANULOCYTES # BLD AUTO: 0.16 K/UL (ref 0–0.04)
IMM GRANULOCYTES NFR BLD AUTO: 0.7 % (ref 0–0.5)
LYMPHOCYTES # BLD AUTO: 1.6 K/UL (ref 1–4.8)
LYMPHOCYTES NFR BLD: 7.2 % (ref 18–48)
MCH RBC QN AUTO: 25.5 PG (ref 27–31)
MCHC RBC AUTO-ENTMCNC: 32.6 G/DL (ref 32–36)
MCV RBC AUTO: 78 FL (ref 82–98)
MONOCYTES # BLD AUTO: 1 K/UL (ref 0.3–1)
MONOCYTES NFR BLD: 4.6 % (ref 4–15)
NEUTROPHILS # BLD AUTO: 18.9 K/UL (ref 1.8–7.7)
NEUTROPHILS NFR BLD: 87.1 % (ref 38–73)
NRBC BLD-RTO: 0 /100 WBC
PLATELET # BLD AUTO: 109 K/UL (ref 150–350)
PLATELET BLD QL SMEAR: ABNORMAL
PMV BLD AUTO: ABNORMAL FL (ref 9.2–12.9)
RBC # BLD AUTO: 3.73 M/UL (ref 4–5.4)
RUBV IGG SER-ACNC: 28 IU/ML
RUBV IGG SER-IMP: REACTIVE
WBC # BLD AUTO: 21.74 K/UL (ref 3.9–12.7)

## 2020-03-17 PROCEDURE — 88307 TISSUE EXAM BY PATHOLOGIST: CPT | Mod: 26,,, | Performed by: PATHOLOGY

## 2020-03-17 PROCEDURE — 99233 SBSQ HOSP IP/OBS HIGH 50: CPT | Mod: ,,, | Performed by: PEDIATRICS

## 2020-03-17 PROCEDURE — 63600175 PHARM REV CODE 636 W HCPCS: Performed by: STUDENT IN AN ORGANIZED HEALTH CARE EDUCATION/TRAINING PROGRAM

## 2020-03-17 PROCEDURE — 59409 OBSTETRICAL CARE: CPT | Mod: AT,,, | Performed by: OBSTETRICS & GYNECOLOGY

## 2020-03-17 PROCEDURE — 85025 COMPLETE CBC W/AUTO DIFF WBC: CPT

## 2020-03-17 PROCEDURE — 11000001 HC ACUTE MED/SURG PRIVATE ROOM

## 2020-03-17 PROCEDURE — 59409 PR OBSTETRICAL CARE,VAG DELIV ONLY: ICD-10-PCS | Mod: AT,,, | Performed by: OBSTETRICS & GYNECOLOGY

## 2020-03-17 PROCEDURE — 88307 TISSUE EXAM BY PATHOLOGIST: CPT | Performed by: PATHOLOGY

## 2020-03-17 PROCEDURE — 99233 PR SUBSEQUENT HOSPITAL CARE,LEVL III: ICD-10-PCS | Mod: ,,, | Performed by: PEDIATRICS

## 2020-03-17 PROCEDURE — 25000003 PHARM REV CODE 250: Performed by: STUDENT IN AN ORGANIZED HEALTH CARE EDUCATION/TRAINING PROGRAM

## 2020-03-17 PROCEDURE — 88307 PR  SURG PATH,LEVEL V: ICD-10-PCS | Mod: 26,,, | Performed by: PATHOLOGY

## 2020-03-17 PROCEDURE — 36415 COLL VENOUS BLD VENIPUNCTURE: CPT

## 2020-03-17 PROCEDURE — 72200005 HC VAGINAL DELIVERY LEVEL II

## 2020-03-17 RX ORDER — DOCUSATE SODIUM 100 MG/1
200 CAPSULE, LIQUID FILLED ORAL 2 TIMES DAILY PRN
Qty: 30 CAPSULE | Refills: 1 | Status: SHIPPED | OUTPATIENT
Start: 2020-03-17 | End: 2020-10-02

## 2020-03-17 RX ORDER — PRENATAL WITH FERROUS FUM AND FOLIC ACID 3080; 920; 120; 400; 22; 1.84; 3; 20; 10; 1; 12; 200; 27; 25; 2 [IU]/1; [IU]/1; MG/1; [IU]/1; MG/1; MG/1; MG/1; MG/1; MG/1; MG/1; UG/1; MG/1; MG/1; MG/1; MG/1
1 TABLET ORAL DAILY
Status: DISCONTINUED | OUTPATIENT
Start: 2020-03-17 | End: 2020-03-19 | Stop reason: HOSPADM

## 2020-03-17 RX ORDER — HYDROCORTISONE 25 MG/G
CREAM TOPICAL 3 TIMES DAILY PRN
Status: DISCONTINUED | OUTPATIENT
Start: 2020-03-17 | End: 2020-03-19 | Stop reason: HOSPADM

## 2020-03-17 RX ORDER — OXYTOCIN/RINGER'S LACTATE 30/500 ML
95 PLASTIC BAG, INJECTION (ML) INTRAVENOUS ONCE
Status: DISCONTINUED | OUTPATIENT
Start: 2020-03-17 | End: 2020-03-19 | Stop reason: HOSPADM

## 2020-03-17 RX ORDER — SIMETHICONE 80 MG
1 TABLET,CHEWABLE ORAL EVERY 6 HOURS PRN
Status: DISCONTINUED | OUTPATIENT
Start: 2020-03-17 | End: 2020-03-19 | Stop reason: HOSPADM

## 2020-03-17 RX ORDER — OXYCODONE AND ACETAMINOPHEN 10; 325 MG/1; MG/1
1 TABLET ORAL EVERY 4 HOURS PRN
Status: DISCONTINUED | OUTPATIENT
Start: 2020-03-17 | End: 2020-03-19 | Stop reason: HOSPADM

## 2020-03-17 RX ORDER — DOCUSATE SODIUM 100 MG/1
200 CAPSULE, LIQUID FILLED ORAL 2 TIMES DAILY PRN
Status: DISCONTINUED | OUTPATIENT
Start: 2020-03-17 | End: 2020-03-19 | Stop reason: HOSPADM

## 2020-03-17 RX ORDER — DIPHENHYDRAMINE HYDROCHLORIDE 50 MG/ML
25 INJECTION INTRAMUSCULAR; INTRAVENOUS EVERY 4 HOURS PRN
Status: DISCONTINUED | OUTPATIENT
Start: 2020-03-17 | End: 2020-03-19 | Stop reason: HOSPADM

## 2020-03-17 RX ORDER — ENOXAPARIN SODIUM 100 MG/ML
40 INJECTION SUBCUTANEOUS DAILY
Status: DISCONTINUED | OUTPATIENT
Start: 2020-03-17 | End: 2020-03-17

## 2020-03-17 RX ORDER — ACETAMINOPHEN 325 MG/1
650 TABLET ORAL EVERY 6 HOURS PRN
Status: DISCONTINUED | OUTPATIENT
Start: 2020-03-17 | End: 2020-03-19 | Stop reason: HOSPADM

## 2020-03-17 RX ORDER — IBUPROFEN 600 MG/1
600 TABLET ORAL EVERY 6 HOURS PRN
Qty: 30 TABLET | Refills: 1 | Status: SHIPPED | OUTPATIENT
Start: 2020-03-17 | End: 2023-04-21

## 2020-03-17 RX ORDER — OXYCODONE AND ACETAMINOPHEN 5; 325 MG/1; MG/1
1 TABLET ORAL EVERY 4 HOURS PRN
Status: DISCONTINUED | OUTPATIENT
Start: 2020-03-17 | End: 2020-03-19 | Stop reason: HOSPADM

## 2020-03-17 RX ORDER — DIPHENHYDRAMINE HCL 25 MG
25 CAPSULE ORAL EVERY 4 HOURS PRN
Status: DISCONTINUED | OUTPATIENT
Start: 2020-03-17 | End: 2020-03-19 | Stop reason: HOSPADM

## 2020-03-17 RX ORDER — ONDANSETRON 8 MG/1
8 TABLET, ORALLY DISINTEGRATING ORAL EVERY 8 HOURS PRN
Status: DISCONTINUED | OUTPATIENT
Start: 2020-03-17 | End: 2020-03-19 | Stop reason: HOSPADM

## 2020-03-17 RX ORDER — ENOXAPARIN SODIUM 100 MG/ML
40 INJECTION SUBCUTANEOUS EVERY 24 HOURS
Status: DISCONTINUED | OUTPATIENT
Start: 2020-03-17 | End: 2020-03-19 | Stop reason: HOSPADM

## 2020-03-17 RX ORDER — IBUPROFEN 600 MG/1
600 TABLET ORAL EVERY 6 HOURS
Status: DISCONTINUED | OUTPATIENT
Start: 2020-03-17 | End: 2020-03-19 | Stop reason: HOSPADM

## 2020-03-17 RX ADMIN — IBUPROFEN 600 MG: 600 TABLET, FILM COATED ORAL at 10:03

## 2020-03-17 RX ADMIN — OXYCODONE HYDROCHLORIDE AND ACETAMINOPHEN 1 TABLET: 10; 325 TABLET ORAL at 10:03

## 2020-03-17 RX ADMIN — IBUPROFEN 600 MG: 600 TABLET, FILM COATED ORAL at 05:03

## 2020-03-17 RX ADMIN — ENOXAPARIN SODIUM 40 MG: 100 INJECTION SUBCUTANEOUS at 11:03

## 2020-03-17 RX ADMIN — Medication 334 MILLI-UNITS/MIN: at 12:03

## 2020-03-17 RX ADMIN — PRENATAL VIT W/ FE FUMARATE-FA TAB 27-0.8 MG 1 TABLET: 27-0.8 TAB at 08:03

## 2020-03-17 RX ADMIN — DOCUSATE SODIUM 200 MG: 100 CAPSULE, LIQUID FILLED ORAL at 08:03

## 2020-03-17 NOTE — ASSESSMENT & PLAN NOTE
1. 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 resolved cyanosis (normal sats for her around 92-98%)  3. Mild thrombocytopenia - common in Fontan patients.  4. s/p vaginal delivery 3/17/20   - post delivery orthostatic hypotension with sinus tachycardia likely exacerbated by epidural anesthesia induced vasodilation in a preload dependent patient   - currently without evidence of fluid overload, pathologic arrhythmias, and heart failure   - at risk for thrombosis (Fontan physiology, pregnancy)     Discussion:  She looks great.  There is no evidence of fluid overload.  In fact, she is orthostatic as evident this morning when she became dizzy and tachycardic going to the restroom.  She is preload sensitive (no subpulmonic ventricle to augment preload in times of stress) and likely a little vasodilated from her epidural.  I certainly see no need for lasix at present.       We again discussed the long-term risk associated with Fontan circulation. We discussed:     Plan:  1.  Would plan to discharge home on aspirin.  Agree with lovenox prophylaxis for now.   2.  I will see her in the morning.  3.  No need for diuretics at present.  4.  I will want to see her within a week or 2 of discharge - will schedule at that time.  5.  Telemetry monitoring.  6.  If she does well, she may be ready for discharge in the next 1-2 days.  7.  If significant hypotension, would give volume.

## 2020-03-17 NOTE — PROGRESS NOTES
LABOR PROGRESS NOTE    S:  MD to bedside for cervical check. Complaints: None.      O: Temp:  [97.5 °F (36.4 °C)-98.3 °F (36.8 °C)] 98.3 °F (36.8 °C)  Pulse:  [45-80] 55  Resp:  [16-18] 18  SpO2:  [89 %-97 %] 95 %  BP: (102-146)/(55-82) 120/67    FHT: 120 BPM/moderate beat to beat variability/+accels/+ occasional early decels, cat 1  CTX: q 2 minutes,   SVE:  @     ASSESSMENT:   24 y.o.  at 37w3d here for IOL  FHT reassuring      TIMELINE  2300: misoprostol given   0100: cuevas balloon placed without difficulty   0330: cuevas balloon out; cervix 460/-3. Pit started  0730: 460/-3  1000: /-3  1315: /-2, AROM clr  1530: 0  1830: 0  2030:     PLAN:  1. Labor management  -Continue Close Maternal/Fetal Monitoring.   -Pitocin augmentation per protocol,   -Recheck 1- 2 hours or PRN    Loni Ring MD  OBGYN, PGY-2

## 2020-03-17 NOTE — CARE UPDATE
MD to bedside for episode of HR in 160s while ambulating    Patient had elevated HR but was asymptomatic while this episode happened. Once sitting back in bed now back to normal sinus rhythm. No apparent distress.     Temp:  [97.8 °F (36.6 °C)-98.5 °F (36.9 °C)] 97.8 °F (36.6 °C)  Pulse:  [] 75  Resp:  [18] 18  SpO2:  [85 %-99 %] 99 %  BP: ()/(53-73) 95/53    PE:   CV: RRR, harsh S1 sound unchanged from prior CV exam  Lungs: CTAB    A/P  - Now back in NSR and asymptomatic  - Telemetry strips sent to be reviewed  - No acute changes in management needed at this time.     Erin Bowers M.D.   OB/GYN  PGY-1

## 2020-03-17 NOTE — L&D DELIVERY NOTE
[facility]  Vaginal Delivery   Operative Note    SUMMARY   MD to bedside for delivery. Patient complete and +1 station. Approximately two hours of maternal pushing resulted in   Normal spontaneous vaginal delivery of live female infant. Infant was unable to be placed immediately skin to skin due to assessment at the warmer. Infant delivered position TOI over perineum, but had descended OP throught most of the second stage.  Nuchal cord: No.    Spontaneous delivery of placenta and IV pitocin given noting good uterine tone.  1st degree laceration noted and repaired with hemostatic suture of 2-0 vicryl suture.    Patient tolerated delivery well. Sponge needle and lap counted correctly x2.    I WAS SCRUBBED AND PRESENT THROUGHOUT THE DELIVERY    Indications: Encounter for induction of labor  Pregnancy complicated by:   Patient Active Problem List   Diagnosis    Pulmonary stenosis    Situs inversus    S/P D&C (status post dilation and curettage)    Adult congenital heart disease    DORV (double outlet right ventricle)    S/P Fontan procedure    Congenital heart disease in pregnancy    Supervision of high risk pregnancy in second trimester    Dextrocardia    Thrombocytopenia    Poor fetal growth affecting management of mother in third trimester    Encounter for induction of labor     (spontaneous vaginal delivery)     Admitting GA: 37w4d    Delivery Information for Girl Theresa Grey    Birth information:  YOB: 2022   Time of birth: 12:11 AM   Sex: female   Head Delivery Date/Time:     Delivery type:    Gestational Age: 37w4d    Delivery Providers    Delivering clinician:             Measurements    Weight:  2350 g  Length:           Apgars    Living status:  Living  Apgars:   1 min.:   5 min.:   10 min.:   15 min.:   20 min.:     Skin color:   0  1  1      Heart rate:   2  2  2      Reflex irritability:   1  1  1      Muscle tone:   0  2  2      Respiratory effort:   0  2   2      Total:   3  8  8      Apgars assigned by:  SOLOMON PETERSEN RN AT 1 MIN AND NICU                         Interventions/Resuscitation         Cord    No data filed        Placenta    Placenta delivery date/time:    Placenta removal:             Labor Events:       labor:       Labor Onset Date/Time:         Dilation Complete Date/Time:         Start Pushing Date/Time:         Start Pushing Date/Time:       Rupture Date/Time: 20  1303         Rupture type:           Fluid Amount:        Fluid Color: Clear      Fluid Odor:        Membrane Status: ARM (Artificial Rupture)               steroids:       Antibiotics given for GBS:       Induction:       Indications for induction:        Augmentation:       Indications for augmentation:       Labor complications:       Additional complications:          Cervical ripening:                     Delivery:      Episiotomy:       Indication for Episiotomy:       Perineal Lacerations:   Repaired:      Periurethral Laceration:   Repaired:     Labial Laceration:   Repaired:     Sulcus Laceration:   Repaired:     Vaginal Laceration:   Repaired:     Cervical Laceration:   Repaired:     Repair suture:       Repair # of packets:       Last Value - EBL - Nursing (mL):       Sum - EBL - Nursing (mL): 0     Last Value - EBL - Anesthesia (mL):      Calculated QBL (mL):        Vaginal Sweep Performed:       Surgicount Correct:         Other providers:            Details (if applicable):  Trial of Labor      Categorization:      Priority:     Indications for :     Incision Type:       Additional  information:  Forceps:    Vacuum:    Breech:    Observed anomalies    Other (Comments):

## 2020-03-17 NOTE — DISCHARGE INSTRUCTIONS
Breastfeeding Discharge Instructions       Feed the baby at the earliest sign of hunger or comfort  o Hands to mouth, sucking motions  o Rooting or searching for something to suck on  o Dont wait for crying - it is a sign of distress     The feedings may be 8-12 times per 24hrs and will not follow a schedule   Avoid pacifiers and bottles for the first 4 weeks   Alternate the breast you start the feeding with, or start with the breast that feels the fullest   Switch breasts when the baby takes himself off the breast or falls asleep   Keep offering breasts until the baby looks full, no longer gives hunger signs, and stays asleep when placed on his back in the crib   If the baby is sleepy and wont wake for a feeding, put the baby skin-to-skin dressed in a diaper against the mothers bare chest   Sleep near your baby   The baby should be positioned and latched on to the breast correctly  o Chest-to-chest, chin in the breast  o Babys lips are flipped outward  o Babys mouth is stretched open wide like a shout  o Babys sucking should feel like tugging to the mother  - The baby should be drinking at the breast:  o You should hear swallowing or gulping throughout the feeding  o You should see milk on the babys lips when he comes off the breast  o Your breasts should be softer when the baby is finished feeding  o The baby should look relaxed at the end of feedings  o After the 4th day and your milk is in:  o The babys poop should turn bright yellow and be loose, watery, and seedy  o The baby should have at least 3-4 poops the size of the palm of your hand per day  o The baby should have at least 5-6 wet diapers per day  o The urine should be light yellow in color  You should drink when you are thirsty and eat a healthy diet when you are    hungry.     Take naps to get the rest you need.   Take medications and/or drink alcohol only with permission of your obstetrician    or the babys pediatrician.  You can  also call the Infant Risk Center,   ([phone]), Monday-Friday, 8am-5pm Central time, to get the most   up-to-date evidence-based information on the use of medications during   pregnancy and breastfeeding.      The baby should be examined by a pediatrician at 3-5 days of age.  Once your   milk comes in, the baby should be gaining at least ½ - 1oz each day and should be back to birthweight no later than 10-14 days of age.          Community Resources    [facility] Breastfeeding Warmline: [phone]   Local [facility] clinics: provide incentives and breastpumps to eligible mothers  [employer] (LLLI):  mother-to-mother support group website        www.SueEasyl.CardCash.com  Local La Leche League mother-to-mother support groups:        www.BCB Medical        La Leche League [facility]   Dr. Scott Wells website for latch videos and general information:        www.breastfeedinginc.ca  Infant Risk Center is a call center that provides information about the safety of taking medications while breastfeeding.  Call 1-248.850.3303, M-F, 8am-5pm, [place].  International Lactation Consultant Association provides resources for assistance:        www.ilca.org  [place] Breastfeeding Coalition provides informationand resources for parents  and the community    www.LaBreastfeedingSupport.org     Vidya Mena is a mom-to-mom support group:                             www.Moments.menatalie[employer]St[employer].com//breastfeedng-support/  [employer] for Healthy Babies:  8-012-490-BABY(3133)  C[employer]ait: a breastfeeding support group for women of color, [phone]

## 2020-03-17 NOTE — NURSING
Notified Dr. Nunez of patient's HR in the 160's after ambulating out of bed to use the restroom. Pt. is now in bed and VSS with a HR of 93. No new orders given at this time. MD states she will be rounded on soon. Will continue to monitor and maintain pt. safety.

## 2020-03-17 NOTE — SUBJECTIVE & OBJECTIVE
[facility]     Interval History: Patient delivered early this morning via vaginal delivery.  Overall, she is feeling well.  She still has a lot of leg numbness.  She had some urinary retention.  When she got up to use the rest room, she felt very dizzy and had some shortness of breath.  HR jumped up to about 160.  She felt much better when she got back in bed.  No orthopnea.  No edema.  Good urine output.    I reviewed her telemetry.  Sinus rhythm has been noted throughout.  When she was tachycardic while going to the bathroom, she was in sinus tachycardia.  No ectopy noted.    Objective:     Vital Signs (Most Recent):  Temp: 97.8 °F (36.6 °C) (03/17/20 0700)  Pulse: 86 (03/17/20 1130)  Resp: 18 (03/17/20 0333)  BP: (!) 98/54 (03/17/20 0925)  SpO2: 96 % (03/17/20 1130) Vital Signs (24h Range):  Temp:  [97.8 °F (36.6 °C)-98.5 °F (36.9 °C)] 97.8 °F (36.6 °C)  Pulse:  [] 86  Resp:  [18] 18  SpO2:  [85 %-99 %] 96 %  BP: ()/(53-73) 98/54     Weight: 55.8 kg (123 lb 0.3 oz)  Body mass index is 24.03 kg/m².     SpO2: 96 %  O2 Device (Oxygen Therapy): room air    Intake/Output - Last 3 Shifts       03/15 0700 - 03/16 0659 03/16 0700 - 03/17 0659 03/17 0700 - 03/18 0659    I.V. (mL/kg) 1125 (20.2) 1236.6 (22.2)     Total Intake(mL/kg) 1125 (20.2) 1236.6 (22.2)     Urine (mL/kg/hr) 500 2100 (1.6) 1125 (4.5)    Blood  345     Total Output 500 2445 1125    Net +625 -1208.4 -1125                 Lines/Drains/Airways     Peripheral Intravenous Line                 Peripheral IV - Single Lumen 03/15/20 2144 18 G Anterior;Left Forearm 1 day                Scheduled Medications:    enoxaparin  40 mg Subcutaneous Daily    ibuprofen  600 mg Oral Q6H    oxytocin in lactated ringers  95 beatrice-units/min Intravenous Once    prenatal vitamin  1 tablet Oral Daily       Continuous Medications:    benzocaine-lanolin         PRN Medications: acetaminophen, benzocaine-lanolin,  diphenhydrAMINE, diphenhydrAMINE, docusate sodium, hydrocortisone, lanolin, ondansetron, oxyCODONE-acetaminophen, oxyCODONE-acetaminophen, promethazine (PHENERGAN) IVPB, simethicone    Physical Exam    In general, she is an acyanotic, nondysmorphic appearing female in no apparent distress.   She was a little bit tachypneic.  The eyes, nares, and oropharynx are clear.  Eyelids and conjunctiva free of drainage and redness.  PERRLA.  Teeth in good repair.  Mucous membranes are moist.  The head is normocephalic and atraumatic.  The neck is supple without jugular venous distention or thyroid enlargement.  The lungs are clear to auscultation bilaterally.  There is a well healed sternotomy scar.  The PMI is in the right chest.  The first heart sound is normal.  The second is loud and single.  There are no gallops, rubs, or clicks in the supine position.  Her abdomen was not palpated.  Pulses are normal in all 4 extremities with brisk capillary refill and no edema.  No rashes are noted.  No tenderness or swelling in legs.  Her neurological exam is normal.    Lab Results   Component Value Date    WBC 13.14 (H) 03/15/2020    HGB 10.7 (L) 03/15/2020    HCT 34.2 (L) 03/15/2020    MCV 79 (L) 03/15/2020     (L) 03/15/2020     CMP  Sodium   Date Value Ref Range Status   01/20/2020 135 (L) 136 - 145 mmol/L Final     Potassium   Date Value Ref Range Status   01/20/2020 3.6 3.5 - 5.1 mmol/L Final     Chloride   Date Value Ref Range Status   01/20/2020 105 95 - 110 mmol/L Final     CO2   Date Value Ref Range Status   01/20/2020 21 (L) 23 - 29 mmol/L Final     Glucose   Date Value Ref Range Status   01/20/2020 71 70 - 110 mg/dL Final     BUN, Bld   Date Value Ref Range Status   01/20/2020 6 6 - 20 mg/dL Final     Creatinine   Date Value Ref Range Status   01/20/2020 0.6 0.5 - 1.4 mg/dL Final     Calcium   Date Value Ref Range Status   01/20/2020 9.1 8.7 - 10.5 mg/dL Final     Total Protein   Date Value Ref Range Status    01/20/2020 7.7 6.0 - 8.4 g/dL Final     Albumin   Date Value Ref Range Status   01/20/2020 3.1 (L) 3.5 - 5.2 g/dL Final     Total Bilirubin   Date Value Ref Range Status   01/20/2020 0.3 0.1 - 1.0 mg/dL Final     Comment:     For infants and newborns, interpretation of results should be based  on gestational age, weight and in agreement with clinical  observations.  Premature Infant recommended reference ranges:  Up to 24 hours.............<8.0 mg/dL  Up to 48 hours............<12.0 mg/dL  3-5 days..................<15.0 mg/dL  6-29 days.................<15.0 mg/dL       Alkaline Phosphatase   Date Value Ref Range Status   01/20/2020 112 55 - 135 U/L Final     AST   Date Value Ref Range Status   01/20/2020 12 10 - 40 U/L Final     ALT   Date Value Ref Range Status   01/20/2020 10 10 - 44 U/L Final     Anion Gap   Date Value Ref Range Status   01/20/2020 9 8 - 16 mmol/L Final     eGFR if    Date Value Ref Range Status   01/20/2020 >60.0 >60 mL/min/1.73 m^2 Final     eGFR if non    Date Value Ref Range Status   01/20/2020 >60.0 >60 mL/min/1.73 m^2 Final     Comment:     Calculation used to obtain the estimated glomerular filtration  rate (eGFR) is the CKD-EPI equation.          Echo 2/14/20:  Heterotaxy, abdominal situs inversus, dextrocardia (I, D, L) atrial situs inversus, atrioventricular concordance with jason-cross  AV valve arrangement, RV is leftward, anterior and superior, LV is rightward, posterior and inferior, large VSD and straddling  AV valves, d-malposed great arteries and pulmonary stenosis. - S/P Extracardiac Fontan (22mm Goretex graft) with division  of MPA and oversewing of the pulmonary valve - [facility] 9/2015.  Excellent ventricular systolic function  No pericardial effusion  Trivial AV valve insufficiency  Mildly dilated inferior vena cava consistent with Fontan physiology  No evidence of thrombus in the IVC or the proximal Fontan. Distal connection of the extracardiac  Fontan conduit to the  pulmonary artery without obstruction.  Left sided Pavel connection to the LPA well imaged without obstruction. Distal LPA not well seen, but no obstruction within the  proximal LPA and RPA.  No aortic stenosis or insufficiency.  Unrestrictive VSD with LV to RV shunt.  No change from previous echocardiogram.

## 2020-03-17 NOTE — PLAN OF CARE
Pt to attempt to latch baby. If no latch achieved, pt to hand express and provide by cup or spoon. If baby continues to display hunger cues, Pt to supplement with formula by cup or spoon. Pt to monitor voids and stool. Pt to feed baby at least 8 times in a 24 hour period.

## 2020-03-17 NOTE — PLAN OF CARE
Mother requested formula. Mother requested formula to be given with nipple but was open to cup feeding. Mother stated she feels like baby is not getting enough. RN explained the components at which we look at; wet and dirty diapers, weight loss, hunger ques, ect. Parent's feeling like infants were still hungry and asked about formula. RN educated on benefits of breast feeding and importance of consistency and repetition to sustain successful breastfeeding. Parent's with knowledge and understanding of the above requesting formula. 2.5 ml formula given.         Instructed on the risks of formula feeding including:   Lacks the nutrients found in colostrums to help prevent infection, mature the gut, aid in digestion and resist allergies   Contains artificial additives and preservatives which increases incidence of contamination   Increase spitting up due to slower digestion   Increased cost and requires preparation, including bottle sanitation and formula refrigeration   Increased incidence of NEC for the  baby   Increased risk of diabetes with family history, SIDS and ear infections   Skipped feedings for the breastfeeding mother increases chance of engorgement, mastitis and plugged ducts   Decreases breastfeeding babys appetite resulting in poor feeding session, decreased breast stimulation and poor milk supply   Exposes the breastfeeding baby to the possibility of allergic reactions and colic  Pt states understanding and verbalized appropriate recall.       Mother educated on how to cup/spoon feed baby.   Baby should be awake and alert for feeding.   Wrap baby securely in a blanket to prevent baby from bumping into container.   Hold the baby in an upright position supporting head and neck.    Raise the cup/spoon and rest rim lightly on babys lip.   Tip the cup/spoon so the milk touches babys lip so baby may sip it.   Avoid pouring milk into babys mouth.   Let the baby set the pace, allow  baby to pause as needed during feeding.   Burp baby every 10-15mls.   Baby is finished feeding when he stops sipping, body relaxes, turns away from  cup/spoon or falls asleep.   Mother able to return demonstrate cup/spoon feeding    Instructed on the risks of early pacifier or artificial nipple use, including:   May mask feeding cues leading to decreased milk supply due to decreased breast stimulation from delayed or skipped feedings with pacifier use   Nipple confusion due to the promotion of non-nutritive sucking on the pacifier/nipple   Increased nipple soreness due to non-nutritive sucking   Skipped feedings the increases chance of engorgement, mastitis and plugged ducts   Decreases the duration of exclusive breastfeeding   May make it more difficult for baby to attach to breast      Instructed on appropriate time to introduce a pacifier   * Delay use till breastfeeding is well established, around 3-4 weeks of age.     Instructed on signs that breastfeeding is well established.    * Milk supply has increased.   * Infant nurses 8 or more times a day.   * Infant is satisfied after feedings.   * Infant is gaining weight.   * Swallows are heard during feedings.   * Adequate voids & stools according to expected norms.        States understanding and verbalized appropriate recall.

## 2020-03-17 NOTE — PROGRESS NOTES
[facility]  Pediatric Cardiology  Progress Note    Patient Name: Theresa Grey  MRN: [id number]  Admission Date: 3/15/2020  Hospital Length of Stay: 2 days  Code Status: Prior   Attending Physician: Gabriella Dorado MD   Primary Care Physician: Gabriella Dorado MD  Expected Discharge Date:   Principal Problem:Encounter for induction of labor    Subjective:     [facility]     Interval History: Patient delivered early this morning via vaginal delivery.  Overall, she is feeling well.  She still has a lot of leg numbness.  She had some urinary retention.  When she got up to use the rest room, she felt very dizzy and had some shortness of breath.  HR jumped up to about 160.  She felt much better when she got back in bed.  No orthopnea.  No edema.  Good urine output.    I reviewed her telemetry.  Sinus rhythm has been noted throughout.  When she was tachycardic while going to the bathroom, she was in sinus tachycardia.  No ectopy noted.    Objective:     Vital Signs (Most Recent):  Temp: 97.8 °F (36.6 °C) (03/17/20 0700)  Pulse: 86 (03/17/20 1130)  Resp: 18 (03/17/20 0333)  BP: (!) 98/54 (03/17/20 0925)  SpO2: 96 % (03/17/20 1130) Vital Signs (24h Range):  Temp:  [97.8 °F (36.6 °C)-98.5 °F (36.9 °C)] 97.8 °F (36.6 °C)  Pulse:  [] 86  Resp:  [18] 18  SpO2:  [85 %-99 %] 96 %  BP: ()/(53-73) 98/54     Weight: 55.8 kg (123 lb 0.3 oz)  Body mass index is 24.03 kg/m².     SpO2: 96 %  O2 Device (Oxygen Therapy): room air    Intake/Output - Last 3 Shifts       03/15 0700 - 03/16 0659 03/16 0700 - 03/17 0659 03/17 0700 - 03/18 0659    I.V. (mL/kg) 1125 (20.2) 1236.6 (22.2)     Total Intake(mL/kg) 1125 (20.2) 1236.6 (22.2)     Urine (mL/kg/hr) 500 2100 (1.6) 1125 (4.5)    Blood  345     Total Output 500 2445 1125    Net +625 -1208.4 -1125                 Lines/Drains/Airways     Peripheral Intravenous Line                 Peripheral IV - Single Lumen 03/15/20 5695  18 G Anterior;Left Forearm 1 day                Scheduled Medications:    enoxaparin  40 mg Subcutaneous Daily    ibuprofen  600 mg Oral Q6H    oxytocin in lactated ringers  95 beatrice-units/min Intravenous Once    prenatal vitamin  1 tablet Oral Daily       Continuous Medications:    benzocaine-lanolin         PRN Medications: acetaminophen, benzocaine-lanolin, diphenhydrAMINE, diphenhydrAMINE, docusate sodium, hydrocortisone, lanolin, ondansetron, oxyCODONE-acetaminophen, oxyCODONE-acetaminophen, promethazine (PHENERGAN) IVPB, simethicone    Physical Exam    In general, she is an acyanotic, nondysmorphic appearing female in no apparent distress.   She was a little bit tachypneic.  The eyes, nares, and oropharynx are clear.  Eyelids and conjunctiva free of drainage and redness.  PERRLA.  Teeth in good repair.  Mucous membranes are moist.  The head is normocephalic and atraumatic.  The neck is supple without jugular venous distention or thyroid enlargement.  The lungs are clear to auscultation bilaterally.  There is a well healed sternotomy scar.  The PMI is in the right chest.  The first heart sound is normal.  The second is loud and single.  There are no gallops, rubs, or clicks in the supine position.  Her abdomen was not palpated.  Pulses are normal in all 4 extremities with brisk capillary refill and no edema.  No rashes are noted.  No tenderness or swelling in legs.  Her neurological exam is normal.    Lab Results   Component Value Date    WBC 13.14 (H) 03/15/2020    HGB 10.7 (L) 03/15/2020    HCT 34.2 (L) 03/15/2020    MCV 79 (L) 03/15/2020     (L) 03/15/2020     CMP  Sodium   Date Value Ref Range Status   01/20/2020 135 (L) 136 - 145 mmol/L Final     Potassium   Date Value Ref Range Status   01/20/2020 3.6 3.5 - 5.1 mmol/L Final     Chloride   Date Value Ref Range Status   01/20/2020 105 95 - 110 mmol/L Final     CO2   Date Value Ref Range Status   01/20/2020 21 (L) 23 - 29 mmol/L Final     Glucose    Date Value Ref Range Status   01/20/2020 71 70 - 110 mg/dL Final     BUN, Bld   Date Value Ref Range Status   01/20/2020 6 6 - 20 mg/dL Final     Creatinine   Date Value Ref Range Status   01/20/2020 0.6 0.5 - 1.4 mg/dL Final     Calcium   Date Value Ref Range Status   01/20/2020 9.1 8.7 - 10.5 mg/dL Final     Total Protein   Date Value Ref Range Status   01/20/2020 7.7 6.0 - 8.4 g/dL Final     Albumin   Date Value Ref Range Status   01/20/2020 3.1 (L) 3.5 - 5.2 g/dL Final     Total Bilirubin   Date Value Ref Range Status   01/20/2020 0.3 0.1 - 1.0 mg/dL Final     Comment:     For infants and newborns, interpretation of results should be based  on gestational age, weight and in agreement with clinical  observations.  Premature Infant recommended reference ranges:  Up to 24 hours.............<8.0 mg/dL  Up to 48 hours............<12.0 mg/dL  3-5 days..................<15.0 mg/dL  6-29 days.................<15.0 mg/dL       Alkaline Phosphatase   Date Value Ref Range Status   01/20/2020 112 55 - 135 U/L Final     AST   Date Value Ref Range Status   01/20/2020 12 10 - 40 U/L Final     ALT   Date Value Ref Range Status   01/20/2020 10 10 - 44 U/L Final     Anion Gap   Date Value Ref Range Status   01/20/2020 9 8 - 16 mmol/L Final     eGFR if    Date Value Ref Range Status   01/20/2020 >60.0 >60 mL/min/1.73 m^2 Final     eGFR if non    Date Value Ref Range Status   01/20/2020 >60.0 >60 mL/min/1.73 m^2 Final     Comment:     Calculation used to obtain the estimated glomerular filtration  rate (eGFR) is the CKD-EPI equation.          Echo 2/14/20:  Heterotaxy, abdominal situs inversus, dextrocardia (I, D, L) atrial situs inversus, atrioventricular concordance with jason-cross  AV valve arrangement, RV is leftward, anterior and superior, LV is rightward, posterior and inferior, large VSD and straddling  AV valves, d-malposed great arteries and pulmonary stenosis. - S/P Extracardiac Fontan  (22mm Goretex graft) with division  of MPA and oversewing of the pulmonary valve - [facility] 9/2015.  Excellent ventricular systolic function  No pericardial effusion  Trivial AV valve insufficiency  Mildly dilated inferior vena cava consistent with Fontan physiology  No evidence of thrombus in the IVC or the proximal Fontan. Distal connection of the extracardiac Fontan conduit to the  pulmonary artery without obstruction.  Left sided Pavel connection to the LPA well imaged without obstruction. Distal LPA not well seen, but no obstruction within the  proximal LPA and RPA.  No aortic stenosis or insufficiency.  Unrestrictive VSD with LV to RV shunt.  No change from previous echocardiogram.      Assessment and Plan:     Other  S/P Fontan procedure  1. 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 resolved cyanosis (normal sats for her around 92-98%)  3. Mild thrombocytopenia - common in Fontan patients.  4. s/p vaginal delivery 3/17/20   - post delivery orthostatic hypotension with sinus tachycardia likely exacerbated by epidural anesthesia induced vasodilation in a preload dependent patient   - currently without evidence of fluid overload, pathologic arrhythmias, and heart failure   - at risk for thrombosis (Fontan physiology, pregnancy)     Discussion:  She looks great.  There is no evidence of fluid overload.  In fact, she is orthostatic as evident this morning when she became dizzy and tachycardic going to the restroom.  She is preload sensitive (no subpulmonic ventricle to augment preload in times of stress) and  likely a little vasodilated from her epidural.  I certainly see no need for lasix at present.       We again discussed the long-term risk associated with Fontan circulation. We discussed:     Plan:  1.  Would plan to discharge home on aspirin.  Agree with lovenox prophylaxis for now.   2.  I will see her in the morning.  3.  No need for diuretics at present.  4.  I will want to see her within a week or 2 of discharge - will schedule at that time.  5.  Telemetry monitoring.  6.  If she does well, she may be ready for discharge in the next 1-2 days.  7.  If significant hypotension, would give volume.        Toro Weems MD  Pediatric Cardiology  [facility]

## 2020-03-17 NOTE — PROGRESS NOTES
LABOR PROGRESS NOTE    S:  MD to bedside for cervical check. Complaints: None.  Patient reports feeling increased pressure     O: Temp:  [97.5 °F (36.4 °C)-98.3 °F (36.8 °C)] 98.3 °F (36.8 °C)  Pulse:  [45-80] 64  Resp:  [16-18] 18  SpO2:  [89 %-97 %] 96 %  BP: (102-146)/(55-82) 133/70    FHT: 125 BPM/moderate beat to beat variability/+accels/+ occasional early decels, cat 1  CTX: q 2 minutes,   SVE: complete and +1 station     ASSESSMENT:   24 y.o.  at 37w3d here for IOL  FHT reassuring      TIMELINE  2300: misoprostol given   0100: cuevas balloon placed without difficulty   0330: cuevas balloon out; cervix 4/60/-3. Pit started  0730: 4/60/-3  1000: 5/90/-3  1315: 5/90/-2, AROM clr  1530: 6/90/0  1830: 8/90/0  2030: 9/90/0  2130: complete and +1 station     PLAN:  1. Labor management  -Continue Close Maternal/Fetal Monitoring.   -Pitocin augmentation per protocol,   -Plan to set up and begin pushing within next 30 minutes     Loni Ring MD  OBGYN, PGY-2

## 2020-03-18 PROBLEM — Z34.90 ENCOUNTER FOR INDUCTION OF LABOR: Status: RESOLVED | Noted: 2020-03-16 | Resolved: 2020-03-18

## 2020-03-18 PROCEDURE — 99231 PR SUBSEQUENT HOSPITAL CARE,LEVL I: ICD-10-PCS | Mod: ,,, | Performed by: OBSTETRICS & GYNECOLOGY

## 2020-03-18 PROCEDURE — 99231 SBSQ HOSP IP/OBS SF/LOW 25: CPT | Mod: ,,, | Performed by: OBSTETRICS & GYNECOLOGY

## 2020-03-18 PROCEDURE — 63600175 PHARM REV CODE 636 W HCPCS: Performed by: STUDENT IN AN ORGANIZED HEALTH CARE EDUCATION/TRAINING PROGRAM

## 2020-03-18 PROCEDURE — 25000003 PHARM REV CODE 250: Performed by: STUDENT IN AN ORGANIZED HEALTH CARE EDUCATION/TRAINING PROGRAM

## 2020-03-18 PROCEDURE — 11000001 HC ACUTE MED/SURG PRIVATE ROOM

## 2020-03-18 PROCEDURE — 99232 PR SUBSEQUENT HOSPITAL CARE,LEVL II: ICD-10-PCS | Mod: ,,, | Performed by: PEDIATRICS

## 2020-03-18 PROCEDURE — 99232 SBSQ HOSP IP/OBS MODERATE 35: CPT | Mod: ,,, | Performed by: PEDIATRICS

## 2020-03-18 RX ORDER — ASPIRIN 81 MG/1
81 TABLET ORAL DAILY
Qty: 360 TABLET | Refills: 0 | Status: ON HOLD | OUTPATIENT
Start: 2020-03-18 | End: 2023-10-21 | Stop reason: HOSPADM

## 2020-03-18 RX ADMIN — IBUPROFEN 600 MG: 600 TABLET, FILM COATED ORAL at 12:03

## 2020-03-18 RX ADMIN — PRENATAL VIT W/ FE FUMARATE-FA TAB 27-0.8 MG 1 TABLET: 27-0.8 TAB at 09:03

## 2020-03-18 RX ADMIN — ENOXAPARIN SODIUM 40 MG: 100 INJECTION SUBCUTANEOUS at 05:03

## 2020-03-18 RX ADMIN — IBUPROFEN 600 MG: 600 TABLET, FILM COATED ORAL at 05:03

## 2020-03-18 RX ADMIN — DOCUSATE SODIUM 200 MG: 100 CAPSULE, LIQUID FILLED ORAL at 08:03

## 2020-03-18 NOTE — ANESTHESIA POSTPROCEDURE EVALUATION
Anesthesia Post Evaluation    Patient: Theresa Grey    Procedure(s) Performed: * No procedures listed *    Final Anesthesia Type: epidural    Patient location during evaluation: labor & delivery  Patient participation: Yes- Able to Participate  Level of consciousness: awake and alert and oriented  Post-procedure vital signs: reviewed and stable  Pain management: adequate  Airway patency: patent    PONV status at discharge: No PONV  Anesthetic complications: no      Cardiovascular status: blood pressure returned to baseline and hemodynamically stable  Respiratory status: unassisted  Hydration status: euvolemic  Follow-up not needed.          Vitals Value Taken Time   /57 3/18/2020 11:00 AM   Temp 37 °C (98.6 °F) 3/18/2020 11:00 AM   Pulse 66 3/18/2020 11:00 AM   Resp 18 3/18/2020 11:00 AM   SpO2 98 % 3/18/2020 11:00 AM         No case tracking events are documented in the log.      Pain/Miriam Score: Pain Rating Prior to Med Admin: 7 (3/18/2020 12:16 PM)  Pain Rating Post Med Admin: 0 (3/18/2020  1:02 PM)

## 2020-03-18 NOTE — PROGRESS NOTES
POSTPARTUM PROGRESS NOTE     Theresa Grey is a 24 y.o. female PPD #1 status post Spontaneous vaginal delivery at 37w4d in a pregnancy complicated by maternal congenital heart disease (followed by Dr. Weems) and fetal growth restriction. Patient is doing well this morning. She denies nausea, vomiting, fever or chills, shortness of breath.  Patient reports mild abdominal pain that is adequately relieved by oral pain medications. Lochia is mild to moderate  and stable. Patient is voiding without difficulty and ambulating with no difficulty. She has passed flatus, and has not had BM.  Patient does plan to breast feed. Depo for contraception.     Objective:       Temp:  [97.6 °F (36.4 °C)-98.2 °F (36.8 °C)] 97.6 °F (36.4 °C)  Pulse:  [52-93] 61  Resp:  [18] 18  SpO2:  [95 %-99 %] 96 %  BP: ()/(50-60) 128/60    General:   alert, appears stated age and cooperative   Lungs:   clear to auscultation bilaterally   Heart:   regular rate and rhythm   Abdomen:  soft, non-tender; bowel sounds normal; no masses,  no organomegaly   Uterus:  firm located at the umblicus.    Extremities: no edema, redness or tenderness in the calves or thighs     Lab Review  No results found for this or any previous visit (from the past 4 hour(s)).    I/O    Intake/Output Summary (Last 24 hours) at 3/18/2020 0618  Last data filed at 3/17/2020 1000  Gross per 24 hour   Intake --   Output 1125 ml   Net -1125 ml        Assessment:     Patient Active Problem List   Diagnosis    Pulmonary stenosis    Situs inversus    S/P D&C (status post dilation and curettage)    Adult congenital heart disease    DORV (double outlet right ventricle)    S/P Fontan procedure    Congenital heart disease in pregnancy    Supervision of high risk pregnancy in second trimester    Dextrocardia    Thrombocytopenia    Poor fetal growth affecting management of mother in third trimester    Encounter for induction of labor     (spontaneous vaginal delivery)         Plan:   1. Postpartum care:  - Patient doing well. Continue routine management and advances.  - Continue PO pain meds. Pain well controlled.  - Heme: H/h 10.7/34.2 > 9.5/29.1  - Encourage ambulation  - Contraception: desires depo  - Lactation PRN    2. Maternal congenital heart disease  - Dextrocardia, DORV, large VSD, pulmonary stenosis  - s/p Fontan procedure in 2016  - Was on telemetry for 24 hours after delivery  - Asymptomatic, no evidence of fluid overload  - Dr. Weems following, appreciate recs   - No need for lasix at this time   - If significant hypotension, would give volume   - Lovenox while inpatient, ASA on discharge  - Plan for follow up with Dr. Weems within 2 weeks of discharge      Michelle Bob MD  OBGYN PGY-2      Dispo: As patient meets milestones, will plan to discharge PPD #2.    Michelle Bob

## 2020-03-18 NOTE — ASSESSMENT & PLAN NOTE
1. 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 resolved cyanosis (normal sats for her around 92-98%)  3. Mild thrombocytopenia - common in Fontan patients.  4. s/p vaginal delivery 3/17/20   - post delivery orthostatic hypotension with sinus tachycardia likely exacerbated by epidural anesthesia induced vasodilation in a preload dependent patient.  Resolved.   - currently without evidence of fluid overload, pathologic arrhythmias, and heart failure   - at risk for thrombosis (Fontan physiology, pregnancy)     Discussion:  She looks great.  There is no evidence of fluid overload and her orthostatic dizziness has resolved.  I certainly see no need for lasix at present.       Plan:  1.  Would plan to discharge home on a aspirin 81mg daily.  2.  Fine to discharge home from cards standpoint.  3.  No need for diuretics at present.  4.  I will want to see her within a week or 2 of discharge - I will have my nurse schedule her to see me a week from Friday in my [place] clinic.

## 2020-03-18 NOTE — SUBJECTIVE & OBJECTIVE
[facility]     Interval History: Urinating well.  No more dizziness.  No shortness of breath or orthopnea.  Eating and ambulating well.    I reviewed her telemetry.  Sinus rhythm has been noted throughout.      Objective:     Vital Signs (Most Recent):  Temp: 98 °F (36.7 °C) (03/18/20 0804)  Pulse: 72 (03/18/20 0804)  Resp: 18 (03/18/20 0804)  BP: 111/61 (03/18/20 0804)  SpO2: 95 % (03/18/20 0804) Vital Signs (24h Range):  Temp:  [97.6 °F (36.4 °C)-98.2 °F (36.8 °C)] 98 °F (36.7 °C)  Pulse:  [52-88] 72  Resp:  [18] 18  SpO2:  [95 %-99 %] 95 %  BP: ()/(50-61) 111/61     Weight: 55.8 kg (123 lb 0.3 oz)  Body mass index is 24.03 kg/m².     SpO2: 95 %  O2 Device (Oxygen Therapy): room air    Intake/Output - Last 3 Shifts       03/16 0700 - 03/17 0659 03/17 0700 - 03/18 0659 03/18 0700 - 03/19 0659    I.V. (mL/kg) 1236.6 (22.2)      Total Intake(mL/kg) 1236.6 (22.2)      Urine (mL/kg/hr) 2100 (1.6) 1125 (0.8)     Blood 345      Total Output 2445 1125     Net -1208.4 -1125            Urine Occurrence  1 x     Stool Occurrence  1 x           Lines/Drains/Airways     Epidural Line                 Neuraxial Analgesia/Anesthesia Assessment (using dermatomes) Epidural 03/16/20 2335 1 day          Peripheral Intravenous Line                 Peripheral IV - Single Lumen 03/15/20 2144 18 G Anterior;Left Forearm 2 days                Scheduled Medications:    enoxaparin  40 mg Subcutaneous Daily    ibuprofen  600 mg Oral Q6H    oxytocin in lactated ringers  95 beatrice-units/min Intravenous Once    prenatal vitamin  1 tablet Oral Daily       Continuous Medications:    benzocaine-lanolin         PRN Medications: acetaminophen, benzocaine-lanolin, diphenhydrAMINE, diphenhydrAMINE, docusate sodium, hydrocortisone, lanolin, ondansetron, oxyCODONE-acetaminophen, oxyCODONE-acetaminophen, promethazine (PHENERGAN) IVPB, simethicone    Physical Exam    In general, she is an acyanotic,  nondysmorphic appearing female in no apparent distress.   She was not tachypneic.  The eyes, nares, and oropharynx are clear.  Eyelids and conjunctiva free of drainage and redness.  PERRLA.  Teeth in good repair.  Mucous membranes are moist.  The head is normocephalic and atraumatic.  The neck is supple without jugular venous distention or thyroid enlargement.  The lungs are clear to auscultation bilaterally.  There is a well healed sternotomy scar.  The PMI is in the right chest.  The first heart sound is normal.  The second is loud and single.  There are no gallops, rubs, or clicks in the supine position.  Her abdomen was not palpated.  Pulses are normal in all 4 extremities with brisk capillary refill and no edema.  No rashes are noted.  No tenderness or swelling in legs.  Her neurological exam is normal.    Lab Results   Component Value Date    WBC 13.14 (H) 03/15/2020    HGB 10.7 (L) 03/15/2020    HCT 34.2 (L) 03/15/2020    MCV 79 (L) 03/15/2020     (L) 03/15/2020     CMP  Sodium   Date Value Ref Range Status   01/20/2020 135 (L) 136 - 145 mmol/L Final     Potassium   Date Value Ref Range Status   01/20/2020 3.6 3.5 - 5.1 mmol/L Final     Chloride   Date Value Ref Range Status   01/20/2020 105 95 - 110 mmol/L Final     CO2   Date Value Ref Range Status   01/20/2020 21 (L) 23 - 29 mmol/L Final     Glucose   Date Value Ref Range Status   01/20/2020 71 70 - 110 mg/dL Final     BUN, Bld   Date Value Ref Range Status   01/20/2020 6 6 - 20 mg/dL Final     Creatinine   Date Value Ref Range Status   01/20/2020 0.6 0.5 - 1.4 mg/dL Final     Calcium   Date Value Ref Range Status   01/20/2020 9.1 8.7 - 10.5 mg/dL Final     Total Protein   Date Value Ref Range Status   01/20/2020 7.7 6.0 - 8.4 g/dL Final     Albumin   Date Value Ref Range Status   01/20/2020 3.1 (L) 3.5 - 5.2 g/dL Final     Total Bilirubin   Date Value Ref Range Status   01/20/2020 0.3 0.1 - 1.0 mg/dL Final     Comment:     For infants and newborns,  interpretation of results should be based  on gestational age, weight and in agreement with clinical  observations.  Premature Infant recommended reference ranges:  Up to 24 hours.............<8.0 mg/dL  Up to 48 hours............<12.0 mg/dL  3-5 days..................<15.0 mg/dL  6-29 days.................<15.0 mg/dL       Alkaline Phosphatase   Date Value Ref Range Status   01/20/2020 112 55 - 135 U/L Final     AST   Date Value Ref Range Status   01/20/2020 12 10 - 40 U/L Final     ALT   Date Value Ref Range Status   01/20/2020 10 10 - 44 U/L Final     Anion Gap   Date Value Ref Range Status   01/20/2020 9 8 - 16 mmol/L Final     eGFR if    Date Value Ref Range Status   01/20/2020 >60.0 >60 mL/min/1.73 m^2 Final     eGFR if non    Date Value Ref Range Status   01/20/2020 >60.0 >60 mL/min/1.73 m^2 Final     Comment:     Calculation used to obtain the estimated glomerular filtration  rate (eGFR) is the CKD-EPI equation.          Echo 2/14/20:  Heterotaxy, abdominal situs inversus, dextrocardia (I, D, L) atrial situs inversus, atrioventricular concordance with jason-cross  AV valve arrangement, RV is leftward, anterior and superior, LV is rightward, posterior and inferior, large VSD and straddling  AV valves, d-malposed great arteries and pulmonary stenosis. - S/P Extracardiac Fontan (22mm Goretex graft) with division  of MPA and oversewing of the pulmonary valve - [facility] 9/2015.  Excellent ventricular systolic function  No pericardial effusion  Trivial AV valve insufficiency  Mildly dilated inferior vena cava consistent with Fontan physiology  No evidence of thrombus in the IVC or the proximal Fontan. Distal connection of the extracardiac Fontan conduit to the  pulmonary artery without obstruction.  Left sided Pavel connection to the LPA well imaged without obstruction. Distal LPA not well seen, but no obstruction within the  proximal LPA and RPA.  No aortic stenosis or  insufficiency.  Unrestrictive VSD with LV to RV shunt.  No change from previous echocardiogram.

## 2020-03-18 NOTE — PLAN OF CARE
Pt ambulating and voiding without difficulty. Patient safety maintained, side rails up, bed low and locked position.  Pain well controlled with PO pain medication. Fundus midline, firm, with moderate lochia.   S/p tele and s/p continuous pulse ox monitoring. Significant other at bedside; parents responding to infant cues and bonding appropriately. Will continue to monitor.

## 2020-03-18 NOTE — PLAN OF CARE
Received call from [facility] stating pt is in bradycardia (53-55 HR). RN assessed pt and pt stated she feels fine. Pt denies light headedness/dizziness. Dr. Ring stated to continue to monitor.

## 2020-03-18 NOTE — DISCHARGE SUMMARY
Delivery Discharge Summary  Obstetrics      Primary OB Clinician: Gabriella Dorado MD      Admission date: 3/15/2020  Discharge date: 2020    Disposition: To home, self care    Discharge Diagnosis List:      Patient Active Problem List   Diagnosis    Pulmonary stenosis    Situs inversus    S/P D&C (status post dilation and curettage)    Adult congenital heart disease    DORV (double outlet right ventricle)    S/P Fontan procedure    Congenital heart disease in pregnancy    Supervision of high risk pregnancy in second trimester    Dextrocardia    Thrombocytopenia    Poor fetal growth affecting management of mother in third trimester     (spontaneous vaginal delivery)       Procedure:     Hospital Course:  Theresa Grey is a 24 y.o. now , PPD #1 who was admitted on 3/15/2020 at 37w2d for induction of labor secondary to maternal congenital heart defect. Patient was subsequently admitted to labor and delivery unit with signed consents.     Labor course was uncomplicated and resulted in  without complications.     Please see delivery note for further details. Her postpartum course was uncomplicated. Patient remained on telemetry for 24 hours postpartum with sinus rhythm. On discharge day, patient's pain is controlled with oral pain medications. Pt is tolerating ambulation without SOB or CP, and regular diet without N/V. Reports lochia is mild. Denies any HA, vision changes, F/C, LE swelling. Denies any breast pain/soreness.    Pt in stable condition and ready for discharge. She has been instructed to start and/or continue medications and follow up with her obstetrics provider as listed below.  She has been seen by cardiology who has cleared her for discharge from a cardiovascular standpoint. She will be going home on 81 mg ASA daily.    Pertinent studies:  CBC  Recent Labs   Lab 03/15/20  2130 20  1218   WBC 13.14* 21.74*   HGB 10.7* 9.5*   HCT 34.2* 29.1*   MCV 79* 78*   *  109*          Immunization History   Administered Date(s) Administered    Tdap 01/31/2020        Delivery:    Episiotomy: None   Lacerations: 1st   Repair suture:     Repair # of packets: 2   Blood loss (ml):       Birth information:  YOB: 2022   Time of birth: 12:11 AM   Sex: female   Delivery type: Vaginal, Spontaneous   Gestational Age: 37w4d    Delivery Clinician:      Other providers:       Additional  information:  Forceps:    Vacuum:    Breech:    Observed anomalies      Living?:           APGARS  One minute Five minutes Ten minutes   Skin color:         Heart rate:         Grimace:         Muscle tone:         Breathing:         Totals: 3  8  8      Placenta: Delivered:       appearance      Patient Instructions:   Current Discharge Medication List      START taking these medications    Details   !! aspirin (ECOTRIN) 81 MG EC tablet Take 1 tablet (81 mg total) by mouth once daily.  Qty: 360 tablet, Refills: 0      docusate sodium (COLACE) 100 MG capsule Take 2 capsules (200 mg total) by mouth 2 (two) times daily as needed for Constipation.  Qty: 30 capsule, Refills: 1      ibuprofen (ADVIL,MOTRIN) 600 MG tablet Take 1 tablet (600 mg total) by mouth every 6 (six) hours as needed for Pain.  Qty: 30 tablet, Refills: 1       !! - Potential duplicate medications found. Please discuss with provider.      CONTINUE these medications which have NOT CHANGED    Details   !! aspirin (ECOTRIN) 81 MG EC tablet Take 81 mg by mouth once daily.      enoxaparin (LOVENOX) 40 mg/0.4 mL Syrg Inject 0.4 mLs (40 mg total) into the skin once daily.  Qty: 30 Syringe, Refills: 12      PRENATAL NO.116-IRON-FOLIC-DHA ORAL Take 1 tablet by mouth once daily.       !! - Potential duplicate medications found. Please discuss with provider.          Discharge Procedure Orders   Diet Adult Regular     Pelvic Rest   Order Comments: Strict pelvic rest - nothing in the vagina for 6 weeks (no sex, tampons, douching, tampons,  etc.)  No driving while taking narcotic pain medication or until you feel as though you can safely slam on the brakes if necessary     Notify your health care provider if you experience any of the following:  temperature >100.4     Notify your health care provider if you experience any of the following:  persistent nausea and vomiting or diarrhea     Notify your health care provider if you experience any of the following:  severe uncontrolled pain     Notify your health care provider if you experience any of the following:  redness, tenderness, or signs of infection (pain, swelling, redness, odor or green/yellow discharge around incision site)     Notify your health care provider if you experience any of the following:  difficulty breathing or increased cough     Notify your health care provider if you experience any of the following:  severe persistent headache     Notify your health care provider if you experience any of the following:  worsening rash     Notify your health care provider if you experience any of the following:  persistent dizziness, light-headedness, or visual disturbances     Notify your health care provider if you experience any of the following:  increased confusion or weakness     Notify your health care provider if you experience any of the following:   Order Comments: Heavy vaginal bleeding saturating more than 1 pad for at least 2 hours     Activity as tolerated       Follow-up Information     Gabriella Dorado MD. Schedule an appointment as soon as possible for a visit in 6 weeks.    Specialty:  Obstetrics and Gynecology  Why:  Postpartum Visit  Contact information:  [address] [facility] [address] [phone]             Toro Weems MD. Schedule an appointment as soon as possible for a visit in 1 week.    Specialties:  Pediatric Cardiology, Cardiology  Why:  Hospital Follow up  Contact information:  [address] [facility] [id number] [phone]                     Michelle Bob MD  OBGYN PGY-2

## 2020-03-18 NOTE — PROGRESS NOTES
[facility]  Pediatric Cardiology  Progress Note    Patient Name: Theresa Grey  MRN: [id number]  Admission Date: 3/15/2020  Hospital Length of Stay: 3 days  Code Status: Prior   Attending Physician: Gabriella Dorado MD   Primary Care Physician: Gabriella Dorado MD  Expected Discharge Date:   Principal Problem:Encounter for induction of labor    Subjective:     [facility]     Interval History: Urinating well.  No more dizziness.  No shortness of breath or orthopnea.  Eating and ambulating well.    I reviewed her telemetry.  Sinus rhythm has been noted throughout.      Objective:     Vital Signs (Most Recent):  Temp: 98 °F (36.7 °C) (03/18/20 0804)  Pulse: 72 (03/18/20 0804)  Resp: 18 (03/18/20 0804)  BP: 111/61 (03/18/20 0804)  SpO2: 95 % (03/18/20 0804) Vital Signs (24h Range):  Temp:  [97.6 °F (36.4 °C)-98.2 °F (36.8 °C)] 98 °F (36.7 °C)  Pulse:  [52-88] 72  Resp:  [18] 18  SpO2:  [95 %-99 %] 95 %  BP: ()/(50-61) 111/61     Weight: 55.8 kg (123 lb 0.3 oz)  Body mass index is 24.03 kg/m².     SpO2: 95 %  O2 Device (Oxygen Therapy): room air    Intake/Output - Last 3 Shifts       03/16 0700 - 03/17 0659 03/17 0700 - 03/18 0659 03/18 0700 - 03/19 0659    I.V. (mL/kg) 1236.6 (22.2)      Total Intake(mL/kg) 1236.6 (22.2)      Urine (mL/kg/hr) 2100 (1.6) 1125 (0.8)     Blood 345      Total Output 2445 1125     Net -1208.4 -1125            Urine Occurrence  1 x     Stool Occurrence  1 x           Lines/Drains/Airways     Epidural Line                 Neuraxial Analgesia/Anesthesia Assessment (using dermatomes) Epidural 03/16/20 2335 1 day          Peripheral Intravenous Line                 Peripheral IV - Single Lumen 03/15/20 2144 18 G Anterior;Left Forearm 2 days                Scheduled Medications:    enoxaparin  40 mg Subcutaneous Daily    ibuprofen  600 mg Oral Q6H    oxytocin in lactated ringers  95 beatrice-units/min Intravenous Once    prenatal  vitamin  1 tablet Oral Daily       Continuous Medications:    benzocaine-lanolin         PRN Medications: acetaminophen, benzocaine-lanolin, diphenhydrAMINE, diphenhydrAMINE, docusate sodium, hydrocortisone, lanolin, ondansetron, oxyCODONE-acetaminophen, oxyCODONE-acetaminophen, promethazine (PHENERGAN) IVPB, simethicone    Physical Exam    In general, she is an acyanotic, nondysmorphic appearing female in no apparent distress.   She was not tachypneic.  The eyes, nares, and oropharynx are clear.  Eyelids and conjunctiva free of drainage and redness.  PERRLA.  Teeth in good repair.  Mucous membranes are moist.  The head is normocephalic and atraumatic.  The neck is supple without jugular venous distention or thyroid enlargement.  The lungs are clear to auscultation bilaterally.  There is a well healed sternotomy scar.  The PMI is in the right chest.  The first heart sound is normal.  The second is loud and single.  There are no gallops, rubs, or clicks in the supine position.  Her abdomen was not palpated.  Pulses are normal in all 4 extremities with brisk capillary refill and no edema.  No rashes are noted.  No tenderness or swelling in legs.  Her neurological exam is normal.    Lab Results   Component Value Date    WBC 13.14 (H) 03/15/2020    HGB 10.7 (L) 03/15/2020    HCT 34.2 (L) 03/15/2020    MCV 79 (L) 03/15/2020     (L) 03/15/2020     CMP  Sodium   Date Value Ref Range Status   01/20/2020 135 (L) 136 - 145 mmol/L Final     Potassium   Date Value Ref Range Status   01/20/2020 3.6 3.5 - 5.1 mmol/L Final     Chloride   Date Value Ref Range Status   01/20/2020 105 95 - 110 mmol/L Final     CO2   Date Value Ref Range Status   01/20/2020 21 (L) 23 - 29 mmol/L Final     Glucose   Date Value Ref Range Status   01/20/2020 71 70 - 110 mg/dL Final     BUN, Bld   Date Value Ref Range Status   01/20/2020 6 6 - 20 mg/dL Final     Creatinine   Date Value Ref Range Status   01/20/2020 0.6 0.5 - 1.4 mg/dL Final      Calcium   Date Value Ref Range Status   01/20/2020 9.1 8.7 - 10.5 mg/dL Final     Total Protein   Date Value Ref Range Status   01/20/2020 7.7 6.0 - 8.4 g/dL Final     Albumin   Date Value Ref Range Status   01/20/2020 3.1 (L) 3.5 - 5.2 g/dL Final     Total Bilirubin   Date Value Ref Range Status   01/20/2020 0.3 0.1 - 1.0 mg/dL Final     Comment:     For infants and newborns, interpretation of results should be based  on gestational age, weight and in agreement with clinical  observations.  Premature Infant recommended reference ranges:  Up to 24 hours.............<8.0 mg/dL  Up to 48 hours............<12.0 mg/dL  3-5 days..................<15.0 mg/dL  6-29 days.................<15.0 mg/dL       Alkaline Phosphatase   Date Value Ref Range Status   01/20/2020 112 55 - 135 U/L Final     AST   Date Value Ref Range Status   01/20/2020 12 10 - 40 U/L Final     ALT   Date Value Ref Range Status   01/20/2020 10 10 - 44 U/L Final     Anion Gap   Date Value Ref Range Status   01/20/2020 9 8 - 16 mmol/L Final     eGFR if    Date Value Ref Range Status   01/20/2020 >60.0 >60 mL/min/1.73 m^2 Final     eGFR if non    Date Value Ref Range Status   01/20/2020 >60.0 >60 mL/min/1.73 m^2 Final     Comment:     Calculation used to obtain the estimated glomerular filtration  rate (eGFR) is the CKD-EPI equation.          Echo 2/14/20:  Heterotaxy, abdominal situs inversus, dextrocardia (I, D, L) atrial situs inversus, atrioventricular concordance with jason-cross  AV valve arrangement, RV is leftward, anterior and superior, LV is rightward, posterior and inferior, large VSD and straddling  AV valves, d-malposed great arteries and pulmonary stenosis. - S/P Extracardiac Fontan (22mm Goretex graft) with division  of MPA and oversewing of the pulmonary valve - [facility] 9/2015.  Excellent ventricular systolic function  No pericardial effusion  Trivial AV valve insufficiency  Mildly dilated inferior vena cava  consistent with Fontan physiology  No evidence of thrombus in the IVC or the proximal Fontan. Distal connection of the extracardiac Fontan conduit to the  pulmonary artery without obstruction.  Left sided Pavel connection to the LPA well imaged without obstruction. Distal LPA not well seen, but no obstruction within the  proximal LPA and RPA.  No aortic stenosis or insufficiency.  Unrestrictive VSD with LV to RV shunt.  No change from previous echocardiogram.      Assessment and Plan:     Other  S/P Fontan procedure  1. 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 resolved cyanosis (normal sats for her around 92-98%)  3. Mild thrombocytopenia - common in Fontan patients.  4. s/p vaginal delivery 3/17/20   - post delivery orthostatic hypotension with sinus tachycardia likely exacerbated by epidural anesthesia induced vasodilation in a preload dependent patient.  Resolved.   - currently without evidence of fluid overload, pathologic arrhythmias, and heart failure   - at risk for thrombosis (Fontan physiology, pregnancy)     Discussion:  She looks great.  There is no evidence of fluid overload and her orthostatic dizziness has resolved.  I certainly see no need for lasix at present.       Plan:  1.  Would plan to discharge home on a aspirin 81mg daily.  2.  Fine to discharge home from cards standpoint.  3.  No need for diuretics at present.  4.  I will want to see her within a week or 2 of discharge - I will have my nurse schedule her to see me a week from Friday in my [place] clinic.        Toro Weems,  MD  Pediatric Cardiology  [facility]

## 2020-03-18 NOTE — PLAN OF CARE
Sabrehony pump, tubing, collections containers and labels brought to bedside.  Discussed proper pump setting of initiation phase.  Instructed on proper usage of pump and to adjust suction according to maximum comfort level.  Verified appropriate flange fit.  Educated on the frequency and duration of pumping in order to promote and maintain a full milk supply.  Hands on pumping technique reviewed.  Encouraged hand expression after pumping.  Instructed on cleaning of breast pump parts.  Written instructions also given.  Pt verbalized understanding and appropriate recall for proper milk handling, collection, labeling, storage and transportation.

## 2020-03-19 VITALS
DIASTOLIC BLOOD PRESSURE: 58 MMHG | TEMPERATURE: 99 F | RESPIRATION RATE: 18 BRPM | BODY MASS INDEX: 24.15 KG/M2 | HEIGHT: 60 IN | WEIGHT: 123 LBS | HEART RATE: 69 BPM | SYSTOLIC BLOOD PRESSURE: 114 MMHG | OXYGEN SATURATION: 96 %

## 2020-03-19 PROBLEM — O36.5930 POOR FETAL GROWTH AFFECTING MANAGEMENT OF MOTHER IN THIRD TRIMESTER: Status: RESOLVED | Noted: 2020-02-25 | Resolved: 2020-03-19

## 2020-03-19 PROCEDURE — 25000003 PHARM REV CODE 250: Performed by: STUDENT IN AN ORGANIZED HEALTH CARE EDUCATION/TRAINING PROGRAM

## 2020-03-19 PROCEDURE — 99238 PR HOSPITAL DISCHARGE DAY,<30 MIN: ICD-10-PCS | Mod: ,,, | Performed by: OBSTETRICS & GYNECOLOGY

## 2020-03-19 PROCEDURE — 99238 HOSP IP/OBS DSCHRG MGMT 30/<: CPT | Mod: ,,, | Performed by: OBSTETRICS & GYNECOLOGY

## 2020-03-19 RX ORDER — MEDROXYPROGESTERONE ACETATE 150 MG/ML
150 INJECTION, SUSPENSION INTRAMUSCULAR ONCE
Status: DISCONTINUED | OUTPATIENT
Start: 2020-03-19 | End: 2020-03-19 | Stop reason: HOSPADM

## 2020-03-19 RX ADMIN — IBUPROFEN 600 MG: 600 TABLET, FILM COATED ORAL at 11:03

## 2020-03-19 RX ADMIN — IBUPROFEN 600 MG: 600 TABLET, FILM COATED ORAL at 05:03

## 2020-03-19 RX ADMIN — IBUPROFEN 600 MG: 600 TABLET, FILM COATED ORAL at 12:03

## 2020-03-19 RX ADMIN — PRENATAL VIT W/ FE FUMARATE-FA TAB 27-0.8 MG 1 TABLET: 27-0.8 TAB at 09:03

## 2020-03-19 NOTE — PROGRESS NOTES
POSTPARTUM PROGRESS NOTE     Theresa Grey is a 24 y.o. female PPD #2 status post Spontaneous vaginal delivery at 37w4d in a pregnancy complicated by maternal congenital heart disease (followed by Dr. Weems) and fetal growth restriction. Patient is doing well this morning. She denies nausea, vomiting, fever or chills, shortness of breath.  Patient reports mild abdominal pain that is adequately relieved by oral pain medications. Lochia is mild to moderate  and stable. Patient is voiding without difficulty and ambulating with no difficulty. She has passed flatus, and has not had BM.  Patient does plan to breast feed. Depo for contraception.     Objective:       Temp:  [98 °F (36.7 °C)-98.6 °F (37 °C)] 98.2 °F (36.8 °C)  Pulse:  [58-72] 58  Resp:  [18] 18  SpO2:  [95 %-98 %] 96 %  BP: ()/(52-61) 91/52    General:   alert, appears stated age and cooperative   Lungs:   clear to auscultation bilaterally   Heart:   regular rate and rhythm   Abdomen:  soft, non-tender; bowel sounds normal; no masses,  no organomegaly   Uterus:  firm located at the umblicus.    Extremities: no edema, redness or tenderness in the calves or thighs     Lab Review  No results found for this or any previous visit (from the past 4 hour(s)).    I/O  No intake or output data in the 24 hours ending 20 0638     Assessment:     Patient Active Problem List   Diagnosis    Pulmonary stenosis    Situs inversus    S/P D&C (status post dilation and curettage)    Adult congenital heart disease    DORV (double outlet right ventricle)    S/P Fontan procedure    Congenital heart disease in pregnancy    Supervision of high risk pregnancy in second trimester    Dextrocardia    Thrombocytopenia    Poor fetal growth affecting management of mother in third trimester     (spontaneous vaginal delivery)        Plan:   1. Postpartum care:  - Patient doing well. Continue routine management and advances.  - Continue PO pain meds. Pain well  controlled.  - Heme: H/h 10.7/34.2 > 9.5/29.1  - Encourage ambulation  - Contraception: desires depo  - Lactation PRN    2. Maternal congenital heart disease  - Dextrocardia, DORV, large VSD, pulmonary stenosis  - s/p Fontan procedure in 2016  - Was on telemetry for 24 hours after delivery  - Asymptomatic, no evidence of fluid overload  - Dr. Weems following, appreciate recs   - No need for lasix at this time   - If significant hypotension, would give volume   - Lovenox while inpatient, ASA on discharge  - Plan for follow up with Dr. Weems within 2 weeks of discharge      Michelle Bob MD  OBGYN PGY-2      Dispo: As patient meets milestones, will plan to discharge PPD #2.    Michelle Bob

## 2020-03-19 NOTE — PLAN OF CARE
Pump briefly to draw out nipples; attempt to latch baby for no more than 10 minutes; If no latch Pt to pump while FOB supplements with formula or ebm.

## 2020-03-19 NOTE — PLAN OF CARE
Mother to breastfeed infant 8 or more times in 24hrs on infant's cue until content, frequent skin to skin, and will avoid pacifiers.  Mother is to keep infant actively feeding by keeping infant stimulated and using breast compression. Mother ensure effective nursing by hearing infant swallows and feeling nice tugs and pulls. Latch should not be painful while nursing.  Mother to record all breastfeedings, voids and stools in breastfeeding guide. Mother to call LC for breastfeeding assistance or questions .  Refer breastfeeding guide for lactation education.     Encouraged to nurse, pump, bottle feed EBM then formula if needed.

## 2020-03-19 NOTE — DISCHARGE SUMMARY
Delivery Discharge Summary  Obstetrics      Primary OB Clinician: Gabriella Dorado MD      Admission date: 3/15/2020  Discharge date: 2020    Disposition: To home, self care    Discharge Diagnosis List:      Patient Active Problem List   Diagnosis    Pulmonary stenosis    Situs inversus    S/P D&C (status post dilation and curettage)    Adult congenital heart disease    DORV (double outlet right ventricle)    S/P Fontan procedure    Congenital heart disease in pregnancy    Supervision of high risk pregnancy in second trimester    Dextrocardia    Thrombocytopenia    Poor fetal growth affecting management of mother in third trimester     (spontaneous vaginal delivery)       Procedure:     Hospital Course:  Theresa Grey is a 24 y.o. now , PPD #2 who was admitted on 3/15/2020 at 37w2d for induction of labor secondary to maternal congenital heart defect. Patient was subsequently admitted to labor and delivery unit with signed consents.     Labor course was uncomplicated and resulted in  without complications.     Please see delivery note for further details. Her postpartum course was uncomplicated. Patient remained on telemetry for 24 hours postpartum with sinus rhythm. On discharge day, patient's pain is controlled with oral pain medications. Pt is tolerating ambulation without SOB or CP, and regular diet without N/V. Reports lochia is mild. Denies any HA, vision changes, F/C, LE swelling. Denies any breast pain/soreness.    Pt in stable condition and ready for discharge. She has been instructed to start and/or continue medications and follow up with her obstetrics provider as listed below.  She has been seen by cardiology who has cleared her for discharge from a cardiovascular standpoint. She will be going home on 81 mg ASA daily.    Pertinent studies:  CBC  Recent Labs   Lab 03/15/20  2130 20  1218   WBC 13.14* 21.74*   HGB 10.7* 9.5*   HCT 34.2* 29.1*   MCV 79* 78*   *  109*          Immunization History   Administered Date(s) Administered    Tdap 01/31/2020        Delivery:    Episiotomy: None   Lacerations: 1st   Repair suture:     Repair # of packets: 2   Blood loss (ml):       Birth information:  YOB: 2022   Time of birth: 12:11 AM   Sex: female   Delivery type: Vaginal, Spontaneous   Gestational Age: 37w4d    Delivery Clinician:      Other providers:       Additional  information:  Forceps:    Vacuum:    Breech:    Observed anomalies      Living?:           APGARS  One minute Five minutes Ten minutes   Skin color:         Heart rate:         Grimace:         Muscle tone:         Breathing:         Totals: 3  8  8      Placenta: Delivered:       appearance      Patient Instructions:   Current Discharge Medication List      START taking these medications    Details   !! aspirin (ECOTRIN) 81 MG EC tablet Take 1 tablet (81 mg total) by mouth once daily.  Qty: 360 tablet, Refills: 0      docusate sodium (COLACE) 100 MG capsule Take 2 capsules (200 mg total) by mouth 2 (two) times daily as needed for Constipation.  Qty: 30 capsule, Refills: 1      ibuprofen (ADVIL,MOTRIN) 600 MG tablet Take 1 tablet (600 mg total) by mouth every 6 (six) hours as needed for Pain.  Qty: 30 tablet, Refills: 1       !! - Potential duplicate medications found. Please discuss with provider.      CONTINUE these medications which have NOT CHANGED    Details   !! aspirin (ECOTRIN) 81 MG EC tablet Take 81 mg by mouth once daily.      enoxaparin (LOVENOX) 40 mg/0.4 mL Syrg Inject 0.4 mLs (40 mg total) into the skin once daily.  Qty: 30 Syringe, Refills: 12      PRENATAL NO.116-IRON-FOLIC-DHA ORAL Take 1 tablet by mouth once daily.       !! - Potential duplicate medications found. Please discuss with provider.          Discharge Procedure Orders   Diet Adult Regular     Pelvic Rest   Order Comments: Strict pelvic rest - nothing in the vagina for 6 weeks (no sex, tampons, douching, tampons,  etc.)  No driving while taking narcotic pain medication or until you feel as though you can safely slam on the brakes if necessary     Notify your health care provider if you experience any of the following:  temperature >100.4     Notify your health care provider if you experience any of the following:  persistent nausea and vomiting or diarrhea     Notify your health care provider if you experience any of the following:  severe uncontrolled pain     Notify your health care provider if you experience any of the following:  redness, tenderness, or signs of infection (pain, swelling, redness, odor or green/yellow discharge around incision site)     Notify your health care provider if you experience any of the following:  difficulty breathing or increased cough     Notify your health care provider if you experience any of the following:  severe persistent headache     Notify your health care provider if you experience any of the following:  worsening rash     Notify your health care provider if you experience any of the following:  persistent dizziness, light-headedness, or visual disturbances     Notify your health care provider if you experience any of the following:  increased confusion or weakness     Notify your health care provider if you experience any of the following:   Order Comments: Heavy vaginal bleeding saturating more than 1 pad for at least 2 hours     Activity as tolerated       Follow-up Information     Gabriella Dorado MD. Schedule an appointment as soon as possible for a visit in 6 weeks.    Specialty:  Obstetrics and Gynecology  Why:  Postpartum Visit  Contact information:  [address] [facility] [address] [phone]             Toro Weems MD. Schedule an appointment as soon as possible for a visit in 1 week.    Specialties:  Pediatric Cardiology, Cardiology  Why:  Hospital Follow up  Contact information:  [address] [facility] [id number] [phone]                     Michelle Bob MD  OBGYN PGY-2

## 2020-03-23 LAB — FINAL PATHOLOGIC DIAGNOSIS: NORMAL

## 2020-03-24 ENCOUNTER — TELEPHONE (OUTPATIENT)
Dept: PEDIATRIC CARDIOLOGY | Facility: CLINIC | Age: 25
End: 2020-03-24

## 2020-03-24 NOTE — TELEPHONE ENCOUNTER
Switch appointment with Dr. Weems to a virtual visit. Instructions sent to [email or web address]Acceptd. Theresa verbalized understanding all information provided.

## 2020-03-26 ENCOUNTER — TELEPHONE (OUTPATIENT)
Dept: LACTATION | Facility: CLINIC | Age: 25
End: 2020-03-26

## 2020-03-26 NOTE — TELEPHONE ENCOUNTER
Pt reports she is continuing to provide baby pumped breast milk since baby has shown little interest in latching. Pt reports that she is comfortable with feeding decision. LC encouraged Pt to contact warm line if further assistance is needed and confirmed correct number.

## 2020-03-27 ENCOUNTER — OFFICE VISIT (OUTPATIENT)
Dept: PEDIATRIC CARDIOLOGY | Facility: CLINIC | Age: 25
End: 2020-03-27
Payer: MEDICAID

## 2020-03-27 DIAGNOSIS — O99.891 CONGENITAL HEART DISEASE IN PREGNANCY: ICD-10-CM

## 2020-03-27 DIAGNOSIS — Q24.9 ADULT CONGENITAL HEART DISEASE: Primary | ICD-10-CM

## 2020-03-27 DIAGNOSIS — Q24.9 CONGENITAL HEART DISEASE IN PREGNANCY: ICD-10-CM

## 2020-03-27 DIAGNOSIS — Z98.890 S/P FONTAN PROCEDURE: ICD-10-CM

## 2020-03-27 PROCEDURE — 99211 PR OFFICE/OUTPT VISIT, EST, LEVL I: ICD-10-PCS | Mod: 95,,, | Performed by: PEDIATRICS

## 2020-03-27 PROCEDURE — 99211 OFF/OP EST MAY X REQ PHY/QHP: CPT | Mod: 95,,, | Performed by: PEDIATRICS

## 2020-03-27 NOTE — PROGRESS NOTES
The patient location is: her home  The chief complaint leading to consultation is: single ventricle s/p Fontan, recent pregnancy  Visit type: Virtual visit with synchronous audio and video  Total time spent with patient: 10 minutes  Each patient to whom he or she provides medical services by telemedicine is:  (1) informed of the relationship between the physician and patient and the respective role of any other health care provider with respect to management of the patient; and (2) notified that he or she may decline to receive medical services by telemedicine and may withdraw from such care at any time.    Notes:  Patient delivered her baby via vaginal delivery March 17, 2020.  She was discharged home 2 days later.  For the 1st few days at home, she had lower abdominal pain and some generalized chest tightness.  Both of those have resolved.  No shortness of breath.  No orthopnea.  No palpitations.  No syncope or near syncope.  No lower extremity edema.  No diarrhea.  No other concerns.  Her baby is doing well.  The baby had a normal echocardiogram after birth.  Mom has been taking her baby aspirin, but she has still been using her Lovenox injections.    Diagnoses:  1. 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 resolved cyanosis (normal sats for her around 92-98%)  3. Mild thrombocytopenia - common in Fontan patients.  4. s/p vaginal delivery 3/17/20          - post delivery orthostatic hypotension with sinus tachycardia likely exacerbated by epidural anesthesia induced vasodilation in a preload dependent  patient.  Resolved.          - currently without evidence of fluid overload, pathologic arrhythmias, and heart failure          - at risk for thrombosis (Fontan physiology, pregnancy)    Recommendations:  1.  Discontinue Lovenox.  Continue baby aspirin daily.  2.  Follow-up with me with echo, EKG in 3 months.  I can see her in [place].  3.  She will call with any concerns.  Specifically, I would be concerned about edema, chronic diarrhea, syncope, near syncope, and shortness of breath.

## 2020-03-30 ENCOUNTER — TELEPHONE (OUTPATIENT)
Dept: OBSTETRICS AND GYNECOLOGY | Facility: OTHER | Age: 25
End: 2020-03-30

## 2020-03-30 NOTE — TELEPHONE ENCOUNTER
She states her pain was at a comfortable level prior to discharge. Pt is not taking pain meds still at home as prescribed at time of discharge. Pt has made OB follow-up appointment. Her  has been to their first peds appointment. Pt is still breastfeeding & formula feeding. Pt reports it is going well. She has her mother and significant other helping at home during recovery. Pt rates our discharge process a 10/10 for overall satisfaction. She does know how to contact provider with questions/concerns.    Educated on risks for newborns less than 10 weeks of age to COVID-19. Emphasized importance of social distancing and frequent hand hygiene. Stated to contact OB or pediatrician for further questions/concerns throughout this time.

## 2020-04-17 ENCOUNTER — PATIENT MESSAGE (OUTPATIENT)
Dept: OBSTETRICS AND GYNECOLOGY | Facility: CLINIC | Age: 25
End: 2020-04-17

## 2020-04-28 ENCOUNTER — OFFICE VISIT (OUTPATIENT)
Dept: OBSTETRICS AND GYNECOLOGY | Facility: CLINIC | Age: 25
End: 2020-04-28
Payer: MEDICAID

## 2020-04-28 ENCOUNTER — PATIENT MESSAGE (OUTPATIENT)
Dept: OBSTETRICS AND GYNECOLOGY | Facility: CLINIC | Age: 25
End: 2020-04-28

## 2020-04-28 PROCEDURE — 59430 PR CARE AFTER DELIVERY ONLY: ICD-10-PCS | Mod: 95,,, | Performed by: OBSTETRICS & GYNECOLOGY

## 2020-05-06 ENCOUNTER — PATIENT MESSAGE (OUTPATIENT)
Dept: PEDIATRIC CARDIOLOGY | Facility: CLINIC | Age: 25
End: 2020-05-06

## 2020-05-20 ENCOUNTER — PATIENT MESSAGE (OUTPATIENT)
Dept: OBSTETRICS AND GYNECOLOGY | Facility: CLINIC | Age: 25
End: 2020-05-20

## 2020-05-20 ENCOUNTER — PATIENT MESSAGE (OUTPATIENT)
Dept: PEDIATRIC CARDIOLOGY | Facility: CLINIC | Age: 25
End: 2020-05-20

## 2020-05-21 ENCOUNTER — PATIENT MESSAGE (OUTPATIENT)
Dept: OBSTETRICS AND GYNECOLOGY | Facility: CLINIC | Age: 25
End: 2020-05-21

## 2020-05-21 ENCOUNTER — PATIENT MESSAGE (OUTPATIENT)
Dept: PEDIATRIC CARDIOLOGY | Facility: CLINIC | Age: 25
End: 2020-05-21

## 2020-05-21 RX ORDER — MEDROXYPROGESTERONE ACETATE 150 MG/ML
150 INJECTION, SUSPENSION INTRAMUSCULAR ONCE
Qty: 1 ML | Refills: 3 | Status: SHIPPED | OUTPATIENT
Start: 2020-05-21 | End: 2020-10-02

## 2020-05-24 ENCOUNTER — PATIENT MESSAGE (OUTPATIENT)
Dept: OBSTETRICS AND GYNECOLOGY | Facility: CLINIC | Age: 25
End: 2020-05-24

## 2020-05-24 NOTE — PROGRESS NOTES
The patient location is: home  The chief complaint leading to consultation is: postpartum visit    Visit type: audiovisual  Face to Face time with patient: 15 minutes of total time spent on the encounter, which includes face to face time and non-face to face time preparing to see the patient (eg, review of tests), Obtaining and/or reviewing separately obtained history, Documenting clinical information in the electronic or other health record, Independently interpreting results (not separately reported) and communicating results to the patient/family/caregiver, or Care coordination (not separately reported).     Each patient to whom he or she provides medical services by telemedicine is:  (1) informed of the relationship between the physician and patient and the respective role of any other health care provider with respect to management of the patient; and (2) notified that he or she may decline to receive medical services by telemedicine and may withdraw from such care at any time.    Notes:     CC: Postpartum Visit    Theresa Grey is a 24 y.o. female  who presents for postpartum visit. She is s/p . Pregnancy complicated by congential heart disease. had a baby girl - justyn. She and the baby are doing well. Lochia subsiding. She denies pain, fever, bowel/bladder complaints. Mood is overall well.     ROS:  GENERAL: Denies fevers, chills  VAGINAL: Denies abnormal discharge, heavy bleeding  ABDOMEN: Denies abdominal pain, constipation, diarrhea  BREAST: Denies pain.   URINARY: Denies urgency, frequency, incontinence   CARDIOVASCULAR: Denies chest pain, shortness of breath.  NEUROLOGICAL: Denies headaches, vision changes    PHYSICAL EXAM:  LMP 2019   GEN: No apparent distress    ASSESSMENT: Doing well s/p .     PLAN:  1. May resume all normal activities  2. depo for contraception  3. No signs/symptoms of PP depression  4. Follow up with Dr. Weems as previously scheduled.  5. Needs repeat CBC +  peripheral smear - will hold off for now given current pandemic    RTO in 1 year for AE or sooner if needed    Gabriella Dorado MD  Obstetrics and Gynecology  [facility]

## 2020-10-02 ENCOUNTER — LAB VISIT (OUTPATIENT)
Dept: LAB | Facility: HOSPITAL | Age: 25
End: 2020-10-02
Attending: PEDIATRICS
Payer: MEDICAID

## 2020-10-02 ENCOUNTER — CLINICAL SUPPORT (OUTPATIENT)
Dept: PEDIATRIC CARDIOLOGY | Facility: CLINIC | Age: 25
End: 2020-10-02
Payer: MEDICAID

## 2020-10-02 ENCOUNTER — OFFICE VISIT (OUTPATIENT)
Dept: PEDIATRIC CARDIOLOGY | Facility: CLINIC | Age: 25
End: 2020-10-02
Payer: MEDICAID

## 2020-10-02 VITALS
HEIGHT: 60 IN | DIASTOLIC BLOOD PRESSURE: 56 MMHG | BODY MASS INDEX: 23.03 KG/M2 | OXYGEN SATURATION: 99 % | WEIGHT: 117.31 LBS | SYSTOLIC BLOOD PRESSURE: 110 MMHG | HEART RATE: 60 BPM

## 2020-10-02 DIAGNOSIS — Q20.1 DORV (DOUBLE OUTLET RIGHT VENTRICLE): ICD-10-CM

## 2020-10-02 DIAGNOSIS — Q24.9 ADULT CONGENITAL HEART DISEASE: ICD-10-CM

## 2020-10-02 DIAGNOSIS — Z98.890 S/P D&C (STATUS POST DILATION AND CURETTAGE): ICD-10-CM

## 2020-10-02 DIAGNOSIS — Q24.9 CONGENITAL HEART DISEASE IN PREGNANCY: ICD-10-CM

## 2020-10-02 DIAGNOSIS — Q24.0 DEXTROCARDIA: ICD-10-CM

## 2020-10-02 DIAGNOSIS — Q20.8 FONTAN CIRCULATION PRESENT: Primary | ICD-10-CM

## 2020-10-02 DIAGNOSIS — Z98.890 S/P FONTAN PROCEDURE: ICD-10-CM

## 2020-10-02 DIAGNOSIS — O99.891 CONGENITAL HEART DISEASE IN PREGNANCY: ICD-10-CM

## 2020-10-02 DIAGNOSIS — Q20.8 FONTAN CIRCULATION PRESENT: ICD-10-CM

## 2020-10-02 DIAGNOSIS — Q89.3 SITUS INVERSUS: ICD-10-CM

## 2020-10-02 DIAGNOSIS — I37.0 NONRHEUMATIC PULMONARY VALVE STENOSIS: ICD-10-CM

## 2020-10-02 PROBLEM — O09.92 SUPERVISION OF HIGH RISK PREGNANCY IN SECOND TRIMESTER: Status: RESOLVED | Noted: 2019-11-10 | Resolved: 2020-10-02

## 2020-10-02 LAB
ALBUMIN SERPL BCP-MCNC: 4 G/DL (ref 3.5–5.2)
ALP SERPL-CCNC: 82 U/L (ref 55–135)
ALT SERPL W/O P-5'-P-CCNC: 18 U/L (ref 10–44)
ANION GAP SERPL CALC-SCNC: 11 MMOL/L (ref 8–16)
AST SERPL-CCNC: 17 U/L (ref 10–40)
BASOPHILS # BLD AUTO: 0.07 K/UL (ref 0–0.2)
BASOPHILS NFR BLD: 0.7 % (ref 0–1.9)
BILIRUB SERPL-MCNC: 0.4 MG/DL (ref 0.1–1)
BNP SERPL-MCNC: 26 PG/ML (ref 0–99)
BUN SERPL-MCNC: 13 MG/DL (ref 6–20)
CALCIUM SERPL-MCNC: 8.7 MG/DL (ref 8.7–10.5)
CHLORIDE SERPL-SCNC: 108 MMOL/L (ref 95–110)
CO2 SERPL-SCNC: 20 MMOL/L (ref 23–29)
CREAT SERPL-MCNC: 0.6 MG/DL (ref 0.5–1.4)
DIFFERENTIAL METHOD: ABNORMAL
EOSINOPHIL # BLD AUTO: 0.5 K/UL (ref 0–0.5)
EOSINOPHIL NFR BLD: 5.2 % (ref 0–8)
ERYTHROCYTE [DISTWIDTH] IN BLOOD BY AUTOMATED COUNT: 14 % (ref 11.5–14.5)
EST. GFR  (AFRICAN AMERICAN): >60 ML/MIN/1.73 M^2
EST. GFR  (NON AFRICAN AMERICAN): >60 ML/MIN/1.73 M^2
GLUCOSE SERPL-MCNC: 84 MG/DL (ref 70–110)
HCT VFR BLD AUTO: 38.7 % (ref 37–48.5)
HGB BLD-MCNC: 12.6 G/DL (ref 12–16)
IMM GRANULOCYTES # BLD AUTO: 0.05 K/UL (ref 0–0.04)
IMM GRANULOCYTES NFR BLD AUTO: 0.5 % (ref 0–0.5)
LYMPHOCYTES # BLD AUTO: 2.9 K/UL (ref 1–4.8)
LYMPHOCYTES NFR BLD: 28.9 % (ref 18–48)
MCH RBC QN AUTO: 28.3 PG (ref 27–31)
MCHC RBC AUTO-ENTMCNC: 32.6 G/DL (ref 32–36)
MCV RBC AUTO: 87 FL (ref 82–98)
MONOCYTES # BLD AUTO: 0.6 K/UL (ref 0.3–1)
MONOCYTES NFR BLD: 5.5 % (ref 4–15)
NEUTROPHILS # BLD AUTO: 6 K/UL (ref 1.8–7.7)
NEUTROPHILS NFR BLD: 59.2 % (ref 38–73)
NRBC BLD-RTO: 0 /100 WBC
PLATELET # BLD AUTO: 153 K/UL (ref 150–350)
PMV BLD AUTO: 14 FL (ref 9.2–12.9)
POTASSIUM SERPL-SCNC: 3.6 MMOL/L (ref 3.5–5.1)
PROT SERPL-MCNC: 7.8 G/DL (ref 6–8.4)
RBC # BLD AUTO: 4.45 M/UL (ref 4–5.4)
SODIUM SERPL-SCNC: 139 MMOL/L (ref 136–145)
WBC # BLD AUTO: 10.07 K/UL (ref 3.9–12.7)

## 2020-10-02 PROCEDURE — 99211 OFF/OP EST MAY X REQ PHY/QHP: CPT | Mod: PBBFAC,25,27,PO

## 2020-10-02 PROCEDURE — 85025 COMPLETE CBC W/AUTO DIFF WBC: CPT

## 2020-10-02 PROCEDURE — 93325 PR DOPPLER COLOR FLOW VELOCITY MAP: ICD-10-PCS | Mod: 26,S$PBB,, | Performed by: PEDIATRICS

## 2020-10-02 PROCEDURE — 36415 COLL VENOUS BLD VENIPUNCTURE: CPT

## 2020-10-02 PROCEDURE — 93321 PR DOPPLER ECHO HEART,LIMITED,F/U: ICD-10-PCS | Mod: 26,S$PBB,, | Performed by: PEDIATRICS

## 2020-10-02 PROCEDURE — 93321 DOPPLER ECHO F-UP/LMTD STD: CPT | Mod: PBBFAC,PO | Performed by: PEDIATRICS

## 2020-10-02 PROCEDURE — 99999 PR PBB SHADOW E&M-EST. PATIENT-LVL III: CPT | Mod: PBBFAC,,, | Performed by: PEDIATRICS

## 2020-10-02 PROCEDURE — 99999 PR PBB SHADOW E&M-EST. PATIENT-LVL I: ICD-10-PCS | Mod: PBBFAC,,,

## 2020-10-02 PROCEDURE — 99999 PR PBB SHADOW E&M-EST. PATIENT-LVL I: CPT | Mod: PBBFAC,,,

## 2020-10-02 PROCEDURE — 93304 ECHO TRANSTHORACIC: CPT | Mod: 26,S$PBB,, | Performed by: PEDIATRICS

## 2020-10-02 PROCEDURE — 93304 ECHO TRANSTHORACIC: CPT | Mod: PBBFAC,PO | Performed by: PEDIATRICS

## 2020-10-02 PROCEDURE — 83880 ASSAY OF NATRIURETIC PEPTIDE: CPT

## 2020-10-02 PROCEDURE — 93325 DOPPLER ECHO COLOR FLOW MAPG: CPT | Mod: 26,S$PBB,, | Performed by: PEDIATRICS

## 2020-10-02 PROCEDURE — 93304 PR ECHO XTHORACIC,CONG A2M,LIMITED: ICD-10-PCS | Mod: 26,S$PBB,, | Performed by: PEDIATRICS

## 2020-10-02 PROCEDURE — 93005 ELECTROCARDIOGRAM TRACING: CPT | Mod: PBBFAC,PO | Performed by: PEDIATRICS

## 2020-10-02 PROCEDURE — 99213 OFFICE O/P EST LOW 20 MIN: CPT | Mod: PBBFAC,PO,25 | Performed by: PEDIATRICS

## 2020-10-02 PROCEDURE — 99214 OFFICE O/P EST MOD 30 MIN: CPT | Mod: 25,S$PBB,, | Performed by: PEDIATRICS

## 2020-10-02 PROCEDURE — 80053 COMPREHEN METABOLIC PANEL: CPT

## 2020-10-02 PROCEDURE — 99214 PR OFFICE/OUTPT VISIT, EST, LEVL IV, 30-39 MIN: ICD-10-PCS | Mod: 25,S$PBB,, | Performed by: PEDIATRICS

## 2020-10-02 PROCEDURE — 93010 EKG 12-LEAD PEDIATRIC: ICD-10-PCS | Mod: S$PBB,,, | Performed by: PEDIATRICS

## 2020-10-02 PROCEDURE — 93010 ELECTROCARDIOGRAM REPORT: CPT | Mod: S$PBB,,, | Performed by: PEDIATRICS

## 2020-10-02 PROCEDURE — 93325 DOPPLER ECHO COLOR FLOW MAPG: CPT | Mod: PBBFAC,PO | Performed by: PEDIATRICS

## 2020-10-02 PROCEDURE — 93321 DOPPLER ECHO F-UP/LMTD STD: CPT | Mod: 26,S$PBB,, | Performed by: PEDIATRICS

## 2020-10-02 PROCEDURE — 99999 PR PBB SHADOW E&M-EST. PATIENT-LVL III: ICD-10-PCS | Mod: PBBFAC,,, | Performed by: PEDIATRICS

## 2020-10-02 NOTE — PROGRESS NOTES
10/02/2020     [facility]    re:Theresa Grey  :1995     Gabriella Dorado MD   [address] [facility] [address]     Dear Doctors:    Theresa Grey is a 25 y.o. female with the following diagnoses:  Diagnoses:  1. 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   - good ventricular function, no significant atrioventricular valve insufficiency, and no evidence of Fontan obstruction or thrombosis   - situs inversus, right sided spleen  2. resolved cyanosis  3. Delivered healthy baby 3/17/20 without difficulty.  4. Mild thrombocytopenia - common in Fontan patients.    Discussion:  Once again, she asks about the risk of subsequent pregnancies.  I again discussed the following with her:  1.  The rate of fetal loss is significantly elevated, approaching 50%.  2.  The rate of premature delivery is significantly elevated.  3.  The clotting predisposition often noted in Fontan patients may be worsened by pregnancy.  4.  Without a sub pulmonic ventricle, she may tolerate the volume load associated with a pregnancy less well than a 2 ventricle patient.  There is an increased risk of heart failure symptoms.  5.  There is an increased risk of arrhythmias.    That said, she tolerated her last pregnancy well.  I would expect her to tolerate a pregnancy well.  Vaginal delivery is typically preferable if she is doing well.  IV fluids may be necessary to avoid dehydration.  I would recommend SBE prophylaxis for delivery although this is certainly debatable.  I would recommend close monitoring with telemetry for during delivery and for 24-48 hours after delivery.      We again discussed the long-term risk associated with Fontan circulation. We discussed:  1. Risk of thromboembolic events  with Fontan   - she needs to be on a baby aspirin daily to try to cut down on that risk.  I agree with the use of prophylactic Lovenox during the pregnancy.   - would avoid estrogen containing birth control long-term.  She will discuss with her gynecologist  2. Long term risk of arrhythmias, heart failure, PLE, liver disease discussed    Plan:  1.  Continue daily baby aspirin.    2.  Check baseline blood work today.  I specifically want to make sure she is not anemic.  This could potentially contribute to her headaches.    3.  SBEP is indicated for dental work.    4.  Provided she does well, I would like to see her again in 6 months with a repeat echocardiogram, EKG, and 24 hr Holter.  However, if she gets pregnant I would like to get her back in to clinic about 3 months into the pregnancy.    Interval history:   I last saw her via a virtual visit about 2 weeks after she delivered her baby in March 2020.  At that time, I discontinued her Lovenox and continue the baby aspirin.  Overall, she has done well since that time.  However, she does not seem to tolerate wearing a mask well.  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  These headaches can last all day.  They are improved when she takes the mask off, but they still linger.  By the end of the weekend, her headaches are gone.  No other complaints.  No palpitations.  No syncope.  No near syncope.  No shortness of breath.  She does sometimes feel dizzy while wearing the mask.  No diarrhea.  No productive cough.  No edema.    PMH:  She was diagnosed as a young child with congenital heart disease while living in [place]. She was evaluated at [facility] ([place]) with a cath at about 7 years of age. She was scheduled for heart surgery by her report about 10 years ago. However, due to social issues (no insurance, no social security number) the surgery was put off and she was lost to follow up. I first saw her in 2014  "she was admitted with pregnancy, rare chest pain, and cyanosis.  After long discussion, she had a pregnancy termination due to the risk associated with unrepaired cyanotic congenital heart disease.  On 9/29/16, she underwent an extracardiac Fontan with oversewing and ligation of the MPA at [facility].  She did very well and was discharged a week later.      The review of systems is as noted above. It is otherwise negative for other symptoms related to the general, neurological, psychiatric, endocrine, gastrointestinal, genitourinary, respiratory, dermatologic, musculoskeletal, hematologic, and immunologic systems.    Past Medical History:   Diagnosis Date    Abnormality of heart valve     heart disease     fatigue as a result of pregnancy (8wks ) clubbing indicates chronic oxygenation issues but pt considered it "normal"     Past Surgical History:   Procedure Laterality Date    CARDIAC CATHETERIZATION  at 7 yars of age    DILATION AND CURETTAGE OF UTERUS      FONTAN PROCEDURE, EXTRACARDIAC  September 29, 2015    With a nonfenestrated 22 mm [place]-Justice tube graft.  The pulmonary valve was closed.  Procedure performed by Dr. Moe Kulkarni at [facility].     Family History   Problem Relation Age of Onset    Diabetes Maternal Grandmother     Vision loss Maternal Grandmother     Miscarriages / Stillbirths Mother     Diabetes Father     Diabetes Paternal Grandmother     No Known Problems Brother     No Known Problems Maternal Grandfather     No Known Problems Paternal Grandfather     Hypertension Maternal Aunt     Diabetes Maternal Aunt     No Known Problems Sister     No Known Problems Maternal Uncle     No Known Problems Paternal Aunt     No Known Problems Paternal Uncle     Anemia Neg Hx     Arrhythmia Neg Hx     Asthma Neg Hx     Clotting disorder Neg Hx     Fainting Neg Hx     Heart attack Neg Hx     Heart disease Neg Hx     Heart failure Neg Hx     Hyperlipidemia Neg Hx     " Stroke Neg Hx     Atrial Septal Defect Neg Hx      Social History     Socioeconomic History    Marital status:      Spouse name: Not on file    Number of children: Not on file    Years of education: Not on file    Highest education level: Not on file   Occupational History    Not on file   Social Needs    Financial resource strain: Not on file    Food insecurity     Worry: Not on file     Inability: Not on file    Transportation needs     Medical: Not on file     Non-medical: Not on file   Tobacco Use    Smoking status: Former Smoker     Packs/day: 0.25    Smokeless tobacco: Never Used   Substance and Sexual Activity    Alcohol use: Not Currently     Alcohol/week: 0.0 standard drinks     Comment: on ocassion before pregnancy    Drug use: No     Comment: previous marijuana use    Sexual activity: Yes     Partners: Male     Birth control/protection: Injection   Lifestyle    Physical activity     Days per week: Not on file     Minutes per session: Not on file    Stress: Not on file   Relationships    Social connections     Talks on phone: Not on file     Gets together: Not on file     Attends [employer] service: Not on file     Active member of club or organization: Not on file     Attends meetings of clubs or organizations: Not on file     Relationship status: Not on file   Other Topics Concern    Not on file   Social History Narrative    Not on file     Current Outpatient Medications on File Prior to Visit   Medication Sig Dispense Refill    aspirin (ECOTRIN) 81 MG EC tablet Take 1 tablet (81 mg total) by mouth once daily. 360 tablet 0    ibuprofen (ADVIL,MOTRIN) 600 MG tablet Take 1 tablet (600 mg total) by mouth every 6 (six) hours as needed for Pain. 30 tablet 1    [DISCONTINUED] aspirin (ECOTRIN) 81 MG EC tablet Take 81 mg by mouth once daily.      [DISCONTINUED] docusate sodium (COLACE) 100 MG capsule Take 2 capsules (200 mg total) by mouth 2 (two) times daily as needed for  "Constipation. 30 capsule 1    [DISCONTINUED] enoxaparin (LOVENOX) 40 mg/0.4 mL Syrg Inject 0.4 mLs (40 mg total) into the skin once daily. 30 Syringe 12    [DISCONTINUED] medroxyPROGESTERone (DEPO-PROVERA) 150 mg/mL Syrg Inject 1 mL (150 mg total) into the muscle once. for 1 dose 1 mL 3    [DISCONTINUED] PRENATAL NO.116-IRON-FOLIC-DHA ORAL Take 1 tablet by mouth once daily.       No current facility-administered medications on file prior to visit.      Review of patient's allergies indicates:   Allergen Reactions    Hydrocodone Shortness Of Breath and Other (See Comments)     Dizziness; sweating     BP (!) 110/56 (BP Location: Right arm)   Pulse 60   Ht 5' 0.24" (1.53 m)   Wt 53.2 kg (117 lb 4.6 oz)   SpO2 99%   BMI 22.73 kg/m²     In general, she is an acyanotic, nondysmorphic appearing female in no apparent distress.  The eyes, nares, and oropharynx are clear.  Eyelids and conjunctiva free of drainage and redness.  PERRLA.  Teeth in good repair.  Mucous membranes are moist.  The head is normocephalic and atraumatic.  The neck is supple without jugular venous distention or thyroid enlargement.  The lungs are clear to auscultation bilaterally.  There is a well healed sternotomy scar.  The PMI is in the right chest.  The first heart sound is normal.  The second is loud and single.  There are no gallops, rubs, or clicks in the supine position.  The abdominal exam reveals no hepatomegaly.  Pulses are normal in all 4 extremities with brisk capillary refill and no edema.  No rashes are noted.  She has some clubbing.  No tenderness, swelling in legs.  Her neurological exam is normal.    EKG with a "left atrial rhythm" with evidence of dextrocardia.  HR about 55.    An echocardiogram today reveals good ventricular function, no significant AV valve insufficiency, and no evidence of Fontan thrombosis or obstruction.    Cardiac MRI October 19, 2019:  1. Patent innominate vein to left-sided SVC. Widely patent " left-sided Pavel anastomosis. The IVC is anastomosed to the left pulmonary artery. The Fontan tunnel is widely patent without evidence of obstruction. The branch pulmonary arteries are normal in size with no evidence of obstruction.   2. Atrio-ventricular concordance with a jason-cross type of arrangement.   3. The right ventricle volumes are low normal. Moderate RVH. The calculated RVEF is 45 %.  4. The left ventricular volumes are low normal. The calculated LVEF is 50 %.   5. Large inlet type ventricular septal defect.   6. Oversewn pulmonary valve.   7. Anterior and rightward aorta. Structurally normal aortic valve with no significant aortic insufficiency.   8. Right aortic arch.     Sincerely,        Toro Weems MD  Pediatric Cardiology  Adult Congenital Heart Disease  Pediatric Heart Failure and Transplantation  [facility] [address] [place]  [address]  [phone]

## 2021-02-09 ENCOUNTER — PATIENT MESSAGE (OUTPATIENT)
Dept: PEDIATRIC CARDIOLOGY | Facility: CLINIC | Age: 26
End: 2021-02-09

## 2021-02-09 DIAGNOSIS — Q24.9 ADULT CONGENITAL HEART DISEASE: ICD-10-CM

## 2021-02-09 DIAGNOSIS — I37.0 NONRHEUMATIC PULMONARY VALVE STENOSIS: Primary | ICD-10-CM

## 2021-02-09 DIAGNOSIS — Q20.1 DORV (DOUBLE OUTLET RIGHT VENTRICLE): ICD-10-CM

## 2021-02-09 DIAGNOSIS — Z98.890 S/P FONTAN PROCEDURE: ICD-10-CM

## 2021-02-09 DIAGNOSIS — Q24.0 DEXTROCARDIA: ICD-10-CM

## 2021-02-23 DIAGNOSIS — Q20.1 DORV (DOUBLE OUTLET RIGHT VENTRICLE): Primary | ICD-10-CM

## 2021-02-24 ENCOUNTER — HOSPITAL ENCOUNTER (OUTPATIENT)
Dept: CARDIOLOGY | Facility: CLINIC | Age: 26
Discharge: HOME | End: 2021-02-24
Payer: MEDICAID

## 2021-02-24 ENCOUNTER — HOSPITAL ENCOUNTER (OUTPATIENT)
Dept: PEDIATRIC CARDIOLOGY | Facility: HOSPITAL | Age: 26
Discharge: HOME | End: 2021-02-24
Attending: PEDIATRICS
Payer: MEDICAID

## 2021-02-24 ENCOUNTER — OFFICE VISIT (OUTPATIENT)
Dept: CARDIOLOGY | Facility: CLINIC | Age: 26
End: 2021-02-24
Payer: MEDICAID

## 2021-02-24 ENCOUNTER — PATIENT MESSAGE (OUTPATIENT)
Dept: CARDIOLOGY | Facility: CLINIC | Age: 26
End: 2021-02-24

## 2021-02-24 VITALS
DIASTOLIC BLOOD PRESSURE: 78 MMHG | HEART RATE: 59 BPM | WEIGHT: 119.5 LBS | HEIGHT: 59 IN | SYSTOLIC BLOOD PRESSURE: 122 MMHG | OXYGEN SATURATION: 95 % | BODY MASS INDEX: 24.09 KG/M2

## 2021-02-24 DIAGNOSIS — Q24.9 ADULT CONGENITAL HEART DISEASE: ICD-10-CM

## 2021-02-24 DIAGNOSIS — Q20.1 DORV (DOUBLE OUTLET RIGHT VENTRICLE): ICD-10-CM

## 2021-02-24 DIAGNOSIS — Z98.890 S/P FONTAN PROCEDURE: ICD-10-CM

## 2021-02-24 DIAGNOSIS — Q20.8 FONTAN CIRCULATION PRESENT: Primary | ICD-10-CM

## 2021-02-24 DIAGNOSIS — I37.0 NONRHEUMATIC PULMONARY VALVE STENOSIS: ICD-10-CM

## 2021-02-24 DIAGNOSIS — D69.6 THROMBOCYTOPENIA: ICD-10-CM

## 2021-02-24 DIAGNOSIS — Q24.0 DEXTROCARDIA: ICD-10-CM

## 2021-02-24 PROCEDURE — 93227: ICD-10-PCS | Mod: ,,, | Performed by: PEDIATRICS

## 2021-02-24 PROCEDURE — 93227 XTRNL ECG REC<48 HR R&I: CPT | Mod: ,,, | Performed by: PEDIATRICS

## 2021-02-24 PROCEDURE — 93225 XTRNL ECG REC<48 HRS REC: CPT

## 2021-02-24 PROCEDURE — 93010 EKG 12-LEAD: ICD-10-PCS | Mod: S$PBB,,, | Performed by: PEDIATRICS

## 2021-02-24 PROCEDURE — 99214 OFFICE O/P EST MOD 30 MIN: CPT | Mod: S$PBB,,, | Performed by: PEDIATRICS

## 2021-02-24 PROCEDURE — 99999 PR PBB SHADOW E&M-EST. PATIENT-LVL III: CPT | Mod: PBBFAC,,, | Performed by: PEDIATRICS

## 2021-02-24 PROCEDURE — 99214 PR OFFICE/OUTPT VISIT, EST, LEVL IV, 30-39 MIN: ICD-10-PCS | Mod: S$PBB,,, | Performed by: PEDIATRICS

## 2021-02-24 PROCEDURE — 99999 PR PBB SHADOW E&M-EST. PATIENT-LVL III: ICD-10-PCS | Mod: PBBFAC,,, | Performed by: PEDIATRICS

## 2021-02-24 PROCEDURE — 99213 OFFICE O/P EST LOW 20 MIN: CPT | Mod: PBBFAC,25 | Performed by: PEDIATRICS

## 2021-02-24 PROCEDURE — 93005 ELECTROCARDIOGRAM TRACING: CPT | Mod: PBBFAC | Performed by: PEDIATRICS

## 2021-02-24 PROCEDURE — 93010 ELECTROCARDIOGRAM REPORT: CPT | Mod: S$PBB,,, | Performed by: PEDIATRICS

## 2021-03-07 ENCOUNTER — PATIENT MESSAGE (OUTPATIENT)
Dept: CARDIOLOGY | Facility: CLINIC | Age: 26
End: 2021-03-07

## 2021-05-10 ENCOUNTER — PATIENT MESSAGE (OUTPATIENT)
Dept: RESEARCH | Facility: HOSPITAL | Age: 26
End: 2021-05-10

## 2021-06-07 ENCOUNTER — HOSPITAL ENCOUNTER (EMERGENCY)
Facility: HOSPITAL | Age: 26
Discharge: HOME | End: 2021-06-07
Attending: EMERGENCY MEDICINE
Payer: MEDICAID

## 2021-06-07 VITALS
HEART RATE: 70 BPM | RESPIRATION RATE: 16 BRPM | BODY MASS INDEX: 24.24 KG/M2 | DIASTOLIC BLOOD PRESSURE: 64 MMHG | OXYGEN SATURATION: 98 % | WEIGHT: 120 LBS | TEMPERATURE: 98 F | SYSTOLIC BLOOD PRESSURE: 110 MMHG

## 2021-06-07 DIAGNOSIS — R10.33 PERIUMBILICAL PAIN: ICD-10-CM

## 2021-06-07 DIAGNOSIS — N30.90 CYSTITIS: Primary | ICD-10-CM

## 2021-06-07 LAB
ALBUMIN SERPL BCP-MCNC: 4.1 G/DL (ref 3.5–5.2)
ALP SERPL-CCNC: 86 U/L (ref 55–135)
ALT SERPL W/O P-5'-P-CCNC: 53 U/L (ref 10–44)
ANION GAP SERPL CALC-SCNC: 9 MMOL/L (ref 8–16)
AST SERPL-CCNC: 66 U/L (ref 10–40)
B-HCG UR QL: NEGATIVE
BACTERIA #/AREA URNS HPF: ABNORMAL /HPF
BASOPHILS # BLD AUTO: 0.05 K/UL (ref 0–0.2)
BASOPHILS NFR BLD: 0.5 % (ref 0–1.9)
BILIRUB SERPL-MCNC: 0.4 MG/DL (ref 0.1–1)
BILIRUB UR QL STRIP: NEGATIVE
BUN SERPL-MCNC: 11 MG/DL (ref 6–20)
CALCIUM SERPL-MCNC: 9.6 MG/DL (ref 8.7–10.5)
CHLORIDE SERPL-SCNC: 105 MMOL/L (ref 95–110)
CLARITY UR: ABNORMAL
CO2 SERPL-SCNC: 26 MMOL/L (ref 23–29)
COLOR UR: YELLOW
CREAT SERPL-MCNC: 0.7 MG/DL (ref 0.5–1.4)
CTP QC/QA: YES
DIFFERENTIAL METHOD: ABNORMAL
EOSINOPHIL # BLD AUTO: 0.7 K/UL (ref 0–0.5)
EOSINOPHIL NFR BLD: 6.9 % (ref 0–8)
ERYTHROCYTE [DISTWIDTH] IN BLOOD BY AUTOMATED COUNT: 13.9 % (ref 11.5–14.5)
EST. GFR  (AFRICAN AMERICAN): >60 ML/MIN/1.73 M^2
EST. GFR  (NON AFRICAN AMERICAN): >60 ML/MIN/1.73 M^2
GLUCOSE SERPL-MCNC: 90 MG/DL (ref 70–110)
GLUCOSE UR QL STRIP: NEGATIVE
HCT VFR BLD AUTO: 40.6 % (ref 37–48.5)
HGB BLD-MCNC: 13.3 G/DL (ref 12–16)
HGB UR QL STRIP: ABNORMAL
IMM GRANULOCYTES # BLD AUTO: 0.03 K/UL (ref 0–0.04)
IMM GRANULOCYTES NFR BLD AUTO: 0.3 % (ref 0–0.5)
KETONES UR QL STRIP: NEGATIVE
LEUKOCYTE ESTERASE UR QL STRIP: ABNORMAL
LYMPHOCYTES # BLD AUTO: 2.3 K/UL (ref 1–4.8)
LYMPHOCYTES NFR BLD: 22.2 % (ref 18–48)
MCH RBC QN AUTO: 29.8 PG (ref 27–31)
MCHC RBC AUTO-ENTMCNC: 32.8 G/DL (ref 32–36)
MCV RBC AUTO: 91 FL (ref 82–98)
MICROSCOPIC COMMENT: ABNORMAL
MONOCYTES # BLD AUTO: 0.6 K/UL (ref 0.3–1)
MONOCYTES NFR BLD: 6 % (ref 4–15)
NEUTROPHILS # BLD AUTO: 6.6 K/UL (ref 1.8–7.7)
NEUTROPHILS NFR BLD: 64.1 % (ref 38–73)
NITRITE UR QL STRIP: NEGATIVE
NRBC BLD-RTO: 0 /100 WBC
PH UR STRIP: 8 [PH] (ref 5–8)
PLATELET # BLD AUTO: 173 K/UL (ref 150–450)
PMV BLD AUTO: 13 FL (ref 9.2–12.9)
POTASSIUM SERPL-SCNC: 4.1 MMOL/L (ref 3.5–5.1)
PROT SERPL-MCNC: 8 G/DL (ref 6–8.4)
PROT UR QL STRIP: ABNORMAL
RBC # BLD AUTO: 4.46 M/UL (ref 4–5.4)
RBC #/AREA URNS HPF: 3 /HPF (ref 0–4)
SODIUM SERPL-SCNC: 140 MMOL/L (ref 136–145)
SP GR UR STRIP: 1.02 (ref 1–1.03)
SQUAMOUS #/AREA URNS HPF: 5 /HPF
URN SPEC COLLECT METH UR: ABNORMAL
UROBILINOGEN UR STRIP-ACNC: NEGATIVE EU/DL
WBC # BLD AUTO: 10.29 K/UL (ref 3.9–12.7)
WBC #/AREA URNS HPF: 22 /HPF (ref 0–5)

## 2021-06-07 PROCEDURE — 81000 URINALYSIS NONAUTO W/SCOPE: CPT | Performed by: EMERGENCY MEDICINE

## 2021-06-07 PROCEDURE — 36415 COLL VENOUS BLD VENIPUNCTURE: CPT | Performed by: EMERGENCY MEDICINE

## 2021-06-07 PROCEDURE — 25000003 PHARM REV CODE 250: Performed by: EMERGENCY MEDICINE

## 2021-06-07 PROCEDURE — 87086 URINE CULTURE/COLONY COUNT: CPT | Performed by: EMERGENCY MEDICINE

## 2021-06-07 PROCEDURE — 87591 N.GONORRHOEAE DNA AMP PROB: CPT | Performed by: EMERGENCY MEDICINE

## 2021-06-07 PROCEDURE — 85025 COMPLETE CBC W/AUTO DIFF WBC: CPT | Performed by: EMERGENCY MEDICINE

## 2021-06-07 PROCEDURE — 87491 CHLMYD TRACH DNA AMP PROBE: CPT | Performed by: EMERGENCY MEDICINE

## 2021-06-07 PROCEDURE — 81025 URINE PREGNANCY TEST: CPT | Performed by: EMERGENCY MEDICINE

## 2021-06-07 PROCEDURE — 99284 EMERGENCY DEPT VISIT MOD MDM: CPT | Mod: 25

## 2021-06-07 PROCEDURE — 80053 COMPREHEN METABOLIC PANEL: CPT | Performed by: EMERGENCY MEDICINE

## 2021-06-07 RX ORDER — OXYCODONE HYDROCHLORIDE 10 MG/1
10 TABLET ORAL
Status: DISCONTINUED | OUTPATIENT
Start: 2021-06-07 | End: 2021-06-07 | Stop reason: HOSPADM

## 2021-06-07 RX ORDER — DICLOFENAC SODIUM 50 MG/1
50 TABLET, DELAYED RELEASE ORAL 3 TIMES DAILY
Qty: 15 TABLET | Refills: 0 | Status: SHIPPED | OUTPATIENT
Start: 2021-06-07 | End: 2022-06-07

## 2021-06-07 RX ORDER — NITROFURANTOIN 25; 75 MG/1; MG/1
100 CAPSULE ORAL
Status: COMPLETED | OUTPATIENT
Start: 2021-06-07 | End: 2021-06-07

## 2021-06-07 RX ORDER — NITROFURANTOIN 25; 75 MG/1; MG/1
100 CAPSULE ORAL 2 TIMES DAILY
Qty: 14 CAPSULE | Refills: 0 | Status: SHIPPED | OUTPATIENT
Start: 2021-06-07 | End: 2021-06-14

## 2021-06-07 RX ADMIN — NITROFURANTOIN (MONOHYDRATE/MACROCRYSTALS) 100 MG: 75; 25 CAPSULE ORAL at 03:06

## 2021-06-08 ENCOUNTER — TELEPHONE (OUTPATIENT)
Dept: OBSTETRICS AND GYNECOLOGY | Facility: CLINIC | Age: 26
End: 2021-06-08

## 2021-06-09 LAB — BACTERIA UR CULT: NO GROWTH

## 2021-06-10 LAB
C TRACH DNA SPEC QL NAA+PROBE: DETECTED
N GONORRHOEA DNA SPEC QL NAA+PROBE: NOT DETECTED

## 2021-06-13 ENCOUNTER — HOSPITAL ENCOUNTER (EMERGENCY)
Facility: HOSPITAL | Age: 26
Discharge: HOME | End: 2021-06-13
Attending: EMERGENCY MEDICINE
Payer: MEDICAID

## 2021-06-13 VITALS
HEIGHT: 59 IN | HEART RATE: 56 BPM | SYSTOLIC BLOOD PRESSURE: 123 MMHG | DIASTOLIC BLOOD PRESSURE: 62 MMHG | WEIGHT: 119 LBS | TEMPERATURE: 98 F | RESPIRATION RATE: 20 BRPM | BODY MASS INDEX: 23.99 KG/M2 | OXYGEN SATURATION: 95 %

## 2021-06-13 DIAGNOSIS — R10.9 LEFT FLANK PAIN: Primary | ICD-10-CM

## 2021-06-13 LAB
B-HCG UR QL: NEGATIVE
BILIRUB UR QL STRIP: NEGATIVE
CLARITY UR: CLEAR
COLOR UR: YELLOW
CTP QC/QA: YES
GLUCOSE UR QL STRIP: NEGATIVE
HGB UR QL STRIP: NEGATIVE
KETONES UR QL STRIP: NEGATIVE
LEUKOCYTE ESTERASE UR QL STRIP: NEGATIVE
NITRITE UR QL STRIP: NEGATIVE
PH UR STRIP: >8 [PH] (ref 5–8)
PROT UR QL STRIP: ABNORMAL
SP GR UR STRIP: 1.02 (ref 1–1.03)
URN SPEC COLLECT METH UR: ABNORMAL
UROBILINOGEN UR STRIP-ACNC: 1 EU/DL

## 2021-06-13 PROCEDURE — 81025 URINE PREGNANCY TEST: CPT | Performed by: EMERGENCY MEDICINE

## 2021-06-13 PROCEDURE — 99283 EMERGENCY DEPT VISIT LOW MDM: CPT

## 2021-06-13 PROCEDURE — 81003 URINALYSIS AUTO W/O SCOPE: CPT | Performed by: EMERGENCY MEDICINE

## 2021-06-13 RX ORDER — METHOCARBAMOL 500 MG/1
1000 TABLET, FILM COATED ORAL 3 TIMES DAILY PRN
Qty: 20 TABLET | Refills: 0 | Status: SHIPPED | OUTPATIENT
Start: 2021-06-13 | End: 2021-06-18

## 2021-10-15 ENCOUNTER — TELEPHONE (OUTPATIENT)
Dept: OBSTETRICS AND GYNECOLOGY | Facility: CLINIC | Age: 26
End: 2021-10-15

## 2021-10-15 ENCOUNTER — PATIENT MESSAGE (OUTPATIENT)
Dept: OBSTETRICS AND GYNECOLOGY | Facility: CLINIC | Age: 26
End: 2021-10-15

## 2021-10-15 ENCOUNTER — OFFICE VISIT (OUTPATIENT)
Dept: OBSTETRICS AND GYNECOLOGY | Facility: CLINIC | Age: 26
End: 2021-10-15
Payer: MEDICAID

## 2021-10-15 DIAGNOSIS — F41.9 ANXIETY: Primary | ICD-10-CM

## 2021-10-15 PROCEDURE — 99214 PR OFFICE/OUTPT VISIT, EST, LEVL IV, 30-39 MIN: ICD-10-PCS | Mod: 95,,, | Performed by: OBSTETRICS & GYNECOLOGY

## 2021-10-15 PROCEDURE — 99214 OFFICE O/P EST MOD 30 MIN: CPT | Mod: 95,,, | Performed by: OBSTETRICS & GYNECOLOGY

## 2021-10-15 RX ORDER — SERTRALINE HYDROCHLORIDE 50 MG/1
TABLET, FILM COATED ORAL
Qty: 30 TABLET | Refills: 11 | Status: SHIPPED | OUTPATIENT
Start: 2021-10-15 | End: 2022-10-28 | Stop reason: SDUPTHER

## 2021-10-29 ENCOUNTER — PATIENT MESSAGE (OUTPATIENT)
Dept: OBSTETRICS AND GYNECOLOGY | Facility: CLINIC | Age: 26
End: 2021-10-29
Payer: MEDICAID

## 2021-11-01 ENCOUNTER — OFFICE VISIT (OUTPATIENT)
Dept: URGENT CARE | Facility: CLINIC | Age: 26
End: 2021-11-01
Payer: MEDICAID

## 2021-11-01 VITALS
SYSTOLIC BLOOD PRESSURE: 127 MMHG | WEIGHT: 118 LBS | OXYGEN SATURATION: 98 % | RESPIRATION RATE: 19 BRPM | HEART RATE: 60 BPM | BODY MASS INDEX: 23.79 KG/M2 | HEIGHT: 59 IN | DIASTOLIC BLOOD PRESSURE: 80 MMHG | TEMPERATURE: 99 F

## 2021-11-01 DIAGNOSIS — U07.1 COVID-19: Primary | ICD-10-CM

## 2021-11-01 PROCEDURE — 99202 OFFICE O/P NEW SF 15 MIN: CPT | Mod: S$GLB,,, | Performed by: PHYSICIAN ASSISTANT

## 2021-11-01 PROCEDURE — 99202 PR OFFICE/OUTPT VISIT, NEW, LEVL II, 15-29 MIN: ICD-10-PCS | Mod: S$GLB,,, | Performed by: PHYSICIAN ASSISTANT

## 2021-11-02 ENCOUNTER — INFUSION (OUTPATIENT)
Dept: INFECTIOUS DISEASES | Facility: HOSPITAL | Age: 26
End: 2021-11-02
Attending: PHYSICIAN ASSISTANT
Payer: MEDICAID

## 2021-11-02 VITALS
SYSTOLIC BLOOD PRESSURE: 117 MMHG | OXYGEN SATURATION: 98 % | HEART RATE: 63 BPM | TEMPERATURE: 98 F | RESPIRATION RATE: 17 BRPM | DIASTOLIC BLOOD PRESSURE: 71 MMHG

## 2021-11-02 DIAGNOSIS — U07.1 COVID-19: Primary | ICD-10-CM

## 2021-11-02 PROCEDURE — M0243 CASIRIVI AND IMDEVI INFUSION: HCPCS | Performed by: PHYSICIAN ASSISTANT

## 2021-11-02 PROCEDURE — 63600175 PHARM REV CODE 636 W HCPCS: Performed by: PHYSICIAN ASSISTANT

## 2021-11-02 PROCEDURE — 25000003 PHARM REV CODE 250: Performed by: PHYSICIAN ASSISTANT

## 2021-11-02 RX ORDER — DIPHENHYDRAMINE HYDROCHLORIDE 50 MG/ML
25 INJECTION INTRAMUSCULAR; INTRAVENOUS ONCE AS NEEDED
Status: ACTIVE | OUTPATIENT
Start: 2021-11-02 | End: 2033-03-31

## 2021-11-02 RX ORDER — ALBUTEROL SULFATE 90 UG/1
2 AEROSOL, METERED RESPIRATORY (INHALATION)
Status: ACTIVE | OUTPATIENT
Start: 2021-11-02

## 2021-11-02 RX ORDER — ACETAMINOPHEN 325 MG/1
650 TABLET ORAL ONCE AS NEEDED
Status: DISCONTINUED | OUTPATIENT
Start: 2021-11-02 | End: 2023-10-21 | Stop reason: HOSPADM

## 2021-11-02 RX ORDER — SODIUM CHLORIDE 0.9 % (FLUSH) 0.9 %
10 SYRINGE (ML) INJECTION
Status: ACTIVE | OUTPATIENT
Start: 2021-11-02

## 2021-11-02 RX ORDER — EPINEPHRINE 0.3 MG/.3ML
0.3 INJECTION SUBCUTANEOUS
Status: ACTIVE | OUTPATIENT
Start: 2021-11-02

## 2021-11-02 RX ORDER — ONDANSETRON 4 MG/1
4 TABLET, ORALLY DISINTEGRATING ORAL ONCE AS NEEDED
Status: DISPENSED | OUTPATIENT
Start: 2021-11-02 | End: 2033-03-31

## 2021-11-02 RX ADMIN — CASIRIVIMAB AND IMDEVIMAB 600 MG: 600; 600 INJECTION, SOLUTION, CONCENTRATE INTRAVENOUS at 09:11

## 2022-01-10 ENCOUNTER — TELEPHONE (OUTPATIENT)
Dept: INTERNAL MEDICINE | Facility: CLINIC | Age: 27
End: 2022-01-10
Payer: MEDICAID

## 2022-03-28 ENCOUNTER — PATIENT MESSAGE (OUTPATIENT)
Dept: CARDIOLOGY | Facility: CLINIC | Age: 27
End: 2022-03-28
Payer: MEDICAID

## 2022-05-16 DIAGNOSIS — Q20.1 DORV (DOUBLE OUTLET RIGHT VENTRICLE): ICD-10-CM

## 2022-05-16 DIAGNOSIS — Q24.9 ADULT CONGENITAL HEART DISEASE: Primary | ICD-10-CM

## 2022-05-16 DIAGNOSIS — Z98.890 S/P FONTAN PROCEDURE: ICD-10-CM

## 2022-05-16 DIAGNOSIS — Q24.0 DEXTROCARDIA: ICD-10-CM

## 2022-07-05 ENCOUNTER — RESEARCH ENCOUNTER (OUTPATIENT)
Dept: RESEARCH | Facility: HOSPITAL | Age: 27
End: 2022-07-05
Payer: MEDICAID

## 2022-07-05 NOTE — PROGRESS NOTES
Trial: HAYLEE, 2021.237  PI: Dr. Weems    This note is to indicate that the patient self-enrolled in the CHI-DEBBIE study (Congenital Heart Initiative - Redefining Outcomes and Navigation to Adult Centered Care). This study is looking to improve care for future adult congenital heart defect patients. The trial consists of surveys periodically that the patient completes independently.     Please contact CHANDA Leija or Dr. Toro Weems for questions.   Trial Number: [id number]  Trial Email: [email or web address]

## 2022-07-15 ENCOUNTER — CLINICAL SUPPORT (OUTPATIENT)
Dept: PEDIATRIC CARDIOLOGY | Facility: CLINIC | Age: 27
End: 2022-07-15
Payer: MEDICAID

## 2022-07-15 ENCOUNTER — HOSPITAL ENCOUNTER (OUTPATIENT)
Dept: RADIOLOGY | Facility: HOSPITAL | Age: 27
Discharge: HOME | End: 2022-07-15
Attending: PEDIATRICS
Payer: MEDICAID

## 2022-07-15 ENCOUNTER — OFFICE VISIT (OUTPATIENT)
Dept: PEDIATRIC CARDIOLOGY | Facility: CLINIC | Age: 27
End: 2022-07-15
Payer: MEDICAID

## 2022-07-15 VITALS — HEIGHT: 59 IN | WEIGHT: 125.56 LBS | OXYGEN SATURATION: 97 % | BODY MASS INDEX: 25.31 KG/M2

## 2022-07-15 DIAGNOSIS — Q20.1 DORV (DOUBLE OUTLET RIGHT VENTRICLE): ICD-10-CM

## 2022-07-15 DIAGNOSIS — Z98.890 S/P FONTAN PROCEDURE: ICD-10-CM

## 2022-07-15 DIAGNOSIS — Q24.9 ADULT CONGENITAL HEART DISEASE: ICD-10-CM

## 2022-07-15 DIAGNOSIS — R51.9 CHRONIC NONINTRACTABLE HEADACHE, UNSPECIFIED HEADACHE TYPE: ICD-10-CM

## 2022-07-15 DIAGNOSIS — Q24.9 ADULT CONGENITAL HEART DISEASE: Primary | ICD-10-CM

## 2022-07-15 DIAGNOSIS — Q89.3 SITUS INVERSUS: ICD-10-CM

## 2022-07-15 DIAGNOSIS — G89.29 CHRONIC NONINTRACTABLE HEADACHE, UNSPECIFIED HEADACHE TYPE: ICD-10-CM

## 2022-07-15 DIAGNOSIS — Q24.0 DEXTROCARDIA: ICD-10-CM

## 2022-07-15 PROCEDURE — 93325 PEDIATRIC ECHO (CUPID ONLY): ICD-10-PCS | Mod: 26,S$PBB,, | Performed by: PEDIATRICS

## 2022-07-15 PROCEDURE — 76705 ECHO EXAM OF ABDOMEN: CPT | Mod: TC

## 2022-07-15 PROCEDURE — 99213 OFFICE O/P EST LOW 20 MIN: CPT | Mod: PBBFAC,PO | Performed by: PEDIATRICS

## 2022-07-15 PROCEDURE — 93303 PEDIATRIC ECHO (CUPID ONLY): ICD-10-PCS | Mod: 26,S$PBB,, | Performed by: PEDIATRICS

## 2022-07-15 PROCEDURE — 1159F MED LIST DOCD IN RCRD: CPT | Mod: CPTII,,, | Performed by: PEDIATRICS

## 2022-07-15 PROCEDURE — 99999 PR PBB SHADOW E&M-EST. PATIENT-LVL III: CPT | Mod: PBBFAC,,, | Performed by: PEDIATRICS

## 2022-07-15 PROCEDURE — 93005 ELECTROCARDIOGRAM TRACING: CPT | Mod: PBBFAC,PO | Performed by: PEDIATRICS

## 2022-07-15 PROCEDURE — 3008F BODY MASS INDEX DOCD: CPT | Mod: CPTII,,, | Performed by: PEDIATRICS

## 2022-07-15 PROCEDURE — 93303 ECHO TRANSTHORACIC: CPT | Mod: 26,S$PBB,, | Performed by: PEDIATRICS

## 2022-07-15 PROCEDURE — 76705 US ABDOMEN LIMITED: ICD-10-PCS | Mod: 26,,, | Performed by: RADIOLOGY

## 2022-07-15 PROCEDURE — 93010 EKG 12-LEAD PEDIATRIC: ICD-10-PCS | Mod: S$PBB,,, | Performed by: PEDIATRICS

## 2022-07-15 PROCEDURE — 93320 PEDIATRIC ECHO (CUPID ONLY): ICD-10-PCS | Mod: 26,S$PBB,, | Performed by: PEDIATRICS

## 2022-07-15 PROCEDURE — 93325 DOPPLER ECHO COLOR FLOW MAPG: CPT | Mod: PBBFAC,PO | Performed by: PEDIATRICS

## 2022-07-15 PROCEDURE — 99999 PR PBB SHADOW E&M-EST. PATIENT-LVL III: ICD-10-PCS | Mod: PBBFAC,,, | Performed by: PEDIATRICS

## 2022-07-15 PROCEDURE — 3008F PR BODY MASS INDEX (BMI) DOCUMENTED: ICD-10-PCS | Mod: CPTII,,, | Performed by: PEDIATRICS

## 2022-07-15 PROCEDURE — 1159F PR MEDICATION LIST DOCUMENTED IN MEDICAL RECORD: ICD-10-PCS | Mod: CPTII,,, | Performed by: PEDIATRICS

## 2022-07-15 PROCEDURE — 76705 ECHO EXAM OF ABDOMEN: CPT | Mod: 26,,, | Performed by: RADIOLOGY

## 2022-07-15 PROCEDURE — 99214 PR OFFICE/OUTPT VISIT, EST, LEVL IV, 30-39 MIN: ICD-10-PCS | Mod: 25,S$PBB,, | Performed by: PEDIATRICS

## 2022-07-15 PROCEDURE — 93010 ELECTROCARDIOGRAM REPORT: CPT | Mod: S$PBB,,, | Performed by: PEDIATRICS

## 2022-07-15 PROCEDURE — 93320 DOPPLER ECHO COMPLETE: CPT | Mod: 26,S$PBB,, | Performed by: PEDIATRICS

## 2022-07-15 PROCEDURE — 99214 OFFICE O/P EST MOD 30 MIN: CPT | Mod: 25,S$PBB,, | Performed by: PEDIATRICS

## 2022-07-15 NOTE — PROGRESS NOTES
07/15/2022     [facility]    re:Theresa Grey  :1995     Gabriella Dorado MD (Inactive)   [address] [facility] [address]     Dear Doctors:    Theresa Grey is a 26 y.o. female with the following diagnoses:  Diagnoses:  1. 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 resolved cyanosis  3. Delivered healthy baby 3/17/20 without difficulty.  4. Mild thrombocytopenia - common in Fontan patients.    5.  Headache, Doubt cardiac etiology.    Discussion:  She looks great.    I do not think that her headaches are related to her heart disease, but the Fontan physiology can elevate central venous pressures and potentially lead to elevated dural venous sinus pressure and pseudotumor cerebri.  I think it would be worthwhile having her see a neurologist.  We will check some baseline studies to make sure there are no abnormalities.      In the past, she asked about the risk of subsequent pregnancies.  I again discussed the following with her:  1.  The rate of fetal loss is significantly elevated, approaching 50%.  2.  The rate of premature delivery is significantly elevated.  3.  The clotting predisposition often noted in Fontan patients may be worsened by pregnancy.  4.  Without a sub pulmonic ventricle, she may tolerate the volume load associated with a pregnancy less well than a 2 ventricle patient.  There is an increased risk of heart failure symptoms.  5.  There is an increased risk of arrhythmias.    That said, she tolerated her last pregnancy well.  I would expect her to tolerate a pregnancy well.  Vaginal  delivery is typically preferable if she is doing well.  IV fluids may be necessary to avoid dehydration.  I would recommend SBE prophylaxis for delivery although this is certainly debatable.  I would recommend close monitoring with telemetry for during delivery and for 24-48 hours after delivery.      We again discussed the long-term risk associated with Fontan circulation. We discussed:  1. Risk of thromboembolic events with Fontan   - she needs to be on a baby aspirin daily to try to cut down on that risk.  I agree with the use of prophylactic Lovenox during the pregnancy.   - would avoid estrogen containing birth control long-term.  She will discuss with her gynecologist  2. Long term risk of arrhythmias, heart failure, PLE, liver disease discussed    Plan:  1.  Continue daily baby aspirin.    2.  Check baseline blood work today.    3.  SBEP is indicated for dental work.    4.  referral to headache neurologist  5.  Follow up in 1 year with echo and ekg.  6.  Check liver US and baseline Fontan labs today.    Interval history:  I last saw her a year ago.  Overall, she has done well since that time without chest pain, palpitations, syncope, near syncope, cyanosis, or edema.  No chronic cough.  No chronic diarrhea.  Her only complaint are headaches.  She was complaining of this a year ago as well.  She often wakes up with a headache.  It is frontal, but wraps around all the way to her neck.  It gets better during the day.  No swelling at all.    PMH:  She was diagnosed as a young child with congenital heart disease while living in [place]. She was evaluated at [facility] ([place]) with a cath at about 7 years of age. She was scheduled for heart surgery by her report about 10 years ago. However, due to social issues (no insurance, no social security number) the surgery was put off and she was lost to follow up. I first saw her in 2014 she was admitted with pregnancy, rare chest pain, and cyanosis.  After long discussion,  "she had a pregnancy termination due to the risk associated with unrepaired cyanotic congenital heart disease.  On 9/29/16, she underwent an extracardiac Fontan with oversewing and ligation of the MPA at [facility].  She did very well and was discharged a week later.      The review of systems is as noted above. It is otherwise negative for other symptoms related to the general, neurological, psychiatric, endocrine, gastrointestinal, genitourinary, respiratory, dermatologic, musculoskeletal, hematologic, and immunologic systems.    Past Medical History:   Diagnosis Date    Abnormality of heart valve     heart disease     fatigue as a result of pregnancy (8wks ) clubbing indicates chronic oxygenation issues but pt considered it "normal"     Past Surgical History:   Procedure Laterality Date    CARDIAC CATHETERIZATION  at 7 yars of age    DILATION AND CURETTAGE OF UTERUS      FONTAN PROCEDURE, EXTRACARDIAC  September 29, 2015    With a nonfenestrated 22 mm [place]-Justice tube graft.  The pulmonary valve was closed.  Procedure performed by Dr. Moe Kulkarni at [facility].     Family History   Problem Relation Age of Onset    Diabetes Maternal Grandmother     Vision loss Maternal Grandmother     Miscarriages / Stillbirths Mother     Diabetes Father     Diabetes Paternal Grandmother     No Known Problems Brother     No Known Problems Maternal Grandfather     No Known Problems Paternal Grandfather     Hypertension Maternal Aunt     Diabetes Maternal Aunt     No Known Problems Sister     No Known Problems Maternal Uncle     No Known Problems Paternal Aunt     No Known Problems Paternal Uncle     Anemia Neg Hx     Arrhythmia Neg Hx     Asthma Neg Hx     Clotting disorder Neg Hx     Fainting Neg Hx     Heart attack Neg Hx     Heart disease Neg Hx     Heart failure Neg Hx     Hyperlipidemia Neg Hx     Stroke Neg Hx     Atrial Septal Defect Neg Hx      Social History     Socioeconomic " "History    Marital status:    Tobacco Use    Smoking status: Former Smoker     Packs/day: 0.25    Smokeless tobacco: Never Used   Substance and Sexual Activity    Alcohol use: Not Currently     Alcohol/week: 0.0 standard drinks     Comment: on ocassion before pregnancy    Drug use: No     Comment: previous marijuana use    Sexual activity: Yes     Partners: Male     Birth control/protection: Injection       Current Outpatient Medications:     aspirin (ECOTRIN) 81 MG EC tablet, Take 1 tablet (81 mg total) by mouth once daily., Disp: 360 tablet, Rfl: 0    ibuprofen (ADVIL,MOTRIN) 600 MG tablet, Take 1 tablet (600 mg total) by mouth every 6 (six) hours as needed for Pain., Disp: 30 tablet, Rfl: 1    sertraline (ZOLOFT) 50 MG tablet, Take 1/2 tablet per day for a week, then increase to 1 tablet daily thereafter., Disp: 30 tablet, Rfl: 11    Current Facility-Administered Medications:     acetaminophen tablet 650 mg, 650 mg, Oral, Once PRN, Ayah Sanchez MD    albuterol inhaler 2 puff, 2 puff, Inhalation, Q20 Min PRN, Ayah Sanchez MD    diphenhydrAMINE injection 25 mg, 25 mg, Intravenous, Once PRN, Ayah Sanchez MD    EPINEPHrine (EPIPEN) 0.3 mg/0.3 mL pen injection 0.3 mg, 0.3 mg, Intramuscular, PRN, Ayah Sanchez MD    methylPREDNISolone sodium succinate injection 40 mg, 40 mg, Intravenous, Once PRN, Ayah Sanchez MD    ondansetron disintegrating tablet 4 mg, 4 mg, Oral, Once PRN, Ayah Sanchez MD    sodium chloride 0.9% 500 mL flush bag, , Intravenous, PRN, Ayah Sanchez MD    sodium chloride 0.9% flush 10 mL, 10 mL, Intravenous, PRN, Ayah Sanchez MD    Review of patient's allergies indicates:   Allergen Reactions    Hydrocodone Shortness Of Breath and Other (See Comments)     Dizziness; sweating     Ht 4' 11" (1.499 m)   Wt 56.9 kg (125 lb 8.8 oz)   SpO2 97%   BMI 25.36 kg/m²     Blood pressure 112/72.  Heart rate 74.  In general, she is an acyanotic, nondysmorphic " "appearing female in no apparent distress.  The eyes, nares, and oropharynx are clear.  Eyelids and conjunctiva free of drainage and redness.  PERRLA.  Teeth in good repair.  Mucous membranes are moist.  The head is normocephalic and atraumatic.  The neck is supple without jugular venous distention or thyroid enlargement.  The lungs are clear to auscultation bilaterally.  There is a well healed sternotomy scar.  The PMI is in the right chest.  The first heart sound is normal.  The second is loud and single.  There are no gallops, rubs, or clicks in the supine position.  The abdominal exam reveals no hepatomegaly.  Pulses are normal in all 4 extremities with brisk capillary refill and no edema.  No rashes are noted.  She has some clubbing.  No tenderness, swelling in legs.  Her neurological exam is normal.    EKG with a "left atrial rhythm" with evidence of dextrocardia.  HR about 62.  Unchanged from the last EKG.    An echocardiogram looks good.  Good ventricular function.  Mild  atrioventricular valve insufficiency.  No obstruction across the aortic outflow.  Fontan very poorly visualized.    Cardiac MRI October 19, 2019:  1. Patent innominate vein to left-sided SVC. Widely patent left-sided Pavel anastomosis. The IVC is anastomosed to the left pulmonary artery. The Fontan tunnel is widely patent without evidence of obstruction. The branch pulmonary arteries are normal in size with no evidence of obstruction.   2. Atrio-ventricular concordance with a jason-cross type of arrangement.   3. The right ventricle volumes are low normal. Moderate RVH. The calculated RVEF is 45 %.  4. The left ventricular volumes are low normal. The calculated LVEF is 50 %.   5. Large inlet type ventricular septal defect.   6. Oversewn pulmonary valve.   7. Anterior and rightward aorta. Structurally normal aortic valve with no significant aortic insufficiency.   8. Right aortic arch.     Sincerely,        Toro Weems MD  Pediatric " Cardiology  Adult Congenital Heart Disease  Pediatric Heart Failure and Transplantation  [facility]  [address] [place]  [address]  [phone]

## 2022-07-18 ENCOUNTER — PATIENT MESSAGE (OUTPATIENT)
Dept: PEDIATRIC CARDIOLOGY | Facility: CLINIC | Age: 27
End: 2022-07-18
Payer: MEDICAID

## 2022-07-25 ENCOUNTER — PATIENT MESSAGE (OUTPATIENT)
Dept: PEDIATRIC CARDIOLOGY | Facility: CLINIC | Age: 27
End: 2022-07-25
Payer: MEDICAID

## 2022-10-28 RX ORDER — SERTRALINE HYDROCHLORIDE 50 MG/1
TABLET, FILM COATED ORAL
Qty: 30 TABLET | Refills: 11 | Status: ON HOLD | OUTPATIENT
Start: 2022-10-28 | End: 2023-10-21 | Stop reason: HOSPADM

## 2023-02-20 DIAGNOSIS — Z98.890 S/P FONTAN PROCEDURE: ICD-10-CM

## 2023-02-20 DIAGNOSIS — I37.0 NONRHEUMATIC PULMONARY VALVE STENOSIS: Primary | ICD-10-CM

## 2023-02-20 DIAGNOSIS — Q20.1 DORV (DOUBLE OUTLET RIGHT VENTRICLE): ICD-10-CM

## 2023-02-20 DIAGNOSIS — Q20.1 DORV (DOUBLE OUTLET RIGHT VENTRICLE): Primary | ICD-10-CM

## 2023-03-27 ENCOUNTER — TELEPHONE (OUTPATIENT)
Dept: PEDIATRIC CARDIOLOGY | Facility: CLINIC | Age: 28
End: 2023-03-27
Payer: OTHER GOVERNMENT

## 2023-03-27 NOTE — TELEPHONE ENCOUNTER
Returned call, explained appt in [place] is soonest available there.  We can schedule her sooner in [place], but she will need to go to [place] for her scheduled ECHO.  She said she feels better now and will keep her appointments.  She will call back as needed.    ----- Message from Toro Weems MD sent at 3/27/2023 12:29 PM CDT -----  I'm fine with seeing sooner, but just have to warn her she will need to come back for echo.  ----- Message -----  From: Michelle Bonilla RN  Sent: 3/27/2023  11:57 AM CDT  To: Toro Weems MD    Do you want me to try to get her in sooner?  She definitely won't get an ECHO.    Michelle  ----- Message -----  From: Eunice Goetz  Sent: 3/27/2023  11:40 AM CDT  To: Dank GANNON Staff    Patient has been having chest pain since 3/25 she has an appt on 4/21 she would like to know is she can be seen sooner            Patient [phone]          Thank you  Scheduling

## 2023-04-21 ENCOUNTER — CLINICAL SUPPORT (OUTPATIENT)
Dept: PEDIATRIC CARDIOLOGY | Facility: CLINIC | Age: 28
End: 2023-04-21
Payer: OTHER GOVERNMENT

## 2023-04-21 ENCOUNTER — OFFICE VISIT (OUTPATIENT)
Dept: PEDIATRIC CARDIOLOGY | Facility: CLINIC | Age: 28
End: 2023-04-21
Payer: MEDICAID

## 2023-04-21 ENCOUNTER — CLINICAL SUPPORT (OUTPATIENT)
Dept: PEDIATRIC CARDIOLOGY | Facility: CLINIC | Age: 28
End: 2023-04-21
Attending: PEDIATRICS
Payer: MEDICAID

## 2023-04-21 VITALS
OXYGEN SATURATION: 96 % | SYSTOLIC BLOOD PRESSURE: 110 MMHG | HEIGHT: 60 IN | DIASTOLIC BLOOD PRESSURE: 73 MMHG | BODY MASS INDEX: 24.54 KG/M2 | WEIGHT: 125 LBS | HEART RATE: 100 BPM

## 2023-04-21 DIAGNOSIS — I37.0 NONRHEUMATIC PULMONARY VALVE STENOSIS: ICD-10-CM

## 2023-04-21 DIAGNOSIS — Q24.0 DEXTROCARDIA: ICD-10-CM

## 2023-04-21 DIAGNOSIS — O99.891 CONGENITAL HEART DISEASE DURING PREGNANCY: ICD-10-CM

## 2023-04-21 DIAGNOSIS — Z98.890 S/P FONTAN PROCEDURE: Primary | ICD-10-CM

## 2023-04-21 DIAGNOSIS — Q20.1 DORV (DOUBLE OUTLET RIGHT VENTRICLE): ICD-10-CM

## 2023-04-21 DIAGNOSIS — Z98.890 S/P FONTAN PROCEDURE: ICD-10-CM

## 2023-04-21 DIAGNOSIS — Z3A.11 11 WEEKS GESTATION OF PREGNANCY: ICD-10-CM

## 2023-04-21 DIAGNOSIS — Q24.9 CONGENITAL HEART DISEASE DURING PREGNANCY: ICD-10-CM

## 2023-04-21 DIAGNOSIS — Q24.9 ADULT CONGENITAL HEART DISEASE: ICD-10-CM

## 2023-04-21 PROCEDURE — 99215 PR OFFICE/OUTPT VISIT, EST, LEVL V, 40-54 MIN: ICD-10-PCS | Mod: 25,S$PBB,, | Performed by: PEDIATRICS

## 2023-04-21 PROCEDURE — 3078F PR MOST RECENT DIASTOLIC BLOOD PRESSURE < 80 MM HG: ICD-10-PCS | Mod: CPTII,,, | Performed by: PEDIATRICS

## 2023-04-21 PROCEDURE — 93325 PEDIATRIC ECHO (CUPID ONLY): ICD-10-PCS | Mod: 26,S$PBB,, | Performed by: PEDIATRICS

## 2023-04-21 PROCEDURE — 1159F PR MEDICATION LIST DOCUMENTED IN MEDICAL RECORD: ICD-10-PCS | Mod: CPTII,,, | Performed by: PEDIATRICS

## 2023-04-21 PROCEDURE — 3074F SYST BP LT 130 MM HG: CPT | Mod: CPTII,,, | Performed by: PEDIATRICS

## 2023-04-21 PROCEDURE — 93303 ECHO TRANSTHORACIC: CPT | Mod: 26,S$PBB,, | Performed by: PEDIATRICS

## 2023-04-21 PROCEDURE — 99215 OFFICE O/P EST HI 40 MIN: CPT | Mod: 25,S$PBB,, | Performed by: PEDIATRICS

## 2023-04-21 PROCEDURE — 3078F DIAST BP <80 MM HG: CPT | Mod: CPTII,,, | Performed by: PEDIATRICS

## 2023-04-21 PROCEDURE — 99213 OFFICE O/P EST LOW 20 MIN: CPT | Mod: PBBFAC,PO | Performed by: PEDIATRICS

## 2023-04-21 PROCEDURE — 99999 PR PBB SHADOW E&M-EST. PATIENT-LVL III: ICD-10-PCS | Mod: PBBFAC,,, | Performed by: PEDIATRICS

## 2023-04-21 PROCEDURE — 93010 EKG 12-LEAD: ICD-10-PCS | Mod: S$PBB,,, | Performed by: PEDIATRICS

## 2023-04-21 PROCEDURE — 93303 PEDIATRIC ECHO (CUPID ONLY): ICD-10-PCS | Mod: 26,S$PBB,, | Performed by: PEDIATRICS

## 2023-04-21 PROCEDURE — 1159F MED LIST DOCD IN RCRD: CPT | Mod: CPTII,,, | Performed by: PEDIATRICS

## 2023-04-21 PROCEDURE — 93005 ELECTROCARDIOGRAM TRACING: CPT | Mod: PBBFAC,PO | Performed by: PEDIATRICS

## 2023-04-21 PROCEDURE — 93320 PEDIATRIC ECHO (CUPID ONLY): ICD-10-PCS | Mod: 26,S$PBB,, | Performed by: PEDIATRICS

## 2023-04-21 PROCEDURE — 3008F PR BODY MASS INDEX (BMI) DOCUMENTED: ICD-10-PCS | Mod: CPTII,,, | Performed by: PEDIATRICS

## 2023-04-21 PROCEDURE — 3074F PR MOST RECENT SYSTOLIC BLOOD PRESSURE < 130 MM HG: ICD-10-PCS | Mod: CPTII,,, | Performed by: PEDIATRICS

## 2023-04-21 PROCEDURE — 3008F BODY MASS INDEX DOCD: CPT | Mod: CPTII,,, | Performed by: PEDIATRICS

## 2023-04-21 PROCEDURE — 99999 PR PBB SHADOW E&M-EST. PATIENT-LVL III: CPT | Mod: PBBFAC,,, | Performed by: PEDIATRICS

## 2023-04-21 PROCEDURE — 93325 DOPPLER ECHO COLOR FLOW MAPG: CPT | Mod: PBBFAC,PO | Performed by: PEDIATRICS

## 2023-04-21 PROCEDURE — 93320 DOPPLER ECHO COMPLETE: CPT | Mod: 26,S$PBB,, | Performed by: PEDIATRICS

## 2023-04-21 PROCEDURE — 93010 ELECTROCARDIOGRAM REPORT: CPT | Mod: S$PBB,,, | Performed by: PEDIATRICS

## 2023-04-21 NOTE — PROGRESS NOTES
2023     [facility]    re:Theresa Grey  :1995     Gabriella Dorado MD   [address] [facility] [address]       Theresa Grey is a 27 y.o. female with the following diagnoses:  Diagnoses:  1. 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 Delivered healthy baby 3/17/20 via induced vaginal delivery without difficulty.  - now 11 weeks pregnant  3. Mild thrombocytopenia noted in the past - common in Fontan patients.      Discussion:  In regards to her pregnancy, I expect her to do well, but I have concerns:  1.  The clotting predisposition often noted in Fontan patients may be worsened by pregnancy.  2.  Without a sub pulmonic ventricle, she may tolerate the volume load associated with a pregnancy less well than a 2 ventricle patient.  There is an increased risk of heart failure symptoms.  3.  There is an increased risk of arrhythmias.    4.  Increased risk of miscarriage and  birth, increased risk of congenital heart disease.  That said, she really looks great and did well with the last pregnancy.  Vaginal delivery is typically preferable if she is doing well.  IV fluids may be necessary to avoid dehydration.  I would recommend SBE prophylaxis for delivery although this is certainly debatable.  I would recommend close monitoring with telemetry during delivery and for 24-48 hours after delivery.        We have discussed the long-term risk associated with Fontan circulation. We discussed:  1. Risk of thromboembolic events with Fontan   - she needs to be on a baby aspirin daily to try to cut  down on that risk.  At her last pregnancy, Maternal-Fetal Medicine recommended Lovenox as well.  I would be absolutely fine with that plan.   2. Long term risk of arrhythmias, heart failure, PLE, liver disease discussed    Plan:  Restart baby aspirin.  Can discuss Lovenox with Maternal Fetal Medicine.  2.   Check baseline blood work today.    3.   SBEP is indicated for dental work.    4.   Referral to Maternal-Fetal Medicine  5.   Schedule for liver ultrasound  6.   Follow-up with me in about 2 months with repeat echocardiogram and EKG.  I will see her in [place].  Will also get her scheduled for a fetal echo.    Interval history:  She is now 11 weeks pregnant.  She is doing well.  Maybe some mild dyspnea on exertion associated with this pregnancy.  No edema.  No palpitations.  No diarrhea.  No syncope or near-syncope.  No chest pain.    PMH:  She was diagnosed as a young child with congenital heart disease while living in [place]. She was evaluated at [facility] ([place] with a cath at about 7 years of age. She was scheduled for heart surgery by her report about 10 years ago. However, due to social issues (no insurance, no social security number) the surgery was put off and she was lost to follow up. I first saw her in 2014 she was admitted with pregnancy, rare chest pain, and cyanosis.  After long discussion, she had a pregnancy termination due to the risk associated with unrepaired cyanotic congenital heart disease.  On 9/29/16, she underwent an extracardiac Fontan with oversewing and ligation of the MPA at [facility].  She did very well and was discharged a week later.      The review of systems is as noted above. It is otherwise negative for other symptoms related to the general, neurological, psychiatric, endocrine, gastrointestinal, genitourinary, respiratory, dermatologic, musculoskeletal, hematologic, and immunologic systems.    Past Medical History:   Diagnosis Date    Abnormality of heart valve      "heart disease     fatigue as a result of pregnancy (8wks ) clubbing indicates chronic oxygenation issues but pt considered it "normal"     Past Surgical History:   Procedure Laterality Date    CARDIAC CATHETERIZATION  at 7 yars of age    DILATION AND CURETTAGE OF UTERUS      FONTAN PROCEDURE, EXTRACARDIAC  September 29, 2015    With a nonfenestrated 22 mm [place]-Justice tube graft.  The pulmonary valve was closed.  Procedure performed by Dr. Moe Kulkarni at [facility].     Family History   Problem Relation Age of Onset    Diabetes Maternal Grandmother     Vision loss Maternal Grandmother     Miscarriages / Stillbirths Mother     Diabetes Father     Diabetes Paternal Grandmother     No Known Problems Brother     No Known Problems Maternal Grandfather     No Known Problems Paternal Grandfather     Hypertension Maternal Aunt     Diabetes Maternal Aunt     No Known Problems Sister     No Known Problems Maternal Uncle     No Known Problems Paternal Aunt     No Known Problems Paternal Uncle     Anemia Neg Hx     Arrhythmia Neg Hx     Asthma Neg Hx     Clotting disorder Neg Hx     Fainting Neg Hx     Heart attack Neg Hx     Heart disease Neg Hx     Heart failure Neg Hx     Hyperlipidemia Neg Hx     Stroke Neg Hx     Atrial Septal Defect Neg Hx      Social History     Socioeconomic History    Marital status:    Tobacco Use    Smoking status: Former     Packs/day: 0.25     Types: Cigarettes    Smokeless tobacco: Never   Substance and Sexual Activity    Alcohol use: Not Currently     Alcohol/week: 0.0 standard drinks     Comment: on ocassion before pregnancy    Drug use: No     Comment: previous marijuana use    Sexual activity: Yes     Partners: Male     Birth control/protection: Injection       Current Outpatient Medications:     aspirin (ECOTRIN) 81 MG EC tablet, Take 1 tablet (81 mg total) by mouth once daily., Disp: 360 tablet, Rfl: 0    ibuprofen (ADVIL,MOTRIN) 600 MG tablet, Take 1 tablet (600 mg total) by " mouth every 6 (six) hours as needed for Pain., Disp: 30 tablet, Rfl: 1    sertraline (ZOLOFT) 50 MG tablet, Take 1/2 tablet per day for a week, then increase to 1 tablet daily thereafter., Disp: 30 tablet, Rfl: 11    Current Facility-Administered Medications:     acetaminophen tablet 650 mg, 650 mg, Oral, Once PRN, Ayah Sanchez MD    albuterol inhaler 2 puff, 2 puff, Inhalation, Q20 Min PRN, Ayah Sanchez MD    diphenhydrAMINE injection 25 mg, 25 mg, Intravenous, Once PRN, Ayah Sanchez MD    EPINEPHrine (EPIPEN) 0.3 mg/0.3 mL pen injection 0.3 mg, 0.3 mg, Intramuscular, PRN, Ayah Sanchez MD    methylPREDNISolone sodium succinate injection 40 mg, 40 mg, Intravenous, Once PRN, Ayah Sanchez MD    ondansetron disintegrating tablet 4 mg, 4 mg, Oral, Once PRN, Ayah Sanchez MD    sodium chloride 0.9% 500 mL flush bag, , Intravenous, PRN, Ayah Sanchez MD    sodium chloride 0.9% flush 10 mL, 10 mL, Intravenous, PRN, Ayah Sanchez MD    Review of patient's allergies indicates:   Allergen Reactions    Hydrocodone Shortness Of Breath and Other (See Comments)     Dizziness; sweating     /73 (BP Location: Right arm, Patient Position: Sitting)   Pulse 100   Ht 5' (1.524 m)   Wt 56.7 kg (125 lb)   SpO2 96%   BMI 24.41 kg/m²     In general, she is an acyanotic, nondysmorphic appearing female in no apparent distress.  The eyes, nares, and oropharynx are clear.  Eyelids and conjunctiva free of drainage and redness.  PERRLA.  Teeth in good repair and she is wearing braces.  Mucous membranes are moist.  The head is normocephalic and atraumatic.  The neck is supple without jugular venous distention or thyroid enlargement.  The lungs are clear to auscultation bilaterally.  There is a well healed sternotomy scar.  The PMI is in the right chest.  The first heart sound is normal.  The second is loud and single.  There are no gallops, rubs, or clicks in the supine position.  The abdominal exam reveals  "no hepatomegaly.  Pulses are normal in all 4 extremities with brisk capillary refill and no edema.  No rashes are noted.  She has some clubbing.  No tenderness, swelling in legs.  Her neurological exam is normal.    EKG with a "left atrial rhythm" with evidence of dextrocardia.  HR about 87.  Unchanged from the last EKG.    An echocardiogram looks good.  Good ventricular function.  Mild  atrioventricular valve insufficiency.  No obstruction across the aortic outflow.  Fontan very poorly visualized.    Cardiac MRI October 19, 2019:  Patent innominate vein to left-sided SVC. Widely patent left-sided Pavel anastomosis. The IVC is anastomosed to the left pulmonary artery. The Fontan tunnel is widely patent without evidence of obstruction. The branch pulmonary arteries are normal in size with no evidence of obstruction.   Atrio-ventricular concordance with a jason-cross type of arrangement.   The right ventricle volumes are low normal. Moderate RVH. The calculated RVEF is 45 %.  The left ventricular volumes are low normal. The calculated LVEF is 50 %.   Large inlet type ventricular septal defect.   Oversewn pulmonary valve.   Anterior and rightward aorta. Structurally normal aortic valve with no significant aortic insufficiency.   Right aortic arch.     Sincerely,        Toro Weems MD  Pediatric Cardiology  Adult Congenital Heart Disease  Pediatric Heart Failure and Transplantation  [facility]  [address] [place]  [address]  [phone]                    "

## 2023-04-26 ENCOUNTER — PATIENT MESSAGE (OUTPATIENT)
Dept: CARDIOLOGY | Facility: CLINIC | Age: 28
End: 2023-04-26
Payer: OTHER GOVERNMENT

## 2023-04-26 ENCOUNTER — HOSPITAL ENCOUNTER (OUTPATIENT)
Dept: RADIOLOGY | Facility: HOSPITAL | Age: 28
Discharge: HOME | End: 2023-04-26
Attending: PEDIATRICS
Payer: MEDICAID

## 2023-04-26 DIAGNOSIS — Z3A.11 11 WEEKS GESTATION OF PREGNANCY: ICD-10-CM

## 2023-04-26 DIAGNOSIS — Z98.890 S/P FONTAN PROCEDURE: ICD-10-CM

## 2023-04-26 PROCEDURE — 76705 US ABDOMEN LIMITED: ICD-10-PCS | Mod: 26,,, | Performed by: RADIOLOGY

## 2023-04-26 PROCEDURE — 76705 ECHO EXAM OF ABDOMEN: CPT | Mod: TC

## 2023-04-26 PROCEDURE — 76705 ECHO EXAM OF ABDOMEN: CPT | Mod: 26,,, | Performed by: RADIOLOGY

## 2023-04-27 ENCOUNTER — PATIENT MESSAGE (OUTPATIENT)
Dept: CARDIOLOGY | Facility: CLINIC | Age: 28
End: 2023-04-27
Payer: OTHER GOVERNMENT

## 2023-05-09 ENCOUNTER — TELEPHONE (OUTPATIENT)
Dept: MATERNAL FETAL MEDICINE | Facility: CLINIC | Age: 28
End: 2023-05-09
Payer: OTHER GOVERNMENT

## 2023-05-09 DIAGNOSIS — Q89.3 SITUS INVERSUS: ICD-10-CM

## 2023-05-09 DIAGNOSIS — I37.0 PULMONARY VALVE STENOSIS, UNSPECIFIED ETIOLOGY: Primary | ICD-10-CM

## 2023-05-09 DIAGNOSIS — Q24.9 ADULT CONGENITAL HEART DISEASE: ICD-10-CM

## 2023-05-09 DIAGNOSIS — Q24.9 CONGENITAL HEART DISEASE DURING PREGNANCY: ICD-10-CM

## 2023-05-09 DIAGNOSIS — Q24.0 DEXTROCARDIA: ICD-10-CM

## 2023-05-09 DIAGNOSIS — Q20.1 DORV (DOUBLE OUTLET RIGHT VENTRICLE): ICD-10-CM

## 2023-05-09 DIAGNOSIS — O99.891 CONGENITAL HEART DISEASE DURING PREGNANCY: ICD-10-CM

## 2023-05-09 DIAGNOSIS — Z98.890 S/P FONTAN PROCEDURE: ICD-10-CM

## 2023-05-09 NOTE — TELEPHONE ENCOUNTER
Called patient today and scheduled her to see us here in Maternal Fetal Medicine on 05/23 for ultrasound and consult per Dr.Thomas Weems in adult congenital clinic.  Patient stated that she asked 's office to send records but we have not received anything yet.  Patient will reach back out to his office.

## 2023-05-17 DIAGNOSIS — Q24.9 CONGENITAL HEART DISEASE DURING PREGNANCY: Primary | ICD-10-CM

## 2023-05-17 DIAGNOSIS — O99.891 CONGENITAL HEART DISEASE DURING PREGNANCY: Primary | ICD-10-CM

## 2023-05-22 ENCOUNTER — PATIENT MESSAGE (OUTPATIENT)
Dept: MATERNAL FETAL MEDICINE | Facility: CLINIC | Age: 28
End: 2023-05-22
Payer: OTHER GOVERNMENT

## 2023-05-23 ENCOUNTER — PROCEDURE VISIT (OUTPATIENT)
Dept: MATERNAL FETAL MEDICINE | Facility: CLINIC | Age: 28
End: 2023-05-23
Payer: OTHER GOVERNMENT

## 2023-05-23 ENCOUNTER — OFFICE VISIT (OUTPATIENT)
Dept: MATERNAL FETAL MEDICINE | Facility: CLINIC | Age: 28
End: 2023-05-23
Payer: OTHER GOVERNMENT

## 2023-05-23 VITALS
HEIGHT: 60 IN | SYSTOLIC BLOOD PRESSURE: 100 MMHG | WEIGHT: 127 LBS | DIASTOLIC BLOOD PRESSURE: 66 MMHG | BODY MASS INDEX: 24.94 KG/M2

## 2023-05-23 DIAGNOSIS — O99.891 CONGENITAL HEART DISEASE DURING PREGNANCY: ICD-10-CM

## 2023-05-23 DIAGNOSIS — Q24.9 ADULT CONGENITAL HEART DISEASE: ICD-10-CM

## 2023-05-23 DIAGNOSIS — Z36.89 ENCOUNTER FOR FETAL ANATOMIC SURVEY: Primary | ICD-10-CM

## 2023-05-23 DIAGNOSIS — I37.0 PULMONARY VALVE STENOSIS, UNSPECIFIED ETIOLOGY: ICD-10-CM

## 2023-05-23 DIAGNOSIS — Z98.890 S/P FONTAN PROCEDURE: ICD-10-CM

## 2023-05-23 DIAGNOSIS — Q89.3 SITUS INVERSUS: ICD-10-CM

## 2023-05-23 DIAGNOSIS — Q24.9 CONGENITAL HEART DISEASE DURING PREGNANCY: ICD-10-CM

## 2023-05-23 DIAGNOSIS — Q24.0 DEXTROCARDIA: ICD-10-CM

## 2023-05-23 DIAGNOSIS — Q20.1 DORV (DOUBLE OUTLET RIGHT VENTRICLE): ICD-10-CM

## 2023-05-23 DIAGNOSIS — Z3A.11 11 WEEKS GESTATION OF PREGNANCY: ICD-10-CM

## 2023-05-23 PROCEDURE — 99999 PR PBB SHADOW E&M-EST. PATIENT-LVL III: CPT | Mod: PBBFAC,,, | Performed by: OBSTETRICS & GYNECOLOGY

## 2023-05-23 PROCEDURE — 99215 PR OFFICE/OUTPT VISIT, EST, LEVL V, 40-54 MIN: ICD-10-PCS | Mod: S$PBB,25,, | Performed by: OBSTETRICS & GYNECOLOGY

## 2023-05-23 PROCEDURE — 99215 OFFICE O/P EST HI 40 MIN: CPT | Mod: S$PBB,25,, | Performed by: OBSTETRICS & GYNECOLOGY

## 2023-05-23 PROCEDURE — 76816 US MFM PROCEDURE (VIEWPOINT): ICD-10-PCS | Mod: 26,S$PBB,, | Performed by: OBSTETRICS & GYNECOLOGY

## 2023-05-23 PROCEDURE — 99213 OFFICE O/P EST LOW 20 MIN: CPT | Mod: PBBFAC | Performed by: OBSTETRICS & GYNECOLOGY

## 2023-05-23 PROCEDURE — 76816 OB US FOLLOW-UP PER FETUS: CPT | Mod: PBBFAC | Performed by: OBSTETRICS & GYNECOLOGY

## 2023-05-23 PROCEDURE — 99999 PR PBB SHADOW E&M-EST. PATIENT-LVL III: ICD-10-PCS | Mod: PBBFAC,,, | Performed by: OBSTETRICS & GYNECOLOGY

## 2023-05-23 RX ORDER — ENOXAPARIN SODIUM 100 MG/ML
40 INJECTION SUBCUTANEOUS DAILY
Qty: 12 ML | Refills: 5 | Status: ON HOLD | OUTPATIENT
Start: 2023-05-23 | End: 2023-10-21 | Stop reason: HOSPADM

## 2023-05-23 NOTE — PROGRESS NOTES
"MATERNAL-FETAL MEDICINE   CONSULT NOTE    Provider requesting consultation: Dr. Weems; Dr. Turpin    SUBJECTIVE:     Ms. Theresa Grey is a 27 y.o.  female with IUP at 16w3d who is seen in consultation by MFM for evaluation and management of:  Problem   Congenital Heart Disease During Pregnancy     Ms. Grey presents today for visit regarding complex maternal congenital heart disease.   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 She does not exercise regularly, but walks around the mall and her neighborhood. Denies any chest pain, palpitations, or shortness of breath today. She denies any complications or cardiac issues in her prior pregnancy. Reports she tolerated delivery and the post partum period well.          Medication List with Changes/Refills   Current Medications    ASPIRIN (ECOTRIN) 81 MG EC TABLET    Take 1 tablet (81 mg total) by mouth once daily.    SERTRALINE (ZOLOFT) 50 MG TABLET    Take 1/2 tablet per day for a week, then increase to 1 tablet daily thereafter.       Review of patient's allergies indicates:   Allergen Reactions    Hydrocodone Shortness Of Breath and Other (See Comments)     Dizziness; sweating       PMH:  Past Medical History:   Diagnosis Date    Abnormality of heart valve     heart disease     fatigue as a result of pregnancy (8wks ) clubbing indicates chronic oxygenation issues but pt considered it "normal"       PObHx:  OB History    Para Term  AB Living   3 1 1   1 1   SAB IAB Ectopic Multiple Live Births     1   0 1      # Outcome Date GA Lbr Hong/2nd Weight Sex Delivery Anes PTL Lv   3 Current            2 Term 20 37w4d  2.35 kg (5 lb " 2.9 oz) F Vag-Spont EPI N ANTONY   1 IAB 2014 8w0d              PSH:  Past Surgical History:   Procedure Laterality Date    CARDIAC CATHETERIZATION  at 7 yars of age    DILATION AND CURETTAGE OF UTERUS      FONTAN PROCEDURE, EXTRACARDIAC  September 29, 2015    With a nonfenestrated 22 mm [place]-Justice tube graft.  The pulmonary valve was closed.  Procedure performed by Dr[facility] Moe [facility]ulkarni at [facility].       Family history:family history includes Diabetes in her father, maternal aunt, maternal grandmother, and paternal grandmother; Hypertension in her maternal aunt; Miscarriages / Stillbirths in her mother; No Known Problems in her brother, maternal grandfather, maternal uncle, paternal aunt, paternal grandfather, paternal uncle, and sister; Vision loss in her maternal grandmother.    Social history: reports that she has never smoked. She has never used smokeless tobacco. She reports that she does not currently use alcohol. She reports that she does not use drugs.    Genetic history: The patient denies any inherited genetic diseases or birth defects in herself or her partner's personal history or family.    Objective:   /66 (BP Location: Left arm, Patient Position: Sitting)   Ht 5' (1.524 m)   Wt 57.6 kg (126 lb 15.8 oz)   BMI 24.80 kg/m²     Ultrasound performed. See viewpoint for full ultrasound report.  1. An early anatomy fetal anatomic ultrasound examination was performed for the following high risk indication: Maternal CHD  2. No fetal structural malformations are identified; however, fetal imaging is incomplete today. A follow-up study will be scheduled to complete the fetal anatomic survey.   3. Fetal size today is consistent with established gestational age.  4. Transabdominal cervical length is normal at 35.8 mm.  5. Placental location is anterior without evidence of previa.  6. Amniotic fluid volume appears to be a normal amount.     Significant labs/imaging:  EKG (4/21/23)   Results for orders  placed or performed in visit on 04/21/23   EKG 12-lead    Collection Time: 04/21/23  1:08 PM    Narrative    Test Reason : I37.0,Z98.890,Q20.1,    Vent. Rate : 087 BPM     Atrial Rate : 087 BPM     P-R Int : 182 ms          QRS Dur : 104 ms      QT Int : 330 ms       P-R-T Axes : 116 054 149 degrees     QTc Int : 397 ms    left atrial rhythm  Intraventricular conduction disturbance, nonspecific type  prominent Q wave in lateral leads  Diffuse T wae inversion in the precordial leads  Confirmed by CAROLINA DIANA MD ([id number]) on 4/21/2023 8:37:51 PM    Referred By: CAROLINA DIANA           Confirmed By:CAROLINA DIANA MD     Echo (4/21/23)  Results for orders placed during the hospital encounter of 10/10/19    Echo Color Flow Doppler? Yes    Interpretation Summary  · Congenital history: Heterotaxy, abdominal situs inversus, dextrocardia (I, D, L) atrial situs inversus, atrioventricular concordance with jason-cross AV valve arrangement, RV is leftward, anterior and superior, LV is rightward, posterior and inferior, large VSD and straddling AV valves, d-malposed great arteries and pulmonary stenosis. - S/P Extracardiac Fontan (22mm [place] graft) with division of MPA and oversewing of the pulmonary valve - [facility] 9/2015.    IMPRESSION:  Pregnant at 15 weeks EGA-  Technically very limited imaging secondary to difficult acoustic windows.  Dextrocardia.  Laminar flow noted in normal size inferior vena cava connecting to extracardiac conduit, laminar flow in left-sided SVC connecting to left pulmonary artery with laminar flow demonstrated at Pavel and Fontan anastomoses.  Unable to demonstrate pulmonary branches clearly.  Qualitative impression of normal size atria.  Straddling left AV valve demonstrated with no significant AV valve insufficiency.  Right-sided AV valve with no evidence of stenosis or insufficiency.  Large inlet left ventricle unrestricted flow from LV to RV.  The ventricles are jason-cross in relationship as demonstrated  "by color Doppler with limited details of the ventricular structures demonstrated by 2D imaging.  Qualitatively good biventricular systolic function.  No evidence of subaortic or aortic valve stenosis or insufficiency.  Large right aortic arch.  There is no pericardial effusion.      ASSESSMENT/PLAN:     27 y.o.  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  HR about 87.  Unchanged from the last EKG.  Last TTE (23):    - Excellent ventricular systolic function  - No aortic stenosis or insufficiency  - Mild atrioventricular valve insufficiency.    - Unrestrictive VSD with LV to RV shunt with no flow obstruction into the anterior aortic outflow   - Fontan connections including Pavel connection to the pulmonary artery very poorly visualized on this study. IVC not dilated (1.6cm) and no flow reversal suggesting relatively low Fontan pressure.    Cardiologist: Dr. Weems   Last Cards visit: 23  Delivery:  - Timing: TBD  - Mode: TBD  - Location: TBD  Bacterial endocarditis prophylaxis: Indicated     Congenital heart disease (CHD) is an important cause of maternal mortality and morbidity during pregnancy. Risk assessment in women with CHD should be performed using the modified WHO classification.  After reviewing Ms. Grey's history, she would be classified as Class III. We reviewed the pregnancy related risks which include but are not limited to maternal cardiac events including arrhythmia, heart failure, potential need for antepartum, postpartum hospitalization, ICU care, and significant maternal morbidity " (for class III/IV, even death). We reviewed fetal risks which include an increased risk of premature delivery, fetal growth restriction, miscarriage/stillbirth, and increased risk of congenital heart disease.      Recommendations:  Continue to follow with Cardiology  Maternal echocardiogram and cardiology appt 7/6  Lovenox 40 mg qD  Continue ASA 81 mg qD  Targeted anatomy ultrasound at 18-20 weeks' gestation  Fetal echocardiogram, scheduled 7/6  Anesthesiology consultation in third trimester  Endocarditis prophylaxis has been recommended by Cardiology  Telemetry intrapartum and for 24-48 hours postpartum  Weekly PNT to start at 32 weeks; would recommend serial growth assessment in 3rd trimester  Delivery recommendations pending clinical course    FOLLOW UP:   - Return to clinic in 4 weeks for MD visit and completion of anatomy ultrasound    Elly Chavez  Maternal-Fetal Medicine    Electronically Signed by Elly Chavez May 23, 2023

## 2023-05-23 NOTE — ASSESSMENT & PLAN NOTE
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  HR about 87.  Unchanged from the last EKG.  Last TTE (4/21/23):    - Excellent ventricular systolic function  - No aortic stenosis or insufficiency  - Mild atrioventricular valve insufficiency.    - Unrestrictive VSD with LV to RV shunt with no flow obstruction into the anterior aortic outflow   - Fontan connections including Pavel connection to the pulmonary artery very poorly visualized on this study. IVC not dilated (1.6cm) and no flow reversal suggesting relatively low Fontan pressure.    Cardiologist: Dr. Weems   Last Cards visit: 4/21/23  Delivery:  - Timing: TBD  - Mode: TBD  - Location: TBD  Bacterial endocarditis prophylaxis: Indicated     Congenital heart disease (CHD) is an important cause of maternal mortality and morbidity during pregnancy. Risk assessment in women with CHD should be performed using the modified WHO classification.  After reviewing Ms. Grey's history, she would be classified as Class III. We reviewed the pregnancy related risks which include but are not limited to maternal cardiac events including arrhythmia, heart failure, potential need for antepartum, postpartum hospitalization, ICU care, and significant maternal morbidity (for class III/IV, even death). We reviewed fetal risks which include an increased risk of premature delivery, fetal growth restriction, miscarriage/stillbirth, and increased risk of congenital heart disease.      Recommendations:   Continue to follow with Cardiology  o Maternal echocardiogram and cardiology appt 7/6   Lovenox 40 mg qD   Continue ASA 81 mg qD   Targeted anatomy " ultrasound at 18-20 weeks' gestation   Fetal echocardiogram, scheduled 7/6   Anesthesiology consultation in third trimester   Endocarditis prophylaxis has been recommended by Cardiology   Telemetry intrapartum and for 24-48 hours postpartum   Weekly PNT to start at 32 weeks; would recommend serial growth assessment in 3rd trimester   Delivery recommendations pending clinical course

## 2023-06-20 ENCOUNTER — OFFICE VISIT (OUTPATIENT)
Dept: MATERNAL FETAL MEDICINE | Facility: CLINIC | Age: 28
End: 2023-06-20
Payer: OTHER GOVERNMENT

## 2023-06-20 ENCOUNTER — PROCEDURE VISIT (OUTPATIENT)
Dept: MATERNAL FETAL MEDICINE | Facility: CLINIC | Age: 28
End: 2023-06-20
Payer: OTHER GOVERNMENT

## 2023-06-20 VITALS — SYSTOLIC BLOOD PRESSURE: 114 MMHG | WEIGHT: 128.5 LBS | BODY MASS INDEX: 25.1 KG/M2 | DIASTOLIC BLOOD PRESSURE: 76 MMHG

## 2023-06-20 DIAGNOSIS — Q24.9 CONGENITAL HEART DISEASE DURING PREGNANCY: ICD-10-CM

## 2023-06-20 DIAGNOSIS — O99.891 CONGENITAL HEART DISEASE DURING PREGNANCY: ICD-10-CM

## 2023-06-20 DIAGNOSIS — Z36.2 ENCOUNTER FOR FOLLOW-UP ULTRASOUND OF FETAL ANATOMY: Primary | ICD-10-CM

## 2023-06-20 DIAGNOSIS — Z36.89 ENCOUNTER FOR FETAL ANATOMIC SURVEY: ICD-10-CM

## 2023-06-20 PROCEDURE — 99212 OFFICE O/P EST SF 10 MIN: CPT | Mod: PBBFAC | Performed by: OBSTETRICS & GYNECOLOGY

## 2023-06-20 PROCEDURE — 76811 PR US, OB FETAL EVAL & EXAM, TRANSABDOM,FIRST GESTATION: ICD-10-PCS | Mod: 26,S$PBB,, | Performed by: OBSTETRICS & GYNECOLOGY

## 2023-06-20 PROCEDURE — 76811 OB US DETAILED SNGL FETUS: CPT | Mod: 26,S$PBB,, | Performed by: OBSTETRICS & GYNECOLOGY

## 2023-06-20 PROCEDURE — 99214 PR OFFICE/OUTPT VISIT, EST, LEVL IV, 30-39 MIN: ICD-10-PCS | Mod: S$PBB,25,, | Performed by: OBSTETRICS & GYNECOLOGY

## 2023-06-20 PROCEDURE — 99214 OFFICE O/P EST MOD 30 MIN: CPT | Mod: S$PBB,25,, | Performed by: OBSTETRICS & GYNECOLOGY

## 2023-06-20 PROCEDURE — 99999 PR PBB SHADOW E&M-EST. PATIENT-LVL II: ICD-10-PCS | Mod: PBBFAC,,, | Performed by: OBSTETRICS & GYNECOLOGY

## 2023-06-20 PROCEDURE — 76811 OB US DETAILED SNGL FETUS: CPT | Mod: PBBFAC | Performed by: OBSTETRICS & GYNECOLOGY

## 2023-06-20 PROCEDURE — 99999 PR PBB SHADOW E&M-EST. PATIENT-LVL II: CPT | Mod: PBBFAC,,, | Performed by: OBSTETRICS & GYNECOLOGY

## 2023-06-20 NOTE — ASSESSMENT & PLAN NOTE
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  HR about 87.  Unchanged from the last EKG.  Last TTE (4/21/23):    - Excellent ventricular systolic function  - No aortic stenosis or insufficiency  - Mild atrioventricular valve insufficiency.    - Unrestrictive VSD with LV to RV shunt with no flow obstruction into the anterior aortic outflow   - Fontan connections including Pavel connection to the pulmonary artery very poorly visualized on this study. IVC not dilated (1.6cm) and no flow reversal suggesting relatively low Fontan pressure.    Cardiologist: Dr. Weems   Last Cards visit: 4/21/23  Delivery:  - Timing: TBD  - Mode: TBD  - Location: TBD  Bacterial endocarditis prophylaxis: Indicated    Please see original consult for full counseling and recommendations.  Since last visit, patient has been doing well with no concerns or complaints. Wondering about travelling in the later third trimester. Recommend she reconsider given her higher risk nature during pregnancy.    Recommendations:   Continue to follow with Cardiology - Dr. Weems  o Maternal echocardiogram and cardiology appt 7/6   Lovenox 40 mg qD - patient will start shortly   Continue ASA 81 mg qD   Fetal echocardiogram, scheduled 7/6   Anesthesiology consultation in third trimester   Endocarditis prophylaxis has been recommended by Cardiology   Telemetry intrapartum and for 24-48 hours postpartum   Weekly PNT to start at 32 weeks.   Serial growth assessments in 3rd trimester   Delivery recommendations pending clinical course, likely 38-39 weeks    "

## 2023-06-20 NOTE — PROGRESS NOTES
Maternal Fetal Medicine follow up consult    SUBJECTIVE:     Theresa Grey is a 27 y.o.  female with IUP at 20w3d who is seen in follow up consultation by MFM.  Pregnancy complications include:   Problem   Congenital Heart Disease During Pregnancy     Previous notes reviewed.   No changes to medical, surgical, family, social, or obstetric history.    Interval history since last M visit:   Patient has no complaints today. She is overall feeling well.  Patient denies any contractions/cramping, vaginal bleeding or leakage of fluid.    Medications:  Current Outpatient Medications   Medication Instructions    aspirin (ECOTRIN) 81 mg, Oral, Daily    enoxaparin (LOVENOX) 40 mg, Subcutaneous, Daily    prenatal vit no.124/iron/folic (PRENATAL VITAMIN ORAL) Oral     Care team members:  Papi - Primary OB  [facility] - Cardiology     OBJECTIVE:   /76 (BP Location: Left arm, Patient Position: Sitting)   Wt 58.3 kg (128 lb 8.5 oz)   BMI 25.10 kg/m²     Physical Exam  Deferred    Ultrasound performed. See viewpoint for full ultrasound report.  A detailed fetal anatomic ultrasound examination was performed for the following high risk indication: Maternal CHD.   No fetal structural malformations are identified; however, fetal imaging is incomplete today.   Fetal size today is consistent with established gestational age ( g).   Transabdominal cervical length is normal.   Placental location is anterior without evidence of previa.  The placental cord insertion was not well visualized, although this appears marginal vs velamentous. This is well away from the DORIS.    Significant labs/imaging:  None new    ASSESSMENT/PLAN:     27 y.o.  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  HR about 87.  Unchanged from the last EKG.  Last TTE (4/21/23):    - Excellent ventricular systolic function  - No aortic stenosis or insufficiency  - Mild atrioventricular valve insufficiency.    - Unrestrictive VSD with LV to RV shunt with no flow obstruction into the anterior aortic outflow   - Fontan connections including Pavel connection to the pulmonary artery very poorly visualized on this study. IVC not dilated (1.6cm) and no flow reversal suggesting relatively low Fontan pressure.    Cardiologist: Dr. Weems   Last Cards visit: 4/21/23  Delivery:  - Timing: TBD  - Mode: TBD  - Location: TBD  Bacterial endocarditis prophylaxis: Indicated    Please see original consult for full counseling and recommendations.  Since last visit, patient has been doing well with no concerns or complaints. Wondering about travelling in the later third trimester. Recommend she reconsider given her higher risk nature during pregnancy.    Recommendations:  Continue to follow with Cardiology - Dr. Weems  Maternal echocardiogram and cardiology appt 7/6  Lovenox 40 mg qD - patient will start shortly  Continue ASA 81 mg qD  Fetal echocardiogram, scheduled 7/6  Anesthesiology consultation in third trimester  Endocarditis prophylaxis has been recommended by Cardiology  Telemetry intrapartum and for 24-48 hours postpartum  Weekly PNT to start at 32 weeks.  Serial growth assessments in 3rd trimester  Delivery recommendations pending clinical course, likely 38-39 weeks      Patient was counseled that prenatal ultrasound studies have limitations. They do not detect all fetal, genetic, placental, and maternal abnormalities. A normal appearing prenatal ultrasound is reassuring. However, it does not guarantee the absence of an abnormality or predict a normal outcome for the " fetus or the mother.       FOLLOW UP:   A follow up ultrasound will be made for 4-6 weeks from today. A follow up MFM MD visit will be made for same day.       The patient was given an opportunity to ask questions about the management of her high risk pregnancy problems. She expressed an understanding of and agreement to the above impression and plan. All questions were answered to her satisfaction.    30 minutes of total time spent on the encounter, which includes face to face time and non-face to face time preparing to see the patient (eg, review of tests), obtaining and/or reviewing separately obtained history, documenting clinical information in the electronic or other health record, independently interpreting results (not separately reported) and communicating results to the patient/family/caregiver, or care coordination (not separately reported).        Christopher Ordonez MD   Maternal-Fetal Medicine      Electronically Signed by Christopher Ordonez June 20, 2023

## 2023-07-06 ENCOUNTER — CLINICAL SUPPORT (OUTPATIENT)
Dept: PEDIATRIC CARDIOLOGY | Facility: CLINIC | Age: 28
End: 2023-07-06
Payer: OTHER GOVERNMENT

## 2023-07-06 ENCOUNTER — HOSPITAL ENCOUNTER (OUTPATIENT)
Dept: CARDIOLOGY | Facility: HOSPITAL | Age: 28
Discharge: HOME | End: 2023-07-06
Attending: PEDIATRICS
Payer: OTHER GOVERNMENT

## 2023-07-06 ENCOUNTER — OFFICE VISIT (OUTPATIENT)
Dept: CARDIOLOGY | Facility: CLINIC | Age: 28
End: 2023-07-06
Payer: OTHER GOVERNMENT

## 2023-07-06 ENCOUNTER — TELEPHONE (OUTPATIENT)
Dept: CARDIOLOGY | Facility: CLINIC | Age: 28
End: 2023-07-06
Payer: OTHER GOVERNMENT

## 2023-07-06 ENCOUNTER — OFFICE VISIT (OUTPATIENT)
Dept: PEDIATRIC CARDIOLOGY | Facility: CLINIC | Age: 28
End: 2023-07-06
Payer: OTHER GOVERNMENT

## 2023-07-06 VITALS
BODY MASS INDEX: 25.13 KG/M2 | WEIGHT: 128 LBS | WEIGHT: 130.5 LBS | OXYGEN SATURATION: 96 % | SYSTOLIC BLOOD PRESSURE: 115 MMHG | HEIGHT: 60 IN | DIASTOLIC BLOOD PRESSURE: 64 MMHG | HEIGHT: 60 IN | HEART RATE: 62 BPM | HEART RATE: 69 BPM | BODY MASS INDEX: 25.62 KG/M2

## 2023-07-06 VITALS
SYSTOLIC BLOOD PRESSURE: 110 MMHG | HEART RATE: 80 BPM | WEIGHT: 129.44 LBS | DIASTOLIC BLOOD PRESSURE: 66 MMHG | BODY MASS INDEX: 25.41 KG/M2 | HEIGHT: 60 IN

## 2023-07-06 DIAGNOSIS — Q24.0 DEXTROCARDIA: Primary | ICD-10-CM

## 2023-07-06 DIAGNOSIS — I37.0 NONRHEUMATIC PULMONARY VALVE STENOSIS: ICD-10-CM

## 2023-07-06 DIAGNOSIS — Q20.1 DORV (DOUBLE OUTLET RIGHT VENTRICLE): ICD-10-CM

## 2023-07-06 DIAGNOSIS — Q24.9 ADULT CONGENITAL HEART DISEASE: ICD-10-CM

## 2023-07-06 DIAGNOSIS — Q24.9 CONGENITAL HEART DISEASE DURING PREGNANCY: ICD-10-CM

## 2023-07-06 DIAGNOSIS — O99.891 CONGENITAL HEART DISEASE DURING PREGNANCY: ICD-10-CM

## 2023-07-06 DIAGNOSIS — Z98.890 S/P FONTAN PROCEDURE: ICD-10-CM

## 2023-07-06 DIAGNOSIS — Z98.890 S/P FONTAN PROCEDURE: Primary | ICD-10-CM

## 2023-07-06 LAB — BSA FOR ECHO PROCEDURE: 1.57 M2

## 2023-07-06 PROCEDURE — 93325 PR DOPPLER COLOR FLOW VELOCITY MAP: ICD-10-PCS | Mod: 26,S$PBB,, | Performed by: PEDIATRICS

## 2023-07-06 PROCEDURE — 93325 DOPPLER ECHO COLOR FLOW MAPG: CPT | Mod: 26,,, | Performed by: PEDIATRICS

## 2023-07-06 PROCEDURE — 99999 PR PBB SHADOW E&M-EST. PATIENT-LVL IV: CPT | Mod: PBBFAC,,, | Performed by: PEDIATRICS

## 2023-07-06 PROCEDURE — 93325 ECHO (CUPID ONLY): ICD-10-PCS | Mod: 26,,, | Performed by: PEDIATRICS

## 2023-07-06 PROCEDURE — 93303 ECHO TRANSTHORACIC: CPT | Mod: 26,,, | Performed by: PEDIATRICS

## 2023-07-06 PROCEDURE — 99999 PR PBB SHADOW E&M-EST. PATIENT-LVL III: ICD-10-PCS | Mod: PBBFAC,,, | Performed by: PEDIATRICS

## 2023-07-06 PROCEDURE — 93303 ECHO (CUPID ONLY): ICD-10-PCS | Mod: 26,,, | Performed by: PEDIATRICS

## 2023-07-06 PROCEDURE — 76825 ECHO EXAM OF FETAL HEART: CPT | Mod: PBBFAC | Performed by: PEDIATRICS

## 2023-07-06 PROCEDURE — 93325 DOPPLER ECHO COLOR FLOW MAPG: CPT | Mod: PBBFAC | Performed by: PEDIATRICS

## 2023-07-06 PROCEDURE — 99213 OFFICE O/P EST LOW 20 MIN: CPT | Mod: PBBFAC | Performed by: PEDIATRICS

## 2023-07-06 PROCEDURE — 99214 OFFICE O/P EST MOD 30 MIN: CPT | Mod: PBBFAC,25,27 | Performed by: PEDIATRICS

## 2023-07-06 PROCEDURE — 76827 ECHO EXAM OF FETAL HEART: CPT | Mod: PBBFAC | Performed by: PEDIATRICS

## 2023-07-06 PROCEDURE — 76827 PR  SO2 FETAL HEART DOPPLER: ICD-10-PCS | Mod: 26,S$PBB,, | Performed by: PEDIATRICS

## 2023-07-06 PROCEDURE — 99214 PR OFFICE/OUTPT VISIT, EST, LEVL IV, 30-39 MIN: ICD-10-PCS | Mod: S$PBB,,, | Performed by: PEDIATRICS

## 2023-07-06 PROCEDURE — 99214 OFFICE O/P EST MOD 30 MIN: CPT | Mod: S$PBB,,, | Performed by: PEDIATRICS

## 2023-07-06 PROCEDURE — 76827 ECHO EXAM OF FETAL HEART: CPT | Mod: 26,S$PBB,, | Performed by: PEDIATRICS

## 2023-07-06 PROCEDURE — 76825 ECHO EXAM OF FETAL HEART: CPT | Mod: 26,S$PBB,, | Performed by: PEDIATRICS

## 2023-07-06 PROCEDURE — 99203 PR OFFICE/OUTPT VISIT, NEW, LEVL III, 30-44 MIN: ICD-10-PCS | Mod: 25,S$PBB,, | Performed by: PEDIATRICS

## 2023-07-06 PROCEDURE — 93325 DOPPLER ECHO COLOR FLOW MAPG: CPT | Mod: 26,S$PBB,, | Performed by: PEDIATRICS

## 2023-07-06 PROCEDURE — 99203 OFFICE O/P NEW LOW 30 MIN: CPT | Mod: 25,S$PBB,, | Performed by: PEDIATRICS

## 2023-07-06 PROCEDURE — 99999 PR PBB SHADOW E&M-EST. PATIENT-LVL III: CPT | Mod: PBBFAC,,, | Performed by: PEDIATRICS

## 2023-07-06 PROCEDURE — 93320 DOPPLER ECHO COMPLETE: CPT | Mod: 26,,, | Performed by: PEDIATRICS

## 2023-07-06 PROCEDURE — 93325 DOPPLER ECHO COLOR FLOW MAPG: CPT

## 2023-07-06 PROCEDURE — 99999 PR PBB SHADOW E&M-EST. PATIENT-LVL IV: ICD-10-PCS | Mod: PBBFAC,,, | Performed by: PEDIATRICS

## 2023-07-06 PROCEDURE — 76825 PR  SO2 FETAL HEART: ICD-10-PCS | Mod: 26,S$PBB,, | Performed by: PEDIATRICS

## 2023-07-06 PROCEDURE — 93320 ECHO (CUPID ONLY): ICD-10-PCS | Mod: 26,,, | Performed by: PEDIATRICS

## 2023-07-06 NOTE — PROGRESS NOTES
2023     [facility]    re:Theresa Grey  :1995     Gabriella Dorado MD   [address] [facility] [address]     Dr. Christopher Gonzalez    Theresa Grey is a 27 y.o. female with the following diagnoses:  Diagnoses:  1. 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 [place]   - good ventricular function, mild atrioventricular valve insufficiency, and no evidence of Fontan obstruction or thrombosis   - situs inversus, right sided spleen   - reassuring labs and liver US 2023  2. Delivered healthy baby 3/17/20 via induced vaginal delivery without difficulty.  - now 21 weeks pregnant.  Normal fetal echo 23.  3. Mild thrombocytopenia noted in the past - common in Fontan patients.      Discussion:  In regards to her pregnancy, I expect her to do well.  Her echo today looks great.  Concerns include:  1.  The clotting predisposition often noted in Fontan patients may be worsened by pregnancy.  2.  Without a sub pulmonic ventricle, she may tolerate the volume load associated with a pregnancy less well than a 2 ventricle patient.  There is an increased risk of heart failure symptoms.  3.  There is an increased risk of arrhythmias.    4.  Increased risk of miscarriage and  birth, increased risk of congenital heart disease.  That said, she really looks great and did well with the last pregnancy.  I do recommend delivering at [facility].  Vaginal delivery is typically preferable if she is doing well.  IV fluids may be necessary to avoid dehydration.  I would recommend SBE prophylaxis for delivery although this is certainly debatable.  I would recommend close monitoring with telemetry during delivery and for 24-48 hours after delivery.        We have discussed  "the long-term risk associated with Fontan circulation. We discussed:  1. Risk of thromboembolic events with Fontan   - I agree with lovenox and aspirin for now.  Will go back to just a baby aspirin after delivery.   2. Long term risk of arrhythmias, heart failure, PLE, liver disease discussed    Plan:  Continue aspirin and lovenox.  2.   Follow up 8 weeks with EKG    3.   SBEP is indicated for dental work.    4.   Maternal-Fetal Medicine    Interval history:  She is now 21 weeks pregnant.  She is doing well.  Maybe some mild dyspnea on exertion associated with this pregnancy.  No edema.  No palpitations.  No diarrhea.  No syncope or near-syncope.  No chest pain.    PMH:  She was diagnosed as a young child with congenital heart disease while living in [place]. She was evaluated at [facility] with a cath at about 7 years of age. She was scheduled for heart surgery by her report about 10 years ago. However, due to social issues (no insurance, no social security number) the surgery was put off and she was lost to follow up. I first saw her in 2014 she was admitted with pregnancy, rare chest pain, and cyanosis.  After long discussion, she had a pregnancy termination due to the risk associated with unrepaired cyanotic congenital heart disease.  On 9/29/16, she underwent an extracardiac Fontan with oversewing and ligation of the MPA at [facility].  She did very well and was discharged a week later.      The review of systems is as noted above. It is otherwise negative for other symptoms related to the general, neurological, psychiatric, endocrine, gastrointestinal, genitourinary, respiratory, dermatologic, musculoskeletal, hematologic, and immunologic systems.    Past Medical History:   Diagnosis Date    Abnormality of heart valve     heart disease     fatigue as a result of pregnancy (8wks ) clubbing indicates chronic oxygenation issues but pt considered it "normal"     Past Surgical History:   Procedure " Laterality Date    CARDIAC CATHETERIZATION  at 7 yars of age    DILATION AND CURETTAGE OF UTERUS      FONTAN PROCEDURE, EXTRACARDIAC  September 29, 2015    With a nonfenestrated 22 mm Florence-Justice tube graft.  The pulmonary valve was closed.  Procedure performed by Dr. Moe Kulkarni at [facility].     Family History   Problem Relation Age of Onset    Diabetes Maternal Grandmother     Vision loss Maternal Grandmother     Miscarriages / Stillbirths Mother     Diabetes Father     Diabetes Paternal Grandmother     No Known Problems Brother     No Known Problems Maternal Grandfather     No Known Problems Paternal Grandfather     Hypertension Maternal Aunt     Diabetes Maternal Aunt     No Known Problems Sister     No Known Problems Maternal Uncle     No Known Problems Paternal Aunt     No Known Problems Paternal Uncle     Anemia Neg Hx     Arrhythmia Neg Hx     Asthma Neg Hx     Clotting disorder Neg Hx     Fainting Neg Hx     Heart attack Neg Hx     Heart disease Neg Hx     Heart failure Neg Hx     Hyperlipidemia Neg Hx     Stroke Neg Hx     Atrial Septal Defect Neg Hx      Social History     Socioeconomic History    Marital status:    Tobacco Use    Smoking status: Never    Smokeless tobacco: Never   Substance and Sexual Activity    Alcohol use: Not Currently     Alcohol/week: 0.0 standard drinks     Comment: on ocassion before pregnancy    Drug use: No     Comment: previous marijuana use    Sexual activity: Yes     Partners: Male     Birth control/protection: Injection       Current Outpatient Medications:     aspirin (ECOTRIN) 81 MG EC tablet, Take 1 tablet (81 mg total) by mouth once daily., Disp: 360 tablet, Rfl: 0    enoxaparin (LOVENOX) 40 mg/0.4 mL Syrg, Inject 0.4 mLs (40 mg total) into the skin Daily., Disp: 12 mL, Rfl: 5    prenatal vit no.124/iron/folic (PRENATAL VITAMIN ORAL), Take 1 tablet by mouth once daily., Disp: , Rfl:     sertraline (ZOLOFT) 50 MG tablet, Take 1/2 tablet per day for a week,  then increase to 1 tablet daily thereafter. (Patient not taking: Reported on 7/6/2023), Disp: 30 tablet, Rfl: 11    Current Facility-Administered Medications:     acetaminophen tablet 650 mg, 650 mg, Oral, Once PRN, Ayah Sanchez MD    albuterol inhaler 2 puff, 2 puff, Inhalation, Q20 Min PRN, Ayah Sanchez MD    diphenhydrAMINE injection 25 mg, 25 mg, Intravenous, Once PRN, Ayah Sanchez MD    EPINEPHrine (EPIPEN) 0.3 mg/0.3 mL pen injection 0.3 mg, 0.3 mg, Intramuscular, PRN, Ayah Sanchez MD    methylPREDNISolone sodium succinate injection 40 mg, 40 mg, Intravenous, Once PRN, Ayah Sanchez MD    ondansetron disintegrating tablet 4 mg, 4 mg, Oral, Once PRN, Ayah Sanchez MD    sodium chloride 0.9% 500 mL flush bag, , Intravenous, PRN, Ayah Sanchez MD    sodium chloride 0.9% flush 10 mL, 10 mL, Intravenous, PRN, Ayah Sanchez MD    Review of patient's allergies indicates:   Allergen Reactions    Hydrocodone Shortness Of Breath and Other (See Comments)     Dizziness; sweating     /64   Pulse 69   Ht 5' (1.524 m)   Wt 59.2 kg (130 lb 8.2 oz)   SpO2 96%   BMI 25.49 kg/m²     In general, she is an acyanotic, nondysmorphic appearing female in no apparent distress.  The eyes, nares, and oropharynx are clear.  Eyelids and conjunctiva free of drainage and redness.  PERRLA.  Teeth in good repair and she is wearing braces.  Mucous membranes are moist.  The head is normocephalic and atraumatic.  The neck is supple without jugular venous distention or thyroid enlargement.  The lungs are clear to auscultation bilaterally.  There is a well healed sternotomy scar.  The PMI is in the right chest.  The first heart sound is normal.  The second is loud and single.  There are no gallops, rubs, or clicks in the supine position.  The abdominal exam reveals a gravid uterus.  Pulses are normal in all 4 extremities with brisk capillary refill and no edema.  No rashes are noted.  She has some clubbing.   No tenderness, swelling in legs.  Her neurological exam is normal.    Echo today (my interpretation, full report pending):    Technically very limited imaging secondary to difficult acoustic windows.  Dextrocardia.  Laminar flow noted in normal size inferior vena cava connecting to extracardiac conduit, laminar flow in left-sided SVC connecting to left pulmonary artery with laminar flow demonstrated at Pavel and Fontan anastomoses.  Straddling left AV valve demonstrated with no significant AV valve insufficiency.  Right-sided AV valve with no evidence of stenosis or significant insufficiency.  Large inlet left ventricle unrestricted flow from LV to RV.  The ventricles are jason-cross in relationship as demonstrated by color Doppler with limited details of the ventricular structures demonstrated by 2D imaging.  Qualitatively good biventricular systolic function.  No evidence of subaortic or aortic valve stenosis or insufficiency.  Large right aortic arch.  There is no pericardial effusion.      Cardiac MRI October 19, 2019:  Patent innominate vein to left-sided SVC. Widely patent left-sided Pavel anastomosis. The IVC is anastomosed to the left pulmonary artery. The Fontan tunnel is widely patent without evidence of obstruction. The branch pulmonary arteries are normal in size with no evidence of obstruction.   Atrio-ventricular concordance with a jason-cross type of arrangement.   The right ventricle volumes are low normal. Moderate RVH. The calculated RVEF is 45 %.  The left ventricular volumes are low normal. The calculated LVEF is 50 %.   Large inlet type ventricular septal defect.   Oversewn pulmonary valve.   Anterior and rightward aorta. Structurally normal aortic valve with no significant aortic insufficiency.   Right aortic arch.     Sincerely,        Toro Weems MD  Pediatric Cardiology  Adult Congenital Heart Disease  Pediatric Heart Failure and Transplantation  [facility]  [address]  [place]  [address]  [phone]

## 2023-07-06 NOTE — TELEPHONE ENCOUNTER
Explained to patient the clinic is closed from 12-1 for lunch, patient verbalized understanding all information provided ----- Message from Dorinda John sent at 7/6/2023 11:30 AM CDT -----  Contact: [phone]    Patient: YAN PELAYO-    Reason: The patient is requesting a call back from the nurse to find out if she can come at earlier for     today's appointment.     Comments: Please call the patient back to advise.

## 2023-07-06 NOTE — PROGRESS NOTES
"[facility]iology [facility]    Today, I had the pleasure of evaluating Theresa Grey who is now 27 y.o. and carrying her third pregnancy at 22 5/7 weeks gestation with an DWAYNE of 23. She was referred for evaluation of the fetal heart due to a maternal history of single ventricle heart disease s/p Fontan (dextrocardia, double outlet right ventricle, VSD, pulmonary valve stenosis).  She follows with Dr. Holden Weems.      She is carrying a female fetus, named Yumiko.      Obstetric History:    .  Her OB care is by Dr. Turpin.  Her MFM care is by Dr. Ordonez/Carlos.    Past Medical History:   Diagnosis Date    Abnormality of heart valve     heart disease     fatigue as a result of pregnancy (8wks ) clubbing indicates chronic oxygenation issues but pt considered it "normal"         Current Outpatient Medications:     aspirin (ECOTRIN) 81 MG EC tablet, Take 1 tablet (81 mg total) by mouth once daily., Disp: 360 tablet, Rfl: 0    enoxaparin (LOVENOX) 40 mg/0.4 mL Syrg, Inject 0.4 mLs (40 mg total) into the skin Daily., Disp: 12 mL, Rfl: 5    prenatal vit no.124/iron/folic (PRENATAL VITAMIN ORAL), Take 1 tablet by mouth once daily., Disp: , Rfl:     sertraline (ZOLOFT) 50 MG tablet, Take 1/2 tablet per day for a week, then increase to 1 tablet daily thereafter. (Patient not taking: Reported on 2023), Disp: 30 tablet, Rfl: 11    Current Facility-Administered Medications:     acetaminophen tablet 650 mg, 650 mg, Oral, Once PRN, Ayah Sanchez MD    albuterol inhaler 2 puff, 2 puff, Inhalation, Q20 Min PRN, Ayah Sanchez MD    diphenhydrAMINE injection 25 mg, 25 mg, Intravenous, Once PRN, Ayah Sanchez MD    EPINEPHrine (EPIPEN) 0.3 mg/0.3 mL pen injection 0.3 mg, 0.3 mg, Intramuscular, PRN, Ayah Sanchez MD    methylPREDNISolone sodium succinate injection 40 mg, 40 mg, Intravenous, Once PRN, Ayah Sanchez MD    ondansetron disintegrating tablet 4 mg, 4 mg, Oral, " Once PRN, Ayah Sanchez MD    sodium chloride 0.9% 500 mL flush bag, , Intravenous, PRN, Ayah Sanchez MD    sodium chloride 0.9% flush 10 mL, 10 mL, Intravenous, PRN, Ayah Sanchez MD    Family History: Except as above, negative for congenital heart disease, early coronary artery disease, sudden unexplained death, connective tissues disorders, genetic syndromes, multiple miscarriages or other congenital anomalies.    Social History: Mrs. Grey is  to the father of the baby.    FETAL ECHOCARDIOGRAM (summary):  Fetal echocardiogram at 22 5/7 weeks gestation for a maternal history of single ventricle heart disease s/p Fontan. DWAYNE 23. Normally connected heart. Normal sinus rhythm. No ectopy or sustained arrhythmia demonstrated throughout the study. Normal fetal atrial and ductal level shunting. No ventricular level shunting. Normal atrioventricular and semilunar valve structure and function. Normal ductal and aortic arches. Normal biventricular size and systolic function. No pericardial effusion  (Full report in electronic medical record)    Impression:  Single active female fetus at 22 wga.  Normal fetal echocardiogram.      Todays fetal echocardiogram is normal, within the limitations of fetal echocardiography.  I discussed with her that fetal echocardiography is insufficiently sensitive to rule out all septal defects, anomalies of pulmonary and systemic veins, arch anomalies, and some valvar abnormalities, nor can it ensure that the ductus arteriosus and foramen ovale will spontaneously close.     Recommendations:  Location, timing, and mode of delivery will be determined by the obstetrical team.  She does not require further follow-up in the fetal echocardiography clinic, but I would be happy to see her again if additional questions or concerns arise.    Should there be any concerns about the baby's heart after birth, a post- echocardiogram and cardiology consultation are recommended.      I would like to have Mrs. Grey bring the baby to see Dr. Weems for a surface echocardiogram in the first 2 months of life.    The above information was discussed in detail including the use of diagrams, with 30 minutes of total face to face time, with greater than 50% with counseling and coordination of care.  The discussion of the diagnosis and treatment options is as described above.      Lefty Coon MD, MPH  Pediatric and Fetal Cardiology  [facility] [address] [place] [address]    Office: [phone]  Cell: [phone]

## 2023-07-18 ENCOUNTER — OFFICE VISIT (OUTPATIENT)
Dept: MATERNAL FETAL MEDICINE | Facility: CLINIC | Age: 28
End: 2023-07-18
Payer: OTHER GOVERNMENT

## 2023-07-18 ENCOUNTER — PROCEDURE VISIT (OUTPATIENT)
Dept: MATERNAL FETAL MEDICINE | Facility: CLINIC | Age: 28
End: 2023-07-18
Payer: OTHER GOVERNMENT

## 2023-07-18 VITALS
HEIGHT: 60 IN | WEIGHT: 131.19 LBS | DIASTOLIC BLOOD PRESSURE: 77 MMHG | BODY MASS INDEX: 25.76 KG/M2 | SYSTOLIC BLOOD PRESSURE: 120 MMHG

## 2023-07-18 DIAGNOSIS — O99.891 CONGENITAL HEART DISEASE DURING PREGNANCY: ICD-10-CM

## 2023-07-18 DIAGNOSIS — Q24.9 MATERNAL CONGENITAL HEART DISEASE IN THIRD TRIMESTER, ANTEPARTUM: Primary | ICD-10-CM

## 2023-07-18 DIAGNOSIS — Z36.2 ENCOUNTER FOR FOLLOW-UP ULTRASOUND OF FETAL ANATOMY: ICD-10-CM

## 2023-07-18 DIAGNOSIS — Q24.9 CONGENITAL HEART DISEASE DURING PREGNANCY: ICD-10-CM

## 2023-07-18 DIAGNOSIS — I37.0 PULMONARY VALVE STENOSIS, UNSPECIFIED ETIOLOGY: Primary | ICD-10-CM

## 2023-07-18 DIAGNOSIS — Z98.890 S/P FONTAN PROCEDURE: ICD-10-CM

## 2023-07-18 DIAGNOSIS — O99.413 MATERNAL CONGENITAL HEART DISEASE IN THIRD TRIMESTER, ANTEPARTUM: Primary | ICD-10-CM

## 2023-07-18 PROCEDURE — 99213 OFFICE O/P EST LOW 20 MIN: CPT | Mod: S$PBB,25,, | Performed by: OBSTETRICS & GYNECOLOGY

## 2023-07-18 PROCEDURE — 99999 PR PBB SHADOW E&M-EST. PATIENT-LVL III: CPT | Mod: PBBFAC,,, | Performed by: OBSTETRICS & GYNECOLOGY

## 2023-07-18 PROCEDURE — 76816 OB US FOLLOW-UP PER FETUS: CPT | Mod: PBBFAC | Performed by: OBSTETRICS & GYNECOLOGY

## 2023-07-18 PROCEDURE — 99213 OFFICE O/P EST LOW 20 MIN: CPT | Mod: PBBFAC | Performed by: OBSTETRICS & GYNECOLOGY

## 2023-07-18 PROCEDURE — 99213 PR OFFICE/OUTPT VISIT, EST, LEVL III, 20-29 MIN: ICD-10-PCS | Mod: S$PBB,25,, | Performed by: OBSTETRICS & GYNECOLOGY

## 2023-07-18 PROCEDURE — 76816 PR  US,PREGNANT UTERUS,F/U,TRANSABD APP: ICD-10-PCS | Mod: 26,S$PBB,, | Performed by: OBSTETRICS & GYNECOLOGY

## 2023-07-18 PROCEDURE — 76816 OB US FOLLOW-UP PER FETUS: CPT | Mod: 26,S$PBB,, | Performed by: OBSTETRICS & GYNECOLOGY

## 2023-07-18 PROCEDURE — 99999 PR PBB SHADOW E&M-EST. PATIENT-LVL III: ICD-10-PCS | Mod: PBBFAC,,, | Performed by: OBSTETRICS & GYNECOLOGY

## 2023-07-18 NOTE — PROGRESS NOTES
Maternal Fetal Medicine follow up consult    SUBJECTIVE:     Theresa Grey is a 27 y.o.  female with IUP at 24w3d who is seen in follow up consultation by MFM.  Pregnancy complications include:   Problem   Congenital Heart Disease During Pregnancy       Previous notes reviewed.   No changes to medical, surgical, family, social, or obstetric history.    Interval history since last MFM visit: no interval problems.     Medications:  Current Outpatient Medications   Medication Instructions    aspirin (ECOTRIN) 81 mg, Oral, Daily    enoxaparin (LOVENOX) 40 mg, Subcutaneous, Daily    prenatal vit no.124/iron/folic (PRENATAL VITAMIN ORAL) 1 tablet, Oral, Daily    sertraline (ZOLOFT) 50 MG tablet Take 1/2 tablet per day for a week, then increase to 1 tablet daily thereafter.       Care team members:  Dr. Davey Turpin - Primary OB  Dr. Weems - Cardiology     OBJECTIVE:   /77 (BP Location: Left arm, Patient Position: Sitting, BP Method: Medium (Automatic))   Ht 5' (1.524 m)   Wt 59.5 kg (131 lb 2.8 oz)   BMI 25.62 kg/m²     Ultrasound performed. See viewpoint for full ultrasound report.  1. Fetal anatomy was again surveyed today. While no fetal structural anomalies are identified on sufficiently visualized structures, some spine views remain suboptimal. No findings raise concern for underlying anomaly .  2. Fetal size is AGA with the EFW at the 57%.   3. AFV is normal.   4. Placenta site is anterior without evidence of previa.   5. There is a marginal cord insertion at the the fundal edge of the placenta.    ASSESSMENT/PLAN:     27 y.o.  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  HR about 87.  Unchanged from the last EKG.  Last TTE (7/6/23):    Dextrocardia.  Laminar flow noted in normal size inferior vena cava connecting to extracardiac conduit to Fontan anastomosis, left-sided SVC connecting to left pulmonary artery to Pavel anastomisis, proximal LPA, pulmonary confluence and proximal RPA.  Qualitative impression of normal size atria.  Straddling left AV valve demonstrated with trivial AV valve insufficiency.  Right-sided AV valve with no evidence of stenosis and mild  insufficiency.  Large inlet VSD with unrestricted flow from LV to RV.  The ventricles are jason-cross in relationship as demonstrated by color Doppler with limited details of the ventricular structures demonstrated by 2D imaging.  Qualitatively good biventricular systolic function.  Centrally positioned pulmonary valve with small annulus and no obvious flow.  Leftward and anteriorly positioned aortic valve with no evidence of subaortic or aortic valve stenosis or insufficiency.  Large right aortic arch.  There is no pericardial effusion.    Cardiologist: Dr. Weems   Last Cards visit: 7/6/23  Delivery:  - Timing: ideally 38 weeks--her  is being deployed and will need a letter so that he can return home--need to try to set a date at RTC  - Mode: usual obstetric indications  - Location: [facility]  Bacterial endocarditis prophylaxis: Indicated    Please see original consult for full counseling and recommendations.  Since last visit, patient has been doing well with no concerns or complaints. Tolerating LMWH. No sx of failure. No palpitations.  Saw Dr. Weems on 7/6.    Recommendations:  Continue to follow with Cardiology - Dr. Weems  Maternal echocardiogram and cardiology appt scheduled for 9/8  Lovenox 40 mg qD - patient will start shortly  Continue ASA 81 mg qD  Anesthesiology consultation in third trimester  Endocarditis prophylaxis has " been recommended by Cardiology  Telemetry intrapartum and for 24-48 hours postpartum  Weekly PNT to start at 32 weeks.  Serial growth assessments in 3rd trimester  Delivery recommendations pending clinical course, likely 38-39 weeks      F/u in 4 weeks for [facility] visit  F/u in 4 weeks for US    Today I have spent 23 minutes in the care of the patient. This includes face to face time and non-face to face time preparing to see the patient (eg, review of tests), obtaining and/or reviewing separately obtained history, documenting clinical information in the electronic or other health record, independently interpreting results and communicating results to the patient/family/caregiver, or care coordination.

## 2023-07-18 NOTE — ASSESSMENT & PLAN NOTE
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  HR about 87.  Unchanged from the last EKG.  Last TTE (7/6/23):    Dextrocardia.  Laminar flow noted in normal size inferior vena cava connecting to extracardiac conduit to Fontan anastomosis, left-sided SVC connecting to left pulmonary artery to Pavel anastomisis, proximal LPA, pulmonary confluence and proximal RPA.  Qualitative impression of normal size atria.  Straddling left AV valve demonstrated with trivial AV valve insufficiency.  Right-sided AV valve with no evidence of stenosis and mild  insufficiency.  Large inlet VSD with unrestricted flow from LV to RV.  The ventricles are jason-cross in relationship as demonstrated by color Doppler with limited details of the ventricular structures demonstrated by 2D imaging.  Qualitatively good biventricular systolic function.  Centrally positioned pulmonary valve with small annulus and no obvious flow.  Leftward and anteriorly positioned aortic valve with no evidence of subaortic or aortic valve stenosis or insufficiency.  Large right aortic arch.  There is no pericardial effusion.    Cardiologist: Dr. Weems   Last Cards visit: 7/6/23  Delivery:  - Timing: ideally 38 weeks--her  is being deployed and will need a letter so that he can return home--need to try to set a date at RTC  - Mode: usual obstetric indications  - Location: [facility]  Bacterial endocarditis prophylaxis: Indicated    Please see original consult for full counseling and recommendations.  Since last visit, patient has been doing well with no concerns or complaints. Tolerating LMWH. No sx of failure. No palpitations.  Saw " Dr. Weems on 7/6.    Recommendations:   Continue to follow with Cardiology - Dr. Weems  o Maternal echocardiogram and cardiology appt scheduled for 9/8   Lovenox 40 mg qD - patient will start shortly   Continue ASA 81 mg qD   Anesthesiology consultation in third trimester   Endocarditis prophylaxis has been recommended by Cardiology   Telemetry intrapartum and for 24-48 hours postpartum   Weekly PNT to start at 32 weeks.   Serial growth assessments in 3rd trimester   Delivery recommendations pending clinical course, likely 38-39 weeks

## 2023-07-27 ENCOUNTER — TELEPHONE (OUTPATIENT)
Dept: FAMILY MEDICINE | Facility: CLINIC | Age: 28
End: 2023-07-27
Payer: OTHER GOVERNMENT

## 2023-07-27 NOTE — TELEPHONE ENCOUNTER
Pt needs to est care to receive referral for high risk pregnancy doctors per      Not sure if you are comfortable making this referral so I wanted to send you a message before attempting to schedule

## 2023-07-27 NOTE — TELEPHONE ENCOUNTER
----- Message from Flaca Lester sent at 7/27/2023  8:39 AM CDT -----  Contact: Self  Type:  Sooner Appointment Request    Caller is requesting a sooner appointment.  Caller declined first available appointment listed below.  Caller will not accept being placed on the waitlist and is requesting a message be sent to doctor.    Name of Caller:  Patient  When is the first available appointment?  N/A  Symptoms:  Pt needs to est care to receive referral for high risk pregnancy doctors per   Best Call Back Number:  [phone]  Additional Information:  Can we please call pt back to advise. Thank You.

## 2023-08-08 ENCOUNTER — HOSPITAL ENCOUNTER (EMERGENCY)
Facility: HOSPITAL | Age: 28
Discharge: HOME | End: 2023-08-09
Attending: EMERGENCY MEDICINE
Payer: OTHER GOVERNMENT

## 2023-08-08 DIAGNOSIS — S89.91XA RIGHT KNEE INJURY, INITIAL ENCOUNTER: ICD-10-CM

## 2023-08-08 DIAGNOSIS — S93.402A SPRAIN OF LEFT ANKLE, UNSPECIFIED LIGAMENT, INITIAL ENCOUNTER: Primary | ICD-10-CM

## 2023-08-08 PROCEDURE — 99283 EMERGENCY DEPT VISIT LOW MDM: CPT

## 2023-08-09 VITALS
WEIGHT: 130 LBS | DIASTOLIC BLOOD PRESSURE: 63 MMHG | SYSTOLIC BLOOD PRESSURE: 114 MMHG | HEART RATE: 68 BPM | RESPIRATION RATE: 20 BRPM | BODY MASS INDEX: 26.21 KG/M2 | HEIGHT: 59 IN | OXYGEN SATURATION: 96 % | TEMPERATURE: 98 F

## 2023-08-09 NOTE — FIRST PROVIDER EVALUATION
Emergency Department TeleTriage Encounter Note      CHIEF COMPLAINT    Chief Complaint   Patient presents with    Fall     Pt. Stepped in hole after getting out of car.  Fell and rolled left ankle and has abrasion to right knee.  Pt. 27 weeks pregnant.       VITAL SIGNS   Initial Vitals [08/08/23 2305]   BP Pulse Resp Temp SpO2   (!) 112/59 65 20 97.7 °F (36.5 °C) 95 %      MAP       --            ALLERGIES    Review of patient's allergies indicates:   Allergen Reactions    Hydrocodone Shortness Of Breath and Other (See Comments)     Dizziness; sweating       PROVIDER TRIAGE NOTE  This is a teletriage evaluation of a 27 y.o. female presenting to the ED with c/o left ankle pain after stepping into hole and falling onto right knee.  Pt is 27wk pregnant    ROS: (-) fever, (-) rash  PE:  NAD    Initial orders will be placed and care will be transferred to an alternate provider when patient is roomed for a full evaluation. Any additional orders and the final disposition will be determined by that provider.         ORDERS  Labs Reviewed - No data to display    ED Orders (720h ago, onward)      Start Ordered     Status Ordering Provider    08/08/23 2314 08/08/23 2313  Nursing communication  Once        Comments: FHT pls    Ordered TUCKER SPENCER    08/08/23 2313 08/08/23 2313  X-Ray Ankle Complete Left  1 time imaging         Ordered TUCKER SPENCER              Virtual Visit Note: The provider triage portion of this emergency department evaluation and documentation was performed via [employer], a HIPAA-compliant telemedicine application, in concert with a tele-presenter in the room. A face to face patient evaluation with one of my colleagues will occur once the patient is placed in an emergency department room.      DISCLAIMER: This note was prepared with [employer] voice recognition transcription software. Garbled syntax, mangled pronouns, and other bizarre constructions may be attributed to that software  system.

## 2023-08-09 NOTE — DISCHARGE INSTRUCTIONS
As we discussed, return to the emergency department for new or worsening symptoms including abdominal pain, vaginal bleeding or passage of fluid, or abdominal cramping.

## 2023-08-09 NOTE — ED PROVIDER NOTES
"Chief complaint:  Fall (Pt. Stepped in hole after getting out of car.  Fell and rolled left ankle and has abrasion to right knee.  Pt. 27 weeks pregnant.)      HPI:  Theresa Grey is a 27 y.o. female , 27 weeks pregnant by dates, remote hx of congenital heart disease (DORV, VSD, PVS s/p Fontan procedure) on aspirin and enoxaparin presenting with a minor fall as she was stepping out of a car and twisted her left ankle, falling onto a bent right knee without other trauma.  She denies any abdominal trauma or injury.  She has no abdominal pain.  She denies vaginal bleeding or passage of fluid.  She endorses normal fetal motion.  She denies head injury or headache.  She has abrasion to right knee with pain in the left ankle.  She is able to walk without assistance.    ROS: As per HPI and below:  No numbness, weakness, back pain, upper extremity injury.    Review of patient's allergies indicates:   Allergen Reactions    Hydrocodone Shortness Of Breath and Other (See Comments)     Dizziness; sweating       Patient's Medications   New Prescriptions    No medications on file   Previous Medications    ASPIRIN (ECOTRIN) 81 MG EC TABLET    Take 1 tablet (81 mg total) by mouth once daily.    ENOXAPARIN (LOVENOX) 40 MG/0.4 ML SYRG    Inject 0.4 mLs (40 mg total) into the skin Daily.    PRENATAL VIT NO.124/IRON/FOLIC (PRENATAL VITAMIN ORAL)    Take 1 tablet by mouth once daily.    SERTRALINE (ZOLOFT) 50 MG TABLET    Take 1/2 tablet per day for a week, then increase to 1 tablet daily thereafter.   Modified Medications    No medications on file   Discontinued Medications    No medications on file       PMH:  As per HPI and below:  Past Medical History:   Diagnosis Date    Abnormality of heart valve     heart disease     fatigue as a result of pregnancy (8wks ) clubbing indicates chronic oxygenation issues but pt considered it "normal"     Past Surgical History:   Procedure Laterality Date    CARDIAC CATHETERIZATION  at 7 yars " of age    DILATION AND CURETTAGE OF UTERUS      FONTAN PROCEDURE, EXTRACARDIAC  September 29, 2015    With a nonfenestrated 22 mm [place]-Justice tube graft.  The pulmonary valve was closed.  Pro[facility] performed by [facility]       Social History     Socioeconomic History    Marital status:    Tobacco Use    Smoking status: Never    Smokeless tobacco: Never   Substance and Sexual Activity    Alcohol use: Not Currently     Alcohol/week: 0.0 standard drinks of alcohol     Comment: on ocassion before pregnancy    Drug use: No     Comment: previous marijuana use    Sexual activity: Yes     Partners: Male     Birth control/protection: Injection       Family History   Problem Relation Age of Onset    Diabetes Maternal Grandmother     Vision loss Maternal Grandmother     Miscarriages / Stillbirths Mother     Diabetes Father     Diabetes Paternal Grandmother     No Known Problems Brother     No Known Problems Maternal Grandfather     No Known Problems Paternal Grandfather     Hypertension Maternal Aunt     Diabetes Maternal Aunt     No Known Problems Sister     No Known Problems Maternal Uncle     No Known Problems Paternal Aunt     No Known Problems Paternal Uncle     Anemia Neg Hx     Arrhythmia Neg Hx     Asthma Neg Hx     Clotting disorder Neg Hx     Fainting Neg Hx     Heart attack Neg Hx     Heart disease Neg Hx     Heart failure Neg Hx     Hyperlipidemia Neg Hx     Stroke Neg Hx     Atrial Septal Defect Neg Hx        Physical Exam:    Vitals:    08/08/23 2305   BP: (!) 112/59   Pulse: 65   Resp: 20   Temp: 97.7 °F (36.5 °C)     GENERAL:  No apparent distress.  Alert.    HEENT:  Moist mucous membranes.  Normocephalic and atraumatic.    NECK:  No swelling.  Midline trachea. No posterior midline tenderness to palpation.    CARDIOVASCULAR:  Regular rate and rhythm.  2+ radial pulses.  Heart tones noted on the R.  PULMONARY:  Lungs clear to auscultation bilaterally.  No wheezes, rales, or  rhonci.    ABDOMEN:  Gravid abdomen, nontender.  EXTREMITIES:  Warm and well perfused.  Brisk capillary refill.  Abrasion right knee with minor tenderness at the proximal right tibia.  No effusion.  Full active range of motion without difficulty.  Mild left lateral ankle malleolar tenderness.  No joint effusion.  Full active range of motion.  No proximal tib-fib squeeze tenderness.  2+ DP and PT pulses.  NEUROLOGICAL:  Normal mental status.  Appropriate and conversant.  5/5 strength sensation in his lower extremities bilaterally.  SKIN:  No rashes or ecchymoses.    BACK:  Atraumatic.  No vertebral tenderness to palpation.      Labs Reviewed - No data to display    Current Discharge Medication List        CONTINUE these medications which have NOT CHANGED    Details   aspirin (ECOTRIN) 81 MG EC tablet Take 1 tablet (81 mg total) by mouth once daily.  Qty: 360 tablet, Refills: 0      enoxaparin (LOVENOX) 40 mg/0.4 mL Syrg Inject 0.4 mLs (40 mg total) into the skin Daily.  Qty: 12 mL, Refills: 5    Associated Diagnoses: Congenital heart disease during pregnancy      prenatal vit no.124/iron/folic (PRENATAL VITAMIN ORAL) Take 1 tablet by mouth once daily.      sertraline (ZOLOFT) 50 MG tablet Take 1/2 tablet per day for a week, then increase to 1 tablet daily thereafter.  Qty: 30 tablet, Refills: 11             Orders Placed This Encounter   Procedures    X-Ray Ankle Complete Left    Nursing communication       Imaging Results              X-Ray Ankle Complete Left (Final result)  Result time 08/08/23 23:40:26      Final result by Rey Lester DO (08/08/23 23:40:26)                   Impression:      No acute osseous abnormality.      Electronically signed by: Rey Lester  Date:    08/08/2023  Time:    23:40               Narrative:    EXAMINATION:  XR ANKLE COMPLETE 3 VIEW LEFT    CLINICAL HISTORY:  Injury, unspecified, initial encounter    TECHNIQUE:  AP, lateral and oblique views of the left ankle were  performed.    COMPARISON:  None    FINDINGS:  No acute fracture or dislocation. Alignment is normal. The ankle mortise is congruent. The talar dome is intact. Joint spaces are preserved. No effusion.                                  (Rad read)    ED Course as of 08/09/23 0343   Wed Aug 09, 2023   0008 FHTs 163 per Niko MICHAEL. [MR]   0047 XR R knee:  No fx or dislocation. (Independently interpreted by me)   [MR]      ED Course User Index  [MR] Jayson Hummel MD       MDM:    27 y.o. female with minor fall with twisting of the left ankle onto a bent right knee without other abdominal trauma.  I have very low suspicion for fetal injury or significant abdominal trauma.  I do not think she requires transfer for monitoring and patient declines transfer for fetal monitoring.  X-ray of the ankle shows no sign of fracture or dislocation.  I suspect ankle sprain.  X-ray of the right knee without acute fx or dislocation.  Possibility of meniscal or ligamentous injury discussed with patient.  No sign of other major injury.  Follow-up with PCP or OB as well as Orthopedics.  Return precautions reviewed.    Diagnoses:    1. L ankle sprain  2. R knee contusion       Jayson Hummel MD  08/09/23 0343

## 2023-08-15 ENCOUNTER — PROCEDURE VISIT (OUTPATIENT)
Dept: MATERNAL FETAL MEDICINE | Facility: CLINIC | Age: 28
End: 2023-08-15
Payer: OTHER GOVERNMENT

## 2023-08-15 ENCOUNTER — OFFICE VISIT (OUTPATIENT)
Dept: MATERNAL FETAL MEDICINE | Facility: CLINIC | Age: 28
End: 2023-08-15
Payer: OTHER GOVERNMENT

## 2023-08-15 ENCOUNTER — DOCUMENTATION ONLY (OUTPATIENT)
Dept: MATERNAL FETAL MEDICINE | Facility: CLINIC | Age: 28
End: 2023-08-15
Payer: OTHER GOVERNMENT

## 2023-08-15 ENCOUNTER — PATIENT MESSAGE (OUTPATIENT)
Dept: MATERNAL FETAL MEDICINE | Facility: CLINIC | Age: 28
End: 2023-08-15

## 2023-08-15 ENCOUNTER — LAB VISIT (OUTPATIENT)
Dept: LAB | Facility: OTHER | Age: 28
End: 2023-08-15
Attending: OBSTETRICS & GYNECOLOGY
Payer: OTHER GOVERNMENT

## 2023-08-15 VITALS
SYSTOLIC BLOOD PRESSURE: 107 MMHG | HEIGHT: 59 IN | DIASTOLIC BLOOD PRESSURE: 68 MMHG | BODY MASS INDEX: 26.8 KG/M2 | WEIGHT: 132.94 LBS

## 2023-08-15 DIAGNOSIS — D69.6 THROMBOCYTOPENIA: ICD-10-CM

## 2023-08-15 DIAGNOSIS — Q24.9 CONGENITAL HEART DISEASE DURING PREGNANCY: ICD-10-CM

## 2023-08-15 DIAGNOSIS — Z36.2 ENCOUNTER FOR FOLLOW-UP ULTRASOUND OF FETAL ANATOMY: ICD-10-CM

## 2023-08-15 DIAGNOSIS — Q24.9 MATERNAL CONGENITAL HEART DISEASE IN THIRD TRIMESTER, ANTEPARTUM: ICD-10-CM

## 2023-08-15 DIAGNOSIS — Q24.9 MATERNAL CONGENITAL HEART DISEASE IN THIRD TRIMESTER, ANTEPARTUM: Primary | ICD-10-CM

## 2023-08-15 DIAGNOSIS — O99.413 MATERNAL CONGENITAL HEART DISEASE IN THIRD TRIMESTER, ANTEPARTUM: ICD-10-CM

## 2023-08-15 DIAGNOSIS — O99.891 CONGENITAL HEART DISEASE DURING PREGNANCY: ICD-10-CM

## 2023-08-15 DIAGNOSIS — Z98.890 S/P FONTAN PROCEDURE: ICD-10-CM

## 2023-08-15 DIAGNOSIS — O99.413 MATERNAL CONGENITAL HEART DISEASE IN THIRD TRIMESTER, ANTEPARTUM: Primary | ICD-10-CM

## 2023-08-15 DIAGNOSIS — Z36.89 ENCOUNTER FOR ULTRASOUND TO ASSESS FETAL GROWTH: ICD-10-CM

## 2023-08-15 DIAGNOSIS — I37.0 PULMONARY VALVE STENOSIS, UNSPECIFIED ETIOLOGY: ICD-10-CM

## 2023-08-15 LAB
BASOPHILS # BLD AUTO: 0.03 K/UL (ref 0–0.2)
BASOPHILS NFR BLD: 0.2 % (ref 0–1.9)
DIFFERENTIAL METHOD: ABNORMAL
EOSINOPHIL # BLD AUTO: 0.3 K/UL (ref 0–0.5)
EOSINOPHIL NFR BLD: 2.1 % (ref 0–8)
ERYTHROCYTE [DISTWIDTH] IN BLOOD BY AUTOMATED COUNT: 14.4 % (ref 11.5–14.5)
HCT VFR BLD AUTO: 34 % (ref 37–48.5)
HGB BLD-MCNC: 10.7 G/DL (ref 12–16)
IMM GRANULOCYTES # BLD AUTO: 0.19 K/UL (ref 0–0.04)
IMM GRANULOCYTES NFR BLD AUTO: 1.5 % (ref 0–0.5)
LYMPHOCYTES # BLD AUTO: 1.7 K/UL (ref 1–4.8)
LYMPHOCYTES NFR BLD: 13.9 % (ref 18–48)
MCH RBC QN AUTO: 25.8 PG (ref 27–31)
MCHC RBC AUTO-ENTMCNC: 31.5 G/DL (ref 32–36)
MCV RBC AUTO: 82 FL (ref 82–98)
MONOCYTES # BLD AUTO: 0.7 K/UL (ref 0.3–1)
MONOCYTES NFR BLD: 5.4 % (ref 4–15)
NEUTROPHILS # BLD AUTO: 9.6 K/UL (ref 1.8–7.7)
NEUTROPHILS NFR BLD: 76.9 % (ref 38–73)
NRBC BLD-RTO: 0 /100 WBC
PLATELET # BLD AUTO: 138 K/UL (ref 150–450)
PMV BLD AUTO: 13.7 FL (ref 9.2–12.9)
RBC # BLD AUTO: 4.15 M/UL (ref 4–5.4)
WBC # BLD AUTO: 12.5 K/UL (ref 3.9–12.7)

## 2023-08-15 PROCEDURE — 99215 PR OFFICE/OUTPT VISIT, EST, LEVL V, 40-54 MIN: ICD-10-PCS | Mod: 25,S$PBB,, | Performed by: OBSTETRICS & GYNECOLOGY

## 2023-08-15 PROCEDURE — 99999 PR PBB SHADOW E&M-EST. PATIENT-LVL III: CPT | Mod: PBBFAC,,, | Performed by: OBSTETRICS & GYNECOLOGY

## 2023-08-15 PROCEDURE — 99215 OFFICE O/P EST HI 40 MIN: CPT | Mod: 25,S$PBB,, | Performed by: OBSTETRICS & GYNECOLOGY

## 2023-08-15 PROCEDURE — 99999 PR PBB SHADOW E&M-EST. PATIENT-LVL III: ICD-10-PCS | Mod: PBBFAC,,, | Performed by: OBSTETRICS & GYNECOLOGY

## 2023-08-15 PROCEDURE — 76816 OB US FOLLOW-UP PER FETUS: CPT | Mod: PBBFAC | Performed by: OBSTETRICS & GYNECOLOGY

## 2023-08-15 PROCEDURE — 99213 OFFICE O/P EST LOW 20 MIN: CPT | Mod: PBBFAC | Performed by: OBSTETRICS & GYNECOLOGY

## 2023-08-15 PROCEDURE — 36415 COLL VENOUS BLD VENIPUNCTURE: CPT | Performed by: OBSTETRICS & GYNECOLOGY

## 2023-08-15 PROCEDURE — 76816 US MFM PROCEDURE (VIEWPOINT): ICD-10-PCS | Mod: 26,S$PBB,, | Performed by: OBSTETRICS & GYNECOLOGY

## 2023-08-15 PROCEDURE — 85025 COMPLETE CBC W/AUTO DIFF WBC: CPT | Performed by: OBSTETRICS & GYNECOLOGY

## 2023-08-15 NOTE — ASSESSMENT & PLAN NOTE
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 Risk Class: 3  Last EKG (23): "left atrial rhythm" with evidence of dextrocardia.  HR about 87.  Unchanged from the last EKG.  Last TTE (23):    Dextrocardia.  Laminar flow noted in normal size inferior vena cava connecting to extracardiac conduit to Fontan anastomosis, left-sided SVC connecting to left pulmonary artery to Pavel anastomisis, proximal LPA, pulmonary confluence and proximal RPA.  Qualitative impression of normal size atria.  Straddling left AV valve demonstrated with trivial AV valve insufficiency.  Right-sided AV valve with no evidence of stenosis and mild  insufficiency.  Large inlet VSD with unrestricted flow from LV to RV.  The ventricles are jason-cross in relationship as demonstrated by color Doppler with limited details of the ventricular structures demonstrated by 2D imaging.  Qualitatively good biventricular systolic function.  Centrally positioned pulmonary valve with small annulus and no obvious flow.  Leftward and anteriorly positioned aortic valve with no evidence of subaortic or aortic valve stenosis or insufficiency.  Large right aortic arch.  There is no pericardial effusion.     Cardiologist: Dr. Weems   Last Cards visit: 23  Delivery:  - Timin weeks at [facility] RN to schedule and will send patient a letter on My Chart portal ( is deployed and needs date set now to be present for delivery)  - Mode: usual obstetric indications  - Location: [facility]  Bacterial endocarditis prophylaxis: Indicated     Please see original consult for full counseling and recommendations.  Since last visit, patient has been doing well with no concerns or complaints. Tolerating LMWH. No sx of " failure. No palpitations.  Saw Dr. Weems on 7/6.     Recommendations:   Continue to follow with Cardiology - Dr. Weems  ? Maternal echocardiogram and cardiology appt scheduled for 9/6   Lovenox 40 mg qD   Continue ASA 81 mg qD   Anesthesiology consultation in third trimester - will coordinate to be done at the time of next [facility] appointment   Endocarditis prophylaxis has been recommended by Cardiology   Telemetry intrapartum and for 24-48 hours postpartum   Weekly PNT to start at 32 weeks.   Serial growth assessments in 3rd trimester   Delivery at 38 weeks - will schedule

## 2023-08-15 NOTE — PROGRESS NOTES
Patient's plan of care discussed at high risk Maternal OB conference.     Follow up:  - Anesthesia consult 3rd trimester  - Repeat echo scheduled 9/8  - PNT to start at 32 weeks  - Delivery at 38 weeks at [facility] - set date at 8/15 MFM appt  - SBE prophylaxis   - Tele intrapartum and post partum     Elly Chavez MD  PGY 6  Maternal Fetal Medicine  [facility]

## 2023-08-15 NOTE — PROGRESS NOTES
Maternal Fetal Medicine follow up consult    SUBJECTIVE:     Theresa Grey is a 27 y.o.  female with IUP at 28w3d  who is seen in follow up consultation by MFM.  Pregnancy complications include:   Problem   Congenital Heart Disease During Pregnancy   Thrombocytopenia       Previous notes reviewed.   No changes to medical, surgical, family, social, or obstetric history.    Interval history since last [facility] visit: no changes. The patient reports adequate fetal movement. She denies leakage of fluid, vaginal bleeding, contractions.  The patient denies symptoms of pre-eclampsia including headache, blurred vision, right upper quadrant pain, chest pain, and shortness of breath.   She reports that she failed her 1 hour GTT and is planning to undergo 3 hour GTT soon.    Medications:  Current Outpatient Medications on File Prior to Visit   Medication Sig Dispense Refill    aspirin (ECOTRIN) 81 MG EC tablet Take 1 tablet (81 mg total) by mouth once daily. 360 tablet 0    enoxaparin (LOVENOX) 40 mg/0.4 mL Syrg Inject 0.4 mLs (40 mg total) into the skin Daily. 12 mL 5    prenatal vit no.124/iron/folic (PRENATAL VITAMIN ORAL) Take 1 tablet by mouth once daily.      sertraline (ZOLOFT) 50 MG tablet Take 1/2 tablet per day for a week, then increase to 1 tablet daily thereafter. (Patient not taking: Reported on 2023) 30 tablet 11          OBJECTIVE:     Blood Pressure: 107/68    Ultrasound performed. See viewpoint for full ultrasound report.  Fetal size is AGA with the EFW at the 59% and the AC at the 90%. The EFW is 1434 g.  A limited repeat fetal anatomic survey shows no abnormalities of the structures that were adequately imaged.  AFV is normal.     ASSESSMENT/PLAN:     27 y.o.  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  HR about 87.  Unchanged from the last EKG.  Last TTE (23):    Dextrocardia.  Laminar flow noted in normal size inferior vena cava connecting to extracardiac conduit to Fontan anastomosis, left-sided SVC connecting to left pulmonary artery to Pavel anastomisis, proximal LPA, pulmonary confluence and proximal RPA.  Qualitative impression of normal size atria.  Straddling left AV valve demonstrated with trivial AV valve insufficiency.  Right-sided AV valve with no evidence of stenosis and mild  insufficiency.  Large inlet VSD with unrestricted flow from LV to RV.  The ventricles are jason-cross in relationship as demonstrated by color Doppler with limited details of the ventricular structures demonstrated by 2D imaging.  Qualitatively good biventricular systolic function.  Centrally positioned pulmonary valve with small annulus and no obvious flow.  Leftward and anteriorly positioned aortic valve with no evidence of subaortic or aortic valve stenosis or insufficiency.  Large right aortic arch.  There is no pericardial effusion.     Cardiologist: Dr. Weems   Last Cards visit: 23  Delivery:  - Timin weeks at [facility] RN to schedule and will send patient a letter on My Chart portal ( is deployed and needs date set now to be present for delivery)  - Mode: usual obstetric indications  - Location: [facility]  Bacterial endocarditis prophylaxis: Indicated     Please see original consult for full counseling and recommendations.  Since last visit, patient has been doing well with no concerns or complaints. Tolerating LMWH. No sx of failure. No palpitations.  Saw Dr. Weems on .     Recommendations:  Continue to follow with Cardiology - Dr." Young  Maternal echocardiogram and cardiology appt scheduled for 9/6  Lovenox 40 mg qD  Continue ASA 81 mg qD  Anesthesiology consultation in third trimester - will coordinate to be done at the time of next MFM appointment  Endocarditis prophylaxis has been recommended by Cardiology  Telemetry intrapartum and for 24-48 hours postpartum  Weekly PNT to start at 32 weeks.  Serial growth assessments in 3rd trimester  Delivery at 38 weeks - will schedule    Thrombocytopenia  Will recheck CBC now      Please see original MFM consultation for full details regarding management recommendations of these and other obstetric co-morbidities    FOLLOW UP:   F/u in 4 weeks for US/MFM visit    Today I have spent 60 minutes in the care of the patient. This includes face to face time and non-face to face time preparing to see the patient (eg, review of tests), obtaining and/or reviewing separately obtained history, documenting clinical information in the electronic or other health record, independently interpreting results and communicating results to the patient/family/caregiver, or care coordination.     Daxa Magallon MD  Maternal Fetal Medicine

## 2023-08-15 NOTE — LETTER
August 15, 2023    Theresa Que  [address] [facility] [place] [address]             [facility] Maternal Fetal Medicine [address] [address]  Phone: [phone] Dear Ms. Grey:    Your induction has been scheduled for Monday October 23,2023  at 5 am at [facility] in Labor and Delivery located on the 6th floor of the [facility].  You will need to check in with the  on the 6th floor of the [facility] that morning.      If you have any questions or concerns, please don't hesitate to call.    Sincerely,        Daxa Magallon MD

## 2023-08-16 ENCOUNTER — HOSPITAL ENCOUNTER (EMERGENCY)
Facility: HOSPITAL | Age: 28
Discharge: HOME | End: 2023-08-17
Attending: EMERGENCY MEDICINE
Payer: OTHER GOVERNMENT

## 2023-08-16 DIAGNOSIS — R07.9 CHEST PAIN: ICD-10-CM

## 2023-08-16 DIAGNOSIS — R10.13 EPIGASTRIC PAIN: Primary | ICD-10-CM

## 2023-08-16 LAB
ALBUMIN SERPL BCP-MCNC: 3.2 G/DL (ref 3.5–5.2)
ALP SERPL-CCNC: 89 U/L (ref 55–135)
ALT SERPL W/O P-5'-P-CCNC: 17 U/L (ref 10–44)
ANION GAP SERPL CALC-SCNC: 10 MMOL/L (ref 8–16)
AST SERPL-CCNC: 18 U/L (ref 10–40)
BASOPHILS # BLD AUTO: 0.03 K/UL (ref 0–0.2)
BASOPHILS NFR BLD: 0.2 % (ref 0–1.9)
BILIRUB SERPL-MCNC: 0.4 MG/DL (ref 0.1–1)
BUN SERPL-MCNC: 10 MG/DL (ref 6–20)
CALCIUM SERPL-MCNC: 8.7 MG/DL (ref 8.7–10.5)
CHLORIDE SERPL-SCNC: 100 MMOL/L (ref 95–110)
CO2 SERPL-SCNC: 22 MMOL/L (ref 23–29)
CREAT SERPL-MCNC: 0.5 MG/DL (ref 0.5–1.4)
DIFFERENTIAL METHOD: ABNORMAL
EOSINOPHIL # BLD AUTO: 0.4 K/UL (ref 0–0.5)
EOSINOPHIL NFR BLD: 2.8 % (ref 0–8)
ERYTHROCYTE [DISTWIDTH] IN BLOOD BY AUTOMATED COUNT: 14.6 % (ref 11.5–14.5)
EST. GFR  (NO RACE VARIABLE): >60 ML/MIN/1.73 M^2
GLUCOSE SERPL-MCNC: 127 MG/DL (ref 70–110)
HCT VFR BLD AUTO: 34.4 % (ref 37–48.5)
HGB BLD-MCNC: 11 G/DL (ref 12–16)
IMM GRANULOCYTES # BLD AUTO: 0.22 K/UL (ref 0–0.04)
IMM GRANULOCYTES NFR BLD AUTO: 1.7 % (ref 0–0.5)
LIPASE SERPL-CCNC: 34 U/L (ref 4–60)
LYMPHOCYTES # BLD AUTO: 2.6 K/UL (ref 1–4.8)
LYMPHOCYTES NFR BLD: 20.3 % (ref 18–48)
MCH RBC QN AUTO: 25.7 PG (ref 27–31)
MCHC RBC AUTO-ENTMCNC: 32 G/DL (ref 32–36)
MCV RBC AUTO: 80 FL (ref 82–98)
MONOCYTES # BLD AUTO: 0.7 K/UL (ref 0.3–1)
MONOCYTES NFR BLD: 5.8 % (ref 4–15)
NEUTROPHILS # BLD AUTO: 8.8 K/UL (ref 1.8–7.7)
NEUTROPHILS NFR BLD: 69.2 % (ref 38–73)
NRBC BLD-RTO: 0 /100 WBC
PLATELET # BLD AUTO: 126 K/UL (ref 150–450)
PMV BLD AUTO: 13.9 FL (ref 9.2–12.9)
POTASSIUM SERPL-SCNC: 3.4 MMOL/L (ref 3.5–5.1)
PROT SERPL-MCNC: 7.6 G/DL (ref 6–8.4)
RBC # BLD AUTO: 4.28 M/UL (ref 4–5.4)
SODIUM SERPL-SCNC: 132 MMOL/L (ref 136–145)
WBC # BLD AUTO: 12.76 K/UL (ref 3.9–12.7)

## 2023-08-16 PROCEDURE — 80053 COMPREHEN METABOLIC PANEL: CPT | Performed by: EMERGENCY MEDICINE

## 2023-08-16 PROCEDURE — 93010 ELECTROCARDIOGRAM REPORT: CPT | Mod: ,,, | Performed by: INTERNAL MEDICINE

## 2023-08-16 PROCEDURE — 83880 ASSAY OF NATRIURETIC PEPTIDE: CPT | Performed by: EMERGENCY MEDICINE

## 2023-08-16 PROCEDURE — 93005 ELECTROCARDIOGRAM TRACING: CPT | Performed by: INTERNAL MEDICINE

## 2023-08-16 PROCEDURE — 93010 EKG 12-LEAD: ICD-10-PCS | Mod: ,,, | Performed by: INTERNAL MEDICINE

## 2023-08-16 PROCEDURE — 83690 ASSAY OF LIPASE: CPT | Performed by: EMERGENCY MEDICINE

## 2023-08-16 PROCEDURE — 85025 COMPLETE CBC W/AUTO DIFF WBC: CPT | Performed by: EMERGENCY MEDICINE

## 2023-08-16 PROCEDURE — 83735 ASSAY OF MAGNESIUM: CPT | Performed by: EMERGENCY MEDICINE

## 2023-08-16 PROCEDURE — 25000003 PHARM REV CODE 250: Performed by: EMERGENCY MEDICINE

## 2023-08-16 PROCEDURE — 84484 ASSAY OF TROPONIN QUANT: CPT | Performed by: EMERGENCY MEDICINE

## 2023-08-16 RX ORDER — MAG HYDROX/ALUMINUM HYD/SIMETH 200-200-20
30 SUSPENSION, ORAL (FINAL DOSE FORM) ORAL ONCE
Status: COMPLETED | OUTPATIENT
Start: 2023-08-16 | End: 2023-08-16

## 2023-08-16 RX ORDER — LIDOCAINE HYDROCHLORIDE 20 MG/ML
15 SOLUTION OROPHARYNGEAL ONCE
Status: COMPLETED | OUTPATIENT
Start: 2023-08-16 | End: 2023-08-16

## 2023-08-16 RX ADMIN — LIDOCAINE HYDROCHLORIDE 15 ML: 20 SOLUTION ORAL; TOPICAL at 11:08

## 2023-08-16 RX ADMIN — ALUMINUM HYDROXIDE, MAGNESIUM HYDROXIDE, AND SIMETHICONE 30 ML: 200; 200; 20 SUSPENSION ORAL at 11:08

## 2023-08-17 VITALS
SYSTOLIC BLOOD PRESSURE: 109 MMHG | RESPIRATION RATE: 28 BRPM | DIASTOLIC BLOOD PRESSURE: 77 MMHG | TEMPERATURE: 98 F | HEIGHT: 59 IN | HEART RATE: 95 BPM | WEIGHT: 132 LBS | BODY MASS INDEX: 26.61 KG/M2 | OXYGEN SATURATION: 94 %

## 2023-08-17 LAB
BNP SERPL-MCNC: 11 PG/ML (ref 0–99)
MAGNESIUM SERPL-MCNC: 1.8 MG/DL (ref 1.6–2.6)
TROPONIN I SERPL HS-MCNC: 5.3 PG/ML (ref 0–14.9)

## 2023-08-17 PROCEDURE — 93010 ELECTROCARDIOGRAM REPORT: CPT | Mod: ,,, | Performed by: INTERNAL MEDICINE

## 2023-08-17 PROCEDURE — 93010 EKG 12-LEAD: ICD-10-PCS | Mod: ,,, | Performed by: INTERNAL MEDICINE

## 2023-08-17 PROCEDURE — 36415 COLL VENOUS BLD VENIPUNCTURE: CPT | Performed by: EMERGENCY MEDICINE

## 2023-08-17 PROCEDURE — 99285 EMERGENCY DEPT VISIT HI MDM: CPT | Mod: 25

## 2023-08-17 PROCEDURE — 25500020 PHARM REV CODE 255: Performed by: STUDENT IN AN ORGANIZED HEALTH CARE EDUCATION/TRAINING PROGRAM

## 2023-08-17 PROCEDURE — 63600175 PHARM REV CODE 636 W HCPCS: Performed by: EMERGENCY MEDICINE

## 2023-08-17 PROCEDURE — 93005 ELECTROCARDIOGRAM TRACING: CPT | Performed by: INTERNAL MEDICINE

## 2023-08-17 PROCEDURE — 96374 THER/PROPH/DIAG INJ IV PUSH: CPT | Mod: 59

## 2023-08-17 PROCEDURE — 25000003 PHARM REV CODE 250: Performed by: STUDENT IN AN ORGANIZED HEALTH CARE EDUCATION/TRAINING PROGRAM

## 2023-08-17 RX ORDER — FAMOTIDINE 20 MG/1
20 TABLET, FILM COATED ORAL 2 TIMES DAILY
Qty: 20 TABLET | Refills: 0 | Status: ON HOLD | OUTPATIENT
Start: 2023-08-17 | End: 2023-10-21 | Stop reason: HOSPADM

## 2023-08-17 RX ORDER — HYDROCODONE BITARTRATE AND ACETAMINOPHEN 7.5; 325 MG/1; MG/1
1 TABLET ORAL EVERY 6 HOURS PRN
Status: DISCONTINUED | OUTPATIENT
Start: 2023-08-17 | End: 2023-08-17 | Stop reason: HOSPADM

## 2023-08-17 RX ORDER — FENTANYL CITRATE 50 UG/ML
50 INJECTION, SOLUTION INTRAMUSCULAR; INTRAVENOUS
Status: COMPLETED | OUTPATIENT
Start: 2023-08-17 | End: 2023-08-17

## 2023-08-17 RX ADMIN — HYDROCODONE BITARTRATE AND ACETAMINOPHEN 1 TABLET: 7.5; 325 TABLET ORAL at 01:08

## 2023-08-17 RX ADMIN — FENTANYL CITRATE 50 MCG: 50 INJECTION INTRAMUSCULAR; INTRAVENOUS at 01:08

## 2023-08-17 RX ADMIN — IOHEXOL 100 ML: 350 INJECTION, SOLUTION INTRAVENOUS at 12:08

## 2023-08-17 NOTE — ED PROVIDER NOTES
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  Patient describes chest pain as a tightness, states that it is worsened with inspiration.  She goes on to state that after eating spicy food she also began to experience epigastric pain.  She reports shortness of breath.  She reports leg swelling that she states is at its baseline.  She denies fever, chills, cough, nausea, vomiting, experiencing similar symptoms previously.    Of note patient states that she recently traveled to [place] this past weekend by car to drive lasting roughly 6 hours.  She states that she is followed by maternal fetal medicine due to her history of congenital heart disease and high risk pregnancy, she is on prophylactic Lovenox though she has been unable to take her medication on a daily basis.  Review of patient's allergies indicates:   Allergen Reactions    Hydrocodone Shortness Of Breath and Other (See Comments)     Dizziness; sweating     Past Medical History:   Diagnosis Date    Abnormality of heart valve     heart disease     fatigue as a result of pregnancy (8wks ) clubbing indicates chronic oxygenation issues but pt considered it "normal"     Past Surgical History:   Procedure Laterality Date    CARDIAC CATHETERIZATION  at 7 yars of age    DILATION AND CURETTAGE OF UTERUS      FONTAN PROCEDURE, EXTRACARDIAC  2015    With a nonfenestrated 22 mm Herndon-Justice tube graft.  The pulmonary valve was closed.  " Procedure performed by Dr. Moe Kulkarni at [facility].     Family History   Problem Relation Age of Onset    Diabetes Maternal Grandmother     Vision loss Maternal Grandmother     Miscarriages / Stillbirths Mother     Diabetes Father     Diabetes Paternal Grandmother     No Known Problems Brother     No Known Problems Maternal Grandfather     No Known Problems Paternal Grandfather     Hypertension Maternal Aunt     Diabetes Maternal Aunt     No Known Problems Sister     No Known Problems Maternal Uncle     No Known Problems Paternal Aunt     No Known Problems Paternal Uncle     Anemia Neg Hx     Arrhythmia Neg Hx     Asthma Neg Hx     Clotting disorder Neg Hx     Fainting Neg Hx     Heart attack Neg Hx     Heart disease Neg Hx     Heart failure Neg Hx     Hyperlipidemia Neg Hx     Stroke Neg Hx     Atrial Septal Defect Neg Hx      Social History     Tobacco Use    Smoking status: Never    Smokeless tobacco: Never   Substance Use Topics    Alcohol use: Not Currently     Alcohol/week: 0.0 standard drinks of alcohol     Comment: on ocassion before pregnancy    Drug use: No     Comment: previous marijuana use     Review of Systems   Constitutional:  Negative for fever.   HENT:  Negative for sore throat.    Respiratory:  Positive for chest tightness and shortness of breath. Negative for cough.         Pleurisy   Cardiovascular:  Positive for chest pain and leg swelling.   Gastrointestinal:  Negative for nausea and vomiting.   Genitourinary:  Negative for dysuria.   Musculoskeletal:  Negative for back pain.   Skin:  Negative for rash.   Neurological:  Negative for weakness.   Hematological:  Does not bruise/bleed easily.       Physical Exam     Initial Vitals [08/16/23 2257]   BP Pulse Resp Temp SpO2   134/85 99 (!) 22 97.8 °F (36.6 °C) 97 %      MAP       --         Physical Exam    Constitutional: She appears well-developed and well-nourished.   Uncomfortable appearing female sitting in exam room bed, tachypneic  with respiratory rate in the mid 20s   HENT:   Head: Normocephalic and atraumatic.   Eyes: EOM are normal. Pupils are equal, round, and reactive to light.   Neck:   Normal range of motion.  Cardiovascular:  Normal rate and regular rhythm.           Pulmonary/Chest: Breath sounds normal.   Mild tachypnea   Abdominal:   Gravid abdomen  Feather [employer] monitor in place with fetal heart tones in the 140s  RUQ and epigastric abdominal tenderness to palpation without guarding or rebound   Musculoskeletal:         General: Normal range of motion.      Cervical back: Normal range of motion.     Neurological: She is alert and oriented to person, place, and time.   Skin: Skin is warm and dry.   Psychiatric: She has a normal mood and affect.         ED Course   Procedures  Labs Reviewed   CBC W/ AUTO DIFFERENTIAL - Abnormal; Notable for the following components:       Result Value    WBC 12.76 (*)     Hemoglobin 11.0 (*)     Hematocrit 34.4 (*)     MCV 80 (*)     MCH 25.7 (*)     RDW 14.6 (*)     Platelets 126 (*)     MPV 13.9 (*)     Immature Granulocytes 1.7 (*)     Gran # (ANC) 8.8 (*)     Immature Grans (Abs) 0.22 (*)     All other components within normal limits    Narrative:     For upper or mid abdominal pain.   COMPREHENSIVE METABOLIC PANEL - Abnormal; Notable for the following components:    Sodium 132 (*)     Potassium 3.4 (*)     CO2 22 (*)     Glucose 127 (*)     Albumin 3.2 (*)     All other components within normal limits    Narrative:     For upper or mid abdominal pain.   LIPASE    Narrative:     For upper or mid abdominal pain.   URINALYSIS, REFLEX TO URINE CULTURE     EKG Readings: (Independently Interpreted)   Initial Reading: No STEMI.   Normal sinus rhythm rate of 66 beats per minute, no significant MI or QT prolongation.  Q-waves noted and leads 1, aVL, V5 and V6.  Q-waves also noted in leads V1 and V2.  T-wave inversion in 1 and aVL as well as the V1 and V2.  Today's EKG appears similar to patient's prior  "      Imaging Results              US Abdomen Limited (In process)                      Medications   aluminum-magnesium hydroxide-simethicone 200-200-20 mg/5 mL suspension 30 mL (30 mLs Oral Given 23)     And   LIDOcaine HCl 2% oral solution 15 mL (15 mLs Oral Given 23)     Medical Decision Making:   History:   Old Medical Records: I decided to obtain old medical records.  Old Records Summarized: records from clinic visits.       <> Summary of Records: On review of patient's prior clinic visits the [facility] she has based on Lovenox for PE prophylaxis, is also a candidate for bacterial endocarditis prophylaxis.  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  Described as chest tightness, worsens with a deep breath, associated with shortness of breath and was also worsened after eating "spicy" food.  Has had difficulty with being compliant with her Lovenox.  Initial vital signs significant for tachypnea with respiratory rate in the mid 20s.  Physical examination as stated above.  Differential Diagnosis:   Differential includes but is not limited to pulmonary embolism, reflux, costochondritis, hepatitis, electrolyte abnormalities, metabolic derangement, gastritis, esophagitis pancreatitis, cholecystitis, cholelithiasis, pneumonia, pneumothorax, urinary tract infection, nephrolithiasis, ACS, arrhythmia  Independently Interpreted Test(s):   I have ordered and independently interpreted EKG Reading(s) - see summary below       <> Summary of EKG Reading(s): Initial Reading: No STEMI.   Normal sinus rhythm " rate of 66 beats per minute, no significant IA or QT prolongation.  Q-waves noted and leads 1, aVL, V5 and V6.  Q-waves also noted in leads V1 and V2.  T-wave inversion in 1 and aVL as well as the V1 and V2.  Today's EKG appears similar to patient's prior   Clinical Tests:   Lab Tests: Reviewed  The following lab test(s) were unremarkable: CMP       <> Summary of Lab: Potassium decreased to 3.4, magnesium decreased at 1.8  Radiological Study: Ordered and Reviewed  Medical Tests: Ordered  ED Management:  Workup thus far has returned with a potassium of 3.4, magnesium of 1.8.  Discussed with patient risks and benefits of having CT pulmonary embolism performed and she agreed to having this done.  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  Patient agreed having a 1 time dose of Norco for management of her pain.  On reassessment patient's pain was not adequately managed and she required fentanyl.  Abdominal ultrasound showed no significant abnormality.  Believe that symptoms may be secondary to gastritis/reflux.  Patient to be discharged home with a prescription for Pepcid, advised to stay away from spicy foods and partake in a bland diet.  Carefully reviewed return precautions and advised outpatient follow-up with her, some physician as well as her OBGYN.  Patient understands and agrees with plan of care.  Case was discussed with Dr. Jordan.    Ainyah Cm MD  [facility] Emergency Medicine, PGY-3             ED Course as of 08/17/23 0229   Wed Aug 16, 2023   2347 Potassium(!): 3.4 [SB]   2348 WBC(!): 12.76 [SB]   2348 Immature Granulocytes(!): 1.7 [SB]   2348 Gran # (ANC)(!): 8.8 [SB]   Thu Aug 17, 2023   0054 Magnesium: 1.8 [SB]   0107 CTA Chest Non-Coronary (PE Studies)  IMPRESSION:  No definitive evidence for pulmonary embolus.     Right-sided aortic arch with dextrocardia and situs inversus.      Asymmetric increase. 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     Status post intervention involving the inferior vena cava/draining into the left side pulmonary venous system. [SB]   0112 Anion Gap: 10 [SB]   0149 Troponin I High Sensitivity: 5.3 [SB]   0149 BNP: 11 [SB]      ED Course User Index  [SB] Aniyah Cm MD                   Clinical Impression:   Final diagnoses:  [R10.13] Epigastric pain               Aniyah Cm MD  Resident  08/17/23 0231       Aniyah Cm MD  Resident  08/17/23 0303

## 2023-08-17 NOTE — ED NOTES
C/o epigastic and RUQ abd pain since this AM but worse tonight after eating spicy food. Denies n/v. Normal bm. Tenderness to epigastric and RUQ. 28wks prg g=3 p=1. Slight tachypnea and unable to get comfortable. Pain 9/10. VSS. NADN. L&D notified for toco monitoring.

## 2023-08-17 NOTE — DISCHARGE INSTRUCTIONS
Please return to the emergency department if you experience chest pain, nausea, vomiting, shortness of breath, fever, chills, vaginal bleeding, vaginal discharge, decreased fetal movement.  Please follow-up with your OBGYN and MSM physician as scheduled.  Please avoid spicy foods and attempt to eat a bland diet so that your symptoms do not worsen.  We have discharged with Pepcid to help with your symptoms.  Please take this twice a day (every 12 hours) daily.

## 2023-08-26 NOTE — PROGRESS NOTES
Doing well today. No OB complaints. No SOB, chest pain or any other concerns.    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 S/p extracardiac Fontan with oversewing of the pulmonary valve and division of the MPA on 9/29/16 at [facility]. Good ventricular function, no significant atrioventricular valve insufficiency, and no evidence of Fontan obstruction or thrombosis on cardiac MRI October 16, 2019.  Followed by Dr. Weems and JAIME    #Telemetry 24-48 hours after delivery  #increased risk of thromboembolic events - currently on ASA 81 mg daily and lovenox 40 mg chandrika  #avoid estrogen containing contraception  #Fu with Dr. Weems for echo, ekg and 24 hour holter  #SBEP is indicated for dental work   #Fetal echocardiogram scheduled for November 18th.       RTO in 4 weeks.    Gabriella Dorado MD  Obstetrics and Gynecology  [facility]     PRIMARY DIAGNOSIS: ACUTE PAIN  OUTPATIENT/OBSERVATION GOALS TO BE MET BEFORE DISCHARGE:  1. Pain Status: Improved-controlled with oral pain medications.    2. Return to near baseline physical activity: No    3. Cleared for discharge by consultants (if involved): No    Discharge Planner Nurse   Safe discharge environment identified: No  Barriers to discharge: Yes       Entered by: Dayne Moe RN 08/26/2023 2:22 AM     Please review provider order for any additional goals.   Nurse to notify provider when observation goals have been met and patient is ready for discharge.Goal Outcome Evaluation:       SaO2 below 88%, Oxygen in place at 2LPM. Will continue to monitor.

## 2023-08-29 ENCOUNTER — NUTRITION (OUTPATIENT)
Dept: NUTRITION | Facility: HOSPITAL | Age: 28
End: 2023-08-29
Payer: OTHER GOVERNMENT

## 2023-08-29 DIAGNOSIS — O24.419 GESTATIONAL DIABETES MELLITUS (GDM), ANTEPARTUM, GESTATIONAL DIABETES METHOD OF CONTROL UNSPECIFIED: ICD-10-CM

## 2023-08-29 PROCEDURE — G0108 DIAB MANAGE TRN  PER INDIV: HCPCS

## 2023-08-30 NOTE — PROGRESS NOTES
PCP: Medicine, Ds Family  REFERRING PROVIDER: Davey Turpin MD       HISTORY OF PRESENT ILLNESS: 27 y.o. female patient is in clinic today for gestational diabetes. She is to be enrolled in the diabetes self-care training program.    Weight: 131 lbs     ALLERGIES & MEDICATIONS: Reviewed and Reconciled                                                 MEDICAL/SURGICAL & FAMILY HISTORY: Reviewed and Reconciled    LABORATORY:  -OGTT-  1 hr: 192 mg/ dl  2 hr: 155 mg/dl    Reviewed Diabetes Management Flowsheet and Results Review.     08/29/23 0001   Diabetes Type   Diabetes Type  Gestational   Diabetes History   Current Treatment Diet   Nutrition   What type of beverages do you drink? other (see comments)  (Previously drinking sugar sweetened beverages-eliminated since GDM dx)   Meal Plan 24 Hour Recall - Breakfast Whole wheat toast x2 with 1/3 avocado & hot sauce   Meal Plan 24 Hour Recall - Lunch Whole wheat wrap with chicken (1/2 breast) cheese, lettuce, and buffalo sauce   Meal Plan 24 Hour Recall - Dinner 6 inch Jersey Mayito sub on wheat   Meal Plan 24 Hour Recall - Snack cucomber, cashew + apple, califlower spaced throughout day   Monitoring    Blood Glucose Logs No  (Starting)   Barriers to Change   Barriers to Change None   Readiness to Learn    Readiness to Learn  Eager   Diabetes Education Assessment/Progress   Diabetes Disease Process (diabetes disease process and treatment options) L;DC;W   Nutrition (Incorporating nutritional management into one's lifestyle) L;W;DC   Physical Activity (incorporating physical activity into one's lifestyle) W   Medications (states correct name, dose, onset, peak, duration, side effects & timing of meds) N/A   Monitoring (monitoring blood glucose/other parameters & using results) L;DC;W   Acute Complications (preventing, detecting, and treating acute complications) L;W;DC   Chronic Complications (preventing, detecting, and treating chronic complications) L;W;DC   Clinical  (diabetes, other pertinent medical history, and relevant comorbidities reviewed during visit) DC   Cognitive (knowledge of self-management skills, functional health literacy) L;W;DC   Psychosocial (emotional response to diabetes) NC   Diabetes Distress and Support Systems NC   Behavioral (readiness for change, lifestyle practices, self-care behaviors) DC   Goals   Patient has selected/evaluated goals during today's session Yes, selected   Healthy Eating Set   Start Date 08/30/23   Monitoring Set   Start Date 08/30/23   Problem Solving Set   Start Date 08/30/23   Reducing Risks Set   Start Date 08/30/23   Other Set   Start Date 08/30/23   Diabetes Care Plan/Intervention   Education Plan/Intervention In F/U DSMT   Diabetes Meal Plan   Restrictions Restricted Carbohydrate;Other   Carbohydrate Per Meal 30-45g   Carbohydrate Per Snack  15-20g   Education Units of Time    Time Spent 60 min       SELF-MONITORING: Food - none are avail    ACTIVITY LEVEL: Aerobic - none. Resistance - none.     PSYCHOSOCIAL: Stage of change - action  Barriers to change - none.    Recommendations:  -Increase intake of nutrient dense foods  -Meet appropriate CHO intake  -Consistent meal intake and improved nutrient pairing    Goals: 1) Increase H20 intake. 2) Keep BG log. 3) 30-45g CHO/ meal, <20g CHO/snack. 4) Pair PRO with meals/snacks    Visit Summary:   Ms. Gery was seen today with her MIL for DSMT related to GDM. Pt stated that she wanted guidance on dietary choices to improve her and her babies health outcomes. Since her GDM dx and prior to our appointment, she has made dietary changes including eliminating sweetened beverages/soda and reducing intake of other refined CHO. Reviewed pathophysiology of GDM, role and importance of BG logs, and covered the MyPlate method for meal planning. Answered pt questions and encouraged her to continue with her current dietary changes. Additionally, collaborated with patient to establish new goals  (see above) to further assist in BG management.     At follow-up will assess: BG logs, accomplishment of goals, barriers to success, pt questions and continued goal setting.       Visit Time Spent:  60 minutes      Thank you for the opportunity to work with your patient!  -Paulino Alvarenga, Provisional LDN

## 2023-09-01 DIAGNOSIS — Z98.890 S/P FONTAN PROCEDURE: ICD-10-CM

## 2023-09-01 DIAGNOSIS — Q24.9 ADULT CONGENITAL HEART DISEASE: Primary | ICD-10-CM

## 2023-09-01 DIAGNOSIS — I37.0 NONRHEUMATIC PULMONARY VALVE STENOSIS: ICD-10-CM

## 2023-09-01 DIAGNOSIS — Q20.1 DORV (DOUBLE OUTLET RIGHT VENTRICLE): ICD-10-CM

## 2023-09-06 ENCOUNTER — CLINICAL SUPPORT (OUTPATIENT)
Dept: PEDIATRIC CARDIOLOGY | Facility: CLINIC | Age: 28
End: 2023-09-06
Payer: OTHER GOVERNMENT

## 2023-09-06 ENCOUNTER — OFFICE VISIT (OUTPATIENT)
Dept: PEDIATRIC CARDIOLOGY | Facility: CLINIC | Age: 28
End: 2023-09-06
Payer: OTHER GOVERNMENT

## 2023-09-06 VITALS
BODY MASS INDEX: 26.75 KG/M2 | WEIGHT: 132.69 LBS | HEART RATE: 76 BPM | SYSTOLIC BLOOD PRESSURE: 127 MMHG | HEIGHT: 59 IN | DIASTOLIC BLOOD PRESSURE: 72 MMHG | OXYGEN SATURATION: 96 %

## 2023-09-06 DIAGNOSIS — I37.0 NONRHEUMATIC PULMONARY VALVE STENOSIS: ICD-10-CM

## 2023-09-06 DIAGNOSIS — Q89.3 SITUS INVERSUS: Primary | ICD-10-CM

## 2023-09-06 DIAGNOSIS — Q20.1 DORV (DOUBLE OUTLET RIGHT VENTRICLE): ICD-10-CM

## 2023-09-06 DIAGNOSIS — Z98.890 S/P FONTAN PROCEDURE: ICD-10-CM

## 2023-09-06 DIAGNOSIS — Q24.9 ADULT CONGENITAL HEART DISEASE: ICD-10-CM

## 2023-09-06 DIAGNOSIS — Q24.9 CONGENITAL HEART DISEASE DURING PREGNANCY: ICD-10-CM

## 2023-09-06 DIAGNOSIS — Q24.0 DEXTROCARDIA: ICD-10-CM

## 2023-09-06 DIAGNOSIS — O99.891 CONGENITAL HEART DISEASE DURING PREGNANCY: ICD-10-CM

## 2023-09-06 PROCEDURE — 99999 PR PBB SHADOW E&M-EST. PATIENT-LVL III: CPT | Mod: PBBFAC,,, | Performed by: PEDIATRICS

## 2023-09-06 PROCEDURE — 93010 ELECTROCARDIOGRAM REPORT: CPT | Mod: S$PBB,,, | Performed by: PEDIATRICS

## 2023-09-06 PROCEDURE — 99999 PR PBB SHADOW E&M-EST. PATIENT-LVL I: ICD-10-PCS | Mod: PBBFAC,,,

## 2023-09-06 PROCEDURE — 99999 PR PBB SHADOW E&M-EST. PATIENT-LVL I: CPT | Mod: PBBFAC,,,

## 2023-09-06 PROCEDURE — 99213 OFFICE O/P EST LOW 20 MIN: CPT | Mod: PBBFAC,PN | Performed by: PEDIATRICS

## 2023-09-06 PROCEDURE — 99999 PR PBB SHADOW E&M-EST. PATIENT-LVL III: ICD-10-PCS | Mod: PBBFAC,,, | Performed by: PEDIATRICS

## 2023-09-06 PROCEDURE — 93005 ELECTROCARDIOGRAM TRACING: CPT | Mod: PBBFAC,PN | Performed by: PEDIATRICS

## 2023-09-06 PROCEDURE — 93010 EKG 12-LEAD PEDIATRIC: ICD-10-PCS | Mod: S$PBB,,, | Performed by: PEDIATRICS

## 2023-09-06 PROCEDURE — 99211 OFF/OP EST MAY X REQ PHY/QHP: CPT | Mod: PBBFAC,27,PN

## 2023-09-06 PROCEDURE — 99214 PR OFFICE/OUTPT VISIT, EST, LEVL IV, 30-39 MIN: ICD-10-PCS | Mod: 25,S$PBB,, | Performed by: PEDIATRICS

## 2023-09-06 PROCEDURE — 99214 OFFICE O/P EST MOD 30 MIN: CPT | Mod: 25,S$PBB,, | Performed by: PEDIATRICS

## 2023-09-06 NOTE — PROGRESS NOTES
2023     [facility]    re:Theresa Grey  :1995     Medicine, Ds Family   [address] [facility] [address]     Dr. Christopher Gonzalez    Theresa Grey is a 27 y.o. female with the following diagnoses:  Diagnoses:  1. 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 Delivered healthy baby 3/17/20 via induced vaginal delivery without difficulty.  - now 30 weeks pregnant.  Normal fetal echo 23.  3. Mild thrombocytopenia  - common in Fontan patients.    4. Chest pain, suspect reflux    Discussion:  In regards to her pregnancy, I expect her to do well.  Her echo today looks great.  Concerns include:  1.  The clotting predisposition often noted in Fontan patients may be worsened by pregnancy.  2.  Without a sub pulmonic ventricle, she may tolerate the volume load associated with a pregnancy less well than a 2 ventricle patient.  There is an increased risk of heart failure symptoms.  3.  There is an increased risk of arrhythmias.    4.  Increased risk of miscarriage and  birth, increased risk of congenital heart disease.  That said, she really looks great and did well with the last pregnancy.  I do recommend delivering at [facility] [place], operated by [facility].  Vaginal delivery is typically preferable if she is doing well.  IV fluids may be necessary to avoid dehydration.  I would recommend SBE prophylaxis for delivery although this is certainly debatable.  I would recommend close monitoring with telemetry during delivery and for 24-48 hours after delivery.         We have discussed the long-term risk associated with Fontan circulation. We discussed:  1. Risk of thromboembolic events with Fontan   - I agree with lovenox and aspirin for now.  Will go back to just a baby aspirin after delivery.   2. Long term risk of arrhythmias, heart failure, PLE, liver disease discussed    Her chest pain is not cardiac.  I think reflux is a real possibility, especially given her pregnancy.    Plan:  Continue aspirin and lovenox.  2.   I will see her when she delivers.    3.   SBEP is indicated for dental work.    4.   Maternal-Fetal Medicine follow-up scheduled next week    Interval history:  Overall, she has done well since I last saw her.  She does complain of intermittent chest pain that is midsternal and can radiate to the right shoulder and her right side.  Pain can last anywhere from a few minutes to a few days.  One severe episode occurred after eating spicy food and prompted an emergency room visit.  Last month.  Blood work was very reassuring.  They actually did a CT scan looking for pulmonary embolism, and surprisingly there was no evidence of any blood flow abnormality.  A liver ultrasound was performed, and there was no evidence of gallbladder disease.  No syncope.  No shortness of breath.  No worsening palpitations.  No edema.  No diarrhea.    PMH:  She was diagnosed as a young child with congenital heart disease while living in [place]. She was evaluated at [facility] ([place] with a cath at about 7 years of age. She was scheduled for heart surgery by her report about 10 years ago. However, due to social issues (no insurance, no social security number) the surgery was put off and she was lost to follow up. I first saw her in 2014 she was admitted with pregnancy, rare chest pain, and cyanosis.  After long discussion, she had a pregnancy termination due to the risk associated with unrepaired cyanotic congenital heart disease.  On 9/29/16, she underwent an extracardiac Fontan with  "oversewing and ligation of the MPA at [facility].  She did very well and was discharged a week later.      The review of systems is as noted above. It is otherwise negative for other symptoms related to the general, neurological, psychiatric, endocrine, gastrointestinal, genitourinary, respiratory, dermatologic, musculoskeletal, hematologic, and immunologic systems.    Past Medical History:   Diagnosis Date    Abnormality of heart valve     heart disease     fatigue as a result of pregnancy (8wks ) clubbing indicates chronic oxygenation issues but pt considered it "normal"     Past Surgical History:   Procedure Laterality Date    CARDIAC CATHETERIZATION  at 7 yars of age    DILATION AND CURETTAGE OF UTERUS      FONTAN PROCEDURE, EXTRACARDIAC  September 29, 2015    With a nonfenestrated 22 mm Cactus-Justice tube graft.  The pulmonary valve was closed.  Procedure performed by Dr. Moe Kulkarni at [facility].     Family History   Problem Relation Age of Onset    Diabetes Maternal Grandmother     Vision loss Maternal Grandmother     Miscarriages / Stillbirths Mother     Diabetes Father     Diabetes Paternal Grandmother     No Known Problems Brother     No Known Problems Maternal Grandfather     No Known Problems Paternal Grandfather     Hypertension Maternal Aunt     Diabetes Maternal Aunt     No Known Problems Sister     No Known Problems Maternal Uncle     No Known Problems Paternal Aunt     No Known Problems Paternal Uncle     Anemia Neg Hx     Arrhythmia Neg Hx     Asthma Neg Hx     Clotting disorder Neg Hx     Fainting Neg Hx     Heart attack Neg Hx     Heart disease Neg Hx     Heart failure Neg Hx     Hyperlipidemia Neg Hx     Stroke Neg Hx     Atrial Septal Defect Neg Hx      Social History     Socioeconomic History    Marital status:    Tobacco Use    Smoking status: Never    Smokeless tobacco: Never   Substance and Sexual Activity    Alcohol use: Not Currently     Alcohol/week: 0.0 standard drinks " "of alcohol     Comment: on ocassion before pregnancy    Drug use: No     Comment: previous marijuana use    Sexual activity: Yes     Partners: Male     Birth control/protection: Injection       Current Outpatient Medications:     aspirin (ECOTRIN) 81 MG EC tablet, Take 1 tablet (81 mg total) by mouth once daily., Disp: 360 tablet, Rfl: 0    enoxaparin (LOVENOX) 40 mg/0.4 mL Syrg, Inject 0.4 mLs (40 mg total) into the skin Daily., Disp: 12 mL, Rfl: 5    prenatal vit no.124/iron/folic (PRENATAL VITAMIN ORAL), Take 1 tablet by mouth once daily., Disp: , Rfl:     famotidine (PEPCID) 20 MG tablet, Take 1 tablet (20 mg total) by mouth 2 (two) times daily. (Patient not taking: Reported on 9/6/2023), Disp: 20 tablet, Rfl: 0    sertraline (ZOLOFT) 50 MG tablet, Take 1/2 tablet per day for a week, then increase to 1 tablet daily thereafter. (Patient not taking: Reported on 7/6/2023), Disp: 30 tablet, Rfl: 11    Current Facility-Administered Medications:     acetaminophen tablet 650 mg, 650 mg, Oral, Once PRN, Ayah Sanchez MD    albuterol inhaler 2 puff, 2 puff, Inhalation, Q20 Min PRNDaniel Megan C., MD    diphenhydrAMINE injection 25 mg, 25 mg, Intravenous, Once PRNDaniel Megan C., MD    EPINEPHrine (EPIPEN) 0.3 mg/0.3 mL pen injection 0.3 mg, 0.3 mg, Intramuscular, PRN, Ayah Sanchez MD    methylPREDNISolone sodium succinate injection 40 mg, 40 mg, Intravenous, Once PRNDaniel Megan C., MD    ondansetron disintegrating tablet 4 mg, 4 mg, Oral, Once PRNDaniel Megan C., MD    sodium chloride 0.9% 500 mL flush bag, , Intravenous, PRNDaniel Megan C., MD    sodium chloride 0.9% flush 10 mL, 10 mL, Intravenous, PRNDaniel Megan C., MD    Review of patient's allergies indicates:   Allergen Reactions    Hydrocodone Shortness Of Breath and Other (See Comments)     Dizziness; sweating     /72 (BP Location: Right arm, Patient Position: Sitting)   Pulse 76   Ht 4' 11.02" (1.499 m)   Wt 60.2 kg (132 lb " 11.5 oz)   SpO2 96%   BMI 26.79 kg/m²     In general, she is an acyanotic, nondysmorphic appearing female in no apparent distress.  The eyes, nares, and oropharynx are clear.  Eyelids and conjunctiva free of drainage and redness.  PERRLA.  Teeth in good repair and she is wearing braces.  Mucous membranes are moist.  The head is normocephalic and atraumatic.  The neck is supple without jugular venous distention or thyroid enlargement.  The lungs are clear to auscultation bilaterally.  There is a well healed sternotomy scar.  The PMI is in the right chest.  The first heart sound is normal.  The second is loud and single.  There are no gallops, rubs, or clicks in the supine position.  The abdominal exam reveals a gravid uterus.  Pulses are normal in all 4 extremities with brisk capillary refill and no edema.  No rashes are noted.  She has some clubbing.  No tenderness, swelling in legs.  Her neurological exam is normal.    The EKG performed in clinic today reveals evidence of dextrocardia.  Normal sinus rhythm is present.  No evidence of ischemia.    Echo 07/06/23  IMPRESSION:  Pregnant at 22 weeks EGA-  Technically very limited imaging secondary to difficult acoustic windows.  Dextrocardia.  Laminar flow noted in normal size inferior vena cava connecting to extracardiac conduit to Fontan anastomosis, left-sided SVC connecting to left pulmonary artery to Pavel anastomisis, proximal LPA, pulmonary confluence and proximal RPA.  Qualitative impression of normal size atria.  Straddling left AV valve demonstrated with trivial AV valve insufficiency.  Right-sided AV valve with no evidence of stenosis and mild  insufficiency.  Large inlet VSD with unrestricted flow from LV to RV.  The ventricles are jason-cross in relationship as demonstrated by color Doppler with limited details of the ventricular structures demonstrated by 2D imaging.  Qualitatively good biventricular systolic function.  Centrally positioned pulmonary  valve with small annulus and no obvious flow.  Leftward and anteriorly positioned aortic valve with no evidence of subaortic or aortic valve stenosis or insufficiency.  Large right aortic arch.  There is no pericardial effusion.    Cardiac MRI October 19, 2019:  Patent innominate vein to left-sided SVC. Widely patent left-sided Pavel anastomosis. The IVC is anastomosed to the left pulmonary artery. The Fontan tunnel is widely patent without evidence of obstruction. The branch pulmonary arteries are normal in size with no evidence of obstruction.   Atrio-ventricular concordance with a jason-cross type of arrangement.   The right ventricle volumes are low normal. Moderate RVH. The calculated RVEF is 45 %.  The left ventricular volumes are low normal. The calculated LVEF is 50 %.   Large inlet type ventricular septal defect.   Oversewn pulmonary valve.   Anterior and rightward aorta. Structurally normal aortic valve with no significant aortic insufficiency.   Right aortic arch.     Lab Results   Component Value Date    WBC 12.76 (H) 08/16/2023    HGB 11.0 (L) 08/16/2023    HCT 34.4 (L) 08/16/2023    MCV 80 (L) 08/16/2023     (L) 08/16/2023     CMP  Sodium   Date Value Ref Range Status   08/16/2023 132 (L) 136 - 145 mmol/L Final     Potassium   Date Value Ref Range Status   08/16/2023 3.4 (L) 3.5 - 5.1 mmol/L Final     Chloride   Date Value Ref Range Status   08/16/2023 100 95 - 110 mmol/L Final     CO2   Date Value Ref Range Status   08/16/2023 22 (L) 23 - 29 mmol/L Final     Glucose   Date Value Ref Range Status   08/16/2023 127 (H) 70 - 110 mg/dL Final     BUN   Date Value Ref Range Status   08/16/2023 10 6 - 20 mg/dL Final     Creatinine   Date Value Ref Range Status   08/16/2023 0.5 0.5 - 1.4 mg/dL Final     Calcium   Date Value Ref Range Status   08/16/2023 8.7 8.7 - 10.5 mg/dL Final     Total Protein   Date Value Ref Range Status   08/16/2023 7.6 6.0 - 8.4 g/dL Final     Albumin   Date Value Ref Range  Status   08/16/2023 3.2 (L) 3.5 - 5.2 g/dL Final     Total Bilirubin   Date Value Ref Range Status   08/16/2023 0.4 0.1 - 1.0 mg/dL Final     Comment:     For infants and newborns, interpretation of results should be based  on gestational age, weight and in agreement with clinical  observations.    Premature Infant recommended reference ranges:  Up to 24 hours.............<8.0 mg/dL  Up to 48 hours............<12.0 mg/dL  3-5 days..................<15.0 mg/dL  6-29 days.................<15.0 mg/dL       Alkaline Phosphatase   Date Value Ref Range Status   08/16/2023 89 55 - 135 U/L Final     AST   Date Value Ref Range Status   08/16/2023 18 10 - 40 U/L Final     ALT   Date Value Ref Range Status   08/16/2023 17 10 - 44 U/L Final     Anion Gap   Date Value Ref Range Status   08/16/2023 10 8 - 16 mmol/L Final     eGFR   Date Value Ref Range Status   08/16/2023 >60.0 >60 mL/min/1.73 m^2 Final     BNP   Date Value Ref Range Status   08/16/2023 11 0 - 99 pg/mL Final     Comment:     Values of less than 100 pg/ml are consistent with non-CHF populations.     Liver US 8/17/23:  IMPRESSION: Hepatomegaly. Otherwise negative evaluation of the right upper quadrant.    Sincerely,        Toro Weems MD  Pediatric Cardiology  Adult Congenital Heart Disease  Pediatric Heart Failure and Transplantation  [facility] [address] [place]  [address]  [phone]

## 2023-09-07 ENCOUNTER — NUTRITION (OUTPATIENT)
Dept: NUTRITION | Facility: HOSPITAL | Age: 28
End: 2023-09-07
Payer: OTHER GOVERNMENT

## 2023-09-07 DIAGNOSIS — O24.419 GESTATIONAL DIABETES MELLITUS (GDM), ANTEPARTUM, GESTATIONAL DIABETES METHOD OF CONTROL UNSPECIFIED: ICD-10-CM

## 2023-09-07 PROCEDURE — G0108 DIAB MANAGE TRN  PER INDIV: HCPCS

## 2023-09-07 NOTE — PROGRESS NOTES
DSME Note     09/07/23 0001   Nutrition   Meal Plan 24 Hour Recall - Breakfast Small Xavier latte, quesadilla (cheese + steak)   Meal Plan 24 Hour Recall - Lunch Steak, beans (1-1.5 cups), 1-2 corn tortillas, cheese   Meal Plan 24 Hour Recall - Dinner 2 Crunchy [employer] Tacos   Meal Plan 24 Hour Recall - Snack 1 serving berkley   Monitoring    Blood Glucose Logs Yes   Do you use a personal continuous glucose monitor? No   Goals   Patient has selected/evaluated goals during today's session Yes, evaluated   Healthy Eating In Progress   Monitoring % Met   Met Percentage  100%   Problem Solving In Progress   Healthy Coping In Progress   Reducing Risks In Progress   Other In Progress   Education Units of Time    Time Spent 30 min     Visit Summary:   Ms. Grey was seen today for continued DSME. She has consistently measured and logged her BG, with all recorded scores within target ranges. Pt did have several meals with CHO content above the 30-45g target established at our previous appt, however, the increased CHO intake did not appear to push her readings out of goal range. Ms. Grey did say she was sometimes hungry shortly after meals--recommended increasing PRO and fat servings to assist with satiety and meeting caloric needs. Pt may be able to tolerate/liberalize above the 30-45g CHO target initially established, however, recommended increasing PRO + fat first before liberalizing and if increasing CHO intake, to closely monitor impact on BG. Emphasized the importance of staying within BG target ranges to maximize health outcomes. Pt stated she would like to continue with her current goals with the previously discussed changes.     At follow up will investigate: BG log and achievement of goals, barriers to success, questions, and continued goal setting.     Paulino Alvarenga, Provisional LDN

## 2023-09-12 ENCOUNTER — PROCEDURE VISIT (OUTPATIENT)
Dept: MATERNAL FETAL MEDICINE | Facility: CLINIC | Age: 28
End: 2023-09-12
Payer: OTHER GOVERNMENT

## 2023-09-12 ENCOUNTER — OFFICE VISIT (OUTPATIENT)
Dept: ANESTHESIOLOGY | Facility: OTHER | Age: 28
End: 2023-09-12
Attending: OBSTETRICS & GYNECOLOGY
Payer: OTHER GOVERNMENT

## 2023-09-12 ENCOUNTER — OFFICE VISIT (OUTPATIENT)
Dept: MATERNAL FETAL MEDICINE | Facility: CLINIC | Age: 28
End: 2023-09-12
Payer: OTHER GOVERNMENT

## 2023-09-12 VITALS
HEIGHT: 59 IN | WEIGHT: 132.69 LBS | BODY MASS INDEX: 26.75 KG/M2 | SYSTOLIC BLOOD PRESSURE: 116 MMHG | DIASTOLIC BLOOD PRESSURE: 72 MMHG

## 2023-09-12 DIAGNOSIS — Q24.9 CONGENITAL HEART DISEASE DURING PREGNANCY: ICD-10-CM

## 2023-09-12 DIAGNOSIS — O99.413 MATERNAL CONGENITAL HEART DISEASE IN THIRD TRIMESTER, ANTEPARTUM: ICD-10-CM

## 2023-09-12 DIAGNOSIS — O99.891 CONGENITAL HEART DISEASE DURING PREGNANCY: ICD-10-CM

## 2023-09-12 DIAGNOSIS — Z36.89 ENCOUNTER FOR ULTRASOUND TO ASSESS FETAL GROWTH: Primary | ICD-10-CM

## 2023-09-12 DIAGNOSIS — Z36.89 ENCOUNTER FOR ULTRASOUND TO ASSESS FETAL GROWTH: ICD-10-CM

## 2023-09-12 DIAGNOSIS — Q24.9 MATERNAL CONGENITAL HEART DISEASE IN THIRD TRIMESTER, ANTEPARTUM: ICD-10-CM

## 2023-09-12 PROCEDURE — 99999 PR PBB SHADOW E&M-EST. PATIENT-LVL III: CPT | Mod: PBBFAC,,, | Performed by: OBSTETRICS & GYNECOLOGY

## 2023-09-12 PROCEDURE — 76819 FETAL BIOPHYS PROFIL W/O NST: CPT | Mod: 26,S$PBB,, | Performed by: OBSTETRICS & GYNECOLOGY

## 2023-09-12 PROCEDURE — 76816 US MFM PROCEDURE (VIEWPOINT): ICD-10-PCS | Mod: 26,S$PBB,, | Performed by: OBSTETRICS & GYNECOLOGY

## 2023-09-12 PROCEDURE — 99214 OFFICE O/P EST MOD 30 MIN: CPT | Mod: 25,S$PBB,, | Performed by: OBSTETRICS & GYNECOLOGY

## 2023-09-12 PROCEDURE — 99999 PR PBB SHADOW E&M-EST. PATIENT-LVL III: ICD-10-PCS | Mod: PBBFAC,,, | Performed by: OBSTETRICS & GYNECOLOGY

## 2023-09-12 PROCEDURE — 76816 OB US FOLLOW-UP PER FETUS: CPT | Mod: PBBFAC | Performed by: OBSTETRICS & GYNECOLOGY

## 2023-09-12 PROCEDURE — 99213 OFFICE O/P EST LOW 20 MIN: CPT | Mod: PBBFAC,25 | Performed by: OBSTETRICS & GYNECOLOGY

## 2023-09-12 PROCEDURE — 99214 PR OFFICE/OUTPT VISIT, EST, LEVL IV, 30-39 MIN: ICD-10-PCS | Mod: 25,S$PBB,, | Performed by: OBSTETRICS & GYNECOLOGY

## 2023-09-12 PROCEDURE — 76819 US MFM PROCEDURE (VIEWPOINT): ICD-10-PCS | Mod: 26,S$PBB,, | Performed by: OBSTETRICS & GYNECOLOGY

## 2023-09-12 NOTE — CONSULTS
Consults  Outpatient OB Anesthesia Consult      Date and Time: 2023 10:29 AM     Request from: JAIME    CC requesting physician or midwife: Yes    Reason for Consult: Anesthetic recommendations for delivery    Initial Consult?: Yes    Chief Complaint: Congenital Heart Disease    HPI: Patient is a 27 y.o. 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 She had dextrocardia, DORV, large VSD, significant pulmonary valve stenosis - now s/p 22 mm extracardiac Fontan with oversewing of the pulmonary valve and division of the MPA on 16 at [facility].   Pt's first pregnancy resulted in termination due to the increased risk of maternal mortality in the setting of unrepaired congenital heart disease. She did well during her second pregnancy and vaginal delivery occurred in 2020 without any major adverse events. She was an elective induction and had an epidural placed without difficulty. She reports good analgesia with her epidural during her labor.     This pregnancy, patient has been doing well overall. She reports a few instances of chest pain that can last anywhere from a couple minutes to the entire day. She had one episode of severe pain prompting a visit to the ED with workup [CTA, abdominal U/S, troponin, CBC, CMP] that was unremarkable. She has seen her cardiologist since that time and patient reports her symptoms have been attributed to reflux.   Additionally she has GDM that has been controlled with diet modification and she is on lovenox qd due to clotting predisposition that is noted in fontan patients.    2023 TTE:  - Qualitatively good biventricular systolic function.  - Dextrocardia.  - Laminar flow noted in normal size inferior vena cava connecting to extracardiac conduit to Fontan anastomosis, left-sided SVC connecting to left pulmonary artery to Pavel anastomisis, proximal LPA, pulmonary confluence and proximal  "RPA.  - Qualitative impression of normal size atria.  - Straddling left AV valve demonstrated with trivial AV valve insufficiency.  - Right-sided AV valve with no evidence of stenosis and mild  insufficiency.  - Large inlet VSD with unrestricted flow from LV to RV.  - The ventricles are jason-cross in relationship as demonstrated by color Doppler with limited details of the ventricular structures demonstrated by 2D imaging.  - Centrally positioned pulmonary valve with small annulus and no obvious flow.  - Leftward and anteriorly positioned aortic valve with no evidence of subaortic or aortic valve stenosis or insufficiency.  - Large right aortic arch.    Past medical history:    Past Medical History:   Diagnosis Date    Abnormality of heart valve     heart disease     fatigue as a result of pregnancy (8wks ) clubbing indicates chronic oxygenation issues but pt considered it "normal"       Past surgical history:    Past Surgical History:   Procedure Laterality Date    CARDIAC CATHETERIZATION  at 7 yars of age    DILATION AND CURETTAGE OF UTERUS      FONTAN PROCEDURE, EXTRACARDIAC  September 29, 2015    With a nonfenestrated 22 mm [place] tube graft.  The pulmonary valve was closed.  Procedure performed by Dr. Moe Kulkarni at [facility].       Family history:    Family History   Problem Relation Age of Onset    Diabetes Maternal Grandmother     Vision loss Maternal Grandmother     Miscarriages / Stillbirths Mother     Diabetes Father     Diabetes Paternal Grandmother     No Known Problems Brother     No Known Problems Maternal Grandfather     No Known Problems Paternal Grandfather     Hypertension Maternal Aunt     Diabetes Maternal Aunt     No Known Problems Sister     No Known Problems Maternal Uncle     No Known Problems Paternal Aunt     No Known Problems Paternal Uncle     Anemia Neg Hx     Arrhythmia Neg Hx     Asthma Neg Hx     Clotting disorder Neg Hx     Fainting Neg Hx     Heart attack Neg Hx     " Heart disease Neg Hx     Heart failure Neg Hx     Hyperlipidemia Neg Hx     Stroke Neg Hx     Atrial Septal Defect Neg Hx        Social History:    Social History     Socioeconomic History    Marital status:    Tobacco Use    Smoking status: Never    Smokeless tobacco: Never   Substance and Sexual Activity    Alcohol use: Not Currently     Alcohol/week: 0.0 standard drinks of alcohol     Comment: on ocassion before pregnancy    Drug use: No     Comment: previous marijuana use    Sexual activity: Yes     Partners: Male     Birth control/protection: Injection       Medication:    Current Outpatient Medications on File Prior to Visit   Medication Sig Dispense Refill    aspirin (ECOTRIN) 81 MG EC tablet Take 1 tablet (81 mg total) by mouth once daily. 360 tablet 0    enoxaparin (LOVENOX) 40 mg/0.4 mL Syrg Inject 0.4 mLs (40 mg total) into the skin Daily. 12 mL 5    famotidine (PEPCID) 20 MG tablet Take 1 tablet (20 mg total) by mouth 2 (two) times daily. (Patient not taking: Reported on 9/6/2023) 20 tablet 0    prenatal vit no.124/iron/folic (PRENATAL VITAMIN ORAL) Take 1 tablet by mouth once daily.      sertraline (ZOLOFT) 50 MG tablet Take 1/2 tablet per day for a week, then increase to 1 tablet daily thereafter. (Patient not taking: Reported on 7/6/2023) 30 tablet 11     Current Facility-Administered Medications on File Prior to Visit   Medication Dose Route Frequency Provider Last Rate Last Admin    acetaminophen tablet 650 mg  650 mg Oral Once PRN Ayah Sanchez MD        albuterol inhaler 2 puff  2 puff Inhalation Q20 Min PRAyah Cartagena MD        diphenhydrAMINE injection 25 mg  25 mg Intravenous Once PRN Ayah Sanchez MD        EPINEPHrine (EPIPEN) 0.3 mg/0.3 mL pen injection 0.3 mg  0.3 mg Intramuscular PRN Ayah Sanchez MD        methylPREDNISolone sodium succinate injection 40 mg  40 mg Intravenous Once PRN Ayah Sanchez MD        ondansetron disintegrating tablet 4 mg  4 mg Oral  Once Ayah Lainez MD        sodium chloride 0.9% 500 mL flush bag   Intravenous Ayah Lainez MD        sodium chloride 0.9% flush 10 mL  10 mL Intravenous Ayah Lainez MD           Allergies:    Hydrocodone    Family or personal history of anesthetic complications: No    Diagnostic Studies: I have reviewed the following relevant findings as noted below:  CBC:  Lab Results   Component Value Date    WBC 12.76 (H) 08/16/2023    HGB 11.0 (L) 08/16/2023    HCT 34.4 (L) 08/16/2023    MCV 80 (L) 08/16/2023     (L) 08/16/2023      CMP:  Sodium   Date Value Ref Range Status   08/16/2023 132 (L) 136 - 145 mmol/L Final     Potassium   Date Value Ref Range Status   08/16/2023 3.4 (L) 3.5 - 5.1 mmol/L Final     Chloride   Date Value Ref Range Status   08/16/2023 100 95 - 110 mmol/L Final     CO2   Date Value Ref Range Status   08/16/2023 22 (L) 23 - 29 mmol/L Final     Glucose   Date Value Ref Range Status   08/16/2023 127 (H) 70 - 110 mg/dL Final     BUN   Date Value Ref Range Status   08/16/2023 10 6 - 20 mg/dL Final     Creatinine   Date Value Ref Range Status   08/16/2023 0.5 0.5 - 1.4 mg/dL Final     Calcium   Date Value Ref Range Status   08/16/2023 8.7 8.7 - 10.5 mg/dL Final     Total Protein   Date Value Ref Range Status   08/16/2023 7.6 6.0 - 8.4 g/dL Final     Albumin   Date Value Ref Range Status   08/16/2023 3.2 (L) 3.5 - 5.2 g/dL Final     Total Bilirubin   Date Value Ref Range Status   08/16/2023 0.4 0.1 - 1.0 mg/dL Final     Comment:     For infants and newborns, interpretation of results should be based  on gestational age, weight and in agreement with clinical  observations.    Premature Infant recommended reference ranges:  Up to 24 hours.............<8.0 mg/dL  Up to 48 hours............<12.0 mg/dL  3-5 days..................<15.0 mg/dL  6-29 days.................<15.0 mg/dL       Alkaline Phosphatase   Date Value Ref Range Status   08/16/2023 89 55 - 135 U/L Final     AST    Date Value Ref Range Status   08/16/2023 18 10 - 40 U/L Final     ALT   Date Value Ref Range Status   08/16/2023 17 10 - 44 U/L Final     Anion Gap   Date Value Ref Range Status   08/16/2023 10 8 - 16 mmol/L Final     eGFR if    Date Value Ref Range Status   07/15/2022 >60 >60 mL/min/1.73 m^2 Final     eGFR if non    Date Value Ref Range Status   07/15/2022 >60 >60 mL/min/1.73 m^2 Final     Comment:     Calculation used to obtain the estimated glomerular filtration  rate (eGFR) is the CKD-EPI equation.        BMP:   Lab Results   Component Value Date     (L) 08/16/2023    K 3.4 (L) 08/16/2023     08/16/2023    CO2 22 (L) 08/16/2023    BUN 10 08/16/2023    CREATININE 0.5 08/16/2023    CALCIUM 8.7 08/16/2023    ANIONGAP 10 08/16/2023    ESTGFRAFRICA >60 07/15/2022    EGFRNONAA >60 07/15/2022     Coagulation studies:   Lab Results   Component Value Date    INR 0.9 04/26/2023    APTT 23.9 01/20/2020     EKG 09/06/2023:   Known dextrocardia, Normal sinus rhythm, Prominent Q waves in I and aVL, T wave inversion in lateral leads, Incomplete right bundle branch block   Since previous tracing - no significant change     CTA 08/17/2023:   - No definitive evidence for pulmonary embolus.  - Right-sided aortic arch with dextrocardia and situs inversus.  - Asymmetric increase. 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  - Status post intervention involving the inferior vena cava/draining into the left side pulmonary venous system.    Review of Systems:   Constitutional: Negative for fever and chills; well appearing female  Eyes: no visual changes  Ear, nose, mouth and throat: no loose or broken teeth  Cardiovascular: no chest pain  Respiratory: no shortness of breath  Gastrointestinal: no nausea or vomiting  Genitourinary: no dysuria  Musculoskeletal: no joint pain  Skin: warm and dry, no  rashes  Neurologic: no seizures  Psychiatric: no anxiety or depression  Endocrinology: no heat or cold intolerance  Hematologic: does not bruise or bleed easily      Physical Exam:  Vitals:   See [facility] vitals  Constitutional: alert, no distress  Eyes: normal lids, pupils symmetric  Ear, nose, mouth and throat: Mallampati III, normal thyromental distance, normal lips, teeth, gums and tongue   Cardiovascular: normal rate  Respiratory: normal effort, no wheezes  Musculoskeletal: normal gait, normal range of motion  Skin: no rashes, no induration  Neurologic: normal reflexes and sensation  Psychiatric: oriented to person, place, time; normal affect    ASA Score: 3    Assessment and Plan:  27 year old  at 32w3d with a hx of cyanotic congenital heart disease s/p repair with Fontan in 2016. She follows closely with [facility] and cardiology and she has been doing well this pregnancy.   1) Delivery plan is an induction at 38 weeks. Anesthetic options discussed with patient. Given the plans for induction, pt was informed that if her lovenox remains at prophylactic levels, her last dose will need to be AT LEAST 12 hours prior to epidural placement. After catheter removal, lovenox dosing can be resumed after a four hour wait period. The timing of epidural placement is ultimately up to the patient but she was encouraged to request her epidural early in her induction process so that slow dosing of the epidural can take place in a controlled manner.   2) Should a  section be indicated, neuraxial anesthesia is preferred. In the event that neuraxial anesthesia is not a possibility we discussed that general anesthesia would be the next step. Risks include heart attack, stroke, cardiovascular collapse and prolonged intubation. Pt expressed understanding.  3) She does have a hx of thrombocytopenia in pregnancy with her most recent plt count being 126. If patient remains >70k without a precipitous decrease in her plt count prior to  induction I do not anticipate any changes in her anesthetic options.      Complexity: High    Entertained and answered all questions to the patient's satisfaction.      Girish Nolasco MD

## 2023-09-12 NOTE — PROGRESS NOTES
"Maternal Fetal Medicine follow up consult    SUBJECTIVE:     Theresa Grey is a 27 y.o.  female with IUP at 32w3d who is seen in follow up consultation by MFM.  Pregnancy complications include:   Problem   Congenital Heart Disease During Pregnancy       Previous notes reviewed.   No changes to medical, surgical, family, social, or obstetric history.    Interval history since last MFM visit: doing well today with no complaints. Denies CP, SOB, palpitations, dyspnea on exertion    Medications:  Current Outpatient Medications   Medication Instructions    aspirin (ECOTRIN) 81 mg, Oral, Daily    enoxaparin (LOVENOX) 40 mg, Subcutaneous, Daily    famotidine (PEPCID) 20 mg, Oral, 2 times daily    prenatal vit no.124/iron/folic (PRENATAL VITAMIN ORAL) 1 tablet, Oral, Daily    sertraline (ZOLOFT) 50 MG tablet Take 1/2 tablet per day for a week, then increase to 1 tablet daily thereafter.       Care team members:  Davey Turpin MD - Primary OB     OBJECTIVE:   /72 (BP Location: Left arm, Patient Position: Sitting, BP Method: Large (Automatic))   Ht 4' 11.02" (1.499 m)   Wt 60.2 kg (132 lb 11.5 oz)   BMI 26.79 kg/m²     Ultrasound performed. See viewpoint for full ultrasound report.  Rice live IUP  Fetal size is appropriate for gestational age, with the EFW (2017 g) plotting at the 30% and the AC plotting at the 50%.   A limited repeat fetal anatomic survey appears normal.   The BPP score is reassuring at 8/8.  The MVP is normal.     ASSESSMENT/PLAN:     27 y.o.  female with IUP at 32w3d    Congenital heart disease during pregnancy  Primary  Lesion: Complex cyanotic congenital heart disease with dextrocardia, DORV, large VSD, significant pulmonary valve stenosis - now s/p 22 mm extracardiac Fontan with oversewing of the pulmonary valve and division of the MPA on 16 at [facility]   NYHA Class: 1  mWHO Risk Class: 3  Last EKG (23): Unchanged from the last EKG.  Last TTE " (7/6/23):    Dextrocardia.  Laminar flow noted in normal size inferior vena cava connecting to extracardiac conduit to Fontan anastomosis, left-sided SVC connecting to left pulmonary artery to Pavel anastomisis, proximal LPA, pulmonary confluence and proximal RPA.  Qualitative impression of normal size atria.  Straddling left AV valve demonstrated with trivial AV valve insufficiency.  Right-sided AV valve with no evidence of stenosis and mild  insufficiency.  Large inlet VSD with unrestricted flow from LV to RV.  The ventricles are jason-cross in relationship as demonstrated by color Doppler with limited details of the ventricular structures demonstrated by 2D imaging.  Qualitatively good biventricular systolic function.  Centrally positioned pulmonary valve with small annulus and no obvious flow.  Leftward and anteriorly positioned aortic valve with no evidence of subaortic or aortic valve stenosis or insufficiency.  Large right aortic arch.  There is no pericardial effusion.     Cardiologist: Dr. Weems   Last Cards visit: 9/6/23  Delivery:  - Timing: IOL to be scheduled 10/23/23 @ 38w2d  - Mode: usual obstetric indications  - Location: [facility]  Bacterial endocarditis prophylaxis: Indicated     Please see original consult for full counseling and recommendations.  Since last visit, patient has been doing well with no concerns or complaints. Tolerating LMWH. No sx of failure. No palpitations.  Saw Dr. Weems on 9/6, recommendations below:  That said, she really looks great and did well with the last pregnancy.  I do recommend delivering at [facility].  Vaginal delivery is typically preferable if she is doing well.  IV fluids may be necessary to avoid dehydration.  I would recommend SBE prophylaxis for delivery although this is certainly debatable.  I would recommend close monitoring with telemetry during delivery and for 24-48 hours after delivery. Follow up postpartum     Recommendations:  Continue to follow with  Cardiology - Dr. Weems  Lovenox 40 mg qD  Continue ASA 81 mg qD  Anesthesiology consultation done today   Endocarditis prophylaxis has been recommended by Cardiology  Telemetry intrapartum and for 48 hours postpartum  Weekly PNT has been arranged  Serial growth assessments   Delivery at 38 weeks - arranged as above    Follow up has been scheduled with MFM in 4 weeks    Bam Soler MD  PGY 7  Maternal Fetal Medicine  [facility]

## 2023-09-12 NOTE — ASSESSMENT & PLAN NOTE
Primary  Lesion: Complex cyanotic congenital heart disease with dextrocardia, DORV, large VSD, significant pulmonary valve stenosis - now s/p 22 mm extracardiac Fontan with oversewing of the pulmonary valve and division of the MPA on 9/29/16 at [facility]   NYHA Class: 1  mWHO Risk Class: 3  Last EKG (9/1/23): Unchanged from the last EKG.  Last TTE (7/6/23):    Dextrocardia.  Laminar flow noted in normal size inferior vena cava connecting to extracardiac conduit to Fontan anastomosis, left-sided SVC connecting to left pulmonary artery to Pavel anastomisis, proximal LPA, pulmonary confluence and proximal RPA.  Qualitative impression of normal size atria.  Straddling left AV valve demonstrated with trivial AV valve insufficiency.  Right-sided AV valve with no evidence of stenosis and mild  insufficiency.  Large inlet VSD with unrestricted flow from LV to RV.  The ventricles are jason-cross in relationship as demonstrated by color Doppler with limited details of the ventricular structures demonstrated by 2D imaging.  Qualitatively good biventricular systolic function.  Centrally positioned pulmonary valve with small annulus and no obvious flow.  Leftward and anteriorly positioned aortic valve with no evidence of subaortic or aortic valve stenosis or insufficiency.  Large right aortic arch.  There is no pericardial effusion.     Cardiologist: Dr. Weems   Last Cards visit: 9/6/23  Delivery:  - Timing: IOL to be scheduled 10/23/23 @ 38w2d  - Mode: usual obstetric indications  - Location: [facility]  Bacterial endocarditis prophylaxis: Indicated     Please see original consult for full counseling and recommendations.  Since last visit, patient has been doing well with no concerns or complaints. Tolerating LMWH. No sx of failure. No palpitations.  Saw Dr. Weems on 9/6, recommendations below:  That said, she really looks great and did well with the last pregnancy.  I do recommend delivering at [facility].  Vaginal  delivery is typically preferable if she is doing well.  IV fluids may be necessary to avoid dehydration.  I would recommend SBE prophylaxis for delivery although this is certainly debatable.  I would recommend close monitoring with telemetry during delivery and for 24-48 hours after delivery. Follow up postpartum     Recommendations:   Continue to follow with Cardiology - Dr. Weems   Lovenox 40 mg qD   Continue ASA 81 mg qD   Anesthesiology consultation done today    Endocarditis prophylaxis has been recommended by Cardiology   Telemetry intrapartum and for 48 hours postpartum   Weekly PNT has been arranged   Serial growth assessments    Delivery at 38 weeks - arranged as above

## 2023-09-13 ENCOUNTER — DOCUMENTATION ONLY (OUTPATIENT)
Dept: MATERNAL FETAL MEDICINE | Facility: CLINIC | Age: 28
End: 2023-09-13
Payer: OTHER GOVERNMENT

## 2023-09-13 NOTE — PROGRESS NOTES
Patient and plan of care discussed at multidisciplinary Maternal Conference.    Care Team:    MFM - next appt 10/11   Cardiology (Dr. Weems) - 9/6; expects patient to tolerate delivery well      Follow up   Anesthesia consult 3rd trimester (completed 9/12)   Weekly PNT at 32 weeks   Delivery   38 weeks at [facility] - requested 10/23 at 38w2d   SBE prophylaxis   Tele intrapartum and 48 hours PP    Consult Dank at admission     Elly Chavez MD  PGY 6  Maternal Fetal Medicine  [facility]

## 2023-09-14 DIAGNOSIS — O24.419 GESTATIONAL DIABETES: Primary | ICD-10-CM

## 2023-09-15 ENCOUNTER — HOSPITAL ENCOUNTER (OUTPATIENT)
Facility: HOSPITAL | Age: 28
Discharge: HOME | End: 2023-09-15
Attending: SPECIALIST | Admitting: SPECIALIST
Payer: OTHER GOVERNMENT

## 2023-09-15 VITALS — RESPIRATION RATE: 18 BRPM | DIASTOLIC BLOOD PRESSURE: 79 MMHG | HEART RATE: 78 BPM | SYSTOLIC BLOOD PRESSURE: 115 MMHG

## 2023-09-15 DIAGNOSIS — O24.419 GESTATIONAL DIABETES: ICD-10-CM

## 2023-09-15 PROCEDURE — 59025 FETAL NON-STRESS TEST: CPT

## 2023-09-22 ENCOUNTER — HOSPITAL ENCOUNTER (OUTPATIENT)
Facility: HOSPITAL | Age: 28
Discharge: HOME | End: 2023-09-22
Attending: SPECIALIST | Admitting: SPECIALIST
Payer: OTHER GOVERNMENT

## 2023-09-22 DIAGNOSIS — O24.419 GESTATIONAL DIABETES: ICD-10-CM

## 2023-09-22 PROCEDURE — 59025 FETAL NON-STRESS TEST: CPT

## 2023-09-26 ENCOUNTER — HOSPITAL ENCOUNTER (OUTPATIENT)
Facility: HOSPITAL | Age: 28
Discharge: HOME | End: 2023-09-26
Attending: SPECIALIST | Admitting: SPECIALIST
Payer: OTHER GOVERNMENT

## 2023-09-26 VITALS — DIASTOLIC BLOOD PRESSURE: 58 MMHG | SYSTOLIC BLOOD PRESSURE: 107 MMHG | HEART RATE: 72 BPM | RESPIRATION RATE: 17 BRPM

## 2023-09-26 DIAGNOSIS — O24.419 GESTATIONAL DIABETES: ICD-10-CM

## 2023-09-26 PROCEDURE — 59025 FETAL NON-STRESS TEST: CPT

## 2023-10-04 ENCOUNTER — HOSPITAL ENCOUNTER (OUTPATIENT)
Facility: HOSPITAL | Age: 28
Discharge: HOME | End: 2023-10-04
Attending: SPECIALIST | Admitting: SPECIALIST
Payer: OTHER GOVERNMENT

## 2023-10-04 VITALS — RESPIRATION RATE: 16 BRPM | SYSTOLIC BLOOD PRESSURE: 114 MMHG | DIASTOLIC BLOOD PRESSURE: 64 MMHG | HEART RATE: 63 BPM

## 2023-10-04 DIAGNOSIS — O24.419 GESTATIONAL DIABETES: ICD-10-CM

## 2023-10-04 PROCEDURE — 59025 FETAL NON-STRESS TEST: CPT

## 2023-10-04 NOTE — DISCHARGE INSTRUCTIONS
Keep your scheduled appointment with your provider.    Call your Doctor if you have any of the following:  Temperature above 100 degrees  Nausea, vomiting and/or diarrhea  Severe headache, dizziness, or blurred vision  Notable increase in swelling of hands or feet  Notable swelling of face and lips  Difficulty, pain or burning with urination  Foul smelling vaginal discharge  Decreased fetal movement    Come to the hospital if you have any of the following symptoms:  Your water breaks  More than 4-6 contractions in 1 hour for 2 or more hours  Vaginal bleeding like a period    After 28 weeks, you should feel 10 distinct fetal movements within a 2 hour period.    It is recommended that you drink 1/2 a gallon of water each day.  Tea, Soda and Juice are  in addition to this..

## 2023-10-11 ENCOUNTER — HOSPITAL ENCOUNTER (OUTPATIENT)
Facility: HOSPITAL | Age: 28
Discharge: HOME | End: 2023-10-11
Attending: SPECIALIST | Admitting: SPECIALIST
Payer: OTHER GOVERNMENT

## 2023-10-11 ENCOUNTER — LAB VISIT (OUTPATIENT)
Dept: LAB | Facility: OTHER | Age: 28
End: 2023-10-11
Attending: OBSTETRICS & GYNECOLOGY
Payer: OTHER GOVERNMENT

## 2023-10-11 ENCOUNTER — OFFICE VISIT (OUTPATIENT)
Dept: MATERNAL FETAL MEDICINE | Facility: CLINIC | Age: 28
End: 2023-10-11
Payer: OTHER GOVERNMENT

## 2023-10-11 ENCOUNTER — PROCEDURE VISIT (OUTPATIENT)
Dept: MATERNAL FETAL MEDICINE | Facility: CLINIC | Age: 28
End: 2023-10-11
Payer: OTHER GOVERNMENT

## 2023-10-11 VITALS — SYSTOLIC BLOOD PRESSURE: 119 MMHG | RESPIRATION RATE: 17 BRPM | HEART RATE: 58 BPM | DIASTOLIC BLOOD PRESSURE: 61 MMHG

## 2023-10-11 VITALS
WEIGHT: 134.25 LBS | SYSTOLIC BLOOD PRESSURE: 130 MMHG | DIASTOLIC BLOOD PRESSURE: 82 MMHG | BODY MASS INDEX: 27.06 KG/M2 | HEIGHT: 59 IN

## 2023-10-11 DIAGNOSIS — O09.93 HIGH-RISK PREGNANCY IN THIRD TRIMESTER: ICD-10-CM

## 2023-10-11 DIAGNOSIS — O99.891 CONGENITAL HEART DISEASE DURING PREGNANCY: ICD-10-CM

## 2023-10-11 DIAGNOSIS — Q24.9 CONGENITAL HEART DISEASE DURING PREGNANCY: ICD-10-CM

## 2023-10-11 DIAGNOSIS — Q24.9 CONGENITAL HEART DISEASE DURING PREGNANCY: Primary | ICD-10-CM

## 2023-10-11 DIAGNOSIS — D69.6 THROMBOCYTOPENIA: Primary | ICD-10-CM

## 2023-10-11 DIAGNOSIS — Z36.89 ENCOUNTER FOR ULTRASOUND TO ASSESS FETAL GROWTH: ICD-10-CM

## 2023-10-11 DIAGNOSIS — O24.419 GESTATIONAL DIABETES: ICD-10-CM

## 2023-10-11 DIAGNOSIS — O99.891 CONGENITAL HEART DISEASE DURING PREGNANCY: Primary | ICD-10-CM

## 2023-10-11 DIAGNOSIS — O99.413 MATERNAL CONGENITAL HEART DISEASE IN THIRD TRIMESTER, ANTEPARTUM: Primary | ICD-10-CM

## 2023-10-11 DIAGNOSIS — Q24.9 MATERNAL CONGENITAL HEART DISEASE IN THIRD TRIMESTER, ANTEPARTUM: Primary | ICD-10-CM

## 2023-10-11 LAB
ABO + RH BLD: NORMAL
BASOPHILS # BLD AUTO: 0.03 K/UL (ref 0–0.2)
BASOPHILS NFR BLD: 0.3 % (ref 0–1.9)
BLD GP AB SCN CELLS X3 SERPL QL: NORMAL
DIFFERENTIAL METHOD: ABNORMAL
EOSINOPHIL # BLD AUTO: 0.2 K/UL (ref 0–0.5)
EOSINOPHIL NFR BLD: 1.8 % (ref 0–8)
ERYTHROCYTE [DISTWIDTH] IN BLOOD BY AUTOMATED COUNT: 17.7 % (ref 11.5–14.5)
HCT VFR BLD AUTO: 39.9 % (ref 37–48.5)
HGB BLD-MCNC: 12.6 G/DL (ref 12–16)
HIV 1+2 AB+HIV1 P24 AG SERPL QL IA: NORMAL
IMM GRANULOCYTES # BLD AUTO: 0.08 K/UL (ref 0–0.04)
IMM GRANULOCYTES NFR BLD AUTO: 0.7 % (ref 0–0.5)
LYMPHOCYTES # BLD AUTO: 1.8 K/UL (ref 1–4.8)
LYMPHOCYTES NFR BLD: 16.1 % (ref 18–48)
MCH RBC QN AUTO: 24.3 PG (ref 27–31)
MCHC RBC AUTO-ENTMCNC: 31.6 G/DL (ref 32–36)
MCV RBC AUTO: 77 FL (ref 82–98)
MONOCYTES # BLD AUTO: 0.6 K/UL (ref 0.3–1)
MONOCYTES NFR BLD: 5.4 % (ref 4–15)
NEUTROPHILS # BLD AUTO: 8.6 K/UL (ref 1.8–7.7)
NEUTROPHILS NFR BLD: 75.7 % (ref 38–73)
NRBC BLD-RTO: 0 /100 WBC
PLATELET # BLD AUTO: 129 K/UL (ref 150–450)
PMV BLD AUTO: ABNORMAL FL (ref 9.2–12.9)
RBC # BLD AUTO: 5.19 M/UL (ref 4–5.4)
RPR SER QL: NORMAL
SPECIMEN OUTDATE: NORMAL
WBC # BLD AUTO: 11.32 K/UL (ref 3.9–12.7)

## 2023-10-11 PROCEDURE — 87081 CULTURE SCREEN ONLY: CPT | Performed by: OBSTETRICS & GYNECOLOGY

## 2023-10-11 PROCEDURE — 99213 OFFICE O/P EST LOW 20 MIN: CPT | Mod: PBBFAC,25 | Performed by: OBSTETRICS & GYNECOLOGY

## 2023-10-11 PROCEDURE — 87389 HIV-1 AG W/HIV-1&-2 AB AG IA: CPT | Performed by: OBSTETRICS & GYNECOLOGY

## 2023-10-11 PROCEDURE — 76816 US MFM PROCEDURE (VIEWPOINT): ICD-10-PCS | Mod: 26,S$PBB,, | Performed by: OBSTETRICS & GYNECOLOGY

## 2023-10-11 PROCEDURE — 85025 COMPLETE CBC W/AUTO DIFF WBC: CPT | Performed by: OBSTETRICS & GYNECOLOGY

## 2023-10-11 PROCEDURE — 86900 BLOOD TYPING SEROLOGIC ABO: CPT | Performed by: OBSTETRICS & GYNECOLOGY

## 2023-10-11 PROCEDURE — 99999 PR PBB SHADOW E&M-EST. PATIENT-LVL III: CPT | Mod: PBBFAC,,, | Performed by: OBSTETRICS & GYNECOLOGY

## 2023-10-11 PROCEDURE — 99999 PR PBB SHADOW E&M-EST. PATIENT-LVL III: ICD-10-PCS | Mod: PBBFAC,,, | Performed by: OBSTETRICS & GYNECOLOGY

## 2023-10-11 PROCEDURE — 99214 OFFICE O/P EST MOD 30 MIN: CPT | Mod: S$PBB,,, | Performed by: OBSTETRICS & GYNECOLOGY

## 2023-10-11 PROCEDURE — 86592 SYPHILIS TEST NON-TREP QUAL: CPT | Performed by: OBSTETRICS & GYNECOLOGY

## 2023-10-11 PROCEDURE — 76816 OB US FOLLOW-UP PER FETUS: CPT | Mod: 26,S$PBB,, | Performed by: OBSTETRICS & GYNECOLOGY

## 2023-10-11 PROCEDURE — 76819 US MFM PROCEDURE (VIEWPOINT): ICD-10-PCS | Mod: 26,S$PBB,, | Performed by: OBSTETRICS & GYNECOLOGY

## 2023-10-11 PROCEDURE — 36415 COLL VENOUS BLD VENIPUNCTURE: CPT | Performed by: OBSTETRICS & GYNECOLOGY

## 2023-10-11 PROCEDURE — 59025 FETAL NON-STRESS TEST: CPT

## 2023-10-11 PROCEDURE — 99214 PR OFFICE/OUTPT VISIT, EST, LEVL IV, 30-39 MIN: ICD-10-PCS | Mod: S$PBB,,, | Performed by: OBSTETRICS & GYNECOLOGY

## 2023-10-11 PROCEDURE — 76819 FETAL BIOPHYS PROFIL W/O NST: CPT | Mod: PBBFAC | Performed by: OBSTETRICS & GYNECOLOGY

## 2023-10-11 NOTE — PROGRESS NOTES
"Maternal Fetal Medicine follow up consult    SUBJECTIVE:     Theresa Grey is a 28 y.o.  female with IUP at 36w4d who is seen in follow up consultation by MFM.  Pregnancy complications include:   Problem   High-Risk Pregnancy in Third Trimester   Congenital Heart Disease During Pregnancy   Thrombocytopenia       Previous notes reviewed.   No changes to medical, surgical, family, social, or obstetric history.    Interval history since last MFM visit: No complaints.     Medications:  Current Outpatient Medications   Medication Instructions    aspirin (ECOTRIN) 81 mg, Oral, Daily    enoxaparin (LOVENOX) 40 mg, Subcutaneous, Daily    famotidine (PEPCID) 20 mg, Oral, 2 times daily    prenatal vit no.124/iron/folic (PRENATAL VITAMIN ORAL) 1 tablet, Oral, Daily    sertraline (ZOLOFT) 50 MG tablet Take 1/2 tablet per day for a week, then increase to 1 tablet daily thereafter.       Care team members:  Dr. Turpin - Primary OB  Dr. Weems - Cardiology     OBJECTIVE:   /82 (BP Location: Left arm, Patient Position: Sitting, BP Method: Medium (Automatic))   Ht 4' 11.02" (1.499 m)   Wt 60.9 kg (134 lb 4.2 oz)   BMI 27.10 kg/m²     Physical Exam    Ultrasound performed. See viewpoint for full ultrasound report.    Significant labs/imaging:      ASSESSMENT/PLAN:     28 y.o.  female with IUP at 36w4d    Congenital heart disease during pregnancy  Primary  Lesion: Complex cyanotic congenital heart disease with dextrocardia, DORV, large VSD, significant pulmonary valve stenosis - now s/p 22 mm extracardiac Fontan with oversewing of the pulmonary valve and division of the MPA on 16 at [facility]   NYHA Class: 1  [facility] Risk Class: 3  Last EKG ():   Known dextrocardia   Normal sinus rhythm   Prominent Q waves in I and aVL   T wave inversion in lateral leads   Incomplete right bundle branch block   Since previous tracing - no significant change   Last TTE ():    Dextrocardia.  Laminar flow " noted in normal size inferior vena cava connecting to extracardiac conduit to Fontan anastomosis, left-sided SVC connecting to left pulmonary artery to Pavel anastomisis, proximal LPA, pulmonary confluence and proximal RPA.  Qualitative impression of normal size atria.  Straddling left AV valve demonstrated with trivial AV valve insufficiency.  Right-sided AV valve with no evidence of stenosis and mild  insufficiency.  Large inlet VSD with unrestricted flow from LV to RV.  The ventricles are jason-cross in relationship as demonstrated by color Doppler with limited details of the ventricular structures demonstrated by 2D imaging.  Qualitatively good biventricular systolic function.  Centrally positioned pulmonary valve with small annulus and no obvious flow.  Leftward and anteriorly positioned aortic valve with no evidence of subaortic or aortic valve stenosis or insufficiency.  Large right aortic arch.  There is no pericardial effusion.        Cardiologist: Dr. Weems   Last Cards visit: 9/6/23  Delivery:  - Timing: 10/23  - Mode: IOL  - Location: [facility]  Bacterial endocarditis prophylaxis: Indicated    Cardiac precautions. S/p anesthesia consult.  Continue lovenox 40 mg daily, aspirin 81 mg daily. Hold lovenox 12-24 hours prior to planned delivery.      High-risk pregnancy in third trimester  GBS swab today (Chaperone present).  Cervix closed, posterior.  Third trimester labs today.    Thrombocytopenia  Platelets stable on today's CBC (129). Check on admit to L&D.    F/u as scheduled for delivery.

## 2023-10-11 NOTE — ASSESSMENT & PLAN NOTE
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  Laminar flow noted in normal size inferior vena cava connecting to extracardiac conduit to Fontan anastomosis, left-sided SVC connecting to left pulmonary artery to Pavel anastomisis, proximal LPA, pulmonary confluence and proximal RPA.  Qualitative impression of normal size atria.  Straddling left AV valve demonstrated with trivial AV valve insufficiency.  Right-sided AV valve with no evidence of stenosis and mild  insufficiency.  Large inlet VSD with unrestricted flow from LV to RV.  The ventricles are jason-cross in relationship as demonstrated by color Doppler with limited details of the ventricular structures demonstrated by 2D imaging.  Qualitatively good biventricular systolic function.  Centrally positioned pulmonary valve with small annulus and no obvious flow.  Leftward and anteriorly positioned aortic valve with no evidence of subaortic or aortic valve stenosis or insufficiency.  Large right aortic arch.  There is no pericardial effusion.        Cardiologist: Dr. Weems   Last Cards visit: 9/6/23  Delivery:  - Timing: 10/23  - Mode: IOL  - Location: [facility]  Bacterial endocarditis prophylaxis: Indicated    Cardiac precautions. S/p anesthesia consult.  Continue lovenox 40 mg daily, aspirin 81 mg daily. Hold lovenox 12-24 hours prior to planned delivery.  Continue PNT with primary OB.  Endocarditis prophylaxis is indicated.  Telemetry intrapartum and for  24-48 hours postpartum.

## 2023-10-13 LAB — BACTERIA SPEC AEROBE CULT: NORMAL

## 2023-10-17 ENCOUNTER — HOSPITAL ENCOUNTER (OUTPATIENT)
Facility: HOSPITAL | Age: 28
Discharge: HOME | End: 2023-10-17
Attending: SPECIALIST | Admitting: SPECIALIST
Payer: OTHER GOVERNMENT

## 2023-10-17 ENCOUNTER — HOSPITAL ENCOUNTER (INPATIENT)
Facility: OTHER | Age: 28
LOS: 4 days | Discharge: HOME | End: 2023-10-21
Attending: OBSTETRICS & GYNECOLOGY | Admitting: OBSTETRICS & GYNECOLOGY
Payer: OTHER GOVERNMENT

## 2023-10-17 ENCOUNTER — ANESTHESIA EVENT (OUTPATIENT)
Dept: OBSTETRICS AND GYNECOLOGY | Facility: OTHER | Age: 28
End: 2023-10-17
Payer: OTHER GOVERNMENT

## 2023-10-17 VITALS — HEART RATE: 59 BPM | SYSTOLIC BLOOD PRESSURE: 145 MMHG | RESPIRATION RATE: 16 BRPM | DIASTOLIC BLOOD PRESSURE: 80 MMHG

## 2023-10-17 DIAGNOSIS — Z98.890 S/P FONTAN PROCEDURE: ICD-10-CM

## 2023-10-17 DIAGNOSIS — Z3A.37 37 WEEKS GESTATION OF PREGNANCY: ICD-10-CM

## 2023-10-17 DIAGNOSIS — O42.92 FULL-TERM PREMATURE RUPTURE OF MEMBRANES: ICD-10-CM

## 2023-10-17 DIAGNOSIS — U07.1 COVID-19: ICD-10-CM

## 2023-10-17 DIAGNOSIS — D69.6 THROMBOCYTOPENIA: ICD-10-CM

## 2023-10-17 DIAGNOSIS — Q24.9 CONGENITAL HEART DISEASE DURING PREGNANCY: ICD-10-CM

## 2023-10-17 DIAGNOSIS — I44.30 AV BLOCK: ICD-10-CM

## 2023-10-17 DIAGNOSIS — O36.8390 NON-REASSURING FETAL HEART RATE OR RHYTHM AFFECTING MANAGEMENT OF MOTHER: ICD-10-CM

## 2023-10-17 DIAGNOSIS — O42.92 FULL-TERM PREMATURE RUPTURE OF MEMBRANES, UNSPECIFIED DURATION TO ONSET OF LABOR: ICD-10-CM

## 2023-10-17 DIAGNOSIS — Z98.891 STATUS POST PRIMARY LOW TRANSVERSE CESAREAN SECTION: Primary | ICD-10-CM

## 2023-10-17 DIAGNOSIS — O99.891 CONGENITAL HEART DISEASE DURING PREGNANCY: ICD-10-CM

## 2023-10-17 DIAGNOSIS — O42.90 AMNIOTIC FLUID LEAKING: ICD-10-CM

## 2023-10-17 DIAGNOSIS — O24.410 GESTATIONAL DIABETES MELLITUS, CLASS A1: ICD-10-CM

## 2023-10-17 PROBLEM — F41.9 ANXIETY: Status: ACTIVE | Noted: 2023-10-17

## 2023-10-17 PROBLEM — Z37.9 NORMAL LABOR: Status: RESOLVED | Noted: 2023-10-17 | Resolved: 2023-10-17

## 2023-10-17 PROBLEM — O24.419 GESTATIONAL DIABETES MELLITUS (GDM) IN THIRD TRIMESTER: Status: ACTIVE | Noted: 2023-10-17

## 2023-10-17 PROBLEM — O13.3 GESTATIONAL HYPERTENSION, THIRD TRIMESTER: Status: ACTIVE | Noted: 2023-10-17

## 2023-10-17 PROBLEM — Z37.9 NORMAL LABOR: Status: ACTIVE | Noted: 2023-10-17

## 2023-10-17 LAB
ABO + RH BLD: NORMAL
ALBUMIN SERPL BCP-MCNC: 2.9 G/DL (ref 3.5–5.2)
ALP SERPL-CCNC: 136 U/L (ref 55–135)
ALT SERPL W/O P-5'-P-CCNC: 18 U/L (ref 10–44)
ANION GAP SERPL CALC-SCNC: 11 MMOL/L (ref 8–16)
AST SERPL-CCNC: 23 U/L (ref 10–40)
BASOPHILS # BLD AUTO: 0.03 K/UL (ref 0–0.2)
BASOPHILS NFR BLD: 0.3 % (ref 0–1.9)
BILIRUB SERPL-MCNC: 0.3 MG/DL (ref 0.1–1)
BLD GP AB SCN CELLS X3 SERPL QL: NORMAL
BUN SERPL-MCNC: 9 MG/DL (ref 6–20)
CALCIUM SERPL-MCNC: 9.8 MG/DL (ref 8.7–10.5)
CHLORIDE SERPL-SCNC: 105 MMOL/L (ref 95–110)
CO2 SERPL-SCNC: 19 MMOL/L (ref 23–29)
CREAT SERPL-MCNC: 0.6 MG/DL (ref 0.5–1.4)
DIFFERENTIAL METHOD: ABNORMAL
EOSINOPHIL # BLD AUTO: 0.2 K/UL (ref 0–0.5)
EOSINOPHIL NFR BLD: 2 % (ref 0–8)
ERYTHROCYTE [DISTWIDTH] IN BLOOD BY AUTOMATED COUNT: 17.8 % (ref 11.5–14.5)
EST. GFR  (NO RACE VARIABLE): >60 ML/MIN/1.73 M^2
FERN TEST: NEGATIVE
GLUCOSE SERPL-MCNC: 75 MG/DL (ref 70–110)
HCT VFR BLD AUTO: 39 % (ref 37–48.5)
HGB BLD-MCNC: 12.4 G/DL (ref 12–16)
IMM GRANULOCYTES # BLD AUTO: 0.06 K/UL (ref 0–0.04)
IMM GRANULOCYTES NFR BLD AUTO: 0.5 % (ref 0–0.5)
LYMPHOCYTES # BLD AUTO: 2.2 K/UL (ref 1–4.8)
LYMPHOCYTES NFR BLD: 19.5 % (ref 18–48)
MCH RBC QN AUTO: 24.1 PG (ref 27–31)
MCHC RBC AUTO-ENTMCNC: 31.8 G/DL (ref 32–36)
MCV RBC AUTO: 76 FL (ref 82–98)
MONOCYTES # BLD AUTO: 0.6 K/UL (ref 0.3–1)
MONOCYTES NFR BLD: 5.3 % (ref 4–15)
NEUTROPHILS # BLD AUTO: 8.2 K/UL (ref 1.8–7.7)
NEUTROPHILS NFR BLD: 72.4 % (ref 38–73)
NRBC BLD-RTO: 0 /100 WBC
PH, BODY FLUID: POSITIVE
PLATELET # BLD AUTO: 133 K/UL (ref 150–450)
PMV BLD AUTO: ABNORMAL FL (ref 9.2–12.9)
POTASSIUM SERPL-SCNC: 4.1 MMOL/L (ref 3.5–5.1)
PROT SERPL-MCNC: 7.7 G/DL (ref 6–8.4)
RBC # BLD AUTO: 5.15 M/UL (ref 4–5.4)
SODIUM SERPL-SCNC: 135 MMOL/L (ref 136–145)
SPECIMEN OUTDATE: NORMAL
WBC # BLD AUTO: 11.34 K/UL (ref 3.9–12.7)

## 2023-10-17 PROCEDURE — 63600175 PHARM REV CODE 636 W HCPCS

## 2023-10-17 PROCEDURE — 86762 RUBELLA ANTIBODY: CPT | Performed by: OBSTETRICS & GYNECOLOGY

## 2023-10-17 PROCEDURE — 87340 HEPATITIS B SURFACE AG IA: CPT | Performed by: OBSTETRICS & GYNECOLOGY

## 2023-10-17 PROCEDURE — 59025 PR FETAL 2N-STRESS TEST: ICD-10-PCS | Mod: 26,,, | Performed by: OBSTETRICS & GYNECOLOGY

## 2023-10-17 PROCEDURE — 59025 FETAL NON-STRESS TEST: CPT

## 2023-10-17 PROCEDURE — 25000003 PHARM REV CODE 250

## 2023-10-17 PROCEDURE — 99285 EMERGENCY DEPT VISIT HI MDM: CPT | Mod: 25,,, | Performed by: OBSTETRICS & GYNECOLOGY

## 2023-10-17 PROCEDURE — 27200710 HC EPIDURAL INFUSION PUMP SET: Performed by: ANESTHESIOLOGY

## 2023-10-17 PROCEDURE — 62326 NJX INTERLAMINAR LMBR/SAC: CPT

## 2023-10-17 PROCEDURE — 86901 BLOOD TYPING SEROLOGIC RH(D): CPT | Performed by: OBSTETRICS & GYNECOLOGY

## 2023-10-17 PROCEDURE — 85025 COMPLETE CBC W/AUTO DIFF WBC: CPT | Performed by: OBSTETRICS & GYNECOLOGY

## 2023-10-17 PROCEDURE — 11000001 HC ACUTE MED/SURG PRIVATE ROOM

## 2023-10-17 PROCEDURE — 59514 PRA REAN DELIVERY ONLY: ICD-10-PCS | Mod: AA,,, | Performed by: ANESTHESIOLOGY

## 2023-10-17 PROCEDURE — 99285 PR EMERGENCY DEPT VISIT,LEVEL V: ICD-10-PCS | Mod: 25,,, | Performed by: OBSTETRICS & GYNECOLOGY

## 2023-10-17 PROCEDURE — 01968 ANES/ANALG CS DLVR NEURAXIAL: CPT | Mod: AA,,, | Performed by: ANESTHESIOLOGY

## 2023-10-17 PROCEDURE — 59514 CESAREAN DELIVERY ONLY: CPT | Mod: AA,,, | Performed by: ANESTHESIOLOGY

## 2023-10-17 PROCEDURE — 80053 COMPREHEN METABOLIC PANEL: CPT | Performed by: OBSTETRICS & GYNECOLOGY

## 2023-10-17 PROCEDURE — 59025 FETAL NON-STRESS TEST: CPT | Mod: 26,,, | Performed by: OBSTETRICS & GYNECOLOGY

## 2023-10-17 PROCEDURE — 99285 EMERGENCY DEPT VISIT HI MDM: CPT | Mod: 25

## 2023-10-17 PROCEDURE — 99211 OFF/OP EST MAY X REQ PHY/QHP: CPT

## 2023-10-17 PROCEDURE — C1751 CATH, INF, PER/CENT/MIDLINE: HCPCS | Performed by: ANESTHESIOLOGY

## 2023-10-17 PROCEDURE — 01968 PR INSERT CATH,ART,PERCUT,SHORTTERM: ICD-10-PCS | Mod: AA,,, | Performed by: ANESTHESIOLOGY

## 2023-10-17 RX ORDER — METHYLERGONOVINE MALEATE 0.2 MG/ML
200 INJECTION INTRAVENOUS ONCE AS NEEDED
Status: DISCONTINUED | OUTPATIENT
Start: 2023-10-17 | End: 2023-10-21 | Stop reason: HOSPADM

## 2023-10-17 RX ORDER — OXYTOCIN/RINGER'S LACTATE 30/500 ML
95 PLASTIC BAG, INJECTION (ML) INTRAVENOUS ONCE
Status: DISCONTINUED | OUTPATIENT
Start: 2023-10-17 | End: 2023-10-21 | Stop reason: HOSPADM

## 2023-10-17 RX ORDER — OXYTOCIN 10 [USP'U]/ML
10 INJECTION, SOLUTION INTRAMUSCULAR; INTRAVENOUS ONCE AS NEEDED
Status: DISCONTINUED | OUTPATIENT
Start: 2023-10-17 | End: 2023-10-21 | Stop reason: HOSPADM

## 2023-10-17 RX ORDER — CALCIUM CARBONATE 200(500)MG
500 TABLET,CHEWABLE ORAL 3 TIMES DAILY PRN
Status: DISCONTINUED | OUTPATIENT
Start: 2023-10-17 | End: 2023-10-21 | Stop reason: HOSPADM

## 2023-10-17 RX ORDER — DIPHENOXYLATE HYDROCHLORIDE AND ATROPINE SULFATE 2.5; .025 MG/1; MG/1
2 TABLET ORAL EVERY 6 HOURS PRN
Status: DISCONTINUED | OUTPATIENT
Start: 2023-10-17 | End: 2023-10-21 | Stop reason: HOSPADM

## 2023-10-17 RX ORDER — FENTANYL/BUPIVACAINE/NS/PF 2MCG/ML-.1
PLASTIC BAG, INJECTION (ML) INJECTION CONTINUOUS
Status: DISCONTINUED | OUTPATIENT
Start: 2023-10-17 | End: 2023-10-19

## 2023-10-17 RX ORDER — OXYTOCIN/RINGER'S LACTATE 30/500 ML
95 PLASTIC BAG, INJECTION (ML) INTRAVENOUS ONCE AS NEEDED
Status: DISCONTINUED | OUTPATIENT
Start: 2023-10-17 | End: 2023-10-21 | Stop reason: HOSPADM

## 2023-10-17 RX ORDER — CARBOPROST TROMETHAMINE 250 UG/ML
250 INJECTION, SOLUTION INTRAMUSCULAR
Status: DISCONTINUED | OUTPATIENT
Start: 2023-10-17 | End: 2023-10-21 | Stop reason: HOSPADM

## 2023-10-17 RX ORDER — NIFEDIPINE 10 MG/1
10 CAPSULE ORAL ONCE
Status: COMPLETED | OUTPATIENT
Start: 2023-10-17 | End: 2023-10-17

## 2023-10-17 RX ORDER — EPINEPHRINE 0.3 MG/.3ML
0.3 INJECTION SUBCUTANEOUS
Status: DISCONTINUED | OUTPATIENT
Start: 2023-10-17 | End: 2023-10-21 | Stop reason: HOSPADM

## 2023-10-17 RX ORDER — OXYTOCIN/RINGER'S LACTATE 30/500 ML
334 PLASTIC BAG, INJECTION (ML) INTRAVENOUS ONCE
Status: DISCONTINUED | OUTPATIENT
Start: 2023-10-17 | End: 2023-10-21 | Stop reason: HOSPADM

## 2023-10-17 RX ORDER — SODIUM CHLORIDE, SODIUM LACTATE, POTASSIUM CHLORIDE, CALCIUM CHLORIDE 600; 310; 30; 20 MG/100ML; MG/100ML; MG/100ML; MG/100ML
INJECTION, SOLUTION INTRAVENOUS CONTINUOUS
Status: DISCONTINUED | OUTPATIENT
Start: 2023-10-17 | End: 2023-10-19

## 2023-10-17 RX ORDER — FAMOTIDINE 10 MG/ML
20 INJECTION INTRAVENOUS ONCE
Status: COMPLETED | OUTPATIENT
Start: 2023-10-17 | End: 2023-10-18

## 2023-10-17 RX ORDER — ALBUTEROL SULFATE 90 UG/1
2 AEROSOL, METERED RESPIRATORY (INHALATION)
Status: DISCONTINUED | OUTPATIENT
Start: 2023-10-17 | End: 2023-10-21 | Stop reason: HOSPADM

## 2023-10-17 RX ORDER — FENTANYL/BUPIVACAINE/NS/PF 2MCG/ML-.1
PLASTIC BAG, INJECTION (ML) INJECTION
Status: DISPENSED
Start: 2023-10-17 | End: 2023-10-18

## 2023-10-17 RX ORDER — MISOPROSTOL 200 UG/1
800 TABLET ORAL ONCE AS NEEDED
Status: DISCONTINUED | OUTPATIENT
Start: 2023-10-17 | End: 2023-10-21 | Stop reason: HOSPADM

## 2023-10-17 RX ORDER — NIFEDIPINE 10 MG/1
CAPSULE ORAL
Status: DISPENSED
Start: 2023-10-17 | End: 2023-10-18

## 2023-10-17 RX ORDER — SODIUM CHLORIDE 9 MG/ML
INJECTION, SOLUTION INTRAVENOUS
Status: DISCONTINUED | OUTPATIENT
Start: 2023-10-17 | End: 2023-10-19

## 2023-10-17 RX ORDER — PROCHLORPERAZINE EDISYLATE 5 MG/ML
5 INJECTION INTRAMUSCULAR; INTRAVENOUS EVERY 6 HOURS PRN
Status: DISCONTINUED | OUTPATIENT
Start: 2023-10-17 | End: 2023-10-18 | Stop reason: SDUPTHER

## 2023-10-17 RX ORDER — SIMETHICONE 80 MG
1 TABLET,CHEWABLE ORAL 4 TIMES DAILY PRN
Status: DISCONTINUED | OUTPATIENT
Start: 2023-10-17 | End: 2023-10-21 | Stop reason: HOSPADM

## 2023-10-17 RX ORDER — SERTRALINE HYDROCHLORIDE 50 MG/1
50 TABLET, FILM COATED ORAL DAILY
Status: DISCONTINUED | OUTPATIENT
Start: 2023-10-18 | End: 2023-10-21 | Stop reason: HOSPADM

## 2023-10-17 RX ORDER — TRANEXAMIC ACID 10 MG/ML
1000 INJECTION, SOLUTION INTRAVENOUS EVERY 30 MIN PRN
Status: DISCONTINUED | OUTPATIENT
Start: 2023-10-17 | End: 2023-10-21 | Stop reason: HOSPADM

## 2023-10-17 RX ORDER — ONDANSETRON 8 MG/1
8 TABLET, ORALLY DISINTEGRATING ORAL EVERY 8 HOURS PRN
Status: DISCONTINUED | OUTPATIENT
Start: 2023-10-17 | End: 2023-10-18 | Stop reason: SDUPTHER

## 2023-10-17 RX ORDER — OXYTOCIN/RINGER'S LACTATE 30/500 ML
334 PLASTIC BAG, INJECTION (ML) INTRAVENOUS ONCE AS NEEDED
Status: DISCONTINUED | OUTPATIENT
Start: 2023-10-17 | End: 2023-10-21 | Stop reason: HOSPADM

## 2023-10-17 RX ORDER — LIDOCAINE HYDROCHLORIDE AND EPINEPHRINE 15; 5 MG/ML; UG/ML
INJECTION, SOLUTION EPIDURAL
Status: DISCONTINUED | OUTPATIENT
Start: 2023-10-17 | End: 2023-10-18

## 2023-10-17 RX ORDER — ACETAMINOPHEN 325 MG/1
650 TABLET ORAL EVERY 6 HOURS PRN
Status: DISCONTINUED | OUTPATIENT
Start: 2023-10-17 | End: 2023-10-21 | Stop reason: HOSPADM

## 2023-10-17 RX ORDER — MAGNESIUM SULFATE HEPTAHYDRATE 40 MG/ML
INJECTION, SOLUTION INTRAVENOUS
Status: DISCONTINUED
Start: 2023-10-17 | End: 2023-10-18 | Stop reason: WASHOUT

## 2023-10-17 RX ORDER — LIDOCAINE HYDROCHLORIDE 10 MG/ML
10 INJECTION INFILTRATION; PERINEURAL ONCE AS NEEDED
Status: DISCONTINUED | OUTPATIENT
Start: 2023-10-17 | End: 2023-10-21 | Stop reason: HOSPADM

## 2023-10-17 RX ORDER — SODIUM CITRATE AND CITRIC ACID MONOHYDRATE 334; 500 MG/5ML; MG/5ML
30 SOLUTION ORAL ONCE
Status: COMPLETED | OUTPATIENT
Start: 2023-10-17 | End: 2023-10-18

## 2023-10-17 RX ADMIN — BUPIVACAINE HYDROCHLORIDE 8 ML/HR: 2.5 INJECTION, SOLUTION EPIDURAL; INFILTRATION; INTRACAUDAL; PERINEURAL at 10:10

## 2023-10-17 RX ADMIN — NIFEDIPINE 10 MG: 10 CAPSULE ORAL at 11:10

## 2023-10-17 RX ADMIN — LIDOCAINE HYDROCHLORIDE,EPINEPHRINE BITARTRATE 3 ML: 15; .005 INJECTION, SOLUTION EPIDURAL; INFILTRATION; INTRACAUDAL; PERINEURAL at 10:10

## 2023-10-17 RX ADMIN — AMPICILLIN 2 G: 2 INJECTION, POWDER, FOR SOLUTION INTRAMUSCULAR; INTRAVENOUS at 10:10

## 2023-10-18 PROBLEM — O14.93 PRE-ECLAMPSIA IN THIRD TRIMESTER: Status: ACTIVE | Noted: 2023-10-18

## 2023-10-18 LAB
ANISOCYTOSIS BLD QL SMEAR: SLIGHT
APTT PPP: 25.6 SEC (ref 21–32)
BASOPHILS # BLD AUTO: ABNORMAL K/UL (ref 0–0.2)
BASOPHILS NFR BLD: 0 % (ref 0–1.9)
CREAT UR-MCNC: 28.5 MG/DL (ref 15–325)
DIFFERENTIAL METHOD: ABNORMAL
EOSINOPHIL # BLD AUTO: ABNORMAL K/UL (ref 0–0.5)
EOSINOPHIL NFR BLD: 0 % (ref 0–8)
ERYTHROCYTE [DISTWIDTH] IN BLOOD BY AUTOMATED COUNT: 18.2 % (ref 11.5–14.5)
FIBRINOGEN PPP-MCNC: 420 MG/DL (ref 182–400)
GIANT PLATELETS BLD QL SMEAR: PRESENT
HBV SURFACE AG SERPL QL IA: NORMAL
HCT VFR BLD AUTO: 32.2 % (ref 37–48.5)
HGB BLD-MCNC: 10.2 G/DL (ref 12–16)
IMM GRANULOCYTES # BLD AUTO: ABNORMAL K/UL (ref 0–0.04)
IMM GRANULOCYTES NFR BLD AUTO: ABNORMAL % (ref 0–0.5)
INR PPP: 1 (ref 0.8–1.2)
LYMPHOCYTES # BLD AUTO: ABNORMAL K/UL (ref 1–4.8)
LYMPHOCYTES NFR BLD: 1 % (ref 18–48)
MCH RBC QN AUTO: 24.3 PG (ref 27–31)
MCHC RBC AUTO-ENTMCNC: 31.7 G/DL (ref 32–36)
MCV RBC AUTO: 77 FL (ref 82–98)
MONOCYTES # BLD AUTO: ABNORMAL K/UL (ref 0.3–1)
MONOCYTES NFR BLD: 0 % (ref 4–15)
NEUTROPHILS NFR BLD: 75 % (ref 38–73)
NEUTS BAND NFR BLD MANUAL: 24 %
NRBC BLD-RTO: 0 /100 WBC
PLATELET # BLD AUTO: 138 K/UL (ref 150–450)
PLATELET BLD QL SMEAR: ABNORMAL
PMV BLD AUTO: ABNORMAL FL (ref 9.2–12.9)
POCT GLUCOSE: 117 MG/DL (ref 70–110)
POCT GLUCOSE: 67 MG/DL (ref 70–110)
POCT GLUCOSE: 71 MG/DL (ref 70–110)
POCT GLUCOSE: 71 MG/DL (ref 70–110)
POCT GLUCOSE: 72 MG/DL (ref 70–110)
POCT GLUCOSE: 75 MG/DL (ref 70–110)
PROT UR-MCNC: 50 MG/DL (ref 0–15)
PROT/CREAT UR: 1.75 MG/G{CREAT} (ref 0–0.2)
PROTHROMBIN TIME: 11.2 SEC (ref 9–12.5)
RBC # BLD AUTO: 4.2 M/UL (ref 4–5.4)
WBC # BLD AUTO: 24.35 K/UL (ref 3.9–12.7)

## 2023-10-18 PROCEDURE — 25000003 PHARM REV CODE 250: Performed by: ANESTHESIOLOGY

## 2023-10-18 PROCEDURE — 37000008 HC ANESTHESIA 1ST 15 MINUTES: Performed by: OBSTETRICS & GYNECOLOGY

## 2023-10-18 PROCEDURE — 63600175 PHARM REV CODE 636 W HCPCS: Performed by: STUDENT IN AN ORGANIZED HEALTH CARE EDUCATION/TRAINING PROGRAM

## 2023-10-18 PROCEDURE — 86920 COMPATIBILITY TEST SPIN: CPT

## 2023-10-18 PROCEDURE — 63600175 PHARM REV CODE 636 W HCPCS

## 2023-10-18 PROCEDURE — 59510 CESAREAN DELIVERY: CPT | Mod: ,,, | Performed by: OBSTETRICS & GYNECOLOGY

## 2023-10-18 PROCEDURE — 37000009 HC ANESTHESIA EA ADD 15 MINS: Performed by: OBSTETRICS & GYNECOLOGY

## 2023-10-18 PROCEDURE — 36004725 HC OB OR TIME LEV III - EA ADD 15 MIN: Performed by: OBSTETRICS & GYNECOLOGY

## 2023-10-18 PROCEDURE — 85027 COMPLETE CBC AUTOMATED: CPT

## 2023-10-18 PROCEDURE — 25000003 PHARM REV CODE 250: Performed by: STUDENT IN AN ORGANIZED HEALTH CARE EDUCATION/TRAINING PROGRAM

## 2023-10-18 PROCEDURE — 11000001 HC ACUTE MED/SURG PRIVATE ROOM

## 2023-10-18 PROCEDURE — 51702 INSERT TEMP BLADDER CATH: CPT

## 2023-10-18 PROCEDURE — 85730 THROMBOPLASTIN TIME PARTIAL: CPT

## 2023-10-18 PROCEDURE — 85007 BL SMEAR W/DIFF WBC COUNT: CPT

## 2023-10-18 PROCEDURE — 25000003 PHARM REV CODE 250

## 2023-10-18 PROCEDURE — 59510 PR FULL ROUT OBSTE CARE,CESAREAN DELIV: ICD-10-PCS | Mod: ,,, | Performed by: OBSTETRICS & GYNECOLOGY

## 2023-10-18 PROCEDURE — 36004724 HC OB OR TIME LEV III - 1ST 15 MIN: Performed by: OBSTETRICS & GYNECOLOGY

## 2023-10-18 PROCEDURE — 85384 FIBRINOGEN ACTIVITY: CPT

## 2023-10-18 PROCEDURE — 71000033 HC RECOVERY, INTIAL HOUR: Performed by: OBSTETRICS & GYNECOLOGY

## 2023-10-18 PROCEDURE — 71000039 HC RECOVERY, EACH ADD'L HOUR: Performed by: OBSTETRICS & GYNECOLOGY

## 2023-10-18 PROCEDURE — 84156 ASSAY OF PROTEIN URINE: CPT

## 2023-10-18 PROCEDURE — 85610 PROTHROMBIN TIME: CPT

## 2023-10-18 RX ORDER — DEXAMETHASONE SODIUM PHOSPHATE 4 MG/ML
INJECTION, SOLUTION INTRA-ARTICULAR; INTRALESIONAL; INTRAMUSCULAR; INTRAVENOUS; SOFT TISSUE
Status: DISCONTINUED | OUTPATIENT
Start: 2023-10-18 | End: 2023-10-18

## 2023-10-18 RX ORDER — MIDAZOLAM HYDROCHLORIDE 1 MG/ML
INJECTION INTRAMUSCULAR; INTRAVENOUS
Status: DISCONTINUED | OUTPATIENT
Start: 2023-10-18 | End: 2023-10-18

## 2023-10-18 RX ORDER — SODIUM CHLORIDE, SODIUM LACTATE, POTASSIUM CHLORIDE, CALCIUM CHLORIDE 600; 310; 30; 20 MG/100ML; MG/100ML; MG/100ML; MG/100ML
INJECTION, SOLUTION INTRAVENOUS CONTINUOUS PRN
Status: DISCONTINUED | OUTPATIENT
Start: 2023-10-18 | End: 2023-10-18

## 2023-10-18 RX ORDER — OXYTOCIN 10 [USP'U]/ML
INJECTION, SOLUTION INTRAMUSCULAR; INTRAVENOUS
Status: DISCONTINUED | OUTPATIENT
Start: 2023-10-18 | End: 2023-10-18

## 2023-10-18 RX ORDER — FENTANYL/BUPIVACAINE/NS/PF 2MCG/ML-.1
PLASTIC BAG, INJECTION (ML) INJECTION
Status: COMPLETED
Start: 2023-10-18 | End: 2023-10-18

## 2023-10-18 RX ORDER — KETOROLAC TROMETHAMINE 30 MG/ML
INJECTION, SOLUTION INTRAMUSCULAR; INTRAVENOUS
Status: DISCONTINUED | OUTPATIENT
Start: 2023-10-18 | End: 2023-10-18

## 2023-10-18 RX ORDER — HYDROCODONE BITARTRATE AND ACETAMINOPHEN 500; 5 MG/1; MG/1
TABLET ORAL
Status: DISCONTINUED | OUTPATIENT
Start: 2023-10-18 | End: 2023-10-21 | Stop reason: HOSPADM

## 2023-10-18 RX ORDER — PROCHLORPERAZINE EDISYLATE 5 MG/ML
5 INJECTION INTRAMUSCULAR; INTRAVENOUS EVERY 6 HOURS PRN
Status: DISCONTINUED | OUTPATIENT
Start: 2023-10-18 | End: 2023-10-21 | Stop reason: HOSPADM

## 2023-10-18 RX ORDER — METOCLOPRAMIDE HYDROCHLORIDE 5 MG/ML
10 INJECTION INTRAMUSCULAR; INTRAVENOUS ONCE
Status: COMPLETED | OUTPATIENT
Start: 2023-10-18 | End: 2023-10-18

## 2023-10-18 RX ORDER — ONDANSETRON 2 MG/ML
4 INJECTION INTRAMUSCULAR; INTRAVENOUS EVERY 6 HOURS PRN
Status: ACTIVE | OUTPATIENT
Start: 2023-10-18 | End: 2023-10-19

## 2023-10-18 RX ORDER — ACETAMINOPHEN 325 MG/1
650 TABLET ORAL EVERY 6 HOURS
Status: COMPLETED | OUTPATIENT
Start: 2023-10-19 | End: 2023-10-20

## 2023-10-18 RX ORDER — LIDOCAINE HCL/EPINEPHRINE/PF 2%-1:200K
VIAL (ML) INJECTION
Status: DISCONTINUED | OUTPATIENT
Start: 2023-10-18 | End: 2023-10-18

## 2023-10-18 RX ORDER — OXYCODONE HYDROCHLORIDE 10 MG/1
10 TABLET ORAL EVERY 4 HOURS PRN
Status: DISPENSED | OUTPATIENT
Start: 2023-10-18 | End: 2023-10-19

## 2023-10-18 RX ORDER — OXYCODONE HYDROCHLORIDE 5 MG/1
5 TABLET ORAL EVERY 4 HOURS PRN
Status: DISPENSED | OUTPATIENT
Start: 2023-10-18 | End: 2023-10-19

## 2023-10-18 RX ORDER — ACETAMINOPHEN 500 MG
1000 TABLET ORAL ONCE
Status: COMPLETED | OUTPATIENT
Start: 2023-10-18 | End: 2023-10-18

## 2023-10-18 RX ORDER — CLINDAMYCIN PHOSPHATE 900 MG/50ML
900 INJECTION, SOLUTION INTRAVENOUS
Status: DISCONTINUED | OUTPATIENT
Start: 2023-10-19 | End: 2023-10-18

## 2023-10-18 RX ORDER — BUPIVACAINE HYDROCHLORIDE 2.5 MG/ML
INJECTION, SOLUTION EPIDURAL; INFILTRATION; INTRACAUDAL
Status: DISPENSED
Start: 2023-10-18 | End: 2023-10-19

## 2023-10-18 RX ORDER — DEXMEDETOMIDINE HYDROCHLORIDE 100 UG/ML
INJECTION, SOLUTION INTRAVENOUS
Status: DISCONTINUED | OUTPATIENT
Start: 2023-10-18 | End: 2023-10-18

## 2023-10-18 RX ORDER — ONDANSETRON HYDROCHLORIDE 2 MG/ML
INJECTION, SOLUTION INTRAMUSCULAR; INTRAVENOUS
Status: DISCONTINUED | OUTPATIENT
Start: 2023-10-18 | End: 2023-10-18

## 2023-10-18 RX ORDER — CLINDAMYCIN PHOSPHATE 900 MG/50ML
900 INJECTION, SOLUTION INTRAVENOUS
Status: DISCONTINUED | OUTPATIENT
Start: 2023-10-19 | End: 2023-10-19

## 2023-10-18 RX ORDER — ACETAMINOPHEN 500 MG
1000 TABLET ORAL EVERY 6 HOURS PRN
Status: DISCONTINUED | OUTPATIENT
Start: 2023-10-18 | End: 2023-10-18

## 2023-10-18 RX ORDER — FENTANYL CITRATE 50 UG/ML
INJECTION, SOLUTION INTRAMUSCULAR; INTRAVENOUS
Status: DISCONTINUED | OUTPATIENT
Start: 2023-10-18 | End: 2023-10-18

## 2023-10-18 RX ORDER — CLINDAMYCIN PHOSPHATE 900 MG/50ML
INJECTION, SOLUTION INTRAVENOUS
Status: DISCONTINUED | OUTPATIENT
Start: 2023-10-18 | End: 2023-10-18

## 2023-10-18 RX ORDER — KETOROLAC TROMETHAMINE 30 MG/ML
30 INJECTION, SOLUTION INTRAMUSCULAR; INTRAVENOUS EVERY 6 HOURS
Status: DISCONTINUED | OUTPATIENT
Start: 2023-10-19 | End: 2023-10-19

## 2023-10-18 RX ORDER — PHENYLEPHRINE HYDROCHLORIDE 10 MG/ML
INJECTION INTRAVENOUS
Status: DISCONTINUED | OUTPATIENT
Start: 2023-10-18 | End: 2023-10-18

## 2023-10-18 RX ORDER — DIPHENHYDRAMINE HYDROCHLORIDE 50 MG/ML
25 INJECTION INTRAMUSCULAR; INTRAVENOUS ONCE
Status: COMPLETED | OUTPATIENT
Start: 2023-10-18 | End: 2023-10-18

## 2023-10-18 RX ORDER — CLINDAMYCIN PHOSPHATE 900 MG/50ML
900 INJECTION, SOLUTION INTRAVENOUS
Status: DISCONTINUED | OUTPATIENT
Start: 2023-10-18 | End: 2023-10-18

## 2023-10-18 RX ORDER — ESMOLOL HYDROCHLORIDE 10 MG/ML
INJECTION INTRAVENOUS
Status: DISCONTINUED | OUTPATIENT
Start: 2023-10-18 | End: 2023-10-18

## 2023-10-18 RX ORDER — OXYTOCIN/RINGER'S LACTATE 30/500 ML
0-30 PLASTIC BAG, INJECTION (ML) INTRAVENOUS CONTINUOUS
Status: DISCONTINUED | OUTPATIENT
Start: 2023-10-18 | End: 2023-10-21 | Stop reason: HOSPADM

## 2023-10-18 RX ORDER — MORPHINE SULFATE 0.5 MG/ML
INJECTION, SOLUTION EPIDURAL; INTRATHECAL; INTRAVENOUS
Status: DISCONTINUED | OUTPATIENT
Start: 2023-10-18 | End: 2023-10-18

## 2023-10-18 RX ADMIN — Medication 8 ML/HR: at 04:10

## 2023-10-18 RX ADMIN — DEXAMETHASONE SODIUM PHOSPHATE 4 MG: 4 INJECTION, SOLUTION INTRA-ARTICULAR; INTRALESIONAL; INTRAMUSCULAR; INTRAVENOUS; SOFT TISSUE at 07:10

## 2023-10-18 RX ADMIN — ACETAMINOPHEN 1000 MG: 500 TABLET ORAL at 06:10

## 2023-10-18 RX ADMIN — ONDANSETRON 4 MG: 2 INJECTION INTRAMUSCULAR; INTRAVENOUS at 07:10

## 2023-10-18 RX ADMIN — SODIUM CHLORIDE, SODIUM LACTATE, POTASSIUM CHLORIDE, AND CALCIUM CHLORIDE: 600; 310; 30; 20 INJECTION, SOLUTION INTRAVENOUS at 07:10

## 2023-10-18 RX ADMIN — PHENYLEPHRINE HYDROCHLORIDE 200 MCG: 10 INJECTION INTRAVENOUS at 08:10

## 2023-10-18 RX ADMIN — OXYTOCIN 5 UNITS: 10 INJECTION, SOLUTION INTRAMUSCULAR; INTRAVENOUS at 08:10

## 2023-10-18 RX ADMIN — GENTAMICIN SULFATE 268 MG: 40 INJECTION, SOLUTION INTRAMUSCULAR; INTRAVENOUS at 08:10

## 2023-10-18 RX ADMIN — LIDOCAINE HYDROCHLORIDE,EPINEPHRINE BITARTRATE 5 ML: 20; .005 INJECTION, SOLUTION EPIDURAL; INFILTRATION; INTRACAUDAL; PERINEURAL at 07:10

## 2023-10-18 RX ADMIN — DEXMEDETOMIDINE 6 MCG: 200 INJECTION, SOLUTION INTRAVENOUS at 07:10

## 2023-10-18 RX ADMIN — Medication 1 MG: at 08:10

## 2023-10-18 RX ADMIN — AMPICILLIN 2 G: 2 INJECTION, POWDER, FOR SOLUTION INTRAMUSCULAR; INTRAVENOUS at 09:10

## 2023-10-18 RX ADMIN — AMPICILLIN 2 G: 2 INJECTION, POWDER, FOR SOLUTION INTRAMUSCULAR; INTRAVENOUS at 03:10

## 2023-10-18 RX ADMIN — ESMOLOL HYDROCHLORIDE 10 MG: 100 INJECTION, SOLUTION INTRAVENOUS at 07:10

## 2023-10-18 RX ADMIN — LIDOCAINE HYDROCHLORIDE,EPINEPHRINE BITARTRATE 10 ML: 20; .005 INJECTION, SOLUTION EPIDURAL; INFILTRATION; INTRACAUDAL; PERINEURAL at 07:10

## 2023-10-18 RX ADMIN — METOCLOPRAMIDE 10 MG: 5 INJECTION, SOLUTION INTRAMUSCULAR; INTRAVENOUS at 10:10

## 2023-10-18 RX ADMIN — ACETAMINOPHEN 1000 MG: 500 TABLET ORAL at 07:10

## 2023-10-18 RX ADMIN — KETOROLAC TROMETHAMINE 30 MG: 30 INJECTION, SOLUTION INTRAMUSCULAR; INTRAVENOUS at 08:10

## 2023-10-18 RX ADMIN — SODIUM CITRATE AND CITRIC ACID MONOHYDRATE 30 ML: 500; 334 SOLUTION ORAL at 07:10

## 2023-10-18 RX ADMIN — MIDAZOLAM HYDROCHLORIDE 0.5 MG: 1 INJECTION, SOLUTION INTRAMUSCULAR; INTRAVENOUS at 07:10

## 2023-10-18 RX ADMIN — FAMOTIDINE 20 MG: 10 INJECTION, SOLUTION INTRAVENOUS at 07:10

## 2023-10-18 RX ADMIN — FENTANYL CITRATE 100 MCG: 0.05 INJECTION, SOLUTION INTRAMUSCULAR; INTRAVENOUS at 07:10

## 2023-10-18 RX ADMIN — Medication 4 MILLI-UNITS/MIN: at 06:10

## 2023-10-18 RX ADMIN — DIPHENHYDRAMINE HYDROCHLORIDE 25 MG: 50 INJECTION, SOLUTION INTRAMUSCULAR; INTRAVENOUS at 10:10

## 2023-10-18 RX ADMIN — AZITHROMYCIN 500 MG: 500 INJECTION, POWDER, LYOPHILIZED, FOR SOLUTION INTRAVENOUS at 07:10

## 2023-10-18 RX ADMIN — CLINDAMYCIN IN 5 PERCENT DEXTROSE 900 MG: 18 INJECTION, SOLUTION INTRAVENOUS at 07:10

## 2023-10-18 NOTE — CARE UPDATE
Resident to bedside for SVE due to recurrent decels.   Improved with maternal repositioning    SVE reducible lip.     Will plan to give ampicillin then plan to set up 30 minutes after.       Chely Rosas MD PGY-4  Obstetrics and Gynecology

## 2023-10-18 NOTE — CARE UPDATE
LABOR PROGRESS NOTE    MD to bedside for cervical check. Complaints: None. Epidural: Working     Temp:  [97.7 °F (36.5 °C)-98.1 °F (36.7 °C)] 98.1 °F (36.7 °C)  Pulse:  [49-60] 52  Resp:  [16] 16  SpO2:  [96 %-98 %] 96 %  BP: (128-179)/(58-82) 173/81    FHT: Category 1 , baseline 140, +accel, -decel, mod variability   CTX: q 2-3 minutes     ASSESSMENT   28 y.o.  at 37w4d, for PROM admitted for augmentation of labor     TIMELINE  0130: /-2, fore-bag ruptured    PLAN  1. Labor management  -Continue continuous maternal/fetal monitoring.   -Recheck 2-4 hours or PRN    Alma Gonzalez MD PGY-2  Obstetrics and Gynecology

## 2023-10-18 NOTE — ANESTHESIA PREPROCEDURE EVALUATION
[facility]  Anesthesia Pre-Operative Evaluation         Patient Name: Theresa Grey  YOB: 1995  MRN: [id number]    10/18/2023      Theresa Grey is a 28 y.o. female  at 37w4d who presents with PROM. Current IUP has been c/b a hx of complex cyanotic congenital heart disease s/p repair in 2016. She had dextrocardia, DORV, large VSD, significant pulmonary valve stenosis - now s/p 22 mm extracardiac Fontan with oversewing of the pulmonary valve and division of the MPA on 16 at [facility]. Additionally, she is on lovenox daily given her predisposition to clotting with a hx of Fontan repair (last took lovenox 7pm 10/16/23).    congential heart disease, dextrocardia, thrombocytopenia, pulmonary valve stenosis, GDMA1, anxiety    Previous pregnancies have resulted in one not to completion given high risk of maternal mortality as her congenital heart disease had not been repaired, and one vaginal spontaneous uncomplicated.  .   She reports previous neuraxial anesthesia . She denies complications with these.    She denies history of HTN, asthma, bleeding or coagulation disorders, spine abnormalities, or previous back surgeries.      2023 TTE:  - Qualitatively good biventricular systolic function.  - Dextrocardia.  - Laminar flow noted in normal size inferior vena cava connecting to extracardiac conduit to Fontan anastomosis, left-sided SVC connecting to left pulmonary artery to Pavel anastomisis, proximal LPA, pulmonary confluence and proximal RPA.  - Qualitative impression of normal size atria.  - Straddling left AV valve demonstrated with trivial AV valve insufficiency.  - Right-sided AV valve with no evidence of stenosis and mild  insufficiency.  - Large inlet VSD with unrestricted flow from LV to RV.  - The ventricles are jason-cross in relationship as demonstrated by color Doppler with limited details of the ventricular structures demonstrated by 2D  imaging.  - Centrally positioned pulmonary valve with small annulus and no obvious flow.  - Leftward and anteriorly positioned aortic valve with no evidence of subaortic or aortic valve stenosis or insufficiency.  - Large right aortic arch.    OB History    Para Term  AB Living   3 1 1   1 1   SAB IAB Ectopic Multiple Live Births     1   0 1      # Outcome Date GA Lbr Hong/2nd Weight Sex Delivery Anes PTL Lv   3 Current            2 Term 20 37w4d  2.35 kg (5 lb 2.9 oz) F Vag-Spont EPI N ANTONY   1 IAB  8w0d              Review of patient's allergies indicates:   Allergen Reactions    Hydrocodone Shortness Of Breath and Other (See Comments)     Dizziness; sweating       Wt Readings from Last 1 Encounters:   10/11/23 1114 60.9 kg (134 lb 4.2 oz)       BP Readings from Last 3 Encounters:   10/17/23 (!) 173/81   10/17/23 (!) 145/80   10/11/23 119/61       Patient Active Problem List   Diagnosis    Pulmonary stenosis    Situs inversus    Adult congenital heart disease    DORV (double outlet right ventricle)    S/P Fontan procedure    Dextrocardia    Thrombocytopenia    Encounter for induction of labor    Congenital heart disease during pregnancy    High-risk pregnancy in third trimester    Full-term premature rupture of membranes    Anxiety    Gestational diabetes mellitus (GDM) in third trimester       Past Surgical History:   Procedure Laterality Date    CARDIAC CATHETERIZATION  at 7 yars of age    DILATION AND CURETTAGE OF UTERUS      FONTAN PROCEDURE, EXTRACARDIAC  2015    With a nonfenestrated 22 mm [place]-Justice tube graft.  The pulmonary valve was closed.  Procedure performed by Dr. Moe Kulkarni at [facility].       Tobacco Use: Low Risk  (2023)    Patient History     Smoking Tobacco Use: Never     Smokeless Tobacco Use: Never     Passive Exposure: Not on file     Alcohol Use: Not on file     Social History     Substance and Sexual Activity   Drug Use  No    Comment: previous marijuana use         Chemistry        Component Value Date/Time     (L) 10/17/2023 2210    K 4.1 10/17/2023 2210     10/17/2023 2210    CO2 19 (L) 10/17/2023 2210    BUN 9 10/17/2023 2210    CREATININE 0.6 10/17/2023 2210    GLU 75 10/17/2023 2210        Component Value Date/Time    CALCIUM 9.8 10/17/2023 2210    ALKPHOS 136 (H) 10/17/2023 2210    AST 23 10/17/2023 2210    ALT 18 10/17/2023 2210    BILITOT 0.3 10/17/2023 2210    ESTGFRAFRICA >60 07/15/2022 1558    EGFRNONAA >60 07/15/2022 1558            Lab Results   Component Value Date    WBC 11.34 10/17/2023    HGB 12.4 10/17/2023    HCT 39.0 10/17/2023     (L) 10/17/2023       Lab Results   Component Value Date    LABPROT 10.7 04/26/2023    LABPROT 10.1 01/20/2020    LABPROT 12.6 (H) 03/20/2015    INR 0.9 04/26/2023    INR 1.0 01/20/2020    INR 1.1 09/26/2019    APTT 23.9 01/20/2020    APTT 29.0 09/26/2019    APTT 26.5 02/12/2015       Pre-op Assessment    I have reviewed the Patient Summary Reports.     I have reviewed the Nursing Notes.    I have reviewed the Medications.     Review of Systems  Anesthesia Hx:  No problems with previous Anesthesia  History of prior surgery of interest to airway management or planning: Denies Family Hx of Anesthesia complications.   Denies Personal Hx of Anesthesia complications.   Social:  No Alcohol Use, Former Smoker    Hematology/Oncology:     Oncology Normal    -- Anemia:   EENT/Dental:EENT/Dental Normal   Cardiovascular:   dextrocardia, DORV, large VSD, significant pulmonary valve stenosis - now s/p 22 mm extracardiac Fontan with oversewing of the pulmonary valve and division of the MPA on 9/29/16 at [facility].       Pulmonary:  Pulmonary Normal    Renal/:  Renal/ Normal     Hepatic/GI:  Hepatic/GI Normal    Neurological:  Neurology Normal    Endocrine:  Endocrine Normal    Psych:  Psychiatric Normal           Physical Exam  General: Well nourished,  "Cooperative, Alert and Oriented    Airway:  Mallampati: II   Mouth Opening: Normal  TM Distance: Normal  Tongue: Normal  Neck ROM: Normal ROM    Dental:  Intact        Anesthesia Plan  Type of Anesthesia, risks & benefits discussed:    Anesthesia Type: Gen ETT, Epidural, Spinal, CSE  Intra-op Monitoring Plan: Standard ASA Monitors  Post Op Pain Control Plan: multimodal analgesia  Informed Consent: Informed consent signed with the Patient and all parties understand the risks and agree with anesthesia plan.  All questions answered.   ASA Score: 3  Day of Surgery Review of History & Physical: H&P Update referred to the surgeon/provider.  Anesthesia Plan Notes: Patient had been seen by her peds cardiologist whom stated that "without a sub pulmonic ventricle, she may tolerate the volume load associated with a pregnancy less well than a 2 ventricle patient.  There is an increased risk of heart failure symptoms"  He also stated she is at an increased risk of arrhthymias and will need cardiac telemetry during labor and 24-48 hours following    Ready For Surgery From Anesthesia Perspective.     .      "

## 2023-10-18 NOTE — PROGRESS NOTES
"LABOR NOTE    S:  MD to bedside for routine cervical exam. Epidural working:  yes  No pt concerns at this time    O: BP (!) 145/64 Comment: MD notified  Pulse 76   Temp 98.5 °F (36.9 °C) (Oral)   Resp 18   Ht 4' 11.02" (1.499 m)   Wt 60.9 kg (134 lb 4.2 oz)   SpO2 95%   Breastfeeding No   BMI 27.10 kg/m²     FHT: 140 bpm, moderate variability, +accels, -decels, Cat 1 (reassuring)  CTX: q 2-3 minutes  SVE: 9/80/-2, pit 4     TIMELINE  0130: 6/80/-2, fore-bag ruptured  0330: 7/80/-2  0530: 8/80/-2, starting pit  1040: 9/80/-2, pit 4   1300: 9/80/-2, pit at 4, IUPC placed    PLAN:  Continue Close Maternal/Fetal Monitoring  Pitocin Augmentation per protocol  Recheck 2 hours or PRN      Gifty Harding MD   OB/GYN PGY-2    "

## 2023-10-18 NOTE — PLAN OF CARE
Patient has been screened for Social Work discharge planning needs. Based on documentation in medical record, no discharge planning needs are anticipated at this time. Should any discharge planning needs arise, please consult .        10/18/23 5533   OB SCREEN   Assessment Type Discharge Planning Assessment   Source of Information health record   Received Prenatal Care Yes   Any indications/suspicions for None   Is this a teen pregnancy No   Is the baby in NICU No   Indication for adoption/Safe Haven No   Indication for DME/post-acute needs No   HIV (+) No   Any congenital  disorders No   Fetal demise/ death No

## 2023-10-18 NOTE — ANESTHESIA PROCEDURE NOTES
Epidural    Patient location during procedure: OB   Reason for block: primary anesthetic   Reason for block: labor analgesia requested by patient and obstetrician  Diagnosis: IUP   Start time: 10/17/2023 10:49 PM  Timeout: 10/17/2023 10:45 PM  End time: 10/17/2023 10:57 PM  Surgery related to: Vaginal Delivery    Staffing  Performing Provider: Torsten Franco DO  Authorizing Provider: Kylah Craft MD    Staffing  Performed by: Torsten Franco DO  Authorized by: Kylah Craft MD        Preanesthetic Checklist  Completed: patient identified, IV checked, site marked, risks and benefits discussed, surgical consent, monitors and equipment checked, pre-op evaluation, timeout performed, anesthesia consent given, hand hygiene performed and patient being monitored  Preparation  Patient position: sitting  Prep: ChloraPrep  Patient monitoring: Pulse Ox  Reason for block: primary anesthetic   Epidural  Skin Anesthetic: lidocaine 1%  Skin Wheal: 3 mL  Administration type: continuous  Approach: midline  Interspace: L3-4    Injection technique: MINH air  Needle and Epidural Catheter  Needle type: Tuohy   Needle gauge: 17  Needle length: 3.5 inches  Needle insertion depth: 6 cm  Catheter type: springwound  Catheter size: 19 G  Catheter at skin depth: 10 cm  Insertion Attempts: 2  Test dose: 3 mL of lidocaine 1.5% with Epi 1-to-200,000  Additional Documentation: incremental injection, negative aspiration for heme and CSF, no paresthesia on injection, no signs/symptoms of IV or SA injection, no significant pain on injection and no significant complaints from patient  Needle localization: anatomical landmarks  Medications:  Volume per aspiration: 5 mL  Time between aspirations: 5 minutes   Assessment  Ease of block: easy  Patient's tolerance of the procedure: comfortable throughout block and no complaints No inadvertent dural puncture with Tuohy.  Dural puncture performed with spinal needle.

## 2023-10-18 NOTE — PROGRESS NOTES
LABOR NOTE    S:  MD to bedside for routine cervical exam. Epidural working:  yes  No pt concerns at this time    O: BP (!) 140/75   Pulse 72   Temp 98 °F (36.7 °C) (Oral)   SpO2 95%   Breastfeeding No     FHT: 140 bpm, moderate variability, +accels, -decels, Cat 1 (reassuring)  CTX: q 2-3 minutes  SVE: 7/80/-2    TIMELINE:  TIMELINE  0130: 6/80/-2, fore-bag ruptured  0330: 7/80/-2  0530: 8/80/-2, starting pit    PLAN:    Continue Close Maternal/Fetal Monitoring  Pitocin Augmentation per protocol  Recheck 2 hours or PRN      Moe Cotton MD MS  OB/Gyn  PGY-1

## 2023-10-18 NOTE — PROGRESS NOTES
"LABOR NOTE    S:  MD to bedside for routine cervical exam. Epidural working:  yes  No pt concerns at this time    O: /74   Pulse (!) 58   Temp 97.8 °F (36.6 °C) (Oral)   Resp 18   Ht 4' 11.02" (1.499 m)   Wt 60.9 kg (134 lb 4.2 oz)   SpO2 95%   Breastfeeding No   BMI 27.10 kg/m²     FHT: 140 bpm, moderate variability, +accels, -decels, Cat 1 (reassuring)  CTX: q 2-3 minutes  SVE: 9/80/-2, pit 4     TIMELINE  0130: 6/80/-2, fore-bag ruptured  0330: 7/80/-2  0530: 8/80/-2, starting pit  1040: 9/80/-2, pit 4     PLAN:    Continue Close Maternal/Fetal Monitoring  Pitocin Augmentation per protocol  Recheck 2 hours or PRN    Kumar Tamez MD  Obstetrics and Gynecology- PGY1        "

## 2023-10-18 NOTE — ED PROVIDER NOTES
"Encounter Date: 10/17/2023       History     Chief Complaint   Patient presents with    Rupture of Membranes     Reports SROM approx 12 pm. Pt presented to to [facility] where rupture was confirmed and cervical exam completed=3-4 cm.      Theresa Grey is a 28 y.o. female  @ 37w3d presenting for SROM. Pt reports gush of clear fluid at noon today, was seen at [facility] at 7pm and told to immediately come to L&D at [facility]. Was 3cm dilated there. Reports contractions, continued LOF, good FM.   Patient denies headache, dizziness, visual changes, chest pain, shortness of breath, nausea or vomiting and right upper quadrant pain            Review of patient's allergies indicates:   Allergen Reactions    Hydrocodone Shortness Of Breath and Other (See Comments)     Dizziness; sweating     Past Medical History:   Diagnosis Date    Abnormality of heart valve     heart disease     fatigue as a result of pregnancy (8wks ) clubbing indicates chronic oxygenation issues but pt considered it "normal"     Past Surgical History:   Procedure Laterality Date    CARDIAC CATHETERIZATION  at 7 yars of age    DILATION AND CURETTAGE OF UTERUS      FONTAN PROCEDURE, EXTRACARDIAC  2015    With a nonfenestrated 22 mm [place]-Justice tube graft.  The pulmonary valve was closed.  [facility]     Family History   Problem Relation Age of Onset    Diabetes Maternal Grandmother     Vision loss Maternal Grandmother     Miscarriages / Stillbirths Mother     Diabetes Father     Diabetes Paternal Grandmother     No Known Problems Brother     No Known Problems Maternal Grandfather     No Known Problems Paternal Grandfather     Hypertension Maternal Aunt     Diabetes Maternal Aunt     No Known Problems Sister     No Known Problems Maternal Uncle     No Known Problems Paternal Aunt     No Known Problems Paternal Uncle     Anemia Neg Hx     Arrhythmia Neg Hx     Asthma Neg Hx     " Clotting disorder Neg Hx     Fainting Neg Hx     Heart attack Neg Hx     Heart disease Neg Hx     Heart failure Neg Hx     Hyperlipidemia Neg Hx     Stroke Neg Hx     Atrial Septal Defect Neg Hx      Social History     Tobacco Use    Smoking status: Never    Smokeless tobacco: Never   Substance Use Topics    Alcohol use: Not Currently     Alcohol/week: 0.0 standard drinks of alcohol     Comment: on ocassion before pregnancy    Drug use: No     Comment: previous marijuana use     Review of Systems   Constitutional:  Negative for activity change, appetite change, chills, diaphoresis, fatigue and fever.   HENT:  Negative for congestion and drooling.    Eyes:  Negative for discharge and visual disturbance.   Respiratory:  Negative for apnea, choking and chest tightness.    Cardiovascular:  Negative for chest pain and palpitations.   Gastrointestinal:  Positive for abdominal pain. Negative for blood in stool and vomiting.   Endocrine: Negative for cold intolerance and heat intolerance.   Genitourinary:  Positive for pelvic pain.   Musculoskeletal:  Negative for arthralgias and back pain.   Allergic/Immunologic: Negative for environmental allergies.   Neurological:  Negative for dizziness and facial asymmetry.   Psychiatric/Behavioral:  Negative for agitation, behavioral problems and confusion.        Physical Exam   Pulse:  [59] 59  Resp:  [16] 16  BP: (145)/(80) 145/80      Physical Exam    Vitals reviewed.  Constitutional: She appears well-developed.   HENT:   Head: Normocephalic.   Nose: Nose normal.   Eyes: Conjunctivae are normal.   Neck:   Normal range of motion.  Cardiovascular:  Intact distal pulses.           Pulmonary/Chest: No respiratory distress. She exhibits no tenderness.   Abdominal: Abdomen is soft. Bowel sounds are normal.   Genitourinary:    Vagina normal.     Musculoskeletal:         General: Normal range of motion.      Cervical back: Normal range of motion.     Neurological: She is alert and  oriented to person, place, and time.   Skin: Skin is warm.   Psychiatric: She has a normal mood and affect. Her behavior is normal. Judgment and thought content normal.         ED Course   Fetal non-stress test    Date/Time: 10/17/2023 9:38 PM    Performed by: Parris House MD  Authorized by: Parris House MD    Nonstress Test:     Variability:  6-25 BPM    Decelerations:  None    Accelerations:  15 bpm    Acoustic Stimulator: No      Baseline:  145    Contractions:  Irregular  Biophysical Profile:     Nonstress Test Interpretation: reactive      Overall Impression:  Reassuring  Post-procedure:     Patient tolerance:  Patient tolerated the procedure well with no immediate complications    Labs Reviewed - No data to display       Imaging Results    None          Medications - No data to display  Medical Decision Making  Amount and/or Complexity of Data Reviewed  Labs: ordered.              Attending Attestation:   Physician Attestation Statement for Resident:  As the supervising MD   Physician Attestation Statement: I have personally seen and examined this patient.                    Attending ED Notes:   I have primarily seen and examined the patient without resident involvement due to non-clinical responsibilities.  Questions welcomed and answered to patient satisfaction.      @ 37w3d  presenting for SROM  NST: reactive and reassuring   SROM: admit for delivery at term, 570/-3, toco with regular contractions  H/o congenital heart disease s/p fontan: per cards and MFM may have , IVF to prevent dehydration, Bacterial endocarditis prophylaxis, lovenox following delivery   Elevated BP: normotensive on prenatal visits, will send PreE labs  TCP: transient over past few years, may have cardiac etiology, will send CBC    Pt reports last lovenox yesterday (0 on 10/16), last ate pasta at 6pm today.       Admit for labor due to SROM. Plan for epidural.     Parris Johnson  MD  10/17/2023 10:06 PM            ED Course as of 10/17/23 2134   Tue Oct 17, 2023   2130 Pt arrived from [facility], found to have SROM there.   SROM confirmed on exam, SVE /-3  Hx VSD with surgical repair, planned  with epidural [RF]      ED Course User Index  [RF] Parris House MD                    Clinical Impression:   Final diagnoses:  [O42.92] Full-term premature rupture of membranes, unspecified duration to onset of labor (Primary)  [O99.891, Q24.9] Congenital heart disease during pregnancy  [Z98.890] S/P Fontan procedure  [O24.410] Gestational diabetes mellitus, class A1  [Z3A.37] 37 weeks gestation of pregnancy        ED Disposition Condition    Send to L&D Stable          Parris Johnson MD  10/17/2023 9:39 PM          Parris House MD  10/17/23 2139       Parris House MD  10/17/23 2212

## 2023-10-18 NOTE — NURSING
: Pt arrived to unit from ER with mother at side with reports of leaking clear vaginal fluid since this morning. , 37.3, prev vag delivery. Denies pain, denies vaginal bleeding, +FM. Denies urinary symptoms. States that she had major heart surgery in  and that she sees MFM at [facility], and has plans to deliver there due to complex cardiac history. Nitrazine test performed, results equivocal. ROM Plus collected at .   : ROM Plus positive. SVE 3-4/50/-3. Ctx noted on EFM. Pt denies pain with ctx. States she is comfortable.   : Dr. Quiroz notified of pt arrival to unit, report given pt of Dr. Trupin , 37.3, prev vag delivery SROM earlier this morning (pt report unclear on exact time). Pt in need of transfer to [facility] for delivery. Discussed plan with Dr. Quiroz. Pt has option to wait for ambulance transfer or if family member is available to drive her she may choose to do that.   : Discussed options with pt, discussed ambulance transfer vs family driving her. Discussed pros vs cons. Pt states she would like to take her personal vehicle and that her mother will drive her straight to [facility] for delivery.  notified. [facility] notified via Dr. Quiroz about pt incoming arrival. House Supervisor notified, to bedside to confirm pt decision to drive vs. Ambulance transfer. Pt confirms.   2016: Pt in wheelchair discharged off unit, RN at side to pt vehicle with pt mother.

## 2023-10-18 NOTE — DISCHARGE INSTRUCTIONS
Membranes ruptured morning of 10/17/23, discuss options of discharge vs transfer, pt states she would like to have her family member drive her to [facility] for delivery. Pt agrees to go straight there.         Keep your scheduled appointment with your provider.    Call your Doctor if you have any of the following:  Temperature above 100 degrees  Nausea, vomiting and/or diarrhea  Severe headache, dizziness, or blurred vision  Notable increase in swelling of hands or feet  Notable swelling of face and lips  Difficulty, pain or burning with urination  Foul smelling vaginal discharge  Decreased fetal movement    Come to the hospital if you have any of the following symptoms:  Your water breaks  More than 4-6 contractions in 1 hour for 2 or more hours  Vaginal bleeding like a period    After 28 weeks, you should feel 10 distinct fetal movements within a 2 hour period.    It is recommended that you drink 1/2 a gallon of water each day.  Tea, Soda and Juice are  in addition to this.

## 2023-10-18 NOTE — PROGRESS NOTES
LABOR NOTE    S:  MD to bedside for routine cervical exam. Epidural working:  yes  No pt concerns at this time    O: BP (!) 140/75   Pulse 72   Temp 98 °F (36.7 °C) (Oral)   SpO2 95%   Breastfeeding No     FHT: 140 bpm, moderate variability, +accels, -decels, Cat 1 (reassuring)  CTX: q 2-3 minutes  SVE: 7/80/-2    TIMELINE:  TIMELINE  0130: 6/80/-2, fore-bag ruptured  0330: 7/80/-2    PLAN:    Continue Close Maternal/Fetal Monitoring  Pitocin Augmentation per protocol  Recheck 2 hours or PRN      Moe Cotton MD MS  OB/Gyn  PGY-1

## 2023-10-18 NOTE — H&P
"   HISTORY AND PHYSICAL                                                OBSTETRICS          Subjective:       Theresa Grey is a 28 y.o.  female with IUP at 37w3d weeks gestation who presents with PROM.    Patient reports contractions, denies vaginal bleeding, reports LOF.   Fetal Movement: normal.    This IUP is complicated by congential heart disease, dextrocardia, thrombocytopenia, pulmonary valve stenosis, GDMA1, anxiety.    Review of Systems   Constitutional:  Negative for fever.   Respiratory:  Negative for shortness of breath.    Cardiovascular:  Negative for chest pain.   Gastrointestinal:  Positive for abdominal pain. Negative for nausea and vomiting.   Endocrine: Negative for hot flashes.   Genitourinary:  Positive for pelvic pain and vaginal discharge. Negative for menstrual problem.   Musculoskeletal:  Positive for back pain.   Integumentary:  Negative for breast mass, nipple discharge and breast skin changes.   Neurological:  Negative for headaches.   Hematological:  Does not bruise/bleed easily.   Psychiatric/Behavioral:  Negative for depression.    Breast: Negative for mass, mastodynia, nipple discharge and skin changes      PMHx:   Past Medical History:   Diagnosis Date    Abnormality of heart valve     heart disease     fatigue as a result of pregnancy (8wks ) clubbing indicates chronic oxygenation issues but pt considered it "normal"       PSHx:   Past Surgical History:   Procedure Laterality Date    CARDIAC CATHETERIZATION  at 7 yars of age    DILATION AND CURETTAGE OF UTERUS      FONTAN PROCEDURE, EXTRACARDIAC  2015    With a nonfenestrated 22 mm Jerome-Justice tube graft.  The pulmonary valve was closed.  Procedure performed by Dr. Moe Kulkarni at [facility].       All:   Review of patient's allergies indicates:   Allergen Reactions    Hydrocodone Shortness Of Breath and Other (See Comments)     Dizziness; sweating       Meds:   Facility-Administered Medications Prior to " Admission   Medication Dose Route Frequency Provider Last Rate Last Admin    acetaminophen tablet 650 mg  650 mg Oral Once Ayah Lainez MD        albuterol inhaler 2 puff  2 puff Inhalation Q20 Min Ayah Lainez MD        diphenhydrAMINE injection 25 mg  25 mg Intravenous Once Ayah Lainez MD        EPINEPHrine (EPIPEN) 0.3 mg/0.3 mL pen injection 0.3 mg  0.3 mg Intramuscular PRAyah Cartagena MD        methylPREDNISolone sodium succinate injection 40 mg  40 mg Intravenous Once Ayah Lainez MD        ondansetron disintegrating tablet 4 mg  4 mg Oral Once Ayah Lainez MD        sodium chloride 0.9% 500 mL flush bag   Intravenous PRAyah Cartagena MD        sodium chloride 0.9% flush 10 mL  10 mL Intravenous Ayah Lainez MD         Medications Prior to Admission   Medication Sig Dispense Refill Last Dose    aspirin (ECOTRIN) 81 MG EC tablet Take 1 tablet (81 mg total) by mouth once daily. 360 tablet 0     enoxaparin (LOVENOX) 40 mg/0.4 mL Syrg Inject 0.4 mLs (40 mg total) into the skin Daily. 12 mL 5     famotidine (PEPCID) 20 MG tablet Take 1 tablet (20 mg total) by mouth 2 (two) times daily. (Patient not taking: Reported on 9/6/2023) 20 tablet 0     prenatal vit no.124/iron/folic (PRENATAL VITAMIN ORAL) Take 1 tablet by mouth once daily.       sertraline (ZOLOFT) 50 MG tablet Take 1/2 tablet per day for a week, then increase to 1 tablet daily thereafter. (Patient not taking: Reported on 7/6/2023) 30 tablet 11        SH:   Social History     Socioeconomic History    Marital status:    Tobacco Use    Smoking status: Never    Smokeless tobacco: Never   Substance and Sexual Activity    Alcohol use: Not Currently     Alcohol/week: 0.0 standard drinks of alcohol     Comment: on ocassion before pregnancy    Drug use: No     Comment: previous marijuana use    Sexual activity: Yes     Partners: Male     Birth control/protection: Injection       FH:   Family  History   Problem Relation Age of Onset    Diabetes Maternal Grandmother     Vision loss Maternal Grandmother     Miscarriages / Stillbirths Mother     Diabetes Father     Diabetes Paternal Grandmother     No Known Problems Brother     No Known Problems Maternal Grandfather     No Known Problems Paternal Grandfather     Hypertension Maternal Aunt     Diabetes Maternal Aunt     No Known Problems Sister     No Known Problems Maternal Uncle     No Known Problems Paternal Aunt     No Known Problems Paternal Uncle     Anemia Neg Hx     Arrhythmia Neg Hx     Asthma Neg Hx     Clotting disorder Neg Hx     Fainting Neg Hx     Heart attack Neg Hx     Heart disease Neg Hx     Heart failure Neg Hx     Hyperlipidemia Neg Hx     Stroke Neg Hx     Atrial Septal Defect Neg Hx        OBHx:   OB History    Para Term  AB Living   3 1 1 0 1 1   SAB IAB Ectopic Multiple Live Births   0 1 0 0 1      # Outcome Date GA Lbr Hong/2nd Weight Sex Delivery Anes PTL Lv   3 Current            2 Term 20 37w4d  2.35 kg (5 lb 2.9 oz) F Vag-Spont EPI N ANTONY      Name: PIERCE,GIRL YAN      Apgar1: 3  Apgar5: 8   1 IAB  8w0d              Objective:       BP (!) 147/72   Pulse (!) 52   Temp 97.7 °F (36.5 °C) (Oral)   SpO2 96%     Vitals:    10/17/23 2145 10/17/23 2200 10/17/23 2213 10/17/23 2215   BP: (!) 148/69 139/66 (!) 147/72 (!) 147/72   Pulse: (!) 50 (!) 59 (!) 49 (!) 52   Temp:   97.7 °F (36.5 °C)    TempSrc:   Oral    SpO2: 96% 97% 96% 96%       General:   alert, appears stated age and cooperative, no apparent distress   HENT:  normocephalic, atraumatic   Eyes:  extraocular movements and conjunctivae normal   Neck:  supple, range of motion normal, no thyromegaly   Lungs:   no respiratory distress   Heart:   regular rate   Abdomen:  soft, non-tender, non-distended but gravid, no rebound or guarding    Extremities negative edema, negative erythema   FHT: 140, moderate BTBV, +accels, -decels;  Cat 1 (reassuring)                  TOCO: Poorly visualized   Presentations: cephalic by ultrasound   Cervix:     Dilation: 5    Effacement: 70    Station:  -3    Consistency: medium    Position: middle   Sterile Speculum Exam: deferred    EFW by Leopold's: 7#    Recent Growth Scan: 2887g @ 36w4d    Lab Review  Blood Type O POS  GBBS: negative  Rubella: Unknown  RPR: negative  HIV: negative  HepB: unknown       Assessment:       37w3d weeks gestation with PROM    Active Hospital Problems    Diagnosis  POA    *Full-term premature rupture of membranes [O42.92]  Unknown    Encounter for induction of labor [Z34.90]  Not Applicable      Resolved Hospital Problems    Diagnosis Date Resolved POA    Normal labor [O80, Z37.9] 10/17/2023 Not Applicable          Plan:   PROM   Risks, benefits, alternatives and possible complications have been discussed in detail with the patient.   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 Incomplete right bundle branch block and other findings unchanged from last imaging.  -Will need bacterial endocarditis prophylaxis. Ordered- ampicillin 2g   -s/p lovenox mg daily, held 12-24 hrs prior to delivery    Thrombocytopenia  -Plts 133 on admit  -will hold 1 unit pRBC    Gestational diabetes mellitus  -diet controlled, no meds  -q4 in latent labor and q2 in active labor BG checks   -will need fasting BG pp    Anxiety   -stable  -home meds: zoloft as  ordered     Alma Gonzalez MD PGY-2  Obstetrics and Gynecology

## 2023-10-19 LAB
ALBUMIN SERPL BCP-MCNC: 1.9 G/DL (ref 3.5–5.2)
ALP SERPL-CCNC: 92 U/L (ref 55–135)
ALT SERPL W/O P-5'-P-CCNC: 15 U/L (ref 10–44)
ANION GAP SERPL CALC-SCNC: 9 MMOL/L (ref 8–16)
AST SERPL-CCNC: 20 U/L (ref 10–40)
BASOPHILS # BLD AUTO: 0.02 K/UL (ref 0–0.2)
BASOPHILS NFR BLD: 0.1 % (ref 0–1.9)
BILIRUB SERPL-MCNC: 0.7 MG/DL (ref 0.1–1)
BLD PROD TYP BPU: NORMAL
BLD PROD TYP BPU: NORMAL
BLOOD UNIT EXPIRATION DATE: NORMAL
BLOOD UNIT EXPIRATION DATE: NORMAL
BLOOD UNIT TYPE CODE: 5100
BLOOD UNIT TYPE CODE: 9500
BLOOD UNIT TYPE: NORMAL
BLOOD UNIT TYPE: NORMAL
BUN SERPL-MCNC: 12 MG/DL (ref 6–20)
CALCIUM SERPL-MCNC: 7.8 MG/DL (ref 8.7–10.5)
CHLORIDE SERPL-SCNC: 106 MMOL/L (ref 95–110)
CO2 SERPL-SCNC: 18 MMOL/L (ref 23–29)
CODING SYSTEM: NORMAL
CODING SYSTEM: NORMAL
CREAT SERPL-MCNC: 1 MG/DL (ref 0.5–1.4)
CREAT SERPL-MCNC: 1.3 MG/DL (ref 0.5–1.4)
CROSSMATCH INTERPRETATION: NORMAL
CROSSMATCH INTERPRETATION: NORMAL
DIFFERENTIAL METHOD: ABNORMAL
DISPENSE STATUS: NORMAL
DISPENSE STATUS: NORMAL
EOSINOPHIL # BLD AUTO: 0 K/UL (ref 0–0.5)
EOSINOPHIL NFR BLD: 0 % (ref 0–8)
ERYTHROCYTE [DISTWIDTH] IN BLOOD BY AUTOMATED COUNT: 18.2 % (ref 11.5–14.5)
EST. GFR  (NO RACE VARIABLE): 57 ML/MIN/1.73 M^2
EST. GFR  (NO RACE VARIABLE): >60 ML/MIN/1.73 M^2
GLUCOSE SERPL-MCNC: 105 MG/DL (ref 70–110)
GLUCOSE SERPL-MCNC: 154 MG/DL (ref 70–110)
HCT VFR BLD AUTO: 25.5 % (ref 37–48.5)
HGB BLD-MCNC: 8.2 G/DL (ref 12–16)
IMM GRANULOCYTES # BLD AUTO: 0.15 K/UL (ref 0–0.04)
IMM GRANULOCYTES NFR BLD AUTO: 0.7 % (ref 0–0.5)
LYMPHOCYTES # BLD AUTO: 1.2 K/UL (ref 1–4.8)
LYMPHOCYTES NFR BLD: 5.6 % (ref 18–48)
MCH RBC QN AUTO: 24.6 PG (ref 27–31)
MCHC RBC AUTO-ENTMCNC: 32.2 G/DL (ref 32–36)
MCV RBC AUTO: 76 FL (ref 82–98)
MONOCYTES # BLD AUTO: 1 K/UL (ref 0.3–1)
MONOCYTES NFR BLD: 4.8 % (ref 4–15)
NEUTROPHILS # BLD AUTO: 18.7 K/UL (ref 1.8–7.7)
NEUTROPHILS NFR BLD: 88.8 % (ref 38–73)
NRBC BLD-RTO: 0 /100 WBC
NUM UNITS TRANS PACKED RBC: NORMAL
NUM UNITS TRANS PACKED RBC: NORMAL
PLATELET # BLD AUTO: 110 K/UL (ref 150–450)
PMV BLD AUTO: ABNORMAL FL (ref 9.2–12.9)
POTASSIUM SERPL-SCNC: 3.9 MMOL/L (ref 3.5–5.1)
PROT SERPL-MCNC: 5 G/DL (ref 6–8.4)
RBC # BLD AUTO: 3.34 M/UL (ref 4–5.4)
RUBV IGG SER-ACNC: 21 IU/ML
RUBV IGG SER-IMP: REACTIVE
SODIUM SERPL-SCNC: 133 MMOL/L (ref 136–145)
WBC # BLD AUTO: 21.1 K/UL (ref 3.9–12.7)

## 2023-10-19 PROCEDURE — 25000003 PHARM REV CODE 250

## 2023-10-19 PROCEDURE — 85025 COMPLETE CBC W/AUTO DIFF WBC: CPT | Performed by: STUDENT IN AN ORGANIZED HEALTH CARE EDUCATION/TRAINING PROGRAM

## 2023-10-19 PROCEDURE — 63600175 PHARM REV CODE 636 W HCPCS

## 2023-10-19 PROCEDURE — 99232 PR SUBSEQUENT HOSPITAL CARE,LEVL II: ICD-10-PCS | Mod: ,,, | Performed by: STUDENT IN AN ORGANIZED HEALTH CARE EDUCATION/TRAINING PROGRAM

## 2023-10-19 PROCEDURE — 25000003 PHARM REV CODE 250: Performed by: STUDENT IN AN ORGANIZED HEALTH CARE EDUCATION/TRAINING PROGRAM

## 2023-10-19 PROCEDURE — 36415 COLL VENOUS BLD VENIPUNCTURE: CPT

## 2023-10-19 PROCEDURE — 82565 ASSAY OF CREATININE: CPT

## 2023-10-19 PROCEDURE — 99223 1ST HOSP IP/OBS HIGH 75: CPT | Mod: ,,, | Performed by: INTERNAL MEDICINE

## 2023-10-19 PROCEDURE — 80053 COMPREHEN METABOLIC PANEL: CPT

## 2023-10-19 PROCEDURE — 11000001 HC ACUTE MED/SURG PRIVATE ROOM

## 2023-10-19 PROCEDURE — 99232 SBSQ HOSP IP/OBS MODERATE 35: CPT | Mod: ,,, | Performed by: STUDENT IN AN ORGANIZED HEALTH CARE EDUCATION/TRAINING PROGRAM

## 2023-10-19 PROCEDURE — 82947 ASSAY GLUCOSE BLOOD QUANT: CPT | Performed by: STUDENT IN AN ORGANIZED HEALTH CARE EDUCATION/TRAINING PROGRAM

## 2023-10-19 PROCEDURE — 36415 COLL VENOUS BLD VENIPUNCTURE: CPT | Performed by: STUDENT IN AN ORGANIZED HEALTH CARE EDUCATION/TRAINING PROGRAM

## 2023-10-19 PROCEDURE — 99223 PR INITIAL HOSPITAL CARE,LEVL III: ICD-10-PCS | Mod: ,,, | Performed by: INTERNAL MEDICINE

## 2023-10-19 PROCEDURE — 63600175 PHARM REV CODE 636 W HCPCS: Performed by: STUDENT IN AN ORGANIZED HEALTH CARE EDUCATION/TRAINING PROGRAM

## 2023-10-19 RX ORDER — SODIUM CHLORIDE, SODIUM LACTATE, POTASSIUM CHLORIDE, CALCIUM CHLORIDE 600; 310; 30; 20 MG/100ML; MG/100ML; MG/100ML; MG/100ML
INJECTION, SOLUTION INTRAVENOUS CONTINUOUS
Status: DISCONTINUED | OUTPATIENT
Start: 2023-10-19 | End: 2023-10-19

## 2023-10-19 RX ORDER — DOCUSATE SODIUM 100 MG/1
200 CAPSULE, LIQUID FILLED ORAL 2 TIMES DAILY
Status: DISCONTINUED | OUTPATIENT
Start: 2023-10-19 | End: 2023-10-21 | Stop reason: HOSPADM

## 2023-10-19 RX ORDER — CLINDAMYCIN PHOSPHATE 900 MG/50ML
900 INJECTION, SOLUTION INTRAVENOUS
Status: COMPLETED | OUTPATIENT
Start: 2023-10-19 | End: 2023-10-19

## 2023-10-19 RX ORDER — ENOXAPARIN SODIUM 100 MG/ML
40 INJECTION SUBCUTANEOUS EVERY 24 HOURS
Status: DISCONTINUED | OUTPATIENT
Start: 2023-10-19 | End: 2023-10-21 | Stop reason: HOSPADM

## 2023-10-19 RX ADMIN — DOCUSATE SODIUM 200 MG: 100 CAPSULE, LIQUID FILLED ORAL at 09:10

## 2023-10-19 RX ADMIN — SODIUM CHLORIDE, POTASSIUM CHLORIDE, SODIUM LACTATE AND CALCIUM CHLORIDE: 600; 310; 30; 20 INJECTION, SOLUTION INTRAVENOUS at 08:10

## 2023-10-19 RX ADMIN — OXYCODONE HYDROCHLORIDE 5 MG: 5 TABLET ORAL at 05:10

## 2023-10-19 RX ADMIN — ACETAMINOPHEN 650 MG: 325 TABLET, FILM COATED ORAL at 12:10

## 2023-10-19 RX ADMIN — ACETAMINOPHEN 650 MG: 325 TABLET, FILM COATED ORAL at 05:10

## 2023-10-19 RX ADMIN — AMPICILLIN 2 G: 2 INJECTION, POWDER, FOR SOLUTION INTRAMUSCULAR; INTRAVENOUS at 05:10

## 2023-10-19 RX ADMIN — CLINDAMYCIN IN 5 PERCENT DEXTROSE 900 MG: 18 INJECTION, SOLUTION INTRAVENOUS at 08:10

## 2023-10-19 RX ADMIN — AMPICILLIN 2 G: 2 INJECTION, POWDER, FOR SOLUTION INTRAMUSCULAR; INTRAVENOUS at 12:10

## 2023-10-19 RX ADMIN — ENOXAPARIN SODIUM 40 MG: 40 INJECTION SUBCUTANEOUS at 05:10

## 2023-10-19 RX ADMIN — CLINDAMYCIN IN 5 PERCENT DEXTROSE 900 MG: 18 INJECTION, SOLUTION INTRAVENOUS at 10:10

## 2023-10-19 RX ADMIN — OXYCODONE HYDROCHLORIDE 10 MG: 10 TABLET ORAL at 09:10

## 2023-10-19 RX ADMIN — CLINDAMYCIN PHOSPHATE 900 MG: 900 INJECTION, SOLUTION INTRAVENOUS at 03:10

## 2023-10-19 NOTE — PLAN OF CARE
Problem:  Fall Injury Risk  Goal: Absence of Fall, Infant Drop and Related Injury  Outcome: Ongoing, Progressing     Problem: Adult Inpatient Plan of Care  Goal: Plan of Care Review  Outcome: Ongoing, Progressing     Problem: Adult Inpatient Plan of Care  Goal: Patient-Specific Goal (Individualized)  Outcome: Ongoing, Progressing    Problem: Labor Pain (Labor)  Goal: Acceptable Pain Control  Outcome: Ongoing, Progressing     Problem: Uterine Tachysystole (Labor)  Goal: Normal Uterine Contraction Pattern  Outcome: Ongoing, Progressing     Problem: Fatigue  Goal: Improved Activity Tolerance  Outcome: Ongoing, Progressing        Problem: Adult Inpatient Plan of Care  Goal: Optimal Comfort and Wellbeing  Outcome: Ongoing, Progressing

## 2023-10-19 NOTE — PHYSICIAN QUERY
PT Name: Theresa Grey  MR #: [id number]    DOCUMENTATION CLARIFICATION      CDS/: ANGELA Romo,RNC-MNN        Contact information:[email or web address]    This form is a permanent document in the medical record.      Query Date: 2023    By submitting this query, we are merely seeking further clarification of documentation. Please utilize your independent clinical judgment when addressing the question(s) below.    The Medical Record contains the following:   Indicators  Supporting Clinical Findings Location in Medical Record    Anemia documented     X H&H Hgb=12.4-->10.2-->8.2  Hct=39.0-->32.2-->25.5 LAB 10/17-10/19    BP                    HR      Bleeding     X Procedure/Surgery Performed/EBL Procedure(s) (LRB):   SECTION (N/A) for Arrest of Descent in the setting of NRFHT    Blood Loss: 1410 cc L&D Delivery note 10/19    Transfusion(s)     X Acute/Chronic illness Primary LTCS (arrest of descent)  PPH  Chorioamnionitis  SUZANNE  Pre-E without SF OB Progress note 10/19@751am   X Treatments fe/colace OB Progress note 10/19@751am    Other       Provider, please specify diagnosis or diagnoses associated with above clinical findings.   [ X] Acute blood loss anemia    [   ] Acute blood loss anemia expected post-operatively    [   ] Iron deficiency anemia    [   ] Anemia, unspecified    [   ] Other : _________________   [   ] Clinically Undetermined            Please document in your progress notes daily for the duration of treatment, until resolved, and include in your discharge summary.    Form No. [id number]

## 2023-10-19 NOTE — PROGRESS NOTES
POSTPARTUM PROGRESS NOTE    Subjective:     PPD/POD#: 1   Procedure: Primary LTCS (arrest of descent)   EGA: 37w4d   N/V: No   F/C: No   Abd Pain: Mild, well-controlled with oral pain medication   Lochia: Mild   Voiding: Yes via cuevas   Ambulating: No   Bowel fnc: No   Contraception: Undecided      Objective:      Temp:  [97.8 °F (36.6 °C)-101.4 °F (38.6 °C)] 98 °F (36.7 °C)  Pulse:  [] 58  Resp:  [18] 18  SpO2:  [93 %-96 %] 94 %  BP: ()/(49-78) 111/67    Abdomen: Soft, appropriately tender   Uterus: Firm, no fundal tenderness   Incision: Bandage in place with minimal central shadowing (stable/marked)     Lab Review    Recent Labs   Lab 10/17/23  2210 10/19/23  0008   * 133*   K 4.1 3.9    106   CO2 19* 18*   BUN 9 12   CREATININE 0.6 1.3   GLU 75 154*   PROT 7.7 5.0*   BILITOT 0.3 0.7   ALKPHOS 136* 92   ALT 18 15   AST 23 20       Recent Labs   Lab 10/17/23  2210 10/18/23  2227 10/19/23  0657   WBC 11.34 24.35* 21.10*   HGB 12.4 10.2* 8.2*   HCT 39.0 32.2* 25.5*   MCV 76* 77* 76*   * 138* 110*         I/O    Intake/Output Summary (Last 24 hours) at 10/19/2023 0726  Last data filed at 10/19/2023 0700  Gross per 24 hour   Intake 1411.74 ml   Output 2610 ml   Net -1198.26 ml        Assessment and Plan:   Postpartum care:  - Patient doing well.  - Continue routine management and advances.  -pLTCS for arrest of descent    PPH  -QBL 1400cc  -AM CBC 8/25  -fe/colace  -asymptomatic    Chorioamnionitis  -T last/max 101.4 @ 1838 on 10/18  -continue amp/gent/clinda for 24 hours PP  -asymptomatic this AM    Congenital Heart Disease  -asymptomatic, denies CP, SOB, palpitations  -s/p repairs  -maintain euvolemia, strict I&O ordered   -s/p ampicillin during pushing for endocarditis ppx  -on TELE, no events overnight   -will need lovenox for 6wk PP, currently held due to PPH  -cardiology consulted     SUZANNE  -Cr 1.3 this AM, UOP inadequate  -Cuevas remains in place, draining pink urine  -s/p 2x 500cc  bolus and on IVF (LR@50cc/hr)  -avoid NSAIDs (not ordered)  -if re-initiate lovenox for VTE ppx, will need to evaluate for renal dosing   -Consider FeNA studies     Thrombocytopenia  -Plts this   -s/p epidural    GDMA1  -Fasting   -will need 2 week PP GTT    Anxiety   -continue on home zoloft     Pre-E without SF  -BP as above  -Magnesium not necessary  -Anti-hypertensive agent not indicated at this time  -repeat labs with elevated Cr, see SUZANNE (suspect due to hypovolemia)      Plan to discharge home as patient meets post op milestones, SUZANNE resolves.      Noris Harmon MD  OBGYN PGY-3

## 2023-10-19 NOTE — PHYSICIAN QUERY
PT Name: Theresa Grey  MR #: [id number]     DOCUMENTATION CLARIFICATION     CDS/: ANGELA Romo,RNC-MNN         Contact information:[email or web address]  This form is a permanent document in the medical record.    Query Date: 2023  By submitting this query, we are merely seeking further clarification of documentation. Please utilize your independent clinical judgment when addressing the question(s) below.      The Medical Record contains the following:  Indicators Supporting Clinical Findings Location in Medical Record   X Obstetrical Procedure Performed Procedure(s) (LRB):   SECTION (N/A) for Arrest of Descent in the setting of NRFHT L&D Delivery note 10/19   X Procedure Detail Complications: Yes - bilateral uterine extensions with brisk bleeding with worse bleeding on the right. Uterine extensions repaired in running fashion and with several figure of 8s. Hemostasis achieved.      Blood Loss: 1410 cc L&D Delivery note 10/19    Other       Please further specify the Uterine Extension done during  delivery:  [X ] Unintentional laceration/tear of uterus   [   ] Intentional to facilitate delivery of fetus   [   ] Other clarification (please specify): ______________     Please document in your progress notes daily for the duration of treatment, until resolved, and include in your discharge summary.  Form No. [id number]

## 2023-10-19 NOTE — NURSING
Dr. Simms notified of total urine output 30 since 2120. Stat CMP ordered.     2315: Anesthesia MD at  giving IVF bolus.

## 2023-10-19 NOTE — TRANSFER OF CARE
"Anesthesia Transfer of Care Note    Patient: Theresa Grey    Procedure(s) Performed: * No procedures listed *    Patient location: Labor and Delivery    Transport from OR: Transported from OR on room air with adequate spontaneous ventilation    Post pain: adequate analgesia    Post vital signs: stable    Level of consciousness: awake, alert and oriented    Nausea/Vomiting: no nausea/vomiting    Complications: none    Transfer of care protocol was followed      Last vitals:   Visit Vitals  /77   Pulse (!) 113   Temp (!) 38.6 °C (101.4 °F) (Axillary)   Resp 18   Ht 4' 11.02" (1.499 m)   Wt 60.9 kg (134 lb 4.2 oz)   SpO2 (!) 93%   Breastfeeding No   BMI 27.10 kg/m²     "

## 2023-10-19 NOTE — PLAN OF CARE
Problem: Adult Inpatient Plan of Care  Goal: Plan of Care Review  Outcome: Ongoing, Progressing  Flowsheets (Taken 10/19/2023 1548)  Plan of Care Reviewed With:   patient   spouse   Pt free from falls, injury or any further trauma throughout shift. VSS. Fundus midline and firm, moderate lochia rubra. Continued medications as ordered. Pain adequately controlled with medications. Assistance with feeding provided as needed. Ferrera removed @ 1520, DTV @ 2120. Pt in no distress. Will cont to monitor.

## 2023-10-19 NOTE — PROGRESS NOTES
MFM Attending Attestation  I have seen the patient, reviewed the Resident's progress note. I have personally interviewed and examined the patient at bedside and agree with the findings.    --Plan to keep telemetry through 48 hours post delivery (until tonight) unless stated otherwise by cardiology  --Continue Lovenox for 6 weeks postpartum for increased risk of thromboembolic events with Fontan + postpartum, then ok to transition to only daily aspirin per cardiology  --Continue routine postpartum care  --OK for Ibuprofen/tylenol use for better pain control  --Cr has normalized, likely due to transient hypovolemia post-delivery  --Patient was asking about diagnosis of preEclampsia. On further chart review, I am not convinced that patient had preE; patient has been normotensive for the majority of this hospital course, and there was some question about the accuracy of the blood pressures with cuff placement when they were elevated. She did have elevated protein in her urine but also had ruptured membranes at the time, and this can falsely elevate the urine protein. Ultimately this does not change our postpartum management at this time, as we will continue to monitor her blood pressure anyway.    Marybel Espino MD  Maternal Fetal Medicine  Obstetrics and Gynecology  [facility])      POSTPARTUM PROGRESS NOTE    Subjective:     PPD/POD#: 2   Procedure: Primary LTCS (arrest of descent)   EGA: 37w4d   N/V: No   F/C: No   Abd Pain: Mild, well-controlled with oral pain medication   Lochia: Mild   Voiding: Yes    Ambulating: Yes   Bowel fnc: Yes   Contraception: Undecided      Objective:      Temp:  [97.6 °F (36.4 °C)-98.6 °F (37 °C)] 98.5 °F (36.9 °C)  Pulse:  [] 80  Resp:  [17-18] 18  SpO2:  [94 %-98 %] 94 %  BP: (101-124)/(56-64) 107/59    Abdomen: Soft, appropriately tender   Uterus: Firm, no fundal tenderness   Incision: Bandage in place with minimal central shadowing (stable/marked)     Lab  Review    Recent Labs   Lab 10/17/23  2210 10/19/23  0008 10/19/23  0555 10/19/23  0657   * 133*  --   --    K 4.1 3.9  --   --     106  --   --    CO2 19* 18*  --   --    BUN 9 12  --   --    CREATININE 0.6 1.3 1.0  --    GLU 75 154*  --  105   PROT 7.7 5.0*  --   --    BILITOT 0.3 0.7  --   --    ALKPHOS 136* 92  --   --    ALT 18 15  --   --    AST 23 20  --   --        Recent Labs   Lab 10/17/23  2210 10/18/23  2227 10/19/23  0657   WBC 11.34 24.35* 21.10*   HGB 12.4 10.2* 8.2*   HCT 39.0 32.2* 25.5*   MCV 76* 77* 76*   * 138* 110*         I/O    Intake/Output Summary (Last 24 hours) at 10/20/2023 0724  Last data filed at 10/20/2023 0419  Gross per 24 hour   Intake 2965.95 ml   Output 1100 ml   Net 1865.95 ml        Assessment and Plan:   Postpartum care:  - Patient doing well.  - Continue routine management and advances.  -Consider addition of scheduled ibuprofen for improved pain control if Cr results as normal this AM  -pLTCS for arrest of descent    PPH  -QBL 1400cc  -AM CBC 8/25  -fe/colace  -asymptomatic    Chorioamnionitis  -T last/max 101.4 @ 1838 on 10/18  -s/p amp/gent/clinda for 24 hours PP  -asymptomatic this AM, appropriately tender post op    Congenital Heart Disease  -asymptomatic, denies CP, SOB, palpitations; reports fatigue yesterday  -s/p repairs  -maintain euvolemia, strict I&O ordered   -s/p ampicillin during pushing for endocarditis ppx  -on TELE, 5 seconds of SVT noted overnight (0206), pt asymptomatic   -will need lovenox for 6wk PP, continued while inpatient   -cardiology consulted, appreciate recs   --Dr. Weems to see patient today, will message  --See note dated 9/6    SUZANNE  -Cr 1.0>pending this AM, UOP adequate  -s/p cuevas and passed spontaneous void trial  -avoid NSAIDs (not ordered), will re-evaluate after AM Cr  -Lovenox daily, CrCl 79 (renal dosing not indicated)     Thrombocytopenia  -Plts 110  -s/p epidural    GDMA1  -Fasting   -will need 2 week PP  GTT    Anxiety   -continue on home zoloft     Pre-E without SF  -BP as above  -Magnesium not necessary  -Anti-hypertensive agent not indicated at this time  -repeat labs with elevated Cr, see SUZANNE (suspect due to hypovolemia)  -Will review BP/labs prior to discharge to determine gHTN vs Pre-E w/o as patient has been normotensive and lab abnormalities (elevated Cr) most like due to hypovolemia      Plan to discharge home as patient meets post op milestones, SUZANNE resolves, s/p evaluation by cardiology       Noris Harmon MD  OBGYN PGY-3

## 2023-10-19 NOTE — PLAN OF CARE
Problem: Adult Inpatient Plan of Care  Goal: Plan of Care Review  Outcome: Ongoing, Progressing  Goal: Patient-Specific Goal (Individualized)  Outcome: Ongoing, Progressing  Goal: Absence of Hospital-Acquired Illness or Injury  Outcome: Ongoing, Progressing  Goal: Optimal Comfort and Wellbeing  Outcome: Ongoing, Progressing  Goal: Readiness for Transition of Care  Outcome: Ongoing, Progressing     Problem: Adjustment to Role Transition (Postpartum  Delivery)  Goal: Successful Maternal Role Transition  Outcome: Ongoing, Progressing     Problem: Bleeding (Postpartum  Delivery)  Goal: Hemostasis  Outcome: Ongoing, Progressing     Problem: Infection (Postpartum  Delivery)  Goal: Absence of Infection Signs and Symptoms  Outcome: Ongoing, Progressing     Problem: Pain (Postpartum  Delivery)  Goal: Acceptable Pain Control  Outcome: Ongoing, Progressing     Problem: Postoperative Nausea and Vomiting (Postpartum  Delivery)  Goal: Nausea and Vomiting Relief  Outcome: Ongoing, Progressing     Problem: Postoperative Urinary Retention (Postpartum  Delivery)  Goal: Effective Urinary Elimination  Outcome: Ongoing, Progressing     Problem: Skin Injury Risk Increased  Goal: Skin Health and Integrity  Outcome: Ongoing, Progressing

## 2023-10-19 NOTE — L&D DELIVERY NOTE
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 Same  7. S/p PLTCD  8. Postpartum hemorrhage (atony and uterine artery bleeding)    Anesthesia: Spinal/Epidural           Description of the Findings of the Procedure:   1. Noah blood noted in the cuevas prior to initiation of the case and bladder not draining well due to deeply impacted fetal head in the pelvis, suspect traumatic catheterization  2. Single viable  female infant, with APGARS 2/8, weight 2.71 kg.   3. Incidental bilateral hysterotomy extensions noted after delivery of the fetus and placenta with significant bleeding from the right uterine artery requiring multiple figure of eight sutures placed in modified [place] fashion, otherwise normal appearing uterus, ovaries, and fallopian tubes.  4. Normal placenta  5. Minimal urine output, blood tinged urine throughout the case.    Complications: Postpartum hemorrhage and incidental extensions of the hysterotomy with bleeding from the right uterine artery.     Blood Loss: 1410 cc     Procedure Details   The risks, benefits, complications, treatment options, and expected outcomes were discussed with the patient.  The patient concurred with the proposed plan, giving informed consent.  The patient was taken to Operating Room, identified as Theresa Grey and the procedure verified as  Delivery. A Time Out  was held and the above information confirmed.    After induction of anesthesia, the patient was prepped and draped in the usual sterile manner while placed in a dorsal supine position with a left lateral tilt.  A cuevas catheter was also placed per nursing. Preoperative antibiotics (Ampicillin had been previously given for endocarditis prophylaxis, gentamicin, and clindamycin, as well as azithromycin) were administered per hospital protocol.     An allis test was performed to confirm adequate anesthesia.  Cuevas balloon palpable abdominally in the suprapubic region prior to creation of the Pfannenstiel.  A Pfannenstiel skin incision was made and carried down through the subcutaneous tissue to the fascia. Fascial incision was made and extended transversely. The fascia was grasped with [facility] clamps and  from the underlying rectus tissue superiorly and inferiorly. The peritoneum was identified, found to be free of adherent bowel, and entered bluntly. The peritoneal incision was extended longitudinally with great care not to injure the bladder as the bladder reflection was very prominent and cuevas did not appear to be draining adequately, and the cuevas balloon was readily palpable. It was retracted by nursing to remove it from the operative field with some decompression of the bladder.     The vesico-uterine peritoneum was identified, and was incised transversely. The bladder flap was bluntly freed from the lower uterine segment. The bladder blade was reinserted to keep the bladder out of the operative field.    A low transverse uterine incision was made with knife and extended with longitudinal traction. The amniotic sac was ruptured upon entry to the hysterotomy with some purulence noted, and the infant was noted to be in LOT presentation.The fetal head was brought to the incision and elevated out of the pelvis. The patient delivered a single viable female infant without difficulty.      Infant weighed 2.71  kilograms with Apgar scores of 2/8 at one and five minutes respectively. After the umbilical cord was clamped and cut, cord blood was obtained for evaluation. A cord segment was obtained and sent to pathology.The placenta was removed intact, appeared normal, and was discarded. The uterus was exteriorized.     Bilateral uterine extensions with brisk bleeding was noted. Viera and allis clamps were placed at the bleeding sites and the uterine extensions were repaired in running fashion and with several figure of 8s. Hemostasis was achieved and the uterine incision was closed with running locked sutures of vicryl. The fallopian tubes and ovaries appeared normal.     The posterior cul-de-sac was cleared of clots and debris with moist laps. The hysterotomy was examined again and excellent hemostasis was observed.  The uterus was then returned to the abdominal cavity. Incision was reinspected and additional bleeding noted requiring additional figure of eight sutures along the hysterotomy. The gutters were wiped with moist laps and blood and clot removed. The rectus muscles were examined and good hemostasis noted. The fascia was then reapproximated with running sutures of vicryl. The subcutaneous fat was irrigated, hemostasis achieved, and reapproximated with chromic. Skin was reapproximated with 4-0 monocryl in a subcuticular fashion.    Instrument, sponge, and needle counts were correct prior the abdominal closure and at the conclusion of the case.     The patient tolerated procedure well and was taken to the recovery room in stable condition after the procedure.    **NOTE: This patient is a candidate for trial of labor after  delivery**    Specimens:   Specimen (24h ago, onward)      None            Condition: Good    VTE Risk Mitigation (From admission, onward)           Ordered     IP VTE HIGH RISK PATIENT  Once         10/17/23 2226     Place sequential compression device  Until discontinued          "10/17/23 2226                    Disposition: PACU - hemodynamically stable.    Attestation: Good         Delivery Information for Girl Theresa Grey    Birth information:  YOB: 2025   Time of birth: 8:00 PM   Sex: female   Head Delivery Date/Time: 10/18/2023  8:00 PM   Delivery type: , Low Transverse   Gestational Age: 37w4d        Delivery Providers    Delivering clinician: Gifty Durand MD   Provider Role    Alma Gonzalez MD Resident    Odette Page RN Circulator    Jean Carlos Young ST Scrub Person    Erendira Howard RN Charge Nurse    Neris Hill RN Registered Nurse              Measurements    Weight: 2710 g  Weight (lbs): 5 lb 15.6 oz  Length: 45.7 cm  Length (in): 18"  Head circumference: 33.5 cm  Chest circumference: 30.5 cm         Apgars    Living status: Living  Apgar Component Scores:  1 min.:  5 min.:  10 min.:  15 min.:  20 min.:    Skin color:  0  1       Heart rate:  2  2       Reflex irritability:  0  2       Muscle tone:  0  1       Respiratory effort:  0  2       Total:  2  8       Apgars assigned by: ALEC HILL (1 MIN),NICU (5 MIN)         Operative Delivery    Forceps attempted?: No  Vacuum extractor attempted?: No         Shoulder Dystocia    Shoulder dystocia present?: No           Presentation    Presentation: Vertex  Position: Occiput Anterior           Interventions/Resuscitation    Method: Bulb Suctioning, Tactile Stimulation, Deep Suctioning, CPAP, PPV       Cord    Vessels: 3 vessels  Complications: None  Delayed Cord Clamping?: Yes  Cord Clamped Date/Time: 10/18/2023  8:00 PM  Cord Blood Disposition: Sent with Baby  Gases Sent?: No  Stem Cell Collection (by MD): No       Placenta    Placenta delivery date/time: 10/18/2023 2003  Placenta removal: Manual removal  Placenta appearance: Intact  Placenta disposition: Discarded           Labor Events:       labor: No     Labor Onset Date/Time:         Dilation Complete Date/Time:         Start " Pushing Date/Time:         Start Pushing Date/Time:       Rupture Date/Time: 10/17/23  1200         Rupture type: SRM (Spontaneous Rupture)         Fluid Amount:       Fluid Color: Clear               steroids: None     Antibiotics given for GBS: No     Induction: none     Indications for induction:        Augmentation: oxytocin     Indications for augmentation: Ineffective Contraction Pattern;Preeclampsia     Labor complications: Failure to Progress in Second Stage     Additional complications:          Cervical ripening:                     Delivery:      Episiotomy:       Indication for Episiotomy:       Perineal Lacerations:   Repaired:      Periurethral Laceration:   Repaired:     Labial Laceration:   Repaired:     Sulcus Laceration:   Repaired:     Vaginal Laceration:   Repaired:     Cervical Laceration:   Repaired:     Repair suture:       Repair # of packets:       Last Value - EBL - Nursing (mL):       Sum - EBL - Nursing (mL): 0     Last Value - EBL - Anesthesia (mL):      Calculated QBL (mL): 1410      Vaginal Sweep Performed:       Surgicount Correct:       Vaginal Packing:   Quantity:       Other providers:       Anesthesia    Method: Epidural          Details (if applicable):  Trial of Labor Yes    Categorization: Primary    Priority: Routine   Indications for : Diagnosis of second stage arrest (no descent or rotation)   Incision Type: low transverse     Additional  information:  Forceps:    Vacuum:    Breech:    Observed anomalies    Other (Comments):       Alma Gonzalez MD PGY-2  Obstetrics and Gynecology    ATTESTATION:    I was present and scrubbed for the entire PLTCD complicated by PPH due to incidental hysterotomy extension into the right uterine artery, and performed key portions of the case. I agree with the procedure description as documented.  Will closely follow patient's volume status postpartum to maintain euvolemia due history of cardiac  disease. Appreciate anesthesia's assistance in the care of this medically complex patient.       Gifty Durand MD  Department of Obstetrics & Gynecology  [facility]

## 2023-10-19 NOTE — PLAN OF CARE
Admitted to unit @0245. Education and orientation to MBU completed. VSS during shift at this time. Catheter still in place, UO slowly increasing. Fundus firm, midline, with moderate lochia. Antibx given. Pain controlled with oral pain medications. Bonding appropriately with infant at bedside. Attentive to infant cues. Creatine, RH&H, and BG labs completed. Encouraged to increase fluid intake. No additional information at this time.

## 2023-10-19 NOTE — ANESTHESIA POSTPROCEDURE EVALUATION
Anesthesia Post Evaluation    Patient: Theresa Grey    Procedure(s) Performed: Procedure(s) (LRB):   SECTION (N/A)    Final Anesthesia Type: epidural      Patient location during evaluation: med/surg floor  Patient participation: Yes- Able to Participate  Level of consciousness: awake and alert and oriented  Post-procedure vital signs: reviewed and stable  Pain management: adequate  Airway patency: patent    PONV status at discharge: No PONV  Anesthetic complications: no      Cardiovascular status: stable  Respiratory status: unassisted  Hydration status: euvolemic  Follow-up not needed.          Vitals Value Taken Time   /67 10/19/23 0245   Temp 36.7 °C (98 °F) 10/19/23 024   Pulse 58 10/19/23 0600   Resp 18 10/19/23 0245   SpO2 94 % 10/19/23 0245         Event Time   Out of Recovery 10/19/2023 02:40:00         Pain/Miriam Score: Pain Rating Prior to Med Admin: 5 (10/19/2023  5:52 AM)

## 2023-10-19 NOTE — CARE UPDATE
Resident to bedside for re-evaluation of pushing. Patient started pushing at 1615 and pushed until 1745. Pushing was stopped due to fetal intolerance of labor with zero progression and patient was set up in high fowlers. Pushing restarted 1 hour later at 1645. SVE 10/100/0, position thought to be LOT. Continued fetal decelerations with each contraction with no movement of fetus. Discussed recommendation for  in setting of fetal intolerance of labor. Anesthesia and charge nurse made aware.     Patient has also met criteria for chorioamnionitis given two fevers and fetal tachycardia. Will start chorio antibiotics and give 1g tylenol    Neris Simms MD  PGY-3 OB/GYN

## 2023-10-20 ENCOUNTER — PATIENT MESSAGE (OUTPATIENT)
Dept: PEDIATRIC CARDIOLOGY | Facility: CLINIC | Age: 28
End: 2023-10-20
Payer: OTHER GOVERNMENT

## 2023-10-20 LAB
CREAT SERPL-MCNC: 0.7 MG/DL (ref 0.5–1.4)
EST. GFR  (NO RACE VARIABLE): >60 ML/MIN/1.73 M^2

## 2023-10-20 PROCEDURE — 25000003 PHARM REV CODE 250

## 2023-10-20 PROCEDURE — 99233 PR SUBSEQUENT HOSPITAL CARE,LEVL III: ICD-10-PCS | Mod: ,,, | Performed by: INTERNAL MEDICINE

## 2023-10-20 PROCEDURE — 99900035 HC TECH TIME PER 15 MIN (STAT)

## 2023-10-20 PROCEDURE — 99233 SBSQ HOSP IP/OBS HIGH 50: CPT | Mod: ,,, | Performed by: INTERNAL MEDICINE

## 2023-10-20 PROCEDURE — 99233 PR SUBSEQUENT HOSPITAL CARE,LEVL III: ICD-10-PCS | Mod: ,,, | Performed by: PEDIATRICS

## 2023-10-20 PROCEDURE — 63600175 PHARM REV CODE 636 W HCPCS: Performed by: STUDENT IN AN ORGANIZED HEALTH CARE EDUCATION/TRAINING PROGRAM

## 2023-10-20 PROCEDURE — 99233 SBSQ HOSP IP/OBS HIGH 50: CPT | Mod: ,,, | Performed by: PEDIATRICS

## 2023-10-20 PROCEDURE — 93005 ELECTROCARDIOGRAM TRACING: CPT

## 2023-10-20 PROCEDURE — 99232 SBSQ HOSP IP/OBS MODERATE 35: CPT | Mod: ,,, | Performed by: STUDENT IN AN ORGANIZED HEALTH CARE EDUCATION/TRAINING PROGRAM

## 2023-10-20 PROCEDURE — 82565 ASSAY OF CREATININE: CPT

## 2023-10-20 PROCEDURE — 99232 PR SUBSEQUENT HOSPITAL CARE,LEVL II: ICD-10-PCS | Mod: ,,, | Performed by: STUDENT IN AN ORGANIZED HEALTH CARE EDUCATION/TRAINING PROGRAM

## 2023-10-20 PROCEDURE — 25000003 PHARM REV CODE 250: Performed by: STUDENT IN AN ORGANIZED HEALTH CARE EDUCATION/TRAINING PROGRAM

## 2023-10-20 PROCEDURE — 11000001 HC ACUTE MED/SURG PRIVATE ROOM

## 2023-10-20 PROCEDURE — 93010 ELECTROCARDIOGRAM REPORT: CPT | Mod: ,,, | Performed by: INTERNAL MEDICINE

## 2023-10-20 PROCEDURE — 36415 COLL VENOUS BLD VENIPUNCTURE: CPT

## 2023-10-20 PROCEDURE — 93010 EKG 12-LEAD: ICD-10-PCS | Mod: ,,, | Performed by: INTERNAL MEDICINE

## 2023-10-20 RX ORDER — IBUPROFEN 600 MG/1
600 TABLET ORAL EVERY 6 HOURS
Status: DISCONTINUED | OUTPATIENT
Start: 2023-10-20 | End: 2023-10-20

## 2023-10-20 RX ORDER — OXYCODONE HYDROCHLORIDE 10 MG/1
10 TABLET ORAL EVERY 4 HOURS PRN
Status: DISCONTINUED | OUTPATIENT
Start: 2023-10-20 | End: 2023-10-21 | Stop reason: HOSPADM

## 2023-10-20 RX ORDER — OXYCODONE HYDROCHLORIDE 5 MG/1
5 TABLET ORAL EVERY 4 HOURS PRN
Status: DISCONTINUED | OUTPATIENT
Start: 2023-10-20 | End: 2023-10-21 | Stop reason: HOSPADM

## 2023-10-20 RX ORDER — ACETAMINOPHEN 325 MG/1
650 TABLET ORAL EVERY 8 HOURS
Status: DISCONTINUED | OUTPATIENT
Start: 2023-10-20 | End: 2023-10-21 | Stop reason: HOSPADM

## 2023-10-20 RX ADMIN — ACETAMINOPHEN 650 MG: 325 TABLET, FILM COATED ORAL at 03:10

## 2023-10-20 RX ADMIN — ENOXAPARIN SODIUM 40 MG: 40 INJECTION SUBCUTANEOUS at 05:10

## 2023-10-20 RX ADMIN — DOCUSATE SODIUM 200 MG: 100 CAPSULE, LIQUID FILLED ORAL at 08:10

## 2023-10-20 RX ADMIN — ACETAMINOPHEN 650 MG: 325 TABLET, FILM COATED ORAL at 12:10

## 2023-10-20 RX ADMIN — ACETAMINOPHEN 650 MG: 325 TABLET, FILM COATED ORAL at 08:10

## 2023-10-20 RX ADMIN — DOCUSATE SODIUM 200 MG: 100 CAPSULE, LIQUID FILLED ORAL at 09:10

## 2023-10-20 RX ADMIN — ACETAMINOPHEN 650 MG: 325 TABLET, FILM COATED ORAL at 09:10

## 2023-10-20 RX ADMIN — OXYCODONE HYDROCHLORIDE 10 MG: 10 TABLET ORAL at 09:10

## 2023-10-20 RX ADMIN — SERTRALINE HYDROCHLORIDE 50 MG: 50 TABLET ORAL at 08:10

## 2023-10-20 NOTE — PLAN OF CARE
"Plan of care:  Mother will nurse baby on cue " 8 or more in 24" and lengthen feedings until content; will not go longer than 3 hrs between feedings; will hold baby close to her body and achieve a deep, comfortable latch and observe for signs of milk transfer; will monitor baby's 24hr diaper counts; will  call for assistance prn.   "

## 2023-10-20 NOTE — ASSESSMENT & PLAN NOTE
1. 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 Delivered healthy baby 3/17/20 via induced vaginal delivery without difficulty.  - now s/p delivery of a healthy boy via  on 10/18/23.  3. Mild thrombocytopenia  - common in Fontan patients.    4. Anemia after  likely contributing to some of her tachycardia  5. Possible nonsustained SVT vs. sinus tach the night of C section    Recommendations:  1. At present, no need for diuretics.  She does not look overloaded.  2. If she looks good tomorrow, I am OK with her going home.    3. Will have my nurse call next week to arrange follow up in 2 weeks.  4. Consider oral iron on discharge.  5. At a minimum, she should go home on aspirin but the lovenox is fine too.

## 2023-10-20 NOTE — SUBJECTIVE & OBJECTIVE
Interval History: Patient s/p C section on 10/18.  Had some mild ARF that improved with fluid boluses.  Had possible SVT (rate 153) for 5 seconds or so evening of 10/18 (unclear on my review of strip - could be sinus tach, but hard to tell).  Otherwise doing well.  Some mild dyspnea on exertion and tachycardia with exertion.  No shortness of breath or edema.    EKG from today reviewed.  Looks unchanged (sinus rhythm).    Objective:     Vital Signs (Most Recent):  Temp: 98 °F (36.7 °C) (10/20/23 1628)  Pulse: 74 (10/20/23 1628)  Resp: 18 (10/20/23 1628)  BP: (!) 122/57 (10/20/23 1628)  SpO2: (!) 94 % (10/20/23 1628) Vital Signs (24h Range):  Temp:  [97.6 °F (36.4 °C)-99 °F (37.2 °C)] 98 °F (36.7 °C)  Pulse:  [] 74  Resp:  [16-18] 18  SpO2:  [94 %-96 %] 94 %  BP: (101-124)/(51-61) 122/57     Weight: 60.9 kg (134 lb 4.2 oz)  Body mass index is 27.1 kg/m².     SpO2: (!) 94 %       Intake/Output - Last 3 Shifts         10/18 0700  10/19 0659 10/19 0700  10/20 0659 10/20 0700  10/21 0659    P.O. 550 2060 500    I.V. (mL/kg) 561.7 (9.2) 605.9 (9.9)     IV Piggyback 300 300     Total Intake(mL/kg) 1411.7 (23.2) 2966 (48.7) 500 (8.2)    Urine (mL/kg/hr) 1155 (0.8) 1145 (0.8) 850 (1.2)    Blood 1410      Total Output 2565 1145 850    Net -1153.3 +1821 -350           Urine Occurrence  1 x 2 x            Lines/Drains/Airways       Peripheral Intravenous Line  Duration                  Peripheral IV - Single Lumen 10/17/23 2150 18 G Right Forearm 2 days                    Scheduled Medications:    acetaminophen  650 mg Oral Q8H    docusate sodium  200 mg Oral BID    enoxparin  40 mg Subcutaneous Q24H (prophylaxis, 1700)    ibuprofen  600 mg Oral Q6H    lactated ringers  1,000 mL Intravenous Once    oxytocin in lactated ringers  334 beatrice-units/min Intravenous Once    oxytocin in lactated ringers  95 beatrice-units/min Intravenous Once    sertraline  50 mg Oral Daily       Continuous Medications:    oxytocin in lactated  ringers 4 beatrice-units/min (10/18/23 0614)       PRN Medications: 0.9%  NaCl infusion (for blood administration), acetaminophen, albuterol, calcium carbonate, carboprost, ceFAZolin (ANCEF) IVPB, diphenoxylate-atropine 2.5-0.025 mg, EPINEPHrine, lactated ringers, LIDOcaine HCL 10 mg/ml (1%), methylergonovine, miSOPROStoL, miSOPROStoL, oxyCODONE, oxyCODONE, oxytocin in lactated ringers, oxytocin in lactated ringers, oxytocin, prochlorperazine, simethicone, tranexamic acid (CYKLOKAPRON) infusion       Physical Exam     In general, she is an acyanotic, nondysmorphic appearing female in no apparent distress.  Very comfortable, sitting up in bed.  The eyes, nares, and oropharynx are clear.  Eyelids and conjunctiva free of drainage and redness.  PERRLA.  Teeth in good repair.  Mucous membranes are moist.  The head is normocephalic and atraumatic.  The neck is supple without jugular venous distention or thyroid enlargement.  The lungs are clear to auscultation bilaterally.  There is a well healed sternotomy scar.  The PMI is in the right chest.  The first heart sound is normal.  The second is loud and single.  There are no gallops, rubs, or clicks in the seated position.  2/6 systolic ejection murmur.  The abdominal was not examined.  Pulses are normal in all 4 extremities with brisk capillary refill and no edema.  No rashes are noted.  She has some clubbing.  No tenderness, swelling in legs.  Her neurological exam is normal.    CMP  Sodium   Date Value Ref Range Status   10/19/2023 133 (L) 136 - 145 mmol/L Final     Potassium   Date Value Ref Range Status   10/19/2023 3.9 3.5 - 5.1 mmol/L Final     Chloride   Date Value Ref Range Status   10/19/2023 106 95 - 110 mmol/L Final     CO2   Date Value Ref Range Status   10/19/2023 18 (L) 23 - 29 mmol/L Final     Glucose   Date Value Ref Range Status   10/19/2023 105 70 - 110 mg/dL Final     BUN   Date Value Ref Range Status   10/19/2023 12 6 - 20 mg/dL Final     Creatinine   Date  Value Ref Range Status   10/20/2023 0.7 0.5 - 1.4 mg/dL Final     Calcium   Date Value Ref Range Status   10/19/2023 7.8 (L) 8.7 - 10.5 mg/dL Final     Total Protein   Date Value Ref Range Status   10/19/2023 5.0 (L) 6.0 - 8.4 g/dL Final     Albumin   Date Value Ref Range Status   10/19/2023 1.9 (L) 3.5 - 5.2 g/dL Final     Total Bilirubin   Date Value Ref Range Status   10/19/2023 0.7 0.1 - 1.0 mg/dL Final     Comment:     For infants and newborns, interpretation of results should be based  on gestational age, weight and in agreement with clinical  observations.    Premature Infant recommended reference ranges:  Up to 24 hours.............<8.0 mg/dL  Up to 48 hours............<12.0 mg/dL  3-5 days..................<15.0 mg/dL  6-29 days.................<15.0 mg/dL       Alkaline Phosphatase   Date Value Ref Range Status   10/19/2023 92 55 - 135 U/L Final     AST   Date Value Ref Range Status   10/19/2023 20 10 - 40 U/L Final     ALT   Date Value Ref Range Status   10/19/2023 15 10 - 44 U/L Final     Anion Gap   Date Value Ref Range Status   10/19/2023 9 8 - 16 mmol/L Final     eGFR   Date Value Ref Range Status   10/20/2023 >60 >60 mL/min/1.73 m^2 Final     Lab Results   Component Value Date    WBC 21.10 (H) 10/19/2023    HGB 8.2 (L) 10/19/2023    HCT 25.5 (L) 10/19/2023    MCV 76 (L) 10/19/2023     (L) 10/19/2023

## 2023-10-20 NOTE — PROGRESS NOTES
"[facility] ([place])  Cardiology  Progress Note    Patient Name: Theresa Grey  MRN: [id number]  Admission Date: 10/17/2023  Hospital Length of Stay: 3 days  Code Status: Full Code   Attending Physician: Marybel Espino MD   Primary Care Physician: Medicine, Ds Family  Expected Discharge Date:   Principal Problem:Full-term premature rupture of membranes    Subjective:     Brief HPI:    2023: Comprehensive Progress Note by Dr. Toro Weems:     "Theresa Grey is a 27 y.o. female with the following diagnoses:  Diagnoses:  1. 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 Delivered healthy baby 3/17/20 via induced vaginal delivery without difficulty.  - now 30 weeks pregnant.  Normal fetal echo 23.  3. Mild thrombocytopenia  - common in Fontan patients.    4. Chest pain, suspect reflux     Discussion:  In regards to her pregnancy, I expect her to do well.  Her echo today looks great.  Concerns include:  1.  The clotting predisposition often noted in Fontan patients may be worsened by pregnancy.  2.  Without a sub pulmonic ventricle, she may tolerate the volume load associated with a pregnancy less well than a 2 ventricle patient.  There is an increased risk of heart failure symptoms.  3.  There is an increased risk of arrhythmias.    4.  Increased risk of miscarriage and  birth, increased risk of congenital heart disease.  That said, she really looks great and did well with the last pregnancy.  I do recommend delivering at [facility].  Vaginal delivery " is typically preferable if she is doing well.  IV fluids may be necessary to avoid dehydration.  I would recommend SBE prophylaxis for delivery although this is certainly debatable.  I would recommend close monitoring with telemetry during delivery and for 24-48 hours after delivery.         We have discussed the long-term risk associated with Fontan circulation. We discussed:  1. Risk of thromboembolic events with Fontan              - I agree with lovenox and aspirin for now.  Will go back to just a baby aspirin after delivery.   2. Long term risk of arrhythmias, heart failure, PLE, liver disease discussed     Her chest pain is not cardiac.  I think reflux is a real possibility, especially given her pregnancy.     Plan:  Continue aspirin and lovenox.  2.   I will see her when she delivers.    3.   SBEP is indicated for dental work.    4.   Maternal-Fetal Medicine follow-up scheduled next week     Interval history:  Overall, she has done well since I last saw her.  She does complain of intermittent chest pain that is midsternal and can radiate to the right shoulder and her right side.  Pain can last anywhere from a few minutes to a few days.  One severe episode occurred after eating spicy food and prompted an emergency room visit.  Last month.  Blood work was very reassuring.  They actually did a CT scan looking for pulmonary embolism, and surprisingly there was no evidence of any blood flow abnormality.  A liver ultrasound was performed, and there was no evidence of gallbladder disease.  No syncope.  No shortness of breath.  No worsening palpitations.  No edema.  No diarrhea.     PMH:  She was diagnosed as a young child with congenital heart disease while living in [place]. She was evaluated at [facility] ([place]) with a cath at about 7 years of age. She was scheduled for heart surgery by her report about 10 years ago. However, due to social issues (no insurance, no social security number) the surgery was put off and  "she was lost to follow up. I first saw her in 2014 she was admitted with pregnancy, rare chest pain, and cyanosis.  After long discussion, she had a pregnancy termination due to the risk associated with unrepaired cyanotic congenital heart disease.  On 9/29/16, she underwent an extracardiac Fontan with oversewing and ligation of the MPA at [facility].  She did very well and was discharged a week later.       The review of systems is as noted above. It is otherwise negative for other symptoms related to the general, neurological, psychiatric, endocrine, gastrointestinal, genitourinary, respiratory, dermatologic, musculoskeletal, hematologic, and immunologic systems."     History as above. No cardiac issues during her pregnancy. Good exercise tolerance. She has been on enoxaparin 40 mg sc Q24. She underwent C section on 10/18/2023. Feeling well. Denies any SOB.     Hospital Course:     Observation.    Interval History:    No CP or SOB.    Comfortable supine.    Sore breasts.     No edema of legs.    Review of Systems   Constitutional: Negative for chills, fever and malaise/fatigue.   HENT:  Negative for nosebleeds.    Eyes:  Negative for vision loss in left eye and vision loss in right eye.   Cardiovascular:  Negative for chest pain, leg swelling, orthopnea, palpitations and paroxysmal nocturnal dyspnea.   Respiratory:  Negative for cough, hemoptysis, shortness of breath, sputum production and wheezing.    Hematologic/Lymphatic: Negative for bleeding problem. Does not bruise/bleed easily.   Skin:  Negative for color change and rash.   Musculoskeletal:  Negative for muscle weakness and myalgias.   Gastrointestinal:  Negative for abdominal pain, heartburn, hematemesis, hematochezia, melena, nausea and vomiting.   Genitourinary:  Positive for pelvic pain. Negative for hematuria.        Sore breasts.   Neurological:  Negative for dizziness, focal weakness, headaches, light-headedness, vertigo and weakness. "   Psychiatric/Behavioral:  Negative for altered mental status. The patient is not nervous/anxious.    Allergic/Immunologic: Negative for persistent infections.     Objective:     Vital Signs (Most Recent):  Temp: 98.5 °F (36.9 °C) (10/20/23 0400)  Pulse: 65 (10/20/23 0802)  Resp: 18 (10/20/23 0400)  BP: (!) 107/59 (10/20/23 0400)  SpO2: (!) 94 % (10/20/23 0400) Vital Signs (24h Range):  Temp:  [97.6 °F (36.4 °C)-98.6 °F (37 °C)] 98.5 °F (36.9 °C)  Pulse:  [] 65  Resp:  [17-18] 18  SpO2:  [94 %-98 %] 94 %  BP: (101-124)/(56-64) 107/59     Weight: 60.9 kg (134 lb 4.2 oz)  Body mass index is 27.1 kg/m².    SpO2: (!) 94 %         Intake/Output Summary (Last 24 hours) at 10/20/2023 0810  Last data filed at 10/20/2023 0419  Gross per 24 hour   Intake 2965.95 ml   Output 1100 ml   Net 1865.95 ml       Lines/Drains/Airways       Peripheral Intravenous Line  Duration                  Peripheral IV - Single Lumen 10/17/23 2150 18 G Right Forearm 2 days                    Physical Exam  Cardiovascular:      Rate and Rhythm: Normal rate and regular rhythm.      Heart sounds: Murmur heard.      Holosystolic murmur is present with a grade of 3/6 at the upper right sternal border.   Pulmonary:      Effort: Pulmonary effort is normal.      Breath sounds: Normal breath sounds.   Chest:      Comments: Midline sternotomy scar.  Musculoskeletal:      Right lower leg: Normal.      Left lower leg: Normal.         Current Medications:     docusate sodium  200 mg Oral BID    enoxparin  40 mg Subcutaneous Q24H (prophylaxis, 1700)    lactated ringers  1,000 mL Intravenous Once    oxytocin in lactated ringers  334 beatrice-units/min Intravenous Once    oxytocin in lactated ringers  95 beatrice-units/min Intravenous Once    sertraline  50 mg Oral Daily     Current Laboratory Results:    Recent Results (from the past 24 hour(s))   Creatinine, serum    Collection Time: 10/20/23  6:14 AM   Result Value Ref Range    Creatinine 0.7 0.5 - 1.4 mg/dL     eGFR >60 >60 mL/min/1.73 m^2     Current Imaging Results:    No orders to display       7/6/2023: Echo:     Pregnant at 22 weeks EGA.  Technically very limited imaging secondary to difficult acoustic windows.  Dextrocardia.  Laminar flow noted in normal size inferior vena cava connecting to extracardiac conduit to Fontan anastomosis, left-sided SVC connecting to left pulmonary artery to Pavel anastomisis, proximal LPA, pulmonary confluence and proximal RPA.  Qualitative impression of normal size atria.  Straddling left AV valve demonstrated with trivial AV valve insufficiency.  Right-sided AV valve with no evidence of stenosis and mild  insufficiency.  Large inlet VSD with unrestricted flow from LV to RV.  The ventricles are jason-cross in relationship as demonstrated by color Doppler with limited details of the ventricular structures demonstrated by 2D imaging.  Qualitatively good biventricular systolic function.  Centrally positioned pulmonary valve with small annulus and no obvious flow.  Leftward and anteriorly positioned aortic valve with no evidence of subaortic or aortic valve stenosis or insufficiency.  Large right aortic arch.  There is no pericardial effusion.        10/19/2019:Cardiac MRI:     Patent innominate vein to left-sided SVC. Widely patent left-sided Pavel anastomosis. The IVC is anastomosed to the left pulmonary artery. The Fontan tunnel is widely patent without evidence of obstruction. The branch pulmonary arteries are normal in size with no evidence of obstruction.   Atrio-ventricular concordance with a jason-cross type of arrangement.   The right ventricle volumes are low normal. Moderate RVH. The calculated RVEF is 45 %.  The left ventricular volumes are low normal. The calculated LVEF is 50 %.   Large inlet type ventricular septal defect.   Oversewn pulmonary valve.   Anterior and rightward aorta. Structurally normal aortic valve with no significant aortic insufficiency.   Right aortic  arch.     Assessment and Plan:     Problem List:    Active Diagnoses:    Diagnosis Date Noted POA    PRINCIPAL PROBLEM:  Full-term premature rupture of membranes [O42.92] 10/17/2023 Yes    Pre-eclampsia in third trimester [O14.93] 10/18/2023 No    Anxiety [F41.9] 10/17/2023 Yes    Gestational diabetes mellitus (GDM) in third trimester [O24.419] 10/17/2023 Yes    Congenital heart disease during pregnancy [O99.891, Q24.9] 04/21/2023 Yes    Encounter for induction of labor [Z34.90] 03/16/2020 Not Applicable    Thrombocytopenia [D69.6] 01/19/2020 Yes    S/P Fontan procedure [Z98.890] 01/27/2016 Not Applicable    Pulmonary stenosis [I37.0] 12/11/2014 Yes      Problems Resolved During this Admission:    Diagnosis Date Noted Date Resolved POA    Normal labor [O80, Z37.9] 10/17/2023 10/17/2023 Not Applicable     Assessment and Plan:     Complex Congenital Heart Disease               [facility] by Dr. Toro Weems.   10/18/2023: C section.              She appears to have tolerated pregnancy and C section very well.               Doing well following C section.   On enoxaparin 40 mg sc Q24.              I would follow recommendations for her post delivery care as laid out in Dr. Toro Weems's very comprehensive note.    VTE Risk Mitigation (From admission, onward)           Ordered     enoxaparin injection 40 mg  Every 24 hours         10/19/23 1701     IP VTE HIGH RISK PATIENT  Once         10/17/23 2226     Place sequential compression device  Until discontinued         10/17/23 2226                    Porfirio Patino MD  Cardiology  [facility] ([place])

## 2023-10-20 NOTE — LACTATION NOTE
Lactation Note: Breastfeeding basic education reviewed. Encouraged mother to continue to nurse baby on cue, observing for signs of milk transfer and use I&O log. Requested to call for latch assessment/assistance at next nursing  LC number written on board.

## 2023-10-20 NOTE — CARE UPDATE
MD at bedside to assess patient after TELE notified team of SVT for 5 sec. During the event the patient states she felt her heart racing while she was walking to the bathroom and sat on the toliet to use the bathroom. The patient reports that she was in quite a bit of pain during this time. She felt short of breath but did not have chest pain.     Temp:  [97.6 °F (36.4 °C)-98.6 °F (37 °C)] 97.6 °F (36.4 °C)  Pulse:  [] 84  Resp:  [17-18] 18  SpO2:  [94 %-98 %] 94 %  BP: (101-124)/(56-67) 124/60    Regular rate and rhythm. Lungs clear to ascultation.    When asked how she feels like, patient reports feeling great. Patient asymptomatic at this time without chest pain, SOB, or difficulty breathing AND she does not feel like her heart is currently racing. Will continue to monitor.    Alma Gonzalez MD PGY-2  Obstetrics and Gynecology

## 2023-10-20 NOTE — MEDICAL/APP STUDENT
POSTPARTUM PROGRESS NOTE    Subjective:     PPD/POD#: 2   Procedure: Primary LTCS (arrest of descent)   EGA: 37w4d   N/V: No   F/C: No   Abd Pain: Mild, well-controlled with oral pain medication   Lochia: Mild   Voiding: Yes   Ambulating: Yes   Bowel fnc: Yes   Contraception:  vasectomy     Objective:      Temp:  [97.6 °F (36.4 °C)-98.6 °F (37 °C)] 98.5 °F (36.9 °C)  Pulse:  [] 80  Resp:  [17-18] 18  SpO2:  [94 %-98 %] 94 %  BP: (101-124)/(56-64) 107/59    Abdomen: Soft, appropriately tender   Uterus: Firm, moderate fundal tenderness   Incision: Bandage in place with lateral shadowing- stable, marked     Lab Review    Recent Labs   Lab 10/17/23  2210 10/19/23  0008 10/19/23  0555 10/19/23  0657   * 133*  --   --    K 4.1 3.9  --   --     106  --   --    CO2 19* 18*  --   --    BUN 9 12  --   --    CREATININE 0.6 1.3 1.0  --    GLU 75 154*  --  105   PROT 7.7 5.0*  --   --    BILITOT 0.3 0.7  --   --    ALKPHOS 136* 92  --   --    ALT 18 15  --   --    AST 23 20  --   --        Recent Labs   Lab 10/17/23  2210 10/18/23  2227 10/19/23  0657   WBC 11.34 24.35* 21.10*   HGB 12.4 10.2* 8.2*   HCT 39.0 32.2* 25.5*   MCV 76* 77* 76*   * 138* 110*         I/O    Intake/Output Summary (Last 24 hours) at 10/20/2023 0624  Last data filed at 10/20/2023 0419  Gross per 24 hour   Intake 2965.95 ml   Output 1145 ml   Net 1820.95 ml        Assessment and Plan:   Postpartum care:  - Patient doing well.  - Continue routine management and advances.  -pLTCS for arrest of descent     PPH  -QBL 1400cc  -AM CBC 8/25  -fe/colace  -remains asymptomatic     Chorioamnionitis  -T last/max 101.4 @ 1838 on 10/18  -s/p amp/gent/clinda   -asymptomatic this AM     Congenital Heart Disease  -asymptomatic, denies CP, SOB, palpitations  -s/p repairs  -maintain euvolemia, strict I&O ordered   -s/p ampicillin during pushing for endocarditis ppx  -on TELE: 5 second episode of SVT last night when walking to  bathroom  -cardiology consulted, reaching out to Dr. Weems for recs  -will need lovenox for 6wk PP  -Initiating Lovenox 40mg in the setting of creatine clearance >30 (79), >36 hours post partum    SUZANNE  -Cr 0.6 >>1.3>1.0, today AM repeat in process  -S/P cuevas, urine output adequate- blood tinged  -s/p 2x 500cc bolus and on IVF (LR@50cc/hr  -avoid NSAIDs (not ordered)  -if re-initiate lovenox for VTE ppx, will need to evaluate for renal dosing      Thrombocytopenia  -11/19 Plts 110  -s/p epidural     GDMA1  -Fasting   -will need 2 week PP GTT     Anxiety   -continue on home zoloft      Pre-E without SF  -BP as above  -Magnesium not necessary  -Anti-hypertensive agent not indicated at this time  -repeat labs with elevated Cr, see SUZANNE (suspect due to hypovolemia)        Plan to discharge home as patient meets post op milestones, SUZANNE resolves.    Bhargavi Bowden  [facility], MS4

## 2023-10-20 NOTE — PLAN OF CARE
POC reviewed with pt throughout the shift; all questions answered. Pt ambulating, voiding, and passing flatus. Continues to tolerate PO well with no SS of distress at this time. Pain well controlled throughout shift by oral pain medication. Bleeding remains light and fundus is firm. 12 lead EKG done. MD notified of the results. Safety maintained per unit protocol. See flowsheets for additional information.

## 2023-10-20 NOTE — LACTATION NOTE
10/19/23 1809   Maternal Assessment   Breast Shape Right:;round   Breast Density Right:;soft   Areola Right:;elastic   Nipples Right:;everted   Maternal Infant Feeding   Maternal Emotional State assist needed;relaxed   Infant Positioning cross-cradle   Signs of Milk Transfer infant jaw motion present   Pain with Feeding no   Latch Assistance yes  (minimal to have baby closer to mohter's body and positioned chest-to-chest)     Visited patient in room, baby latched onto R breast, cross cradle position, baby latched on R breast shallowly.  Baby self-removed.  Blankets removed from baby and baby positioned closer to patient's body, chest-to-chest.  Minimal attachment assistance provided to achieve a deeper latch c wider gape, baby actively sucking.   Basic education provided, Guide reviewed.

## 2023-10-21 VITALS
SYSTOLIC BLOOD PRESSURE: 122 MMHG | BODY MASS INDEX: 27.06 KG/M2 | HEIGHT: 59 IN | TEMPERATURE: 98 F | DIASTOLIC BLOOD PRESSURE: 63 MMHG | WEIGHT: 134.25 LBS | OXYGEN SATURATION: 96 % | RESPIRATION RATE: 19 BRPM | HEART RATE: 65 BPM

## 2023-10-21 PROBLEM — Z98.891 STATUS POST PRIMARY LOW TRANSVERSE CESAREAN SECTION: Status: ACTIVE | Noted: 2023-10-21

## 2023-10-21 LAB
BLD PROD TYP BPU: NORMAL
BLD PROD TYP BPU: NORMAL
BLOOD UNIT EXPIRATION DATE: NORMAL
BLOOD UNIT EXPIRATION DATE: NORMAL
BLOOD UNIT TYPE CODE: 5100
BLOOD UNIT TYPE CODE: 5100
BLOOD UNIT TYPE: NORMAL
BLOOD UNIT TYPE: NORMAL
CODING SYSTEM: NORMAL
CODING SYSTEM: NORMAL
CROSSMATCH INTERPRETATION: NORMAL
CROSSMATCH INTERPRETATION: NORMAL
DISPENSE STATUS: NORMAL
DISPENSE STATUS: NORMAL
TRANS ERYTHROCYTES VOL PATIENT: NORMAL ML
TRANS ERYTHROCYTES VOL PATIENT: NORMAL ML

## 2023-10-21 PROCEDURE — 25000003 PHARM REV CODE 250

## 2023-10-21 PROCEDURE — 99238 HOSP IP/OBS DSCHRG MGMT 30/<: CPT | Mod: ,,, | Performed by: STUDENT IN AN ORGANIZED HEALTH CARE EDUCATION/TRAINING PROGRAM

## 2023-10-21 PROCEDURE — 99238 PR HOSPITAL DISCHARGE DAY,<30 MIN: ICD-10-PCS | Mod: ,,, | Performed by: STUDENT IN AN ORGANIZED HEALTH CARE EDUCATION/TRAINING PROGRAM

## 2023-10-21 PROCEDURE — 25000003 PHARM REV CODE 250: Performed by: STUDENT IN AN ORGANIZED HEALTH CARE EDUCATION/TRAINING PROGRAM

## 2023-10-21 PROCEDURE — 63600175 PHARM REV CODE 636 W HCPCS: Performed by: STUDENT IN AN ORGANIZED HEALTH CARE EDUCATION/TRAINING PROGRAM

## 2023-10-21 RX ORDER — OXYCODONE HYDROCHLORIDE 5 MG/1
5 TABLET ORAL EVERY 4 HOURS PRN
Qty: 15 TABLET | Refills: 0 | Status: SHIPPED | OUTPATIENT
Start: 2023-10-21 | End: 2023-11-17

## 2023-10-21 RX ORDER — NAPROXEN SODIUM 220 MG/1
81 TABLET, FILM COATED ORAL DAILY
Qty: 60 TABLET | Refills: 0 | Status: SHIPPED | OUTPATIENT
Start: 2023-10-22 | End: 2023-12-21

## 2023-10-21 RX ORDER — ENOXAPARIN SODIUM 100 MG/ML
40 INJECTION SUBCUTANEOUS EVERY 24 HOURS
Qty: 16 ML | Refills: 0 | Status: SHIPPED | OUTPATIENT
Start: 2023-10-21 | End: 2023-11-17

## 2023-10-21 RX ORDER — IRON POLYSACCHARIDE COMPLEX 150 MG
150 CAPSULE ORAL DAILY
Status: DISCONTINUED | OUTPATIENT
Start: 2023-10-21 | End: 2023-10-21 | Stop reason: HOSPADM

## 2023-10-21 RX ORDER — IRON POLYSACCHARIDE COMPLEX 150 MG
150 CAPSULE ORAL DAILY
Qty: 30 CAPSULE | Refills: 0 | Status: SHIPPED | OUTPATIENT
Start: 2023-10-22

## 2023-10-21 RX ORDER — NAPROXEN SODIUM 220 MG/1
81 TABLET, FILM COATED ORAL DAILY
Status: DISCONTINUED | OUTPATIENT
Start: 2023-10-21 | End: 2023-10-21 | Stop reason: HOSPADM

## 2023-10-21 RX ORDER — DOCUSATE SODIUM 100 MG/1
200 CAPSULE, LIQUID FILLED ORAL 2 TIMES DAILY PRN
Qty: 60 CAPSULE | Refills: 0 | Status: SHIPPED | OUTPATIENT
Start: 2023-10-21

## 2023-10-21 RX ORDER — DEXTROMETHORPHAN HYDROBROMIDE, GUAIFENESIN 5; 100 MG/5ML; MG/5ML
650 LIQUID ORAL EVERY 8 HOURS
Qty: 60 TABLET | Refills: 1 | Status: SHIPPED | OUTPATIENT
Start: 2023-10-21

## 2023-10-21 RX ADMIN — SERTRALINE HYDROCHLORIDE 50 MG: 50 TABLET ORAL at 09:10

## 2023-10-21 RX ADMIN — ENOXAPARIN SODIUM 40 MG: 40 INJECTION SUBCUTANEOUS at 04:10

## 2023-10-21 RX ADMIN — ACETAMINOPHEN 650 MG: 325 TABLET, FILM COATED ORAL at 03:10

## 2023-10-21 RX ADMIN — POLYSACCHARIDE-IRON COMPLEX 150 MG: 150 CAPSULE ORAL at 09:10

## 2023-10-21 RX ADMIN — ACETAMINOPHEN 650 MG: 325 TABLET, FILM COATED ORAL at 09:10

## 2023-10-21 RX ADMIN — ASPIRIN 81 MG CHEWABLE TABLET 81 MG: 81 TABLET CHEWABLE at 09:10

## 2023-10-21 RX ADMIN — ACETAMINOPHEN 650 MG: 325 TABLET, FILM COATED ORAL at 04:10

## 2023-10-21 RX ADMIN — DOCUSATE SODIUM 200 MG: 100 CAPSULE, LIQUID FILLED ORAL at 09:10

## 2023-10-21 NOTE — PLAN OF CARE
VSS overnight; patient continues telemetry monitoring. Patient denies HA, dizziness, vision changes, and RUQ pain. Pt safety maintained, side rails up, bed low and locked position. Pt ambulating independently and voiding without difficulty; intake/output monitored. Pain well controlled with scheduled PO pain medication. Fundus is midline and firm with light lochia. Hydrocolloid dressing in place and free of signs of infection. Significant other at bedside and attentive to patient's needs; parents responding to infant cues and bonding appropriately. No additional needs at this time.

## 2023-10-21 NOTE — LACTATION NOTE
"Plan of care:  Patient's goal is to breast and bottle feed baby.  Patient will feed baby on cue "8 or more in 24" till content; if desires, patient will breastfeed baby, ensure baby achieves a deep latch and observe for signs of milk transfer; at each feeding, patient will double pump breasts using the initiation/Maintain pumping patterns c the most suction that is comfortable; at each feeding, will supplement baby c EBM/formula ad delmis by bottle feeding; will care for the collection kit as instructed; will store and use EBM as instructed; will monitor baby's 24hr diaper counts; will refer to the Guide and call the Warmline for additional assistance and support.   "

## 2023-10-21 NOTE — PROGRESS NOTES
POSTPARTUM PROGRESS NOTE    Subjective:     PPD/POD#: 3   Procedure: Primary LTCS (arrest of descent)   EGA: 37w4d   N/V: No   F/C: No   Abd Pain: Mild, well-controlled with oral pain medication   Lochia: Mild   Voiding: Yes    Ambulating: Yes   Bowel fnc: Yes   Contraception: Vasectomy     Objective:      Temp:  [98 °F (36.7 °C)-99 °F (37.2 °C)] 98.6 °F (37 °C)  Pulse:  [] 61  Resp:  [16-18] 18  SpO2:  [94 %-96 %] 96 %  BP: (101-127)/(51-66) 127/66    Abdomen: Soft, appropriately tender   Uterus: Firm, no fundal tenderness   Incision: Bandage in place with minimal central shadowing (stable/marked)     Lab Review    Recent Labs   Lab 10/17/23  2210 10/19/23  0008 10/19/23  0555 10/19/23  0657 10/20/23  0614   * 133*  --   --   --    K 4.1 3.9  --   --   --     106  --   --   --    CO2 19* 18*  --   --   --    BUN 9 12  --   --   --    CREATININE 0.6 1.3 1.0  --  0.7   GLU 75 154*  --  105  --    PROT 7.7 5.0*  --   --   --    BILITOT 0.3 0.7  --   --   --    ALKPHOS 136* 92  --   --   --    ALT 18 15  --   --   --    AST 23 20  --   --   --          Recent Labs   Lab 10/17/23  2210 10/18/23  2227 10/19/23  0657   WBC 11.34 24.35* 21.10*   HGB 12.4 10.2* 8.2*   HCT 39.0 32.2* 25.5*   MCV 76* 77* 76*   * 138* 110*           I/O    Intake/Output Summary (Last 24 hours) at 10/21/2023 0514  Last data filed at 10/21/2023 0355  Gross per 24 hour   Intake 1100 ml   Output 1725 ml   Net -625 ml          Assessment and Plan:   Postpartum care:  - Patient doing well.  - Continue routine management and advances.  - Scheduled ibuprofen and tylenol added (10/20)  - Norco 5/10 q4 PRN added breakthrough pain added (10/20)  -pLTCS for arrest of descent    PPH  -QBL 1400cc  -AM CBC 8/25  -fe/colace  -asymptomatic    Chorioamnionitis  -T last/max 101.4 @ 1838 on 10/18  -s/p amp/gent/clinda for 24 hours PP  -asymptomatic this AM, appropriately tender post op    Congenital Heart Disease  - Asymptomatic, denies  CP, SOB, palpitations; reports fatigue yesterday  - S/P repairs  - Maintain euvolemia, strict I&O ordered   - S/P ampicillin during pushing for endocarditis ppx  - On TELE, 5 seconds of SVT noted (10/20 @ 0206), pt asymptomatic at this time  - Will need lovenox for 6wk PP, continued while inpatient   - Cardiology consulted, appreciate recs   - Dr. Weems to see patient 10/20  - No need for diuretics  - Can go home today if stable  - Will arrange for outpatient follow up in 2 weeks  - Consider oral iron at discharge  - At minimum DC with ASA, but the Lovenox is fine    SUZANNE  -Cr 1.0 > 0.7, UOP adequate  -s/p cuevas and passed spontaneous void trial  -Added NSAIDs yesterday after Cr normalized    Thrombocytopenia  -Plts 110  -s/p epidural    GDMA1  -Fasting   -will need 2 week PP GTT    Anxiety   -continue on home zoloft     Pre-E without SF  -BP as above  -Magnesium not necessary  -Anti-hypertensive agent not indicated at this time  -repeat labs with elevated Cr, see SUZANNE (suspect due to hypovolemia) - this has resolved      Plan to discharge home as patient meets post op milestones, SUZANNE resolves, s/p evaluation by cardiology       Marie Viveros MD PGY1  Obstetrics and Gynecology    Fellow attestation.   Patient is a 28 y.o.  POD 3 s/p pLTCS at 37w4d. Followed by M for complex cyanotic congenital heart disease with dextrocardia, DORV, large VSD, significant pulmonary valve stenosis - now s/p 22 mm extracardiac Fontan with oversewing of the pulmonary valve and division of the MPA on 16 at [facility].     Patient doing well today without complaints. Discussed recommendation for post partum lovenox and continuation of ASA 81 mg qD. Will follow up with Dr. Weems and primary OB. Plan to discharge home today if baby discharged, otherwise plan for discharge tomorrow.     Elly Chavez MD  PGY 6  Maternal Fetal Medicine  [facility]

## 2023-10-21 NOTE — LACTATION NOTE
10/21/23 1515   Maternal Assessment   Breast Shape Bilateral:;round   Breast Density Bilateral:;soft   Areola Bilateral:;elastic   Nipples Bilateral:;everted;graspable   Left Nipple Symptoms tender   Right Nipple Symptoms tender;scabbed;redness   Maternal Infant Feeding   Maternal Emotional State assist needed;relaxed   Equipment Type   Breast Pump Type double electric, hospital grade   Breast Pump Flange Type hard   Breast Pump Flange Size 21 mm   Breast Pumping   Breast Pumping Interventions frequent pumping encouraged   Breast Pumping double electric breast pump utilized   Community Referrals   Community Referrals outpatient lactation program;pediatric care provider;support group     Visited patient in room, baby sleeping in arms.  Feeding options discussed at length and plan of care developed for post-discharge. Patient states baby recently bottle fed donor milk, content. Assisted patient to use the Symphony pump c the Initiation pumping pattern c the most suction that was comfortable, obtained < 5ml colostrum total.  Father washed double collection kit, air drying.  Patient rented symphony pump for use at home.  Discharge education provided, Guide reviewed.

## 2023-10-21 NOTE — LACTATION NOTE
This note was copied from a baby's chart.  Discussed the risks of the introduction of an artificial nipple. Encouraged to put the baby to the breast when feeding cues are present.  Discussed the AAP recommendation of no bottles or pacifiers until at least 1 month of age.  States understanding verbalized appropriate recall. Pt states that her intention is to offer an artificial nipple at this time in order to be able to soothe infant while under phototherapy.  Request honored.

## 2023-10-21 NOTE — PLAN OF CARE
Problem:  Fall Injury Risk  Goal: Absence of Fall, Infant Drop and Related Injury  Outcome: Met     Problem: Adult Inpatient Plan of Care  Goal: Plan of Care Review  Outcome: Met  Goal: Patient-Specific Goal (Individualized)  Outcome: Met  Goal: Absence of Hospital-Acquired Illness or Injury  Outcome: Met  Goal: Optimal Comfort and Wellbeing  Outcome: Met  Goal: Readiness for Transition of Care  Outcome: Met     Problem: Infection  Goal: Absence of Infection Signs and Symptoms  Outcome: Met     Problem: Diabetes in Pregnancy  Goal: Blood Glucose Level Within Targeted Range  Outcome: Met     Problem: Pain Acute  Goal: Acceptable Pain Control and Functional Ability  Outcome: Met     Problem: Breastfeeding  Goal: Effective Breastfeeding  Outcome: Met     Problem: Adjustment to Role Transition (Postpartum  Delivery)  Goal: Successful Maternal Role Transition  Outcome: Met     Problem: Bleeding (Postpartum  Delivery)  Goal: Hemostasis  Outcome: Met     Problem: Infection (Postpartum  Delivery)  Goal: Absence of Infection Signs and Symptoms  Outcome: Met     Problem: Pain (Postpartum  Delivery)  Goal: Acceptable Pain Control  Outcome: Met     Problem: Skin Injury Risk Increased  Goal: Skin Health and Integrity  Outcome: Met

## 2023-10-21 NOTE — NURSING
D/C instructions given to patient regarding follow up appts, Rx prescribed by MD and S&S of preE and PPH. Pt stated understanding. Mom aware when to follow up with OB. Pt in no distress awaiting transport for d/c.      ROBERTO shen reviewed, questions answered

## 2023-10-21 NOTE — DISCHARGE SUMMARY
Delivery Discharge Summary  Obstetrics      Primary OB Clinician: Davey Turpin MD    Admission date: 10/17/2023  Discharge date: 10/21/2023    Disposition: To home, self care    Discharge Diagnosis List:      Patient Active Problem List   Diagnosis    Pulmonary stenosis    Situs inversus    Adult congenital heart disease    DORV (double outlet right ventricle)    S/P Fontan procedure    Dextrocardia    Thrombocytopenia    Encounter for induction of labor    Congenital heart disease during pregnancy    High-risk pregnancy in third trimester    Full-term premature rupture of membranes    Anxiety    Gestational diabetes mellitus (GDM) in third trimester    Pre-eclampsia in third trimester    Status post primary low transverse  section       Procedure: , due to arrest of descent    Hospital Course:  Theresa Grey is a 28 y.o. now , POD #3 who was admitted on 10/17/2023 at 37w3d for labor. Pregnancy was complicated by congential heart disease, dextrocardia, thrombocytopenia, pulmonary valve stenosis, GDMA1, anxiety. Patient was subsequently admitted to labor and delivery unit with signed consents.     Labor course was complicated by chorioamnionitis, Pre-eclampsia without severe features, and arrest of descent, and decision was made to proceed with delivery via  which was complicated by postpartum hemorrhage.    Please see delivery note for further details. Her postpartum course was uncomplicated. On discharge day, patient's pain is controlled with oral pain medications. Pt is tolerating ambulation without SOB or CP, and regular diet without N/V. Reports lochia is mild. Denies any HA, vision changes, F/C, LE swelling. Denies any breast pain/soreness.    Pt in stable condition and ready for discharge. She has been instructed to start and/or continue medications and follow up with her obstetrics provider as listed below.    Pertinent studies:  CBC  Recent Labs   Lab 10/17/23  3263  "10/18/23  2227 10/19/23  0657   WBC 11.34 24.35* 21.10*   HGB 12.4 10.2* 8.2*   HCT 39.0 32.2* 25.5*   MCV 76* 77* 76*   * 138* 110*          Immunization History   Administered Date(s) Administered    COVID-19, MRNA, LN-S, PF (Pfizer) (Gray Cap) 2022    Tdap 2020        Delivery:    Episiotomy:     Lacerations:     Repair suture:     Repair # of packets:     Blood loss (ml):       Birth information:  YOB: 2025   Time of birth: 8:00 PM   Sex: female   Delivery type: , Low Transverse   Gestational Age: 37w4d     Measurements    Weight: 2710 g  Weight (lbs): 5 lb 15.6 oz  Length: 45.7 cm  Length (in): 18"  Head circumference: 33.5 cm  Chest circumference: 30.5 cm         Delivery Clinician: Delivery Providers    Delivering clinician: Gifty Durand MD   Provider Role    Alma Gonzalez MD Resident    Odette Page RN Circulator    Jean Carlos Young ST Scrub Person    Erendira Howard RN Charge Nurse    Neris Hill RN Registered Nurse             Additional  information:  Forceps:    Vacuum:    Breech:    Observed anomalies      Living?:     Apgars    Living status: Living  Apgar Component Scores:  1 min.:  5 min.:  10 min.:  15 min.:  20 min.:    Skin color:  0  1       Heart rate:  2  2       Reflex irritability:  0  2       Muscle tone:  0  1       Respiratory effort:  0  2       Total:  2  8       Apgars assigned by: ALEC HILL (1 MIN),NICU (5 MIN)         Placenta: Delivered:       appearance    Patient Instructions:   Current Discharge Medication List        START taking these medications    Details   acetaminophen (TYLENOL) 650 MG TbSR Take 1 tablet (650 mg total) by mouth every 8 (eight) hours.  Qty: 60 tablet, Refills: 1      aspirin 81 MG Chew Take 1 tablet (81 mg total) by mouth once daily.  Qty: 60 tablet, Refills: 0      docusate sodium (COLACE) 100 MG capsule Take 2 capsules (200 mg total) by mouth 2 (two) times daily as needed for Constipation.  Qty: " 60 capsule, Refills: 0      iron polysaccharides (NIFEREX) 150 mg iron Cap Take 1 capsule (150 mg total) by mouth once daily.  Qty: 30 capsule, Refills: 0           CONTINUE these medications which have CHANGED    Details   enoxaparin (LOVENOX) 40 mg/0.4 mL Syrg Inject 0.4 mLs (40 mg total) into the skin Q24H (prophylaxis, 1700).  Qty: 16 mL, Refills: 0           CONTINUE these medications which have NOT CHANGED    Details   prenatal vit no.124/iron/folic (PRENATAL VITAMIN ORAL) Take 1 tablet by mouth once daily.           STOP taking these medications       aspirin (ECOTRIN) 81 MG EC tablet Comments:   Reason for Stopping:         famotidine (PEPCID) 20 MG tablet Comments:   Reason for Stopping:         sertraline (ZOLOFT) 50 MG tablet Comments:   Reason for Stopping:               Discharge Procedure Orders   Diet Adult Regular     Lifting restrictions   Order Comments: No lifting more than infant for six weeks.     Other restrictions (specify):     No driving until:   Order Comments: Comfortable stepping on gas and brakes     Pelvic Rest   Order Comments: Nothing in vagina, including intercourse, for at least six weeks     Notify your health care provider if you experience any of the following:  temperature >100.4     Notify your health care provider if you experience any of the following:  persistent nausea and vomiting or diarrhea     Notify your health care provider if you experience any of the following:  severe uncontrolled pain     Notify your health care provider if you experience any of the following:  redness, tenderness, or signs of infection (pain, swelling, redness, odor or green/yellow discharge around incision site)     Notify your health care provider if you experience any of the following:  severe persistent headache     Notify your health care provider if you experience any of the following:  persistent dizziness, light-headedness, or visual disturbances     Notify your health care provider if  you experience any of the following:  increased confusion or weakness     Notify your health care provider if you experience any of the following:   Order Comments: Heavy vaginal bleeding, saturating more than two pads in one hour     Activity as tolerated        Follow-up Information       Toro Weems MD Follow up in 2 week(s).    Specialties: Pediatric Cardiology, Cardiology  Contact information:  [address]  [facility] [id number] [phone]               Davey Turpin MD Follow up in 2 week(s).    Specialties: Obstetrics, Obstetrics and Gynecology  Why: for incision check  Contact information:  [address]  [place], [place] [facility] [id number] [phone]                              Mireya Copeland MD  OB/GYN PGY-2

## 2023-10-21 NOTE — PROGRESS NOTES
[facility]  Pediatric Cardiology  Progress Note    Patient Name: Theresa Grey  MRN: [id number]  Admission Date: 10/17/2023  Hospital Length of Stay: 3 days  Code Status: Full Code   Attending Physician: Marybel Espino MD   Primary Care Physician: Medicine, Ds Family  Expected Discharge Date:   Principal Problem:Full-term premature rupture of membranes    Subjective:     Interval History: Patient s/p C section on 10/18.  Had some mild ARF that improved with fluid boluses.  Had possible SVT (rate 153) for 5 seconds or so evening of 10/18 (unclear on my review of strip - could be sinus tach, but hard to tell).  Otherwise doing well.  Some mild dyspnea on exertion and tachycardia with exertion.  No shortness of breath or edema.    EKG from today reviewed.  Looks unchanged (sinus rhythm).    Objective:     Vital Signs (Most Recent):  Temp: 98 °F (36.7 °C) (10/20/23 1628)  Pulse: 74 (10/20/23 1628)  Resp: 18 (10/20/23 1628)  BP: (!) 122/57 (10/20/23 1628)  SpO2: (!) 94 % (10/20/23 1628) Vital Signs (24h Range):  Temp:  [97.6 °F (36.4 °C)-99 °F (37.2 °C)] 98 °F (36.7 °C)  Pulse:  [] 74  Resp:  [16-18] 18  SpO2:  [94 %-96 %] 94 %  BP: (101-124)/(51-61) 122/57     Weight: 60.9 kg (134 lb 4.2 oz)  Body mass index is 27.1 kg/m².     SpO2: (!) 94 %       Intake/Output - Last 3 Shifts         10/18 0700  10/19 0659 10/19 0700  10/20 0659 10/20 0700  10/21 0659    P.O. 550 2060 500    I.V. (mL/kg) 561.7 (9.2) 605.9 (9.9)     IV Piggyback 300 300     Total Intake(mL/kg) 1411.7 (23.2) 2966 (48.7) 500 (8.2)    Urine (mL/kg/hr) 1155 (0.8) 1145 (0.8) 850 (1.2)    Blood 1410      Total Output 2565 1145 850    Net -1153.3 +1821 -350           Urine Occurrence  1 x 2 x            Lines/Drains/Airways       Peripheral Intravenous Line  Duration                  Peripheral IV - Single Lumen 10/17/23 2150 18 G Right Forearm 2 days                    Scheduled Medications:    acetaminophen  650 mg Oral Q8H     docusate sodium  200 mg Oral BID    enoxparin  40 mg Subcutaneous Q24H (prophylaxis, 1700)    ibuprofen  600 mg Oral Q6H    lactated ringers  1,000 mL Intravenous Once    oxytocin in lactated ringers  334 beatrice-units/min Intravenous Once    oxytocin in lactated ringers  95 beatrice-units/min Intravenous Once    sertraline  50 mg Oral Daily       Continuous Medications:    oxytocin in lactated ringers 4 beatrice-units/min (10/18/23 0614)       PRN Medications: 0.9%  NaCl infusion (for blood administration), acetaminophen, albuterol, calcium carbonate, carboprost, ceFAZolin (ANCEF) IVPB, diphenoxylate-atropine 2.5-0.025 mg, EPINEPHrine, lactated ringers, LIDOcaine HCL 10 mg/ml (1%), methylergonovine, miSOPROStoL, miSOPROStoL, oxyCODONE, oxyCODONE, oxytocin in lactated ringers, oxytocin in lactated ringers, oxytocin, prochlorperazine, simethicone, tranexamic acid (CYKLOKAPRON) infusion       Physical Exam     In general, she is an acyanotic, nondysmorphic appearing female in no apparent distress.  Very comfortable, sitting up in bed.  The eyes, nares, and oropharynx are clear.  Eyelids and conjunctiva free of drainage and redness.  PERRLA.  Teeth in good repair.  Mucous membranes are moist.  The head is normocephalic and atraumatic.  The neck is supple without jugular venous distention or thyroid enlargement.  The lungs are clear to auscultation bilaterally.  There is a well healed sternotomy scar.  The PMI is in the right chest.  The first heart sound is normal.  The second is loud and single.  There are no gallops, rubs, or clicks in the seated position.  2/6 systolic ejection murmur.  The abdominal was not examined.  Pulses are normal in all 4 extremities with brisk capillary refill and no edema.  No rashes are noted.  She has some clubbing.  No tenderness, swelling in legs.  Her neurological exam is normal.    CMP  Sodium   Date Value Ref Range Status   10/19/2023 133 (L) 136 - 145 mmol/L Final     Potassium    Date Value Ref Range Status   10/19/2023 3.9 3.5 - 5.1 mmol/L Final     Chloride   Date Value Ref Range Status   10/19/2023 106 95 - 110 mmol/L Final     CO2   Date Value Ref Range Status   10/19/2023 18 (L) 23 - 29 mmol/L Final     Glucose   Date Value Ref Range Status   10/19/2023 105 70 - 110 mg/dL Final     BUN   Date Value Ref Range Status   10/19/2023 12 6 - 20 mg/dL Final     Creatinine   Date Value Ref Range Status   10/20/2023 0.7 0.5 - 1.4 mg/dL Final     Calcium   Date Value Ref Range Status   10/19/2023 7.8 (L) 8.7 - 10.5 mg/dL Final     Total Protein   Date Value Ref Range Status   10/19/2023 5.0 (L) 6.0 - 8.4 g/dL Final     Albumin   Date Value Ref Range Status   10/19/2023 1.9 (L) 3.5 - 5.2 g/dL Final     Total Bilirubin   Date Value Ref Range Status   10/19/2023 0.7 0.1 - 1.0 mg/dL Final     Comment:     For infants and newborns, interpretation of results should be based  on gestational age, weight and in agreement with clinical  observations.    Premature Infant recommended reference ranges:  Up to 24 hours.............<8.0 mg/dL  Up to 48 hours............<12.0 mg/dL  3-5 days..................<15.0 mg/dL  6-29 days.................<15.0 mg/dL       Alkaline Phosphatase   Date Value Ref Range Status   10/19/2023 92 55 - 135 U/L Final     AST   Date Value Ref Range Status   10/19/2023 20 10 - 40 U/L Final     ALT   Date Value Ref Range Status   10/19/2023 15 10 - 44 U/L Final     Anion Gap   Date Value Ref Range Status   10/19/2023 9 8 - 16 mmol/L Final     eGFR   Date Value Ref Range Status   10/20/2023 >60 >60 mL/min/1.73 m^2 Final     Lab Results   Component Value Date    WBC 21.10 (H) 10/19/2023    HGB 8.2 (L) 10/19/2023    HCT 25.5 (L) 10/19/2023    MCV 76 (L) 10/19/2023     (L) 10/19/2023             Assessment and Plan:     Cardiac/Vascular  S/P Fontan procedure  1. 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 Delivered healthy baby 3/17/20 via induced vaginal delivery without difficulty.  - now s/p delivery of a healthy boy via  on 10/18/23.  3. Mild thrombocytopenia  - common in Fontan patients.    4. Anemia after  likely contributing to some of her tachycardia  5. Possible nonsustained SVT vs. sinus tach the night of C section    Recommendations:  1. At present, no need for diuretics.  She does not look overloaded.  2. If she looks good tomorrow, I am OK with her going home.    3. Will have my nurse call next week to arrange follow up in 2 weeks.  4. Consider oral iron on discharge.  5. At a minimum, she should go home on aspirin but the lovenox is fine too.        Toro Weems MD  Pediatric Cardiology  [facility])

## 2023-10-31 DIAGNOSIS — Q24.0 DEXTROCARDIA: ICD-10-CM

## 2023-10-31 DIAGNOSIS — Z98.890 S/P FONTAN PROCEDURE: ICD-10-CM

## 2023-10-31 DIAGNOSIS — Q24.9 ADULT CONGENITAL HEART DISEASE: Primary | ICD-10-CM

## 2023-11-08 ENCOUNTER — PATIENT MESSAGE (OUTPATIENT)
Dept: MATERNAL FETAL MEDICINE | Facility: CLINIC | Age: 28
End: 2023-11-08
Payer: OTHER GOVERNMENT

## 2023-11-17 ENCOUNTER — CLINICAL SUPPORT (OUTPATIENT)
Dept: PEDIATRIC CARDIOLOGY | Facility: CLINIC | Age: 28
End: 2023-11-17
Payer: OTHER GOVERNMENT

## 2023-11-17 ENCOUNTER — OFFICE VISIT (OUTPATIENT)
Dept: PEDIATRIC CARDIOLOGY | Facility: CLINIC | Age: 28
End: 2023-11-17
Payer: OTHER GOVERNMENT

## 2023-11-17 VITALS
BODY MASS INDEX: 23.37 KG/M2 | SYSTOLIC BLOOD PRESSURE: 129 MMHG | HEART RATE: 47 BPM | WEIGHT: 115.94 LBS | OXYGEN SATURATION: 97 % | DIASTOLIC BLOOD PRESSURE: 58 MMHG | HEIGHT: 59 IN

## 2023-11-17 DIAGNOSIS — I37.0 NONRHEUMATIC PULMONARY VALVE STENOSIS: ICD-10-CM

## 2023-11-17 DIAGNOSIS — Q24.9 ADULT CONGENITAL HEART DISEASE: Primary | ICD-10-CM

## 2023-11-17 DIAGNOSIS — O99.891 CONGENITAL HEART DISEASE DURING PREGNANCY: ICD-10-CM

## 2023-11-17 DIAGNOSIS — Q24.9 ADULT CONGENITAL HEART DISEASE: ICD-10-CM

## 2023-11-17 DIAGNOSIS — Q24.0 DEXTROCARDIA: ICD-10-CM

## 2023-11-17 DIAGNOSIS — Z98.890 S/P FONTAN PROCEDURE: ICD-10-CM

## 2023-11-17 DIAGNOSIS — Q24.9 CONGENITAL HEART DISEASE DURING PREGNANCY: ICD-10-CM

## 2023-11-17 PROBLEM — O42.92 FULL-TERM PREMATURE RUPTURE OF MEMBRANES: Status: RESOLVED | Noted: 2023-10-17 | Resolved: 2023-11-17

## 2023-11-17 PROBLEM — O14.93 PRE-ECLAMPSIA IN THIRD TRIMESTER: Status: RESOLVED | Noted: 2023-10-18 | Resolved: 2023-11-17

## 2023-11-17 PROBLEM — O09.93 HIGH-RISK PREGNANCY IN THIRD TRIMESTER: Status: RESOLVED | Noted: 2023-10-11 | Resolved: 2023-11-17

## 2023-11-17 PROBLEM — Z34.90 ENCOUNTER FOR INDUCTION OF LABOR: Status: RESOLVED | Noted: 2020-03-16 | Resolved: 2023-11-17

## 2023-11-17 PROCEDURE — 99213 OFFICE O/P EST LOW 20 MIN: CPT | Mod: PBBFAC,PO | Performed by: PEDIATRICS

## 2023-11-17 PROCEDURE — 93005 ELECTROCARDIOGRAM TRACING: CPT | Mod: PBBFAC,PO | Performed by: PEDIATRICS

## 2023-11-17 PROCEDURE — 93010 ELECTROCARDIOGRAM REPORT: CPT | Mod: S$PBB,,, | Performed by: PEDIATRICS

## 2023-11-17 PROCEDURE — 99999 PR PBB SHADOW E&M-EST. PATIENT-LVL III: ICD-10-PCS | Mod: PBBFAC,,, | Performed by: PEDIATRICS

## 2023-11-17 PROCEDURE — 99213 OFFICE O/P EST LOW 20 MIN: CPT | Mod: 25,S$PBB,, | Performed by: PEDIATRICS

## 2023-11-17 PROCEDURE — 99213 PR OFFICE/OUTPT VISIT, EST, LEVL III, 20-29 MIN: ICD-10-PCS | Mod: 25,S$PBB,, | Performed by: PEDIATRICS

## 2023-11-17 PROCEDURE — 93010 EKG 12-LEAD PEDIATRIC: ICD-10-PCS | Mod: S$PBB,,, | Performed by: PEDIATRICS

## 2023-11-17 PROCEDURE — 99999 PR PBB SHADOW E&M-EST. PATIENT-LVL III: CPT | Mod: PBBFAC,,, | Performed by: PEDIATRICS

## 2023-11-17 NOTE — PROGRESS NOTES
2023     [facility]    re:Theresa Grey  :1995     Medicine, Ds Family   [address] [facility] [id number]     Dr. Christopher Gonzalez    Theresa Grey is a 28 y.o. female with the following diagnoses:  Diagnoses:  1. 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 Delivered healthy baby 3/17/20 via induced vaginal delivery without difficulty.  - now Status post 2nd pregnancy with  section 2023, baby doing well  3. Mild thrombocytopenia  - common in Fontan patients.      Discussion:  She looks great.  I think it is fine to stop her Lovenox, and she frequently forgets to take it.  I stressed the importance of the baby aspirin.  We have discussed the long-term risk associated with Fontan circulation. We discussed:  1. Risk of thromboembolic events with Fontan   - I agree with lovenox and aspirin for now.  Will go back to just a baby aspirin after delivery.   2. Long term risk of arrhythmias, heart failure, PLE, liver disease discussed    Her chest pain is not cardiac.  I think reflux is a real possibility, especially given her pregnancy.    Plan:  Continue aspirin, okay to stop the Lovenox  2.   Follow-up in July with echo, EKG, Holter    3.   SBEP is indicated for dental work.    4.   Call with any concerns.    Interval history:  She was admitted 2023.  Labor course was complicated by chorioamnionitis, preeclampsia, and arrest of descent, so she underwent a  section.  There was some mild postpartum  "hemorrhage.  Otherwise, postpartum course was uncomplicated.    She has done very well since that time.  No shortness of breath, palpitations, chest pain, edema, diarrhea, or any other new concerns.  She is not getting a lot of sleep because of the baby.  She frequently forgets to take her Lovenox injections.    PMH:  She was diagnosed as a young child with congenital heart disease while living in [place]. She was evaluated at [facility] ([place]) with a cath at about 7 years of age. She was scheduled for heart surgery by her report about 10 years ago. However, due to social issues (no insurance, no social security number) the surgery was put off and she was lost to follow up. I first saw her in  she was admitted with pregnancy, rare chest pain, and cyanosis.  After long discussion, she had a pregnancy termination due to the risk associated with unrepaired cyanotic congenital heart disease.  On 16, she underwent an extracardiac Fontan with oversewing and ligation of the MPA at [facility].  She did very well and was discharged a week later.      The review of systems is as noted above. It is otherwise negative for other symptoms related to the general, neurological, psychiatric, endocrine, gastrointestinal, genitourinary, respiratory, dermatologic, musculoskeletal, hematologic, and immunologic systems.    Past Medical History:   Diagnosis Date    Abnormality of heart valve     heart disease     fatigue as a result of pregnancy (8wks ) clubbing indicates chronic oxygenation issues but pt considered it "normal"     Past Surgical History:   Procedure Laterality Date    CARDIAC CATHETERIZATION  at 7 yars of age     SECTION N/A 10/18/2023    Procedure:  SECTION;  Surgeon: Gifty Durand MD;  Location: [facility] L&D;  Service: OB/GYN;  Laterality: N/A;    DILATION AND CURETTAGE OF UTERUS      FONTAN PROCEDURE, EXTRACARDIAC  2015    With a nonfenestrated 22 mm [place] tube graft.  The " pulmonary valve was closed.  Procedure performed by Dr. Moe Kulkarni at [facility].     Family History   Problem Relation Age of Onset    Diabetes Maternal Grandmother     Vision loss Maternal Grandmother     Miscarriages / Stillbirths Mother     Diabetes Father     Diabetes Paternal Grandmother     No Known Problems Brother     No Known Problems Maternal Grandfather     No Known Problems Paternal Grandfather     Hypertension Maternal Aunt     Diabetes Maternal Aunt     No Known Problems Sister     No Known Problems Maternal Uncle     No Known Problems Paternal Aunt     No Known Problems Paternal Uncle     Anemia Neg Hx     Arrhythmia Neg Hx     Asthma Neg Hx     Clotting disorder Neg Hx     Fainting Neg Hx     Heart attack Neg Hx     Heart disease Neg Hx     Heart failure Neg Hx     Hyperlipidemia Neg Hx     Stroke Neg Hx     Atrial Septal Defect Neg Hx      Social History     Socioeconomic History    Marital status:    Tobacco Use    Smoking status: Never    Smokeless tobacco: Never   Substance and Sexual Activity    Alcohol use: Not Currently     Alcohol/week: 0.0 standard drinks of alcohol     Comment: on ocassion before pregnancy    Drug use: No     Comment: previous marijuana use    Sexual activity: Yes     Partners: Male     Birth control/protection: Injection       Current Outpatient Medications:     acetaminophen (TYLENOL) 650 MG TbSR, Take 1 tablet (650 mg total) by mouth every 8 (eight) hours., Disp: 60 tablet, Rfl: 1    aspirin 81 MG Chew, Take 1 tablet (81 mg total) by mouth once daily., Disp: 60 tablet, Rfl: 0    docusate sodium (COLACE) 100 MG capsule, Take 2 capsules (200 mg total) by mouth 2 (two) times daily as needed for Constipation., Disp: 60 capsule, Rfl: 0    enoxaparin (LOVENOX) 40 mg/0.4 mL Syrg, Inject 0.4 mLs (40 mg total) into the skin Q24H (prophylaxis, 1700)., Disp: 16 mL, Rfl: 0    iron polysaccharides (NIFEREX) 150 mg iron Cap, Take 1 capsule (150 mg total) by mouth  "once daily., Disp: 30 capsule, Rfl: 0    oxyCODONE (ROXICODONE) 5 MG immediate release tablet, Take 1 tablet (5 mg total) by mouth every 4 (four) hours as needed for Pain., Disp: 15 tablet, Rfl: 0    prenatal vit no.124/iron/folic (PRENATAL VITAMIN ORAL), Take 1 tablet by mouth once daily., Disp: , Rfl:     Current Facility-Administered Medications:     albuterol inhaler 2 puff, 2 puff, Inhalation, Q20 Min PRN, Ayah Sanchez MD    diphenhydrAMINE injection 25 mg, 25 mg, Intravenous, Once PRN, Ayah Sanchez MD    EPINEPHrine (EPIPEN) 0.3 mg/0.3 mL pen injection 0.3 mg, 0.3 mg, Intramuscular, PRN, Ayah Sanchez MD    methylPREDNISolone sodium succinate injection 40 mg, 40 mg, Intravenous, Once PRN, Ayah Sanchez MD    ondansetron disintegrating tablet 4 mg, 4 mg, Oral, Once PRN, Ayah Sanchez MD    sodium chloride 0.9% 500 mL flush bag, , Intravenous, PRN, Ayah Sanchez MD    sodium chloride 0.9% flush 10 mL, 10 mL, Intravenous, Daniel SPEAR Megan C., MD    Review of patient's allergies indicates:   Allergen Reactions    Hydrocodone Shortness Of Breath and Other (See Comments)     Dizziness; sweating     BP (!) 129/58 (BP Location: Left leg)   Pulse (!) 47   Ht 4' 11.02" (1.499 m)   Wt 52.6 kg (115 lb 15.4 oz)   SpO2 97%   Breastfeeding Yes   BMI 23.41 kg/m²     In general, she is an acyanotic, nondysmorphic appearing female in no apparent distress.  The eyes, nares, and oropharynx are clear.  Eyelids and conjunctiva free of drainage and redness.  PERRLA.  Teeth in good repair and she is wearing braces.  Mucous membranes are moist.  The head is normocephalic and atraumatic.  The neck is supple without jugular venous distention or thyroid enlargement.  The lungs are clear to auscultation bilaterally.  There is a well healed sternotomy scar.  The PMI is in the right chest.  The first heart sound is normal.  The second is loud and single.  There are no gallops, rubs, or clicks in the supine " position.  Abdominal exam is benign.  Pulses are normal in all 4 extremities with brisk capillary refill and no edema.  No rashes are noted.  She has some clubbing.  No tenderness, no swelling.  Her neurological exam is normal.    The EKG performed in clinic today reveals evidence of dextrocardia.  Sinus bradycardia.    Echo 07/06/23  IMPRESSION:  Pregnant at 22 weeks EGA-  Technically very limited imaging secondary to difficult acoustic windows.  Dextrocardia.  Laminar flow noted in normal size inferior vena cava connecting to extracardiac conduit to Fontan anastomosis, left-sided SVC connecting to left pulmonary artery to Pavel anastomisis, proximal LPA, pulmonary confluence and proximal RPA.  Qualitative impression of normal size atria.  Straddling left AV valve demonstrated with trivial AV valve insufficiency.  Right-sided AV valve with no evidence of stenosis and mild  insufficiency.  Large inlet VSD with unrestricted flow from LV to RV.  The ventricles are jason-cross in relationship as demonstrated by color Doppler with limited details of the ventricular structures demonstrated by 2D imaging.  Qualitatively good biventricular systolic function.  Centrally positioned pulmonary valve with small annulus and no obvious flow.  Leftward and anteriorly positioned aortic valve with no evidence of subaortic or aortic valve stenosis or insufficiency.  Large right aortic arch.  There is no pericardial effusion.    Cardiac MRI October 19, 2019:  Patent innominate vein to left-sided SVC. Widely patent left-sided Pavel anastomosis. The IVC is anastomosed to the left pulmonary artery. The Fontan tunnel is widely patent without evidence of obstruction. The branch pulmonary arteries are normal in size with no evidence of obstruction.   Atrio-ventricular concordance with a jason-cross type of arrangement.   The right ventricle volumes are low normal. Moderate RVH. The calculated RVEF is 45 %.  The left ventricular volumes are  low normal. The calculated LVEF is 50 %.   Large inlet type ventricular septal defect.   Oversewn pulmonary valve.   Anterior and rightward aorta. Structurally normal aortic valve with no significant aortic insufficiency.   Right aortic arch.     Lab Results   Component Value Date    WBC 21.10 (H) 10/19/2023    HGB 8.2 (L) 10/19/2023    HCT 25.5 (L) 10/19/2023    MCV 76 (L) 10/19/2023     (L) 10/19/2023     CMP  Sodium   Date Value Ref Range Status   10/19/2023 133 (L) 136 - 145 mmol/L Final     Potassium   Date Value Ref Range Status   10/19/2023 3.9 3.5 - 5.1 mmol/L Final     Chloride   Date Value Ref Range Status   10/19/2023 106 95 - 110 mmol/L Final     CO2   Date Value Ref Range Status   10/19/2023 18 (L) 23 - 29 mmol/L Final     Glucose   Date Value Ref Range Status   10/19/2023 105 70 - 110 mg/dL Final     BUN   Date Value Ref Range Status   10/19/2023 12 6 - 20 mg/dL Final     Creatinine   Date Value Ref Range Status   10/20/2023 0.7 0.5 - 1.4 mg/dL Final     Calcium   Date Value Ref Range Status   10/19/2023 7.8 (L) 8.7 - 10.5 mg/dL Final     Total Protein   Date Value Ref Range Status   10/19/2023 5.0 (L) 6.0 - 8.4 g/dL Final     Albumin   Date Value Ref Range Status   10/19/2023 1.9 (L) 3.5 - 5.2 g/dL Final     Total Bilirubin   Date Value Ref Range Status   10/19/2023 0.7 0.1 - 1.0 mg/dL Final     Comment:     For infants and newborns, interpretation of results should be based  on gestational age, weight and in agreement with clinical  observations.    Premature Infant recommended reference ranges:  Up to 24 hours.............<8.0 mg/dL  Up to 48 hours............<12.0 mg/dL  3-5 days..................<15.0 mg/dL  6-29 days.................<15.0 mg/dL       Alkaline Phosphatase   Date Value Ref Range Status   10/19/2023 92 55 - 135 U/L Final     AST   Date Value Ref Range Status   10/19/2023 20 10 - 40 U/L Final     ALT   Date Value Ref Range Status   10/19/2023 15 10 - 44 U/L Final     Anion Gap    Date Value Ref Range Status   10/19/2023 9 8 - 16 mmol/L Final     eGFR   Date Value Ref Range Status   10/20/2023 >60 >60 mL/min/1.73 m^2 Final     BNP   Date Value Ref Range Status   08/16/2023 11 0 - 99 pg/mL Final     Comment:     Values of less than 100 pg/ml are consistent with non-CHF populations.     Liver US 8/17/23:  IMPRESSION: Hepatomegaly. Otherwise negative evaluation of the right upper quadrant.    Sincerely,        Toro Weems MD  Pediatric Cardiology  Adult Congenital Heart Disease  Pediatric Heart Failure and Transplantation  [facility] [address] [place]  [address]  [phone]

## 2023-12-21 NOTE — TELEPHONE ENCOUNTER
----- Message from Sandra Montes sent at 2/17/2020  8:50 AM CST -----  Contact: Patient   Patient have concerns on baby movement would like to speak w/ staff. Patient contact number [phone].   RN left message conveying results on patient's voicemail per MD patient to call clinic with questions or concerns.      ----- Message from Michael D D'Amico, MD sent at 12/21/2023  7:54 AM CST -----  There are no new concerns regarding recent test(s).   Follow-up per normal routine

## 2024-07-15 NOTE — TELEPHONE ENCOUNTER
----- Message from Dixie Spear sent at 1/9/2017  2:03 PM CST -----  Contact: pt #[phone]  Pt would like a call back in regards to an appt   fall

## 2024-12-09 ENCOUNTER — PATIENT MESSAGE (OUTPATIENT)
Dept: CARDIOLOGY | Facility: CLINIC | Age: 29
End: 2024-12-09
Payer: OTHER GOVERNMENT

## 2024-12-09 DIAGNOSIS — Z98.890 S/P FONTAN PROCEDURE: ICD-10-CM

## 2024-12-09 DIAGNOSIS — I37.0 NONRHEUMATIC PULMONARY VALVE STENOSIS: ICD-10-CM

## 2024-12-09 DIAGNOSIS — Q24.9 ADULT CONGENITAL HEART DISEASE: ICD-10-CM

## 2024-12-09 DIAGNOSIS — Q24.0 DEXTROCARDIA: Primary | ICD-10-CM

## 2024-12-24 ENCOUNTER — PATIENT MESSAGE (OUTPATIENT)
Dept: PEDIATRIC CARDIOLOGY | Facility: CLINIC | Age: 29
End: 2024-12-24
Payer: OTHER GOVERNMENT

## 2024-12-24 ENCOUNTER — HOSPITAL ENCOUNTER (OUTPATIENT)
Dept: RADIOLOGY | Facility: HOSPITAL | Age: 29
Discharge: HOME | End: 2024-12-24
Attending: PEDIATRICS
Payer: OTHER GOVERNMENT

## 2024-12-24 DIAGNOSIS — Z98.890 S/P FONTAN PROCEDURE: ICD-10-CM

## 2024-12-24 DIAGNOSIS — Q24.9 ADULT CONGENITAL HEART DISEASE: ICD-10-CM

## 2024-12-24 DIAGNOSIS — Q24.0 DEXTROCARDIA: ICD-10-CM

## 2024-12-24 DIAGNOSIS — I37.0 NONRHEUMATIC PULMONARY VALVE STENOSIS: ICD-10-CM

## 2024-12-24 PROCEDURE — 93976 VASCULAR STUDY: CPT | Mod: TC,PO

## 2024-12-26 ENCOUNTER — HOSPITAL ENCOUNTER (OUTPATIENT)
Dept: PEDIATRIC CARDIOLOGY | Facility: HOSPITAL | Age: 29
Discharge: HOME | End: 2024-12-26
Attending: PEDIATRICS
Payer: OTHER GOVERNMENT

## 2024-12-26 ENCOUNTER — OFFICE VISIT (OUTPATIENT)
Dept: CARDIOLOGY | Facility: CLINIC | Age: 29
End: 2024-12-26
Payer: OTHER GOVERNMENT

## 2024-12-26 ENCOUNTER — HOSPITAL ENCOUNTER (OUTPATIENT)
Dept: CARDIOLOGY | Facility: CLINIC | Age: 29
Discharge: HOME | End: 2024-12-26
Payer: OTHER GOVERNMENT

## 2024-12-26 ENCOUNTER — HOSPITAL ENCOUNTER (OUTPATIENT)
Dept: CARDIOLOGY | Facility: HOSPITAL | Age: 29
Discharge: HOME | End: 2024-12-26
Attending: PEDIATRICS
Payer: OTHER GOVERNMENT

## 2024-12-26 VITALS
SYSTOLIC BLOOD PRESSURE: 123 MMHG | OXYGEN SATURATION: 95 % | WEIGHT: 117.31 LBS | HEIGHT: 59 IN | HEART RATE: 59 BPM | DIASTOLIC BLOOD PRESSURE: 73 MMHG | BODY MASS INDEX: 23.65 KG/M2

## 2024-12-26 VITALS — HEIGHT: 59 IN | BODY MASS INDEX: 23.18 KG/M2 | WEIGHT: 115 LBS

## 2024-12-26 DIAGNOSIS — Q89.3 SITUS INVERSUS: ICD-10-CM

## 2024-12-26 DIAGNOSIS — I37.0 NONRHEUMATIC PULMONARY VALVE STENOSIS: ICD-10-CM

## 2024-12-26 DIAGNOSIS — Q24.9 ADULT CONGENITAL HEART DISEASE: ICD-10-CM

## 2024-12-26 DIAGNOSIS — Z98.890 S/P FONTAN PROCEDURE: ICD-10-CM

## 2024-12-26 DIAGNOSIS — Q24.9 ADULT CONGENITAL HEART DISEASE: Primary | ICD-10-CM

## 2024-12-26 DIAGNOSIS — Q24.0 DEXTROCARDIA: ICD-10-CM

## 2024-12-26 DIAGNOSIS — Q20.1 DORV (DOUBLE OUTLET RIGHT VENTRICLE): ICD-10-CM

## 2024-12-26 PROBLEM — O24.419 GESTATIONAL DIABETES MELLITUS (GDM) IN THIRD TRIMESTER: Status: RESOLVED | Noted: 2023-10-17 | Resolved: 2024-12-26

## 2024-12-26 LAB
AV MEAN GRADIENT: 4 MMHG
AV PEAK GRADIENT: 6.8 MMHG
BSA FOR ECHO PROCEDURE: 1.47 M2
DOP CALC AO PEAK VEL: 1.3 M/S
DOP CALC AO VTI: 23.6 CM
OHS QRS DURATION: 112 MS
OHS QTC CALCULATION: 386 MS

## 2024-12-26 PROCEDURE — 99213 OFFICE O/P EST LOW 20 MIN: CPT | Mod: PBBFAC,25 | Performed by: PEDIATRICS

## 2024-12-26 PROCEDURE — 93005 ELECTROCARDIOGRAM TRACING: CPT | Mod: PBBFAC | Performed by: INTERNAL MEDICINE

## 2024-12-26 PROCEDURE — 93242 EXT ECG>48HR<7D RECORDING: CPT

## 2024-12-26 PROCEDURE — 93010 ELECTROCARDIOGRAM REPORT: CPT | Mod: S$PBB,,, | Performed by: INTERNAL MEDICINE

## 2024-12-26 PROCEDURE — 99999 PR PBB SHADOW E&M-EST. PATIENT-LVL III: CPT | Mod: PBBFAC,,, | Performed by: PEDIATRICS

## 2024-12-26 PROCEDURE — 93303 ECHO TRANSTHORACIC: CPT | Mod: 26,,, | Performed by: PEDIATRICS

## 2024-12-26 PROCEDURE — 93325 DOPPLER ECHO COLOR FLOW MAPG: CPT

## 2024-12-26 PROCEDURE — 93325 DOPPLER ECHO COLOR FLOW MAPG: CPT | Mod: 26,,, | Performed by: PEDIATRICS

## 2024-12-26 PROCEDURE — 93320 DOPPLER ECHO COMPLETE: CPT | Mod: 26,,, | Performed by: PEDIATRICS

## 2024-12-26 NOTE — PROGRESS NOTES
2024     [facility]    re:Theresa Grey  :1995     Medicine, Ds Family   [address] [facility] [address]     Dr. Christopher Gonzalez    Theresa Grey is a 29 y.o. female with the following diagnoses:  Diagnoses:  1. 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 Delivered healthy baby 3/17/20 via induced vaginal delivery without difficulty.  - now Status post 2nd pregnancy with  section 2023, baby doing well  3. Mild thrombocytopenia  - common in Fontan patients.  Better now.    Discussion:  She looks great.  I suspect that she had some pleuritis to explain her pleuritic chest pain a few weeks ago.  There is certainly no evidence of heart failure at present.  I stressed the importance of the baby aspirin.  We have discussed the long-term risk associated with Fontan circulation. We discussed:  1. Risk of thromboembolic events with Fontan   - I agree with lovenox and aspirin for now.  Will go back to just a baby aspirin after delivery.   2. Long term risk of arrhythmias, heart failure, PLE, liver disease discussed    Plan:  Continue aspirin  2.   Holter today   3.   SBEP is indicated for dental work.    4.   Call with any concerns.  5.   Follow up 1 year with cardiac MRI, ekg, labs, liver US  6.   Will plan to refer to hepatology at her visit next year, once she is close to 10 years post Fontan.  Likely plan diagnostic cath at about the same time.    Interval history:  Overall, he has done well since I last saw her.   "About 2 weeks ago, she was having some chest tightness and pleuritic chest pain.  She was then diagnosed with strep throat shortly thereafter.  She was treated with amoxicillin.  Her symptoms have completely resolved.  No cough.  No diarrhea.  No edema.  No fever.  No syncope or near-syncope.  She has not been taking her aspirin.    PMH:  She was diagnosed as a young child with congenital heart disease while living in [place]. She was evaluated at [facility] ([place] with a cath at about 7 years of age. She was scheduled for heart surgery by her report about 10 years ago. However, due to social issues (no insurance, no social security number) the surgery was put off and she was lost to follow up. I first saw her in  she was admitted with pregnancy, rare chest pain, and cyanosis.  After long discussion, she had a pregnancy termination due to the risk associated with unrepaired cyanotic congenital heart disease.  On 16, she underwent an extracardiac Fontan with oversewing and ligation of the MPA at [facility].  She did very well and was discharged a week later.      The review of systems is as noted above. It is otherwise negative for other symptoms related to the general, neurological, psychiatric, endocrine, gastrointestinal, genitourinary, respiratory, dermatologic, musculoskeletal, hematologic, and immunologic systems.    Past Medical History:   Diagnosis Date    Abnormality of heart valve     heart disease     fatigue as a result of pregnancy (8wks ) clubbing indicates chronic oxygenation issues but pt considered it "normal"     Past Surgical History:   Procedure Laterality Date    CARDIAC CATHETERIZATION  at 7 yars of age     SECTION N/A 10/18/2023    Procedure:  SECTION;  Surgeon: Gifty Durand MD;  Location: [facility] L&D;  Service: OB/GYN;  Laterality: N/A;    DILATION AND CURETTAGE OF UTERUS      FONTAN PROCEDURE, EXTRACARDIAC  2015    With a nonfenestrated 22 mm " [place] tube graft.  The pulmonary valve was closed.  Procedure performed by Dr. Moe Kulkarni at [facility].     Family History   Problem Relation Name Age of Onset    Diabetes Maternal Grandmother      Vision loss Maternal Grandmother      Miscarriages / Stillbirths Mother      Diabetes Father      Diabetes Paternal Grandmother      No Known Problems Brother      No Known Problems Maternal Grandfather      No Known Problems Paternal Grandfather      Hypertension Maternal Aunt      Diabetes Maternal Aunt      No Known Problems Sister      No Known Problems Maternal Uncle      No Known Problems Paternal Aunt      No Known Problems Paternal Uncle      Anemia Neg Hx      Arrhythmia Neg Hx      Asthma Neg Hx      Clotting disorder Neg Hx      Fainting Neg Hx      Heart attack Neg Hx      Heart disease Neg Hx      Heart failure Neg Hx      Hyperlipidemia Neg Hx      Stroke Neg Hx      Atrial Septal Defect Neg Hx       Social History     Socioeconomic History    Marital status:    Tobacco Use    Smoking status: Never    Smokeless tobacco: Never   Substance and Sexual Activity    Alcohol use: Not Currently     Alcohol/week: 0.0 standard drinks of alcohol     Comment: on ocassion before pregnancy    Drug use: No     Comment: previous marijuana use    Sexual activity: Yes     Partners: Male     Birth control/protection: Injection     Social Drivers of Health     Financial Resource Strain: Low Risk  (12/26/2024)    Overall Financial Resource Strain (CARDIA)     Difficulty of Paying Living Expenses: Not very hard   Food Insecurity: No Food Insecurity (12/26/2024)    Hunger Vital Sign     Worried About Running Out of Food in the Last Year: Never true     Ran Out of Food in the Last Year: Never true   Physical Activity: Insufficiently Active (12/26/2024)    Exercise Vital Sign     Days of Exercise per Week: 1 day     Minutes of Exercise per Session: 20 min   Stress: No Stress Concern Present (12/26/2024)     [facility] - Occupational Stress Questionnaire     Feeling of Stress : Only a little   Housing Stability: High Risk (12/26/2024)    Housing Stability Vital Sign     Unable to Pay for Housing in the Last Year: Yes       Current Outpatient Medications:     acetaminophen (TYLENOL) 650 MG TbSR, Take 1 tablet (650 mg total) by mouth every 8 (eight) hours. (Patient not taking: Reported on 12/26/2024), Disp: 60 tablet, Rfl: 1    aspirin 81 MG Chew, Take 1 tablet (81 mg total) by mouth once daily. (Patient not taking: Reported on 12/26/2024), Disp: 60 tablet, Rfl: 0    docusate sodium (COLACE) 100 MG capsule, Take 2 capsules (200 mg total) by mouth 2 (two) times daily as needed for Constipation. (Patient not taking: Reported on 12/26/2024), Disp: 60 capsule, Rfl: 0    iron polysaccharides (NIFEREX) 150 mg iron Cap, Take 1 capsule (150 mg total) by mouth once daily. (Patient not taking: Reported on 12/26/2024), Disp: 30 capsule, Rfl: 0    prenatal vit no.124/iron/folic (PRENATAL VITAMIN ORAL), Take 1 tablet by mouth once daily. (Patient not taking: Reported on 12/26/2024), Disp: , Rfl:     Current Facility-Administered Medications:     albuterol inhaler 2 puff, 2 puff, Inhalation, Q20 Min PRNDaniel Megan C., MD    diphenhydrAMINE injection 25 mg, 25 mg, Intravenous, Once PRNDaniel Megan C., MD    EPINEPHrine (EPIPEN) 0.3 mg/0.3 mL pen injection 0.3 mg, 0.3 mg, Intramuscular, PRNDaniel Megan C., MD    methylPREDNISolone sodium succinate injection 40 mg, 40 mg, Intravenous, Once PRNDaniel Megan C., MD    ondansetron disintegrating tablet 4 mg, 4 mg, Oral, Once PRNDaniel Megan C., MD    sodium chloride 0.9% 500 mL flush bag, , Intravenous, PRNDaniel Megan C., MD    sodium chloride 0.9% flush 10 mL, 10 mL, Intravenous, PRNDaniel Megan C., MD    Review of patient's allergies indicates:   Allergen Reactions    Hydrocodone Shortness Of Breath and Other (See Comments)     Dizziness;  "sweating     /73 (BP Location: Right arm, Patient Position: Sitting)   Pulse (!) 59   Ht 4' 11" (1.499 m)   Wt 53.2 kg (117 lb 4.6 oz)   SpO2 95%   BMI 23.69 kg/m²     In general, she is an acyanotic, nondysmorphic appearing female in no apparent distress.  The eyes, nares, and oropharynx are clear.  Eyelids and conjunctiva free of drainage and redness.  PERRLA.  Teeth in good repair and she is wearing braces.  Mucous membranes are moist.  The head is normocephalic and atraumatic.  The neck is supple without jugular venous distention or thyroid enlargement.  The lungs are clear to auscultation bilaterally.  There is a well healed sternotomy scar.  The PMI is in the right chest.  The first heart sound is normal.  The second is loud and single.  There are no gallops, rubs, or clicks in the supine position.  Abdominal exam is benign.  Pulses are normal in all 4 extremities with brisk capillary refill and no edema.  No rashes are noted.  She has some clubbing.  No tenderness, no swelling.  Her neurological exam is normal.    The EKG performed in clinic today reveals evidence of dextrocardia.  Sinus bradycardia.    Echo today:  Results for orders placed during the hospital encounter of 12/26/24    Echo    Interpretation Summary  CONGENITAL CARDIAC HISTORY:  Heterotaxy, abdominal situs inversus, dextrocardia (I, D, L) atrial situs inversus, atrioventricular concordance with jason-cross AV valve arrangement, RV is leftward, anterior and superior, LV is rightward, posterior and inferior, large VSD and straddling AV valves, d-malposed great arteries and pulmonary stenosis.  - S/P Extracardiac Fontan (22mm Goretex graft) and left cavopulmonary anastomosis to LPA with division of MPA, oversewing of the pulmonary valve and ligation of small right SVC- [facility] at [facility] 9/2015.      IMPRESSION:  Continues with very difficult acoustic windows no obvious change from previous study.  Dextrocardia.  Laminar flow noted in " normal size inferior vena cava connecting to extracardiac conduit to Fontan anastomosis, left-sided SVC connecting to left pulmonary artery to Pavel anastomosis.  Proximal LPA, pulmonary confluence and proximal RPA are not well demonstrated.  Qualitative impression of normal size atria.  Straddling left AV valve demonstrated with mild AV valve insufficiency.  Right-sided AV valve with no evidence of stenosis and mild insufficiency.  Large inlet VSD with unrestricted flow from LV to RV.  Qualitatively good biventricular systolic function.  Centrally positioned pulmonary valve with small annulus, limited movement of the leaflets and no obvious flow by color Doppler.  Leftward and anteriorly positioned aortic valve with no evidence of outflow obstruction, aortic valve stenosis or insufficiency.  Large aortic arch no evidence of coarctation.  There is no pericardial effusion.      Cardiac MRI October 19, 2019:  Patent innominate vein to left-sided SVC. Widely patent left-sided Pavel anastomosis. The IVC is anastomosed to the left pulmonary artery. The Fontan tunnel is widely patent without evidence of obstruction. The branch pulmonary arteries are normal in size with no evidence of obstruction.   Atrio-ventricular concordance with a jason-cross type of arrangement.   The right ventricle volumes are low normal. Moderate RVH. The calculated RVEF is 45 %.  The left ventricular volumes are low normal. The calculated LVEF is 50 %.   Large inlet type ventricular septal defect.   Oversewn pulmonary valve.   Anterior and rightward aorta. Structurally normal aortic valve with no significant aortic insufficiency.   Right aortic arch.     Lab Results   Component Value Date    WBC 10.33 12/26/2024    HGB 14.2 12/26/2024    HCT 43.6 12/26/2024    MCV 87 12/26/2024     12/26/2024       CMP  Sodium   Date Value Ref Range Status   12/26/2024 138 136 - 145 mmol/L Final     Potassium   Date Value Ref Range Status   12/26/2024 4.0  3.5 - 5.1 mmol/L Final     Chloride   Date Value Ref Range Status   12/26/2024 104 95 - 110 mmol/L Final     CO2   Date Value Ref Range Status   12/26/2024 24 23 - 29 mmol/L Final     Glucose   Date Value Ref Range Status   12/26/2024 79 70 - 110 mg/dL Final     BUN   Date Value Ref Range Status   12/26/2024 12 6 - 20 mg/dL Final     Creatinine   Date Value Ref Range Status   12/26/2024 0.7 0.5 - 1.4 mg/dL Final     Calcium   Date Value Ref Range Status   12/26/2024 10.1 8.7 - 10.5 mg/dL Final     Total Protein   Date Value Ref Range Status   12/26/2024 8.2 6.0 - 8.4 g/dL Final     Albumin   Date Value Ref Range Status   12/26/2024 4.0 3.5 - 5.2 g/dL Final     Total Bilirubin   Date Value Ref Range Status   12/26/2024 0.3 0.1 - 1.0 mg/dL Final     Comment:     For infants and newborns, interpretation of results should be based  on gestational age, weight and in agreement with clinical  observations.    Premature Infant recommended reference ranges:  Up to 24 hours.............<8.0 mg/dL  Up to 48 hours............<12.0 mg/dL  3-5 days..................<15.0 mg/dL  6-29 days.................<15.0 mg/dL       Alkaline Phosphatase   Date Value Ref Range Status   12/26/2024 116 40 - 150 U/L Final     AST   Date Value Ref Range Status   12/26/2024 17 10 - 40 U/L Final     ALT   Date Value Ref Range Status   12/26/2024 21 10 - 44 U/L Final     Anion Gap   Date Value Ref Range Status   12/26/2024 10 8 - 16 mmol/L Final     eGFR   Date Value Ref Range Status   12/26/2024 >60.0 >60 mL/min/1.73 m^2 Final     BNP   Date Value Ref Range Status   12/26/2024 18 0 - 99 pg/mL Final     Comment:     Values of less than 100 pg/ml are consistent with non-CHF populations.      Lab Results   Component Value Date    TSH 1.797 12/26/2024     AFP   Date Value Ref Range Status   12/26/2024 <2.0 0.0 - 8.4 ng/mL Final     Comment:     The testing method is a chemiluminescent microparticle immunoassay   manufactured by [employer]  and performed on the    or   [employer] system. Values obtained with different assay manufacturers   for   methods may be different and cannot be used interchangeably.       Ferritin   Date Value Ref Range Status   12/26/2024 81 20.0 - 300.0 ng/mL Final     Vit D, 25-Hydroxy   Date Value Ref Range Status   12/26/2024 19 (L) 30 - 96 ng/mL Final     Comment:     Vitamin D deficiency.........<10 ng/mL                              Vitamin D insufficiency......10-29 ng/mL       Vitamin D sufficiency........> or equal to 30 ng/mL  Vitamin D toxicity............>100 ng/mL           Liver US 12/24/24 looked good    Sincerely,        Toro Weems MD  Pediatric Cardiology  Adult Congenital Heart Disease  Pediatric Heart Failure and Transplantation  [facility] [address] [place]  [address]  [phone]

## 2025-01-16 ENCOUNTER — PATIENT MESSAGE (OUTPATIENT)
Dept: CARDIOLOGY | Facility: CLINIC | Age: 30
End: 2025-01-16

## 2025-02-22 ENCOUNTER — PATIENT MESSAGE (OUTPATIENT)
Dept: CARDIOLOGY | Facility: CLINIC | Age: 30
End: 2025-02-22

## 2025-08-16 ENCOUNTER — OFFICE VISIT (OUTPATIENT)
Dept: URGENT CARE | Facility: CLINIC | Age: 30
End: 2025-08-16
Payer: OTHER GOVERNMENT

## 2025-08-16 VITALS
WEIGHT: 112 LBS | DIASTOLIC BLOOD PRESSURE: 83 MMHG | BODY MASS INDEX: 22.58 KG/M2 | OXYGEN SATURATION: 97 % | HEIGHT: 59 IN | RESPIRATION RATE: 18 BRPM | HEART RATE: 60 BPM | TEMPERATURE: 98 F | SYSTOLIC BLOOD PRESSURE: 125 MMHG

## 2025-08-16 DIAGNOSIS — J03.90 TONSILLITIS: ICD-10-CM

## 2025-08-16 DIAGNOSIS — R05.9 COUGH, UNSPECIFIED TYPE: Primary | ICD-10-CM

## 2025-08-16 LAB
CTP QC/QA: YES
FLUAV AG NPH QL: NEGATIVE
FLUBV AG NPH QL: NEGATIVE
S PYO RRNA THROAT QL PROBE: NEGATIVE
SARS-COV+SARS-COV-2 AG RESP QL IA.RAPID: NEGATIVE

## 2025-08-16 PROCEDURE — 99214 OFFICE O/P EST MOD 30 MIN: CPT | Mod: S$GLB,,, | Performed by: NURSE PRACTITIONER

## 2025-08-16 PROCEDURE — 87804 INFLUENZA ASSAY W/OPTIC: CPT | Mod: QW,,, | Performed by: NURSE PRACTITIONER

## 2025-08-16 PROCEDURE — 87880 STREP A ASSAY W/OPTIC: CPT | Mod: QW,,, | Performed by: NURSE PRACTITIONER

## 2025-08-16 PROCEDURE — 87811 SARS-COV-2 COVID19 W/OPTIC: CPT | Mod: QW,S$GLB,, | Performed by: NURSE PRACTITIONER

## 2025-08-16 RX ORDER — AMOXICILLIN 500 MG/1
500 CAPSULE ORAL 3 TIMES DAILY
Qty: 21 CAPSULE | Refills: 0 | Status: SHIPPED | OUTPATIENT
Start: 2025-08-16 | End: 2025-08-23